# Patient Record
Sex: FEMALE | Race: WHITE | HISPANIC OR LATINO | Employment: OTHER | URBAN - METROPOLITAN AREA
[De-identification: names, ages, dates, MRNs, and addresses within clinical notes are randomized per-mention and may not be internally consistent; named-entity substitution may affect disease eponyms.]

---

## 2017-02-15 ENCOUNTER — OFFICE VISIT (OUTPATIENT)
Dept: INTERNAL MEDICINE | Facility: CLINIC | Age: 82
End: 2017-02-15
Payer: MEDICARE

## 2017-02-15 VITALS
WEIGHT: 122.81 LBS | DIASTOLIC BLOOD PRESSURE: 70 MMHG | RESPIRATION RATE: 18 BRPM | HEIGHT: 60 IN | BODY MASS INDEX: 24.11 KG/M2 | TEMPERATURE: 97 F | SYSTOLIC BLOOD PRESSURE: 134 MMHG | HEART RATE: 76 BPM

## 2017-02-15 DIAGNOSIS — J40 BRONCHITIS: Primary | ICD-10-CM

## 2017-02-15 DIAGNOSIS — E78.5 HYPERLIPIDEMIA, UNSPECIFIED HYPERLIPIDEMIA TYPE: ICD-10-CM

## 2017-02-15 DIAGNOSIS — M81.0 OSTEOPOROSIS: ICD-10-CM

## 2017-02-15 PROCEDURE — 99214 OFFICE O/P EST MOD 30 MIN: CPT | Mod: S$PBB,,, | Performed by: INTERNAL MEDICINE

## 2017-02-15 PROCEDURE — 99999 PR PBB SHADOW E&M-EST. PATIENT-LVL III: CPT | Mod: PBBFAC,,, | Performed by: INTERNAL MEDICINE

## 2017-02-15 PROCEDURE — 99213 OFFICE O/P EST LOW 20 MIN: CPT | Mod: PBBFAC,PO | Performed by: INTERNAL MEDICINE

## 2017-02-15 RX ORDER — IBUPROFEN 600 MG/1
600 TABLET ORAL EVERY 6 HOURS PRN
Qty: 60 TABLET | Refills: 5 | Status: SHIPPED | OUTPATIENT
Start: 2017-02-15 | End: 2017-07-27 | Stop reason: SDUPTHER

## 2017-02-15 RX ORDER — SIMVASTATIN 40 MG/1
40 TABLET, FILM COATED ORAL NIGHTLY
Qty: 90 TABLET | Refills: 3 | Status: SHIPPED | OUTPATIENT
Start: 2017-02-15 | End: 2018-04-11 | Stop reason: SDUPTHER

## 2017-02-15 RX ORDER — ALBUTEROL SULFATE 90 UG/1
1-2 AEROSOL, METERED RESPIRATORY (INHALATION) EVERY 6 HOURS PRN
Qty: 1 EACH | Refills: 0 | Status: ON HOLD | OUTPATIENT
Start: 2017-02-15 | End: 2019-02-19 | Stop reason: HOSPADM

## 2017-02-15 RX ORDER — AZITHROMYCIN 250 MG/1
TABLET, FILM COATED ORAL
Qty: 6 TABLET | Refills: 0 | Status: SHIPPED | OUTPATIENT
Start: 2017-02-15 | End: 2017-02-24

## 2017-02-15 NOTE — PROGRESS NOTES
Subjective:       Patient ID: Vicki Peña is a 81 y.o. female.    Chief Complaint: Cough (had flu 2 weeks ago and is still having a productive cough) and Insomnia    HPI     81-year-old female here for evaluation of a cough and insomnia.    She has a lot of coughing, especially when she is trying to go to sleep.  This started 2 weeks ago.  She thinks she had the flu.  Her nose was running and had a headache.  She felt chest congestion as well.  No fevers or chills.  She had sinus pressure before.  The cough is a little dry.  Before, she was taking robitussin.  She feels like she has congestion in her chest.  No SOB.  Her daughter is sick with similar symptoms.  She has taken ibuprofen for the pain in her head when she had sinus pressure.  The cough is keeping her from sleeping.    Review of Systems    Objective:      Physical Exam   Constitutional: She is oriented to person, place, and time. She appears well-developed and well-nourished.   HENT:   Head: Normocephalic and atraumatic.   Right Ear: Tympanic membrane and ear canal normal.   Left Ear: Tympanic membrane and ear canal normal.   Mouth/Throat: No oropharyngeal exudate.   Eyes: EOM are normal. Pupils are equal, round, and reactive to light. Right eye exhibits no discharge. Left eye exhibits no discharge. No scleral icterus.   Neck: Normal range of motion. Neck supple. No tracheal deviation present. No thyromegaly present.   Cardiovascular: Normal rate, regular rhythm and normal heart sounds.  Exam reveals no gallop and no friction rub.    No murmur heard.  Pulmonary/Chest: Effort normal and breath sounds normal. No respiratory distress. She has no wheezes. She has no rales. She exhibits no tenderness.   Abdominal: Soft. Bowel sounds are normal. She exhibits no distension and no mass. There is no tenderness. There is no rebound and no guarding.   Musculoskeletal: Normal range of motion. She exhibits no edema or tenderness.   Neurological: She is alert and  oriented to person, place, and time.   Skin: Skin is warm and dry. No rash noted. No erythema. No pallor.   Psychiatric: She has a normal mood and affect. Her behavior is normal.   Vitals reviewed.      Assessment:       1. Bronchitis    2. Hyperlipidemia, unspecified hyperlipidemia type    3. Osteoporosis        Plan:       1.  Z-Clement prescribed.  Albuterol prescribed.  2.  Simvastatin 40 mg.  3.  Continue with Boniva.  Check DEXA scan.

## 2017-02-16 ENCOUNTER — TELEPHONE (OUTPATIENT)
Dept: INTERNAL MEDICINE | Facility: CLINIC | Age: 82
End: 2017-02-16

## 2017-02-16 ENCOUNTER — LAB VISIT (OUTPATIENT)
Dept: LAB | Facility: HOSPITAL | Age: 82
End: 2017-02-16
Attending: INTERNAL MEDICINE
Payer: MEDICARE

## 2017-02-16 DIAGNOSIS — E87.5 HYPERKALEMIA: Primary | ICD-10-CM

## 2017-02-16 DIAGNOSIS — E78.5 HYPERLIPIDEMIA, UNSPECIFIED HYPERLIPIDEMIA TYPE: ICD-10-CM

## 2017-02-16 LAB
ALBUMIN SERPL BCP-MCNC: 3.4 G/DL
ALP SERPL-CCNC: 147 U/L
ALT SERPL W/O P-5'-P-CCNC: 24 U/L
ANION GAP SERPL CALC-SCNC: 8 MMOL/L
AST SERPL-CCNC: 26 U/L
BILIRUB SERPL-MCNC: 0.5 MG/DL
BUN SERPL-MCNC: 11 MG/DL
CALCIUM SERPL-MCNC: 9.5 MG/DL
CHLORIDE SERPL-SCNC: 106 MMOL/L
CHOLEST/HDLC SERPL: 2.1 {RATIO}
CO2 SERPL-SCNC: 25 MMOL/L
CREAT SERPL-MCNC: 0.8 MG/DL
EST. GFR  (AFRICAN AMERICAN): >60 ML/MIN/1.73 M^2
EST. GFR  (NON AFRICAN AMERICAN): >60 ML/MIN/1.73 M^2
GLUCOSE SERPL-MCNC: 86 MG/DL
HDL/CHOLESTEROL RATIO: 48.1 %
HDLC SERPL-MCNC: 162 MG/DL
HDLC SERPL-MCNC: 78 MG/DL
LDLC SERPL CALC-MCNC: 71.6 MG/DL
NONHDLC SERPL-MCNC: 84 MG/DL
POTASSIUM SERPL-SCNC: 5.5 MMOL/L
PROT SERPL-MCNC: 6.6 G/DL
SODIUM SERPL-SCNC: 139 MMOL/L
TRIGL SERPL-MCNC: 62 MG/DL

## 2017-02-16 PROCEDURE — 80061 LIPID PANEL: CPT

## 2017-02-16 PROCEDURE — 36415 COLL VENOUS BLD VENIPUNCTURE: CPT | Mod: PO

## 2017-02-16 PROCEDURE — 80053 COMPREHEN METABOLIC PANEL: CPT

## 2017-02-16 NOTE — TELEPHONE ENCOUNTER
Cholesterol is normal.  Kidney/liver function are normal.  Potassium is elevated.  Need to recheck potassium in the next couple days.

## 2017-02-24 ENCOUNTER — LAB VISIT (OUTPATIENT)
Dept: LAB | Facility: HOSPITAL | Age: 82
End: 2017-02-24
Attending: INTERNAL MEDICINE
Payer: MEDICARE

## 2017-02-24 ENCOUNTER — OFFICE VISIT (OUTPATIENT)
Dept: INTERNAL MEDICINE | Facility: CLINIC | Age: 82
End: 2017-02-24
Payer: MEDICARE

## 2017-02-24 VITALS
WEIGHT: 123.88 LBS | TEMPERATURE: 98 F | SYSTOLIC BLOOD PRESSURE: 138 MMHG | DIASTOLIC BLOOD PRESSURE: 72 MMHG | RESPIRATION RATE: 14 BRPM | BODY MASS INDEX: 24.32 KG/M2 | HEIGHT: 60 IN | HEART RATE: 82 BPM

## 2017-02-24 DIAGNOSIS — E87.5 HYPERKALEMIA: ICD-10-CM

## 2017-02-24 DIAGNOSIS — M79.671 RIGHT FOOT PAIN: ICD-10-CM

## 2017-02-24 DIAGNOSIS — M19.90 ARTHRITIS: ICD-10-CM

## 2017-02-24 DIAGNOSIS — M81.0 OSTEOPOROSIS: Primary | ICD-10-CM

## 2017-02-24 LAB
ANION GAP SERPL CALC-SCNC: 6 MMOL/L
BUN SERPL-MCNC: 16 MG/DL
CALCIUM SERPL-MCNC: 9.2 MG/DL
CHLORIDE SERPL-SCNC: 105 MMOL/L
CO2 SERPL-SCNC: 26 MMOL/L
CREAT SERPL-MCNC: 0.8 MG/DL
EST. GFR  (AFRICAN AMERICAN): >60 ML/MIN/1.73 M^2
EST. GFR  (NON AFRICAN AMERICAN): >60 ML/MIN/1.73 M^2
GLUCOSE SERPL-MCNC: 88 MG/DL
POTASSIUM SERPL-SCNC: 4.4 MMOL/L
SODIUM SERPL-SCNC: 137 MMOL/L

## 2017-02-24 PROCEDURE — 99999 PR PBB SHADOW E&M-EST. PATIENT-LVL III: CPT | Mod: PBBFAC,,, | Performed by: INTERNAL MEDICINE

## 2017-02-24 PROCEDURE — 36415 COLL VENOUS BLD VENIPUNCTURE: CPT | Mod: PO

## 2017-02-24 PROCEDURE — 99214 OFFICE O/P EST MOD 30 MIN: CPT | Mod: S$PBB,,, | Performed by: INTERNAL MEDICINE

## 2017-02-24 PROCEDURE — 80048 BASIC METABOLIC PNL TOTAL CA: CPT

## 2017-02-24 RX ORDER — RISEDRONATE SODIUM 150 MG/1
150 TABLET, FILM COATED ORAL
Qty: 1 TABLET | Refills: 11 | Status: SHIPPED | OUTPATIENT
Start: 2017-02-24 | End: 2018-04-11 | Stop reason: SDUPTHER

## 2017-02-24 NOTE — PROGRESS NOTES
Subjective:       Patient ID: Vicki Peña is a 81 y.o. female.    Chief Complaint: Results and Foot Pain    HPI     81-year-old female here for follow-up of results and foot pain.  She had a DEXA scan showing osteoporosis.    She reports that she has trouble with her right foot.   She has a bump.  She has pain in her toes as well.  This has been going on a long time.  She has pressure in her shoes causing pain.    She complains of arthritis in her knees.  The right is worse than the left.   She can exercise.  She was told that she needs surgery.  She is worried about surgery.    Review of Systems    Objective:      Physical Exam   Constitutional: She is oriented to person, place, and time. She appears well-developed and well-nourished.   HENT:   Head: Normocephalic and atraumatic.   Mouth/Throat: No oropharyngeal exudate.   Eyes: EOM are normal. Pupils are equal, round, and reactive to light. Right eye exhibits no discharge. Left eye exhibits no discharge. No scleral icterus.   Neck: Normal range of motion. Neck supple. No tracheal deviation present. No thyromegaly present.   Cardiovascular: Normal rate, regular rhythm and normal heart sounds.  Exam reveals no gallop and no friction rub.    No murmur heard.  Pulmonary/Chest: Effort normal and breath sounds normal. No respiratory distress. She has no wheezes. She has no rales. She exhibits no tenderness.   Abdominal: Soft. Bowel sounds are normal. She exhibits no distension and no mass. There is no tenderness. There is no rebound and no guarding.   Musculoskeletal: Normal range of motion. She exhibits no edema or tenderness.   Neurological: She is alert and oriented to person, place, and time.   Skin: Skin is warm and dry. No rash noted. No erythema. No pallor.   Psychiatric: She has a normal mood and affect. Her behavior is normal.   Vitals reviewed.      Assessment:       1. Osteoporosis    2. Right foot pain    3. Arthritis    4. Hyperkalemia        Plan:        1.  Actonel 150 mg monthly.  2.  Refer to podiatry.  Recommend wider shoes.  3.  Recommend patient continue with exercise and living her life.  When this becomes difficult, would recommend surgery.  4.  Check BMP.

## 2017-02-27 ENCOUNTER — OFFICE VISIT (OUTPATIENT)
Dept: PODIATRY | Facility: CLINIC | Age: 82
End: 2017-02-27
Payer: MEDICARE

## 2017-02-27 ENCOUNTER — HOSPITAL ENCOUNTER (OUTPATIENT)
Dept: RADIOLOGY | Facility: HOSPITAL | Age: 82
Discharge: HOME | End: 2017-02-27
Attending: PODIATRIST
Payer: MEDICARE

## 2017-02-27 VITALS
HEIGHT: 60 IN | HEART RATE: 70 BPM | WEIGHT: 123 LBS | DIASTOLIC BLOOD PRESSURE: 76 MMHG | BODY MASS INDEX: 24.15 KG/M2 | SYSTOLIC BLOOD PRESSURE: 151 MMHG

## 2017-02-27 DIAGNOSIS — M20.41 HAMMER TOE OF RIGHT FOOT: ICD-10-CM

## 2017-02-27 DIAGNOSIS — M21.611 BUNION OF GREAT TOE OF RIGHT FOOT: ICD-10-CM

## 2017-02-27 DIAGNOSIS — M79.671 RIGHT FOOT PAIN: ICD-10-CM

## 2017-02-27 DIAGNOSIS — M79.671 RIGHT FOOT PAIN: Primary | ICD-10-CM

## 2017-02-27 DIAGNOSIS — L84 CORN OR CALLUS: ICD-10-CM

## 2017-02-27 PROCEDURE — 73630 X-RAY EXAM OF FOOT: CPT | Mod: 26,RT,, | Performed by: RADIOLOGY

## 2017-02-27 PROCEDURE — 99203 OFFICE O/P NEW LOW 30 MIN: CPT | Mod: S$PBB,,, | Performed by: PODIATRIST

## 2017-02-27 PROCEDURE — 99999 PR PBB SHADOW E&M-EST. PATIENT-LVL III: CPT | Mod: PBBFAC,,, | Performed by: PODIATRIST

## 2017-02-27 PROCEDURE — 73630 X-RAY EXAM OF FOOT: CPT | Mod: TC,PO,RT

## 2017-02-27 NOTE — PROGRESS NOTES
Subjective:    Patient ID: Vicki Peña is a 81 y.o. female.    Chief Complaint: Foot Pain (right foot /between toes)      HPI:   Vicki is a 81 y.o. female who presents to the podiatry clinic  with complaint of  right foot pain. Onset of the symptoms was several years ago. Precipitating event: none known. Current symptoms include: stiffness, swelling and worsening symptoms after a period of activity. Aggravating factors: any weight bearing. Symptoms have gradually worsened. Patient has had no prior foot problems. Evaluation to date: none. Treatment to date: none. Patients rates pain 5/10 on pain scale.  Of note - pt had dexa scan showing osteoporosis.    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     Social History Main Topics    Smoking status: Never Smoker    Smokeless tobacco: Never Used    Alcohol use No      Comment: moderate    Drug use: No    Sexual activity: Yes     Partners: Male     Other Topics Concern    Not on file     Social History Narrative         Current Outpatient Prescriptions:     albuterol 90 mcg/actuation inhaler, Inhale 1-2 puffs into the lungs every 6 (six) hours as needed for Wheezing., Disp: 1 each, Rfl: 0    ascorbic acid (VITAMIN C) 500 MG tablet, Take 1 tablet (500 mg total) by  mouth 2 (two) times daily., Disp: , Rfl:     cranberry 400 mg Cap, Take by mouth.  , Disp: , Rfl:     GLUCOSAMINE SULFATE (GLUCOSAMINE ORAL), Take 2 tablets by mouth once daily., Disp: , Rfl:     ibuprofen (ADVIL,MOTRIN) 600 MG tablet, Take 1 tablet (600 mg total) by mouth every 6 (six) hours as needed for Pain., Disp: 60 tablet, Rfl: 5    multivitamin capsule, Take 1 capsule by mouth once daily.  , Disp: , Rfl:     naproxen (NAPROSYN) 500 MG tablet, Take 1 tablet (500 mg total) by mouth 2 (two) times daily., Disp: 60 tablet, Rfl: 1    risedronate (ACTONEL) 150 MG Tab, Take 1 tablet (150 mg total) by mouth every 30 days. with water on empty stomach, nothing by mouth or lie down for next 30 minutes., Disp: 1 tablet, Rfl: 11    simvastatin (ZOCOR) 40 MG tablet, Take 1 tablet (40 mg total) by mouth every evening., Disp: 90 tablet, Rfl: 3    tramadol (ULTRAM) 50 mg tablet, Take 1 tablet (50 mg total) by mouth nightly as needed for Pain., Disp: 30 tablet, Rfl: 0    estradiol (ESTRACE) 0.01 % (0.1 mg/gram) vaginal cream, Place 1 g vaginally every other day. Pea size amount 3 x week (M-W-F) at bedtime, Disp: 42.5 g, Rfl: 3    ibandronate (BONIVA) 150 mg tablet, Take 1 tablet (150 mg total) by mouth every 30 days., Disp: 1 tablet, Rfl: 11  Review of patient's allergies indicates:   Allergen Reactions    Tramadol Rash       BP (!) 151/76  Pulse 70  Ht 5' (1.524 m)  Wt 55.8 kg (123 lb)  BMI 24.02 kg/m2    ROS  ROS Constitutional:  General Appearance: well nourished  Vascular: negative for cramps, edema and bruising  Musculoskeletal: positive for joint pain, joint edema  Skin: negative for rashes and lesions  Neurological: negative for burning, tingling and numbness  Gastrointestinal: negative for stomach pain, nausea and vomiting        Objective:        Ortho/SPM Exam  Physical Exam  LE exam cont:  V: DP 2/4, PT 2/4, CRT< 3s to all digits tested.     N: SILT in SP/DP/T/Pepe/Saph distributions.     Derm: Skin  "intact. No erythema, edema or ecchymosis. +hyperkeratotic lesions "kissing lesions" toes 1-4    Ortho:  +Motor EHL/FHL/TA/GA   Lateral deviation of hallux R foot   Hammertoe deformity 2-5 R foot   Compartments soft/compressible. No pain on passive stretch of big toe. No calf  pain.        Assessment:     Imaging / Labs:          1. Right foot pain    2. Hammer toe of right foot    3. Bunion of great toe of right foot    4. Corn or callus          Plan:       Orders Placed This Encounter    X-Ray Foot Complete Right     Vicki was seen today for foot pain.    Diagnoses and all orders for this visit:    Right foot pain  -     X-Ray Foot Complete Right; Future    Hammer toe of right foot    Bunion of great toe of right foot    Corn or callus        Vicki Peña is a 81 y.o. female presenting w/   1. Right foot pain    2. Hammer toe of right foot    3. Bunion of great toe of right foot    4. Corn or callus        -pt seen, evaluated, and managed  -dx discussed in detail. All questions/concerns addressed  -all tx options discussed. All alternatives, risks, benefits of all txs discussed  -The patient was educated regarding the above diagnosis. We discussed conservative care options regarding shoe wear and/or padding.  -rxs dispensed: none  -offloading pads dispensed  -discussed bunion and hammertoe eitiology and txs  -rec'd wider shoegear  -rec'd OTC wart kits for callus  -XR on way out to assess deformity---> will review at nxt visit    Return in about 8 weeks (around 4/24/2017) for xr review.          "

## 2017-02-27 NOTE — LETTER
February 27, 2017      Yaron Waite MD  [address] [facility]  [place] [address]           [place] [facility]  [address] [facility] [address]  Phone: [phone]          Patient: Vicki Peña   MR Number: [id number]   YOB: 1935   Date of Visit: 2/27/2017       Dear Dr. Yaron Waite:    Thank you for referring Vicki Peña to me for evaluation. Attached you will find relevant portions of my assessment and plan of care.    If you have questions, please do not hesitate to call me. I look forward to following Vicki Peña along with you.    Sincerely,    Larry Odell Jr., DPBESS    Enclosure  CC:  No Recipients    If you would like to receive this communication electronically, please contact [email or web address] or [phone] to request more information on [employer] access.    For providers and/or their staff who would like to refer a patient to [facility], please contact us through our one-stop-shop provider referral line, [facility], at [phone].    If you feel you have received this communication in error or would no longer like to receive these types of communications, please e-mail [email or web address]

## 2017-02-27 NOTE — PATIENT INSTRUCTIONS
What Are Corns and Calluses?    Corns and calluses are your bodys response to friction or pressure against the skin. If your foot rubs the inside of your shoe, the affected area of skin thickens. Or, if a bone is not in the normal position, skin caught between bone and shoe or bone and ground builds up. In either case, the outer layer of skin thickens to protect the foot from unusual pressure. In many cases, corns and calluses look bad but are not harmful. However, more severe corns and calluses may become infected, destroy healthy tissue, or affect foot movement.    Corns  Corns usually grow on top of the foot, often at the toe joint. Corns can range from a slight thickening of skin to a painful soft or hard bump. They often form on top of buckled toe joints (hammer toes). If your toes curl under, corns may grow on the tips of the toes. You may also get a corn on the end of a toe if it rubs against your shoe. Corns can also grow between toes, often between the first and second toes.    Calluses  Calluses grow on the bottom of the foot or on the outer edge of a toe or heel. A callus may spread across the ball of your foot. This type of callus is usually due to a problem with a metatarsal (the long bone at the base of a toe, near the ball of the foot). A pinch callus may grow along the outer edge of the heel or the big toe. Some calluses press up into the foot instead of spreading on the outside. A callus may form a central core or plug of tissue where pressure is greatest.  Date Last Reviewed: 9/21/2015 © 2000-2016 [employer]. [address] [place] [address], [place] [address]. All rights reserved. This information is not intended as a substitute for professional medical care. Always follow your healthcare professional's instructions.        Treating Corns and Calluses  If your corns or calluses are mild, reducing friction may help. Different shoes, moleskin patches, or soft pads may be all the treatment  you need. In more severe cases, treating tissue buildup may require your doctors care. Sometimes custom-made shoe inserts (orthotics) or special pads are prescribed to reduce friction and pressure.    Change shoes  If you have corns, your doctor may suggest wearing shoes that have more toe room. This way, buckled joints are less likely to be pinched against the top of the shoe. If you have calluses, wearing a cushioned insole, arch support, or heel counter can help reduce friction.  Visit your doctor  In some cases, your doctor may trim away the outer layers of skin that make up the corn or callus. For a painful corn, medicine may be injected beneath the built-up tissue.  Wear orthotics  Orthotics are specially made to meet the needs of your feet. They cushion calluses or divert pressure away from these problem areas. Worn as directed, orthotics help limit existing problems and prevent new ones from forming.  If you need surgery  If a bone or joint is out of place, certain parts of your foot may be under too much pressure. This can cause severe corns and calluses. In such cases, surgery may be the best way to correct the problem.  Outpatient procedures  In most cases, surgery to improve bone position is an outpatient procedure. Your doctor may cut away excess bone, reposition prominent bones, or even fuse joints. Sometimes tendons or ligaments are cut to reduce tension on a bone or joint. Your doctor will talk with you about the procedure that is best suited to your needs.  Date Last Reviewed: 7/1/2016 © 2000-2016 [employer] [address] [place] [address], [place] [address]. All rights reserved. This information is not intended as a substitute for professional medical care. Always follow your healthcare professional's instructions.        Treating Mallet, Hammer, and Claw Toes  Definitions  A hammer toe has an abnormal bend in the middle joint of your toe (toe is bent upward at joint).  Mallet toe affects  the joint nearest your toenail (toe is bent downward at joint).  Claw toe affects the joint at the ball of your foot (toe is bent upward at joint), as well as both toe joints (toe is bent downward at both joints).  Hammer toe and mallet toe are most likely to occur in the toe next to your big toe.  Causes  The most common cause for all 3 deformities is poorly fitting shoes and tight shoes, especially high heels for women. Trauma and nerve damage from various diseases like diabetes may also cause these deformities.  Treatment  Buying shoes with more room in the toes, filing down corns and calluses, and padding, taping, or strapping the toe most often relieve the pain. Toe stretching and exercises may also be helpful. If these steps dont work, you may need surgery to straighten your toes.  Shoes  Buy low-heeled shoes with plenty of room in the front. This keeps your toes from being jammed against the end of your shoe. It also keeps your shoe from rubbing the tops of your toes.  Corns and calluses    To file down a corn or callus, soak your foot in warm water. This softens the hard skin. Dry your foot. Then gently rub the corn or callus with a pumice stone or nail file.  Pads and splints  If you still have pain, you may need to put a pad or splint on your toe. This helps take pressure off the painful corn or callus.  · For a mallet toe, you can put a gel pad on the toe. This keeps the tip of the toe from rubbing against the bottom of the shoe.  · For a hammer or claw toe, you can put a felt or foam pad over the bent joint. This keeps the toe from rubbing on the top of the shoe.  · For a hammer or claw toe that is still flexible, you can put a splint on the toe. This keeps it straight so it doesn't rub on the top of the shoe.    Date Last Reviewed: 9/29/2015 © 2000-2016 [employer]. [address] [place] [address], [place] [address]. All rights reserved. This information is not intended as a substitute for  professional medical care. Always follow your healthcare professional's instructions.          Bunion    You have a bunion. This is a bony bump at the base of your big toe, along the inside edge of your foot. As the bump gets bigger, it can become red, swollen, and painful with shoe wear.  Bunions may occur if you wear shoes that are too tight and pinch your toes together. High heels may make this worse. In some cases a bunion is due to poor alignment of the foot and ankle. This puts extra weight on the instep of each foot.  Once a bunion forms, it changes the way weight is spread all across your foot. This causes the bunion to get worse over time. The big toe will bend more and more toward the other toes.  A minor bunion can be treated by:  · Wearing properly fitting shoes  · Using bunion pads  · Wearing shoe inserts, called orthotics, to better align the foot and ankle  Physical therapy with ultrasound or whirlpool baths can ease pain, redness, and swelling. Severe cases may require surgery. If you dont treat what is causing the bunion, it may get larger and more painful.  Home care  · Limit high heels. These shoes force your foot forward, crowding the toes together.  · Switch to comfortable shoes with a wide toe area. Or have your existing shoes stretched by a shoe repair shop.  · Avoid shoes that are tight, narrow, or pointed.  · If you are flat-footed, using arch supports may help prevent further deformity. The best shoe inserts are the ones custom made by a foot specialist, called a podiatrist, or other healthcare provider.  · Put a bunion pad over the bunion to ease pressure of your shoe against the bunion. You can buy these pads at most pharmacies without a prescription  · To reduce pain and swelling, apply an ice pack over the injured area for 15 to 20 minutes. Do this every 1 to 2 hours the first day. Keep using ice 3 to 4 times a day until the pain and swelling goes away.  · To make an ice pack, put ice  "cubes in a sealed zip-lock plastic bag. Wrap the bag in a clean, thin towel or cloth. Never put ice or an ice pack directly on the skin.  · You may use over-the-counter pain medicine to control pain, unless another medicine was prescribed. Talk with your provider before using these medicines if you have chronic liver or kidney disease, or ever had a stomach ulcer or GI (gastrointestinal) bleeding.  Follow-up care  Follow up with a podiatrist or foot doctor, or as advised.  If X-rays were taken, you will be notified of any new findings that may affect your care.  When to seek medical care  Contact your healthcare provider if any of the following occur:  · Increasing pain or redness around the base of the big toe  · Painful ingrown toenail, with redness and swelling or pus around the nail  Date Last Reviewed: 11/21/2015  © 5454-4484 [employer]. [address] [place] [address]. All rights reserved. This information is not intended as a substitute for professional medical care. Always follow your healthcare professional's instructions.          Treating Bunions  Although a bunion wont go away, wearing shoes that fit properly will often relieve the pain. Padding and icing the bunion may also help. Bunions that remain painful may need surgery.     Heels: Heel height should be low. The back of the shoe should  your heel firmly so the shoe doesn't flop when you walk.         Toes: There should be 1/2" between your longest toe and the tip of the shoe. The shoe should be wide enough for you to wiggle your toes.    Shoes  To relieve a bunion, you dont have to buy shoes that are ugly or out of fashion. But follow these tips:  · Shop for shoes late in the day. This is when your feet are the largest.  · Have both feet measured often. Fit shoes to your larger foot.  · Look for shoes that have the same shape as your foot but are slightly wider across the toes.  · Choose low-heeled shoes.  · Always try " shoes on. Stand up and walk around. If the shoes arent comfortable, dont buy them.  Ice massage  To help relieve a painful bunion, put an ice cube in a plastic bag. Rub the ice on the bunion for 5 minutes. Repeat 2 to 3 times a day.  Pads  You may want to put a pad over the bunion to cushion it. You can buy bunion pads at [facility].  Surgery  Wearing wider shoes and padding the bunion may not relieve the pain. Your healthcare provider may then suggest surgery. During surgery, the bunion is shaved away and the bones are put back in a straight line.   Date Last Reviewed: 9/27/2015  © 6849-0550 [employer]. [address] [place] [address], [place] [address]. All rights reserved. This information is not intended as a substitute for professional medical care. Always follow your healthcare professional's instructions.            What Is Arthritis in the Foot?  Degenerative arthritis is a condition that slowly wears away joints, the area where bones meet and move. In the beginning, you may notice that the affected joint seems stiff. It may even ache. As the joint lining (cartilage) breaks down, the bones rub against each other, causing pain and swelling. Over time, small pieces of rough or splintered bone (bone spurs) develop, and the joints range of motion becomes limited. But movement doesnt have to cause pain. The effects of arthritis can be reduced.    The big-toe joint  When arthritis affects your big toe, your foot hurts when it pushes off the ground. Arthritis often appears in the big-toe joint along with a bunion (a bony bump at the side of the joint) or a bone spur on top of the joint.    Other joints  When arthritis affects the rear or midfoot joints, you feel pain when you put weight on your foot. Arthritis may affect the joint where the ankle and foot meet. It may also affect other joints nearby.  Date Last Reviewed: 7/1/2016  © 0617-4848 [employer]. [address] [place] [address],  [place] [address]. All rights reserved. This information is not intended as a substitute for professional medical care. Always follow your healthcare professional's instructions.        Treating Arthritis in the Foot  If your symptoms are mild, medications may be enough to reduce pain and swelling. For more severe arthritis, surgery may be needed to improve the condition of the joint.    Medicine  Your doctor may prescribe medicine--pills or injections--to limit pain and swelling. Ice, aspirin, acetaminophen, or ibuprofen may help relieve mild symptoms that occur after activity.  Surgery and bone trimming  To ease movement and reduce pain, your doctor may trim damaged bone. If arthritis is severe, the joint may be fused or removed. If the bone is not damaged too badly, your doctor may simply shave away bone spurs. Any excess bone growth related to a bunion may also be trimmed.  Fusing joints  If damage is more severe, your doctor may fuse the joint to prevent the bones from rubbing. Afterward, staples, plates, or screws may hold the bones in place so they heal properly. In some cases, the joint may be removed and replaced with an implant.  After surgery  During the early stages of recovery, your foot is likely to be bandaged and immobilized for a while. For best results, follow up with your doctor as scheduled. These visits help ensure that your foot heals properly.  As you heal  After surgery, youll be told how to care for your incision and how soon to begin walking on the foot. Until the foot can bear weight, you may need to walk with crutches or a cane.  For surgery on the big toe, your foot may be splinted to limit movement for several weeks. Despite this, you should be able to walk soon after surgery.  For surgery on rear or midfoot joints, you may need to wear a cast or surgical shoe. These joints are fairly large, so full recovery may take a few months. Once the bone has healed, any staples, plates, or  screws may be removed.  Date Last Reviewed: 7/1/2016  © 5819-1248 [employer]. [address] [place] [address], [place] [address]. All rights reserved. This information is not intended as a substitute for professional medical care. Always follow your healthcare professional's instructions.

## 2017-02-27 NOTE — MR AVS SNAPSHOT
[facility]   [facility]  [place] [address]  Phone: [phone]                  Vicki Peña   2017 7:45 AM   Office Visit    Description:  Female : 1935   Provider:  Larry Odell Jr., DPM   Department:  [facility]           Reason for Visit     Foot Pain           Diagnoses this Visit        Comments    Right foot pain    -  Primary            To Do List           Future Appointments        Provider Department Dept Phone    2017 8:00 AM Larry Odell Jr., DPM [facility] [phone]      Goals (5 Years of Data)     None      Follow-Up and Disposition     Return in about 8 weeks (around 2017) for xr review.      [facility]     [facility] On Call Nurse Care Line -  Assistance  Registered nurses in the [facility] Call Center provide clinical advisement, health education, appointment booking, and other advisory services.  Call for this free service at [phone].             Medications           Message regarding Medications     Verify the changes and/or additions to your medication regime listed below are the same as discussed with your clinician today.  If any of these changes or additions are incorrect, please notify your healthcare provider.             Verify that the below list of medications is an accurate representation of the medications you are currently taking.  If none reported, the list may be blank. If incorrect, please contact your healthcare provider. Carry this list with you in case of emergency.           Current Medications     albuterol 90 mcg/actuation inhaler Inhale 1-2 puffs into the lungs every 6 (six) hours as needed for Wheezing.    ascorbic acid (VITAMIN C) 500 MG tablet Take 1 tablet (500 mg total) by mouth 2 (two) times daily.    cranberry 400 mg Cap Take by mouth.      GLUCOSAMINE SULFATE (GLUCOSAMINE ORAL) Take 2 tablets by mouth once daily.    ibuprofen (ADVIL,MOTRIN) 600 MG tablet Take 1  tablet (600 mg total) by mouth every 6 (six) hours as needed for Pain.    multivitamin capsule Take 1 capsule by mouth once daily.      naproxen (NAPROSYN) 500 MG tablet Take 1 tablet (500 mg total) by mouth 2 (two) times daily.    risedronate (ACTONEL) 150 MG Tab Take 1 tablet (150 mg total) by mouth every 30 days. with water on empty stomach, nothing by mouth or lie down for next 30 minutes.    simvastatin (ZOCOR) 40 MG tablet Take 1 tablet (40 mg total) by mouth every evening.    tramadol (ULTRAM) 50 mg tablet Take 1 tablet (50 mg total) by mouth nightly as needed for Pain.    estradiol (ESTRACE) 0.01 % (0.1 mg/gram) vaginal cream Place 1 g vaginally every other day. Pea size amount 3 x week (M-W-F) at bedtime    ibandronate (BONIVA) 150 mg tablet Take 1 tablet (150 mg total) by mouth every 30 days.           Clinical Reference Information           Your Vitals Were     BP                   151/76           Blood Pressure          Most Recent Value    BP  (!)  151/76      Allergies as of 2/27/2017     Tramadol      Immunizations Administered on Date of Encounter - 2/27/2017     None      Orders Placed During Today's Visit     Future Labs/Procedures Expected by Expires    X-Ray Foot Complete Right  2/27/2017 2/27/2018      Instructions      What Is Arthritis in the Foot?  Degenerative arthritis is a condition that slowly wears away joints, the area where bones meet and move. In the beginning, you may notice that the affected joint seems stiff. It may even ache. As the joint lining (cartilage) breaks down, the bones rub against each other, causing pain and swelling. Over time, small pieces of rough or splintered bone (bone spurs) develop, and the joints range of motion becomes limited. But movement doesnt have to cause pain. The effects of arthritis can be reduced.    The big-toe joint  When arthritis affects your big toe, your foot hurts when it pushes off the ground. Arthritis often appears in the big-toe joint  along with a bunion (a bony bump at the side of the joint) or a bone spur on top of the joint.    Other joints  When arthritis affects the rear or midfoot joints, you feel pain when you put weight on your foot. Arthritis may affect the joint where the ankle and foot meet. It may also affect other joints nearby.  Date Last Reviewed: 7/1/2016  © 5739-1050 [employer]. [address] [place] [address], [place] [address]. All rights reserved. This information is not intended as a substitute for professional medical care. Always follow your healthcare professional's instructions.        Treating Arthritis in the Foot  If your symptoms are mild, medications may be enough to reduce pain and swelling. For more severe arthritis, surgery may be needed to improve the condition of the joint.    Medicine  Your doctor may prescribe medicine--pills or injections--to limit pain and swelling. Ice, aspirin, acetaminophen, or ibuprofen may help relieve mild symptoms that occur after activity.  Surgery and bone trimming  To ease movement and reduce pain, your doctor may trim damaged bone. If arthritis is severe, the joint may be fused or removed. If the bone is not damaged too badly, your doctor may simply shave away bone spurs. Any excess bone growth related to a bunion may also be trimmed.  Fusing joints  If damage is more severe, your doctor may fuse the joint to prevent the bones from rubbing. Afterward, staples, plates, or screws may hold the bones in place so they heal properly. In some cases, the joint may be removed and replaced with an implant.  After surgery  During the early stages of recovery, your foot is likely to be bandaged and immobilized for a while. For best results, follow up with your doctor as scheduled. These visits help ensure that your foot heals properly.  As you heal  After surgery, youll be told how to care for your incision and how soon to begin walking on the foot. Until the foot can bear weight, you  may need to walk with crutches or a cane.  For surgery on the big toe, your foot may be splinted to limit movement for several weeks. Despite this, you should be able to walk soon after surgery.  For surgery on rear or midfoot joints, you may need to wear a cast or surgical shoe. These joints are fairly large, so full recovery may take a few months. Once the bone has healed, any staples, plates, or screws may be removed.  Date Last Reviewed: 7/1/2016 © 2000-2016 [facility]. [address] [place] [address]. All rights reserved. This information is not intended as a substitute for professional medical care. Always follow your healthcare professional's instructions.             Language Assistance Services     ATTENTION: Language assistance services are available, free of charge. Please call [phone].      ATENCIÓN: Si rosa moralez, tiene a yang disposición servicios gratuitos de asistencia lingüística. Llame al [phone].     CHÚ Ý: N?u b?n nói Ti?ng Vi?t, có các d?ch v? h? tr? ngôn ng? mi?n phí dành cho b?n. G?i s? [phone].         [facility] complies with applicable Federal civil rights laws and does not discriminate on the basis of race, color, national origin, age, disability, or sex.

## 2017-07-27 ENCOUNTER — HOSPITAL ENCOUNTER (OUTPATIENT)
Dept: RADIOLOGY | Facility: HOSPITAL | Age: 82
Discharge: HOME | End: 2017-07-27
Attending: ORTHOPAEDIC SURGERY
Payer: MEDICARE

## 2017-07-27 ENCOUNTER — OFFICE VISIT (OUTPATIENT)
Dept: ORTHOPEDICS | Facility: CLINIC | Age: 82
End: 2017-07-27
Payer: MEDICARE

## 2017-07-27 VITALS — BODY MASS INDEX: 25.99 KG/M2 | HEIGHT: 60 IN | WEIGHT: 132.38 LBS

## 2017-07-27 DIAGNOSIS — M25.561 PAIN IN BOTH KNEES, UNSPECIFIED CHRONICITY: ICD-10-CM

## 2017-07-27 DIAGNOSIS — M25.562 PAIN IN BOTH KNEES, UNSPECIFIED CHRONICITY: ICD-10-CM

## 2017-07-27 DIAGNOSIS — M17.0 OSTEOARTHRITIS OF BOTH KNEES, UNSPECIFIED OSTEOARTHRITIS TYPE: Primary | ICD-10-CM

## 2017-07-27 PROCEDURE — 20610 DRAIN/INJ JOINT/BURSA W/O US: CPT | Mod: 50,S$PBB,, | Performed by: PHYSICIAN ASSISTANT

## 2017-07-27 PROCEDURE — 99999 PR PBB SHADOW E&M-EST. PATIENT-LVL III: CPT | Mod: PBBFAC,,, | Performed by: PHYSICIAN ASSISTANT

## 2017-07-27 PROCEDURE — 73564 X-RAY EXAM KNEE 4 OR MORE: CPT | Mod: 26,50,, | Performed by: RADIOLOGY

## 2017-07-27 PROCEDURE — 1159F MED LIST DOCD IN RCRD: CPT | Mod: ,,, | Performed by: PHYSICIAN ASSISTANT

## 2017-07-27 PROCEDURE — 1125F AMNT PAIN NOTED PAIN PRSNT: CPT | Mod: ,,, | Performed by: PHYSICIAN ASSISTANT

## 2017-07-27 PROCEDURE — 99213 OFFICE O/P EST LOW 20 MIN: CPT | Mod: 25,S$PBB,, | Performed by: PHYSICIAN ASSISTANT

## 2017-07-27 PROCEDURE — 73564 X-RAY EXAM KNEE 4 OR MORE: CPT | Mod: TC,50

## 2017-07-27 RX ORDER — METHYLPREDNISOLONE ACETATE 80 MG/ML
80 INJECTION, SUSPENSION INTRA-ARTICULAR; INTRALESIONAL; INTRAMUSCULAR; SOFT TISSUE
Status: COMPLETED | OUTPATIENT
Start: 2017-07-27 | End: 2017-07-27

## 2017-07-27 RX ORDER — IBUPROFEN 600 MG/1
600 TABLET ORAL EVERY 6 HOURS PRN
Qty: 60 TABLET | Refills: 2 | Status: SHIPPED | OUTPATIENT
Start: 2017-07-27 | End: 2018-07-02 | Stop reason: SDUPTHER

## 2017-07-27 RX ADMIN — METHYLPREDNISOLONE ACETATE 80 MG: 80 INJECTION, SUSPENSION INTRALESIONAL; INTRAMUSCULAR; INTRASYNOVIAL; SOFT TISSUE at 02:07

## 2017-07-27 NOTE — PROGRESS NOTES
Subjective:      Patient ID: Vicki Peña is a 82 y.o. female.    Chief Complaint: No chief complaint on file.    HPI  82 year old female presents with chief complaint of bilateral knee pain R>L. Right knee pain is chronic and she has h/o right knee surgery for meniscus tear in 2000. Left knee pain started in February. No trauma. Pain occurs with a lot of walking or exercising. Ibuprofen prn helps. She denies swelling. She does not use assistive devices.   Review of Systems   Constitution: Negative for chills, fever and night sweats.   Cardiovascular: Negative for chest pain.   Respiratory: Negative for cough and shortness of breath.    Hematologic/Lymphatic: Does not bruise/bleed easily.   Skin: Negative for color change.   Gastrointestinal: Negative for heartburn.   Genitourinary: Negative for dysuria.   Neurological: Negative for numbness and paresthesias.   Psychiatric/Behavioral: Negative for altered mental status.   Allergic/Immunologic: Negative for persistent infections.         Objective:            General    Vitals reviewed.  Constitutional: She is oriented to person, place, and time. She appears well-developed and well-nourished.   Cardiovascular: Normal rate.    Neurological: She is alert and oriented to person, place, and time.             Right Knee Exam:  ROM 0-100 degrees  +crepitus  No effusion  TTP medial joint line    Left Knee Exam:  ROM 0-110 degrees  +crepitus  No effusion  TTP medial joint line    X-ray: ordered and reviewed by myself. Advanced DJD both knees.      Assessment:       Encounter Diagnosis   Name Primary?    Osteoarthritis of both knees, unspecified osteoarthritis type Yes          Plan:       Discussed treatment options with patient. She is not interested in surgery. She would like to try an injection. Continue ibuprofen as needed. RTC prn.     PROCEDURE:  I have explained the risks, benefits, and alternatives of the procedure in detail.  The patient voices understanding and  all questions have been answered.  The patient agrees to proceed as planned. So after I performed a sterile prep of the skin in the normal fashion the bilateral knee is injected using a 22 gauge needle from the anteromedial approach with a combination of 4cc 1% plain lidocaine and 80 mg of depo medrol.  The patient is cautioned and immediate relief of pain is secondary to the local anesthetic and will be temporary.  After the anesthetic wears off there may be a increase in pain that may last for a few hours or a few days and they should use ice to help alleviate this flair up of pain.

## 2017-08-08 ENCOUNTER — OFFICE VISIT (OUTPATIENT)
Dept: INTERNAL MEDICINE | Facility: CLINIC | Age: 82
End: 2017-08-08
Payer: MEDICARE

## 2017-08-08 VITALS
DIASTOLIC BLOOD PRESSURE: 84 MMHG | HEART RATE: 63 BPM | SYSTOLIC BLOOD PRESSURE: 120 MMHG | TEMPERATURE: 97 F | BODY MASS INDEX: 23.82 KG/M2 | WEIGHT: 129.44 LBS | HEIGHT: 62 IN

## 2017-08-08 DIAGNOSIS — G47.00 INSOMNIA, UNSPECIFIED TYPE: ICD-10-CM

## 2017-08-08 DIAGNOSIS — F41.9 ANXIETY: Primary | ICD-10-CM

## 2017-08-08 PROCEDURE — 99213 OFFICE O/P EST LOW 20 MIN: CPT | Mod: PBBFAC,PO | Performed by: INTERNAL MEDICINE

## 2017-08-08 PROCEDURE — 3008F BODY MASS INDEX DOCD: CPT | Mod: ,,, | Performed by: INTERNAL MEDICINE

## 2017-08-08 PROCEDURE — 99214 OFFICE O/P EST MOD 30 MIN: CPT | Mod: S$PBB,,, | Performed by: INTERNAL MEDICINE

## 2017-08-08 PROCEDURE — 1159F MED LIST DOCD IN RCRD: CPT | Mod: ,,, | Performed by: INTERNAL MEDICINE

## 2017-08-08 PROCEDURE — 99999 PR PBB SHADOW E&M-EST. PATIENT-LVL III: CPT | Mod: PBBFAC,,, | Performed by: INTERNAL MEDICINE

## 2017-08-08 PROCEDURE — 1126F AMNT PAIN NOTED NONE PRSNT: CPT | Mod: ,,, | Performed by: INTERNAL MEDICINE

## 2017-08-08 RX ORDER — TRAZODONE HYDROCHLORIDE 50 MG/1
50 TABLET ORAL NIGHTLY
Qty: 30 TABLET | Refills: 11 | Status: ON HOLD | OUTPATIENT
Start: 2017-08-08 | End: 2019-02-03

## 2017-08-08 NOTE — PROGRESS NOTES
Subjective:       Patient ID: Vicki Peña is a 82 y.o. female.    Chief Complaint: Anxiety    HPI     82-year-old female here for evaluation of anxiety.  She is anxious about her daughter being in the hospital.  Her daughter was hospitalized for her BP was really high.  She is missing a plane trip right now, because of her anxiety surrounding the situation with her hospitalized daughter.  Her daughter has been discharged Sunday.  She is planning to fly out next week after making sure her daughter is ok. She has not tried anything for the anxiety.  Her sleep is poor.  She never takes a pill for her anxiety.  She thinks that when this acute event passes, she will feel better.  She was not sleeping well before her daughter went to the hospital.  Her appetite is decreased as well.  She does not sleep during the day.  She goes to sleep about 2 am and wakes up about 5 am.    Review of Systems    Objective:      Physical Exam   Constitutional: She is oriented to person, place, and time. She appears well-developed and well-nourished.   HENT:   Head: Normocephalic and atraumatic.   Mouth/Throat: No oropharyngeal exudate.   Eyes: EOM are normal. Pupils are equal, round, and reactive to light. Right eye exhibits no discharge. Left eye exhibits no discharge. No scleral icterus.   Neck: Normal range of motion. Neck supple. No tracheal deviation present. No thyromegaly present.   Cardiovascular: Normal rate, regular rhythm and normal heart sounds.  Exam reveals no gallop and no friction rub.    No murmur heard.  Pulmonary/Chest: Effort normal and breath sounds normal. No respiratory distress. She has no wheezes. She has no rales. She exhibits no tenderness.   Abdominal: Soft. Bowel sounds are normal. She exhibits no distension and no mass. There is no tenderness. There is no rebound and no guarding.   Musculoskeletal: Normal range of motion. She exhibits no edema or tenderness.   Neurological: She is alert and oriented to  person, place, and time.   Skin: Skin is warm and dry. No rash noted. No erythema. No pallor.   Psychiatric: She has a normal mood and affect. Her behavior is normal.   Vitals reviewed.      Assessment:       1. Anxiety    2. Insomnia, unspecified type        Plan:       1.  Likely situational related to daughter's health.  Expect this to resolve the starts health improves.  Gave patient note for airline to assist in rescheduling.  2.  Trazodone prescribed for sleep.

## 2017-10-04 ENCOUNTER — OFFICE VISIT (OUTPATIENT)
Dept: INTERNAL MEDICINE | Facility: CLINIC | Age: 82
End: 2017-10-04
Payer: MEDICARE

## 2017-10-04 VITALS
RESPIRATION RATE: 10 BRPM | WEIGHT: 127.19 LBS | DIASTOLIC BLOOD PRESSURE: 90 MMHG | HEART RATE: 70 BPM | BODY MASS INDEX: 23.4 KG/M2 | SYSTOLIC BLOOD PRESSURE: 160 MMHG | HEIGHT: 62 IN

## 2017-10-04 DIAGNOSIS — R03.0 ELEVATED BLOOD PRESSURE READING: ICD-10-CM

## 2017-10-04 DIAGNOSIS — F41.9 ANXIETY: Primary | ICD-10-CM

## 2017-10-04 PROCEDURE — 99213 OFFICE O/P EST LOW 20 MIN: CPT | Mod: S$PBB,,, | Performed by: INTERNAL MEDICINE

## 2017-10-04 PROCEDURE — 99213 OFFICE O/P EST LOW 20 MIN: CPT | Mod: PBBFAC,PO | Performed by: INTERNAL MEDICINE

## 2017-10-04 PROCEDURE — 99999 PR PBB SHADOW E&M-EST. PATIENT-LVL III: CPT | Mod: PBBFAC,,, | Performed by: INTERNAL MEDICINE

## 2017-10-04 NOTE — PROGRESS NOTES
Subjective:       Patient ID: Vicki Peña is a 82 y.o. female.    Chief Complaint: Anxiety    HPI     82-year-old female here to discuss travel plans around her anxiety.  She reports that she can fly after August 16th.  She needs the letter to read that she can fly after August 16th.    She is asking about her cholesterol.    She went to a fair last Saturday and was told her BP was a little high. Her blood pressure was 174 systolic.    HTN -  Patient is currently on no medication. She does not check her BP at home. Side effects of medications note: none. Denies headaches, blurred vision, chest pain, shortness of breath, nausea.    Review of Systems    Objective:      Physical Exam   Constitutional: She is oriented to person, place, and time. She appears well-developed and well-nourished.   HENT:   Head: Normocephalic and atraumatic.   Mouth/Throat: No oropharyngeal exudate.   Eyes: EOM are normal. Pupils are equal, round, and reactive to light. Right eye exhibits no discharge. Left eye exhibits no discharge. No scleral icterus.   Neck: Normal range of motion. Neck supple. No tracheal deviation present. No thyromegaly present.   Cardiovascular: Normal rate, regular rhythm and normal heart sounds.  Exam reveals no gallop and no friction rub.    No murmur heard.  Pulmonary/Chest: Effort normal and breath sounds normal. No respiratory distress. She has no wheezes. She has no rales. She exhibits no tenderness.   Abdominal: Soft. Bowel sounds are normal. She exhibits no distension and no mass. There is no tenderness. There is no rebound and no guarding.   Musculoskeletal: Normal range of motion. She exhibits no edema or tenderness.   Neurological: She is alert and oriented to person, place, and time.   Skin: Skin is warm and dry. No rash noted. No erythema. No pallor.   Psychiatric: She has a normal mood and affect. Her behavior is normal.   Vitals reviewed.      Assessment:       1. Anxiety    2. Elevated blood  pressure reading        Plan:       1.  Letter written for patient to rearrange travel plans.  2.  Patient to reassess blood pressure after she returns from [place].  Do not want to prescribe medication not have patient follow-up for 3 months.

## 2017-10-11 ENCOUNTER — OFFICE VISIT (OUTPATIENT)
Dept: OPHTHALMOLOGY | Facility: CLINIC | Age: 82
End: 2017-10-11
Payer: MEDICARE

## 2017-10-11 DIAGNOSIS — M35.01 KERATOCONJUNCTIVITIS SICCA OF BOTH EYES: ICD-10-CM

## 2017-10-11 PROCEDURE — 99213 OFFICE O/P EST LOW 20 MIN: CPT | Mod: PBBFAC | Performed by: OPHTHALMOLOGY

## 2017-10-11 PROCEDURE — 92012 INTRM OPH EXAM EST PATIENT: CPT | Mod: S$PBB,,, | Performed by: OPHTHALMOLOGY

## 2017-10-11 PROCEDURE — 99999 PR PBB SHADOW E&M-EST. PATIENT-LVL III: CPT | Mod: PBBFAC,,, | Performed by: OPHTHALMOLOGY

## 2017-10-11 NOTE — PATIENT INSTRUCTIONS
Systane Balance four times daily in both eyes.  Lubricating ointment or gel at bedtime.  Return to me as needed.

## 2017-10-11 NOTE — PROGRESS NOTES
HPI     Diego/Bashir pt  S/p phaco IOL OS 7/21/14   S/p phaco IOL OD 8/11/14   Patient states OS eye pain x week that comes and goes.  1 on pain scale.    Eye Drops:AT'S qid OU(PRN)    I have personally interviewed the patient, reviewed the history and   examined the patient and agree with the technician's exam.    Last edited by Jose F Spear MD on 10/11/2017 10:48 AM. (History)            Assessment /Plan     For exam results, see Encounter Report.    Keratoconjunctivitis sicca of both eyes      Ms. Peña is developing staining of the inferior cornea in her left eye indicating k sicca as the cause for her foreign body sensation. Recommend Systane Balance 4 times daily, lubricating ointment or gel at bedtime. Return to me as needed.

## 2018-04-04 ENCOUNTER — OFFICE VISIT (OUTPATIENT)
Dept: ORTHOPEDICS | Facility: CLINIC | Age: 83
End: 2018-04-04
Payer: MEDICARE

## 2018-04-04 VITALS — WEIGHT: 120.13 LBS | HEIGHT: 62 IN | BODY MASS INDEX: 22.11 KG/M2

## 2018-04-04 DIAGNOSIS — M17.0 OSTEOARTHRITIS OF BOTH KNEES, UNSPECIFIED OSTEOARTHRITIS TYPE: Primary | ICD-10-CM

## 2018-04-04 PROCEDURE — 20610 DRAIN/INJ JOINT/BURSA W/O US: CPT | Mod: 50,PBBFAC | Performed by: PHYSICIAN ASSISTANT

## 2018-04-04 PROCEDURE — 99999 PR PBB SHADOW E&M-EST. PATIENT-LVL III: CPT | Mod: PBBFAC,,, | Performed by: PHYSICIAN ASSISTANT

## 2018-04-04 PROCEDURE — 20610 DRAIN/INJ JOINT/BURSA W/O US: CPT | Mod: 50,S$PBB,, | Performed by: PHYSICIAN ASSISTANT

## 2018-04-04 PROCEDURE — 99213 OFFICE O/P EST LOW 20 MIN: CPT | Mod: PBBFAC | Performed by: PHYSICIAN ASSISTANT

## 2018-04-04 PROCEDURE — 99213 OFFICE O/P EST LOW 20 MIN: CPT | Mod: 25,S$PBB,, | Performed by: PHYSICIAN ASSISTANT

## 2018-04-04 RX ORDER — METHYLPREDNISOLONE ACETATE 80 MG/ML
80 INJECTION, SUSPENSION INTRA-ARTICULAR; INTRALESIONAL; INTRAMUSCULAR; SOFT TISSUE
Status: COMPLETED | OUTPATIENT
Start: 2018-04-04 | End: 2018-04-04

## 2018-04-04 RX ADMIN — METHYLPREDNISOLONE ACETATE 80 MG: 80 INJECTION, SUSPENSION INTRALESIONAL; INTRAMUSCULAR; INTRASYNOVIAL; SOFT TISSUE at 04:04

## 2018-04-04 NOTE — PROGRESS NOTES
Subjective:      Patient ID: Vicki Peña is a 82 y.o. female.    Chief Complaint: No chief complaint on file.    HPI  Patient returns to clinic with chief complaint of bilateral knee pain R>L. She has a known h/o OA. She received cortisone injections last July that gave her relief for 3 months. She takes ibuprofen prn and says it helps. She does not use assistive devices.   Review of Systems   Constitution: Negative for chills, fever and night sweats.   Cardiovascular: Negative for chest pain.   Respiratory: Negative for cough and shortness of breath.    Hematologic/Lymphatic: Does not bruise/bleed easily.   Skin: Negative for color change.   Gastrointestinal: Negative for heartburn.   Genitourinary: Negative for dysuria.   Neurological: Negative for numbness and paresthesias.   Psychiatric/Behavioral: Negative for altered mental status.   Allergic/Immunologic: Negative for persistent infections.         Objective:            General    Vitals reviewed.  Constitutional: She is oriented to person, place, and time. She appears well-developed and well-nourished.   Cardiovascular: Normal rate.    Neurological: She is alert and oriented to person, place, and time.             Bilateral Knee Exam:  ROM 0-120 degrees  +crepitus  No effusion  TTP medial joint line      X-ray: reviewed by myself. Advanced DJD both knees.      Assessment:       Encounter Diagnosis   Name Primary?    Osteoarthritis of both knees, unspecified osteoarthritis type Yes          Plan:       Discussed treatment options with patient. She would like to discuss possible surgery but not at this time. She would like a cortisone injection in both knees for now. Will schedule a consult with one of the knee surgeons in about 2 months.     PROCEDURE:  I have explained the risks, benefits, and alternatives of the procedure in detail.  The patient voices understanding and all questions have been answered.  The patient agrees to proceed as planned. So after  I performed a sterile prep of the skin in the normal fashion the bilateral knee is injected using a 22 gauge needle from the anteromedial approach with a combination of 4cc 1% plain lidocaine and 80 mg of depo medrol.  The patient is cautioned and immediate relief of pain is secondary to the local anesthetic and will be temporary.  After the anesthetic wears off there may be a increase in pain that may last for a few hours or a few days and they should use ice to help alleviate this flair up of pain.

## 2018-04-11 ENCOUNTER — OFFICE VISIT (OUTPATIENT)
Dept: INTERNAL MEDICINE | Facility: CLINIC | Age: 83
End: 2018-04-11
Payer: MEDICARE

## 2018-04-11 VITALS
TEMPERATURE: 98 F | RESPIRATION RATE: 16 BRPM | SYSTOLIC BLOOD PRESSURE: 137 MMHG | DIASTOLIC BLOOD PRESSURE: 67 MMHG | WEIGHT: 116.88 LBS | HEIGHT: 62 IN | BODY MASS INDEX: 21.51 KG/M2 | HEART RATE: 79 BPM

## 2018-04-11 DIAGNOSIS — M35.01 KERATOCONJUNCTIVITIS SICCA OF BOTH EYES: Chronic | ICD-10-CM

## 2018-04-11 DIAGNOSIS — M81.0 OSTEOPOROSIS, UNSPECIFIED OSTEOPOROSIS TYPE, UNSPECIFIED PATHOLOGICAL FRACTURE PRESENCE: ICD-10-CM

## 2018-04-11 DIAGNOSIS — I10 ESSENTIAL HYPERTENSION: Primary | ICD-10-CM

## 2018-04-11 DIAGNOSIS — E78.5 HYPERLIPIDEMIA, UNSPECIFIED HYPERLIPIDEMIA TYPE: Chronic | ICD-10-CM

## 2018-04-11 PROCEDURE — 99213 OFFICE O/P EST LOW 20 MIN: CPT | Mod: PBBFAC,PO | Performed by: INTERNAL MEDICINE

## 2018-04-11 PROCEDURE — 99214 OFFICE O/P EST MOD 30 MIN: CPT | Mod: S$PBB,,, | Performed by: INTERNAL MEDICINE

## 2018-04-11 PROCEDURE — 99999 PR PBB SHADOW E&M-EST. PATIENT-LVL III: CPT | Mod: PBBFAC,,, | Performed by: INTERNAL MEDICINE

## 2018-04-11 RX ORDER — RISEDRONATE SODIUM 150 MG/1
150 TABLET, FILM COATED ORAL
Qty: 1 TABLET | Refills: 11 | Status: ON HOLD | OUTPATIENT
Start: 2018-04-11 | End: 2019-02-03

## 2018-04-11 RX ORDER — SIMVASTATIN 40 MG/1
40 TABLET, FILM COATED ORAL NIGHTLY
Qty: 90 TABLET | Refills: 3 | Status: ON HOLD | OUTPATIENT
Start: 2018-04-11 | End: 2019-02-19 | Stop reason: HOSPADM

## 2018-04-11 RX ORDER — LOSARTAN POTASSIUM 50 MG/1
50 TABLET ORAL DAILY
Qty: 30 TABLET | Refills: 6 | Status: SHIPPED | OUTPATIENT
Start: 2018-04-11 | End: 2018-05-03

## 2018-04-12 ENCOUNTER — LAB VISIT (OUTPATIENT)
Dept: LAB | Facility: HOSPITAL | Age: 83
End: 2018-04-12
Attending: INTERNAL MEDICINE
Payer: MEDICARE

## 2018-04-12 DIAGNOSIS — M35.01 KERATOCONJUNCTIVITIS SICCA OF BOTH EYES: Chronic | ICD-10-CM

## 2018-04-12 DIAGNOSIS — E78.5 HYPERLIPIDEMIA, UNSPECIFIED HYPERLIPIDEMIA TYPE: Chronic | ICD-10-CM

## 2018-04-12 DIAGNOSIS — I10 ESSENTIAL HYPERTENSION: ICD-10-CM

## 2018-04-12 DIAGNOSIS — M81.0 OSTEOPOROSIS, UNSPECIFIED OSTEOPOROSIS TYPE, UNSPECIFIED PATHOLOGICAL FRACTURE PRESENCE: ICD-10-CM

## 2018-04-12 LAB
ALBUMIN SERPL BCP-MCNC: 3.7 G/DL
ALP SERPL-CCNC: 113 U/L
ALT SERPL W/O P-5'-P-CCNC: 25 U/L
ANION GAP SERPL CALC-SCNC: 7 MMOL/L
ANISOCYTOSIS BLD QL SMEAR: SLIGHT
AST SERPL-CCNC: 20 U/L
BASOPHILS # BLD AUTO: 0.02 K/UL
BASOPHILS NFR BLD: 0.7 %
BILIRUB SERPL-MCNC: 0.7 MG/DL
BUN SERPL-MCNC: 16 MG/DL
CALCIUM SERPL-MCNC: 9.6 MG/DL
CHLORIDE SERPL-SCNC: 104 MMOL/L
CHOLEST SERPL-MCNC: 184 MG/DL
CHOLEST/HDLC SERPL: 2 {RATIO}
CO2 SERPL-SCNC: 27 MMOL/L
CREAT SERPL-MCNC: 0.8 MG/DL
DIFFERENTIAL METHOD: ABNORMAL
EOSINOPHIL # BLD AUTO: 0.1 K/UL
EOSINOPHIL NFR BLD: 2.1 %
ERYTHROCYTE [DISTWIDTH] IN BLOOD BY AUTOMATED COUNT: 19.4 %
EST. GFR  (AFRICAN AMERICAN): >60 ML/MIN/1.73 M^2
EST. GFR  (NON AFRICAN AMERICAN): >60 ML/MIN/1.73 M^2
GIANT PLATELETS BLD QL SMEAR: PRESENT
GLUCOSE SERPL-MCNC: 91 MG/DL
HCT VFR BLD AUTO: 35.5 %
HDLC SERPL-MCNC: 90 MG/DL
HDLC SERPL: 48.9 %
HGB BLD-MCNC: 11.6 G/DL
LDLC SERPL CALC-MCNC: 82.6 MG/DL
LYMPHOCYTES # BLD AUTO: 1.1 K/UL
LYMPHOCYTES NFR BLD: 37.7 %
MCH RBC QN AUTO: 28.9 PG
MCHC RBC AUTO-ENTMCNC: 32.7 G/DL
MCV RBC AUTO: 89 FL
MONOCYTES # BLD AUTO: 0.5 K/UL
MONOCYTES NFR BLD: 17.6 %
NEUTROPHILS # BLD AUTO: 1.2 K/UL
NEUTROPHILS NFR BLD: 41.9 %
NONHDLC SERPL-MCNC: 94 MG/DL
PLATELET # BLD AUTO: 95 K/UL
PLATELET BLD QL SMEAR: ABNORMAL
PMV BLD AUTO: ABNORMAL FL
POIKILOCYTOSIS BLD QL SMEAR: SLIGHT
POTASSIUM SERPL-SCNC: 4.8 MMOL/L
PROT SERPL-MCNC: 6.8 G/DL
RBC # BLD AUTO: 4.01 M/UL
SODIUM SERPL-SCNC: 138 MMOL/L
TRIGL SERPL-MCNC: 57 MG/DL
TSH SERPL DL<=0.005 MIU/L-ACNC: 1.1 UIU/ML
WBC # BLD AUTO: 2.84 K/UL

## 2018-04-12 PROCEDURE — 84443 ASSAY THYROID STIM HORMONE: CPT

## 2018-04-12 PROCEDURE — 80061 LIPID PANEL: CPT

## 2018-04-12 PROCEDURE — 85025 COMPLETE CBC W/AUTO DIFF WBC: CPT

## 2018-04-12 PROCEDURE — 36415 COLL VENOUS BLD VENIPUNCTURE: CPT

## 2018-04-12 PROCEDURE — 80053 COMPREHEN METABOLIC PANEL: CPT

## 2018-04-20 ENCOUNTER — PATIENT MESSAGE (OUTPATIENT)
Dept: INTERNAL MEDICINE | Facility: CLINIC | Age: 83
End: 2018-04-20

## 2018-05-03 ENCOUNTER — OFFICE VISIT (OUTPATIENT)
Dept: INTERNAL MEDICINE | Facility: CLINIC | Age: 83
End: 2018-05-03
Payer: MEDICARE

## 2018-05-03 ENCOUNTER — HOSPITAL ENCOUNTER (OUTPATIENT)
Dept: RADIOLOGY | Facility: HOSPITAL | Age: 83
Discharge: HOME | End: 2018-05-03
Attending: INTERNAL MEDICINE
Payer: MEDICARE

## 2018-05-03 VITALS
WEIGHT: 114.88 LBS | BODY MASS INDEX: 21.14 KG/M2 | TEMPERATURE: 98 F | DIASTOLIC BLOOD PRESSURE: 71 MMHG | SYSTOLIC BLOOD PRESSURE: 153 MMHG | HEART RATE: 73 BPM | HEIGHT: 62 IN | RESPIRATION RATE: 16 BRPM

## 2018-05-03 DIAGNOSIS — M17.10 ARTHRITIS OF KNEE: ICD-10-CM

## 2018-05-03 DIAGNOSIS — R51.9 NONINTRACTABLE HEADACHE, UNSPECIFIED CHRONICITY PATTERN, UNSPECIFIED HEADACHE TYPE: Primary | ICD-10-CM

## 2018-05-03 DIAGNOSIS — I10 ESSENTIAL HYPERTENSION: Chronic | ICD-10-CM

## 2018-05-03 PROCEDURE — 99214 OFFICE O/P EST MOD 30 MIN: CPT | Mod: PBBFAC,PO,25 | Performed by: INTERNAL MEDICINE

## 2018-05-03 PROCEDURE — 73560 X-RAY EXAM OF KNEE 1 OR 2: CPT | Mod: 50,TC,PO

## 2018-05-03 PROCEDURE — 73560 X-RAY EXAM OF KNEE 1 OR 2: CPT | Mod: 26,50,, | Performed by: RADIOLOGY

## 2018-05-03 PROCEDURE — 99999 PR PBB SHADOW E&M-EST. PATIENT-LVL IV: CPT | Mod: PBBFAC,,, | Performed by: INTERNAL MEDICINE

## 2018-05-03 PROCEDURE — 99214 OFFICE O/P EST MOD 30 MIN: CPT | Mod: S$PBB,,, | Performed by: INTERNAL MEDICINE

## 2018-05-03 RX ORDER — AMLODIPINE BESYLATE 2.5 MG/1
2.5 TABLET ORAL DAILY
Qty: 30 TABLET | Refills: 11 | Status: ON HOLD | OUTPATIENT
Start: 2018-05-03 | End: 2019-02-03

## 2018-05-03 NOTE — PROGRESS NOTES
Subjective:       Patient ID: Vicki Peña is a 82 y.o. female.    Chief Complaint: Headache    HPI     82-year-old female here for evaluation of headache.  She feels it on the left side of her head.  She has had migraines in the past.  This started after her stroke.  She was in [place] for 6 months and had two and has been back and with more headaches.  She has stopped the blood pressure medication.  Her blood pressure has been between 130s-150s.  She reports that the pain on the left side of her head feels like a little pain in her eyes and ears.  When she does take the ibuprofen, the pain is not as bad.  She feels the pain for a bout 5 seconds, then it comes back. This happens when she does not take the ibuprofen in time.  If she does not take anything for this, it goes on for hours.    She has had chest pain on her left side.  This has happened twice.  She gets anxiety a lot.  It happened twice in the last week.  This pain lasts for a couple seconds and is gone.  Her niece passed away recently.  She has not walked as much as she used to.  She has arthritis of both knees.    Review of Systems    Objective:      Physical Exam   Constitutional: She is oriented to person, place, and time. She appears well-developed and well-nourished.   HENT:   Head: Normocephalic and atraumatic.   Mouth/Throat: No oropharyngeal exudate.   Eyes: EOM are normal. Pupils are equal, round, and reactive to light. Right eye exhibits no discharge. Left eye exhibits no discharge. No scleral icterus.   Neck: Normal range of motion. Neck supple. No tracheal deviation present. No thyromegaly present.   Cardiovascular: Normal rate, regular rhythm and normal heart sounds.  Exam reveals no gallop and no friction rub.    No murmur heard.  Pulmonary/Chest: Effort normal and breath sounds normal. No respiratory distress. She has no wheezes. She has no rales. She exhibits no tenderness.   Abdominal: Soft. Bowel sounds are normal. She exhibits no  distension and no mass. There is no tenderness. There is no rebound and no guarding.   Musculoskeletal: Normal range of motion. She exhibits no edema or tenderness.   Neurological: She is alert and oriented to person, place, and time.   Skin: Skin is warm and dry. No rash noted. No erythema. No pallor.   Psychiatric: She has a normal mood and affect. Her behavior is normal.   Vitals reviewed.      Assessment:       1. Nonintractable headache, unspecified chronicity pattern, unspecified headache type    2. Arthritis of knee    3. Essential hypertension        Plan:       1.  Think this is related to blood pressure.  Patient advised to use improve as needed.  2.  Refer to orthopedics for evaluation of possible operative intervention.  3.  Norvasc 2.5 mg daily prescribed.  Return to clinic in one month.  Sent message about blood pressures in 2 weeks.

## 2018-05-17 ENCOUNTER — OFFICE VISIT (OUTPATIENT)
Dept: ORTHOPEDICS | Facility: CLINIC | Age: 83
End: 2018-05-17
Payer: MEDICARE

## 2018-05-17 VITALS
HEIGHT: 62 IN | DIASTOLIC BLOOD PRESSURE: 74 MMHG | WEIGHT: 114 LBS | SYSTOLIC BLOOD PRESSURE: 138 MMHG | BODY MASS INDEX: 20.98 KG/M2

## 2018-05-17 DIAGNOSIS — M17.11 PRIMARY OSTEOARTHRITIS OF RIGHT KNEE: Primary | ICD-10-CM

## 2018-05-17 PROCEDURE — 99213 OFFICE O/P EST LOW 20 MIN: CPT | Mod: PBBFAC,PO,25 | Performed by: ORTHOPAEDIC SURGERY

## 2018-05-17 PROCEDURE — 99999 PR PBB SHADOW E&M-EST. PATIENT-LVL III: CPT | Mod: PBBFAC,,, | Performed by: ORTHOPAEDIC SURGERY

## 2018-05-17 PROCEDURE — 20610 DRAIN/INJ JOINT/BURSA W/O US: CPT | Mod: S$PBB,RT,, | Performed by: ORTHOPAEDIC SURGERY

## 2018-05-17 PROCEDURE — 20610 DRAIN/INJ JOINT/BURSA W/O US: CPT | Mod: PBBFAC,PO | Performed by: ORTHOPAEDIC SURGERY

## 2018-05-17 PROCEDURE — 99204 OFFICE O/P NEW MOD 45 MIN: CPT | Mod: 25,S$PBB,, | Performed by: ORTHOPAEDIC SURGERY

## 2018-05-17 RX ORDER — TRIAMCINOLONE ACETONIDE 40 MG/ML
40 INJECTION, SUSPENSION INTRA-ARTICULAR; INTRAMUSCULAR
Status: COMPLETED | OUTPATIENT
Start: 2018-05-17 | End: 2018-05-17

## 2018-05-17 RX ADMIN — TRIAMCINOLONE ACETONIDE 40 MG: 40 INJECTION, SUSPENSION INTRA-ARTICULAR; INTRAMUSCULAR at 11:05

## 2018-05-17 NOTE — PROCEDURES
Procedures       After explaining the procedure, the patient gave verbal consent for right knee aspiration. The sight was identified and the skin was aseptically prepped. The right knee was aspirated from asuperiorportal.  35 cc's of clear fluid was obtained.  After injection the joint was injected with 40 mg of triamcinolone and 1 cc of 1% plain Xylocaine.  A sterile dressing was applied.  The patient was instructed to call if there were any problems at the aspiration sight.

## 2018-05-17 NOTE — LETTER
May 17, 2018      Yaron Waite MD  [address] [facility]  [place] [address]           [place] [facility]  [address] [facility] [address]  Phone: [phone]          Patient: Vicki Peña   MR Number: [id number]   YOB: 1935   Date of Visit: 5/17/2018       Dear Dr. Yaron Waite:    Thank you for referring Vicki Peña to me for evaluation. Attached you will find relevant portions of my assessment and plan of care.    If you have questions, please do not hesitate to call me. I look forward to following Vicki Peña along with you.    Sincerely,    Dominguez Thompson MD    Enclosure  CC:  No Recipients    If you would like to receive this communication electronically, please contact [email or web address] or [phone] to request more information on [employer] [facility] access.    For providers and/or their staff who would like to refer a patient to [facility], please contact us through our one-stop-shop provider referral line, [facility] [place], at [phone].    If you feel you have received this communication in error or would no longer like to receive these types of communications, please e-mail [email or web address]

## 2018-05-17 NOTE — PROGRESS NOTES
Subjective:      Patient ID: Vicki Peña is a 83 y.o. female.    Chief Complaint: Right knee pain  HPI     Patient complains of many years right knee pain and swelling.  She has giving way.  Walking aggravates symptoms.  NSAIDs help a little.  Past injections were more helpful but recent injection was not  Review of Systems   Constitution: Negative for fever and weight loss.   HENT: Negative for congestion.    Eyes: Negative for visual disturbance.   Cardiovascular: Negative for chest pain.   Respiratory: Negative for shortness of breath.    Hematologic/Lymphatic: Negative for bleeding problem. Does not bruise/bleed easily.   Skin: Negative for poor wound healing.   Musculoskeletal: Positive for joint pain and joint swelling.   Gastrointestinal: Negative for abdominal pain.   Genitourinary: Negative for dysuria.   Neurological: Negative for seizures.   Psychiatric/Behavioral: Negative for altered mental status.   Allergic/Immunologic: Negative for persistent infections.         Objective:            Ortho/SPM Exam    Right Knee    Vitals:    05/17/18 1034   BP: 128/62       The patient is not in acute distress.   Body habitus is normal.   The patient walks with a limp.  Resisted SLR negative.   The skin over the knee is intact.  Knee effusion 3+  Tendernes is located presentmedial  Range of motion- Flexion 135 deg, Extension 0 deg,   Ligament exam:   MCL 2+ laxity   Lachman intact              Post sag intact    LCL intact  Patellar apprehension negative.  Popliteal cyst positive  Patellar crepitation present.  Flexion/pinch negative.  Pulses DP present, PT present.  Motor normal 5/5 strength in all tested muscle groups.   Sensory normal.    I reviewed the relevant radiographic images for the patient's condition: There is advanced medial narrowing and diffuse osteophyte formation      Vitals:    05/17/18 1034   BP: 128/62           Assessment:       No diagnosis found.         I explained my diagnostic impression  and the reasoning behind it in detail, using layman's terms.  Models and/or pictures were used to help in the explanation.  Plan:       There are no diagnoses linked to this encounter.    Aspiration and injection recommended-consent given    Brace    Cane    I explained the potential role of surgery in the treatment of this condition to the patient.  They understand that if nonsurgical measures do not adequately control symptoms, surgery will be considered in the future.    Patient may wish to consider at the end of the summer

## 2018-06-28 ENCOUNTER — OFFICE VISIT (OUTPATIENT)
Dept: ORTHOPEDICS | Facility: CLINIC | Age: 83
End: 2018-06-28
Payer: MEDICARE

## 2018-06-28 VITALS — BODY MASS INDEX: 20.98 KG/M2 | WEIGHT: 114 LBS | HEIGHT: 62 IN

## 2018-06-28 DIAGNOSIS — M17.0 PRIMARY OSTEOARTHRITIS OF BOTH KNEES: Primary | ICD-10-CM

## 2018-06-28 PROCEDURE — 99213 OFFICE O/P EST LOW 20 MIN: CPT | Mod: 25,S$PBB,, | Performed by: ORTHOPAEDIC SURGERY

## 2018-06-28 PROCEDURE — 99999 PR PBB SHADOW E&M-EST. PATIENT-LVL III: CPT | Mod: PBBFAC,,, | Performed by: ORTHOPAEDIC SURGERY

## 2018-06-28 PROCEDURE — 99213 OFFICE O/P EST LOW 20 MIN: CPT | Mod: PBBFAC,PO | Performed by: ORTHOPAEDIC SURGERY

## 2018-06-28 PROCEDURE — 20610 DRAIN/INJ JOINT/BURSA W/O US: CPT | Mod: 50,PBBFAC,PO | Performed by: ORTHOPAEDIC SURGERY

## 2018-06-28 PROCEDURE — 20610 DRAIN/INJ JOINT/BURSA W/O US: CPT | Mod: 50,S$PBB,, | Performed by: ORTHOPAEDIC SURGERY

## 2018-06-28 RX ORDER — TRIAMCINOLONE ACETONIDE 40 MG/ML
40 INJECTION, SUSPENSION INTRA-ARTICULAR; INTRAMUSCULAR
Status: COMPLETED | OUTPATIENT
Start: 2018-06-28 | End: 2018-06-28

## 2018-06-28 RX ADMIN — TRIAMCINOLONE ACETONIDE 40 MG: 40 INJECTION, SUSPENSION INTRA-ARTICULAR; INTRAMUSCULAR at 01:06

## 2018-06-28 NOTE — PROCEDURES
Procedures     After obtaining verbal informed consent the patient's right knee was prepped aseptically and injected through an inferior lateral approach using 40 mg of triamcinolone and 1 cc of 1% plain Xylocaine.  The patient was warned about postinjection flare and how to manage it with ice, rest and over-the-counter analgesics.  They're advised to contact me for any severe, uncontrolled pain.    After obtaining verbal informed consent the patient's left knee was prepped aseptically and injected through an inferior lateral approach using 40 mg of triamcinolone and 1 cc of 1% plain Xylocaine.  The patient was warned about postinjection flare and how to manage it with ice, rest and over-the-counter analgesics.  They're advised to contact me for any severe, uncontrolled pain.

## 2018-06-28 NOTE — PROGRESS NOTES
Subjective:      Patient ID: Vicki Peña is a 83 y.o. female.    Chief Complaint:  Bilateral knee pain  HPI     Follow-up for osteoarthritis.  The patient has received injections with good but transient relief.  She request reinjection.  She has recently had increasing pain with prolonged ambulation he  Review of Systems   Constitution: Negative for fever and weight loss.   HENT: Negative for congestion.    Eyes: Negative for visual disturbance.   Cardiovascular: Negative for chest pain.   Respiratory: Negative for shortness of breath.    Hematologic/Lymphatic: Negative for bleeding problem. Does not bruise/bleed easily.   Skin: Negative for poor wound healing.   Musculoskeletal: Positive for joint pain.   Gastrointestinal: Negative for abdominal pain.   Genitourinary: Negative for dysuria.   Neurological: Negative for seizures.   Psychiatric/Behavioral: Negative for altered mental status.   Allergic/Immunologic: Negative for persistent infections.         Objective:            Ortho/SPM Exam    Right Knee    There were no vitals filed for this visit.    The patient is not in acute distress.   Body habitus is normal.   The patient walks without a limp.  Resisted SLR negative.   The skin over the knee is intact.  Knee effusion trace  Tendernes is located absent  Range of motion- Flexion 125 deg, Extension 3 deg,   Ligament exam:   MCL 1+ laxity   Lachman intact              Post sag intact    LCL intact  Patellar apprehension negative.  Popliteal cyst positive  Patellar crepitation present.  Flexion/pinch negative.  Pulses DP present, PT present.  Motor normal 5/5 strength in all tested muscle groups.   Sensory normal.    Left Knee      There were no vitals filed for this visit.    This side is idententical to the contralateral side.          Assessment:       Encounter Diagnosis   Name Primary?    Primary osteoarthritis of both knees Yes          Plan:       Vicki was seen today for pain.    Diagnoses and all  orders for this visit:    Primary osteoarthritis of both knees      I explained my diagnostic impression and the reasoning behind it in detail, using layman's terms.  Models and/or pictures were used to help in the explanation.    Patient is leaving for [place] and would like palliative injection prior to being way for several months    I explained the potential role of surgery in the treatment of this condition to the patient.  They understand that if nonsurgical measures do not adequately control symptoms, surgery will be considered in the future.    X-ray follow-up

## 2018-06-28 NOTE — LETTER
June 28, 2018      Yaron Waite MD  [address] [facility]  [place] [address]           [place] [facility]  [address] [facility] [address]  Phone: [phone]          Patient: Vicki Peña   MR Number: [id number]   YOB: 1935   Date of Visit: 6/28/2018       Dear Dr. Yaron Waite:    Thank you for referring Vicki Peña to me for evaluation. Attached you will find relevant portions of my assessment and plan of care.    If you have questions, please do not hesitate to call me. I look forward to following Vicki Peña along with you.    Sincerely,    Dominguez Thompson MD    Enclosure  CC:  No Recipients    If you would like to receive this communication electronically, please contact [email or web address] or [phone] to request more information on [employer] Link access.    For providers and/or their staff who would like to refer a patient to [facility], please contact us through our one-stop-shop provider referral line, [facility], at [phone].    If you feel you have received this communication in error or would no longer like to receive these types of communications, please e-mail [email or web address]

## 2018-07-02 DIAGNOSIS — M17.0 OSTEOARTHRITIS OF BOTH KNEES, UNSPECIFIED OSTEOARTHRITIS TYPE: ICD-10-CM

## 2018-07-02 RX ORDER — IBUPROFEN 600 MG/1
600 TABLET ORAL EVERY 6 HOURS PRN
Qty: 60 TABLET | Refills: 2 | Status: ON HOLD | OUTPATIENT
Start: 2018-07-02 | End: 2019-02-19 | Stop reason: HOSPADM

## 2018-10-05 ENCOUNTER — PES CALL (OUTPATIENT)
Dept: ADMINISTRATIVE | Facility: CLINIC | Age: 83
End: 2018-10-05

## 2018-12-20 ENCOUNTER — HOSPITAL ENCOUNTER (OUTPATIENT)
Dept: RADIOLOGY | Facility: HOSPITAL | Age: 83
Discharge: HOME | End: 2018-12-20
Attending: ORTHOPAEDIC SURGERY
Payer: MEDICARE

## 2018-12-20 ENCOUNTER — OFFICE VISIT (OUTPATIENT)
Dept: ORTHOPEDICS | Facility: CLINIC | Age: 83
End: 2018-12-20
Payer: MEDICARE

## 2018-12-20 ENCOUNTER — TELEPHONE (OUTPATIENT)
Dept: ORTHOPEDICS | Facility: CLINIC | Age: 83
End: 2018-12-20

## 2018-12-20 VITALS — BODY MASS INDEX: 20.98 KG/M2 | WEIGHT: 114 LBS | HEIGHT: 62 IN

## 2018-12-20 DIAGNOSIS — M17.9 OSTEOARTHRITIS, KNEE: ICD-10-CM

## 2018-12-20 DIAGNOSIS — M17.11 PRIMARY OSTEOARTHRITIS OF RIGHT KNEE: Primary | ICD-10-CM

## 2018-12-20 DIAGNOSIS — M17.0 PRIMARY OSTEOARTHRITIS OF BOTH KNEES: ICD-10-CM

## 2018-12-20 DIAGNOSIS — M17.0 OSTEOARTHRITIS OF BOTH KNEES, UNSPECIFIED OSTEOARTHRITIS TYPE: Primary | ICD-10-CM

## 2018-12-20 PROCEDURE — 99213 OFFICE O/P EST LOW 20 MIN: CPT | Mod: PBBFAC,25,PN | Performed by: ORTHOPAEDIC SURGERY

## 2018-12-20 PROCEDURE — 73564 X-RAY EXAM KNEE 4 OR MORE: CPT | Mod: TC,50,PN

## 2018-12-20 PROCEDURE — 73564 X-RAY EXAM KNEE 4 OR MORE: CPT | Mod: 26,50,, | Performed by: RADIOLOGY

## 2018-12-20 PROCEDURE — 99999 PR PBB SHADOW E&M-EST. PATIENT-LVL III: CPT | Mod: PBBFAC,,, | Performed by: ORTHOPAEDIC SURGERY

## 2018-12-20 PROCEDURE — 99214 OFFICE O/P EST MOD 30 MIN: CPT | Mod: S$PBB,,, | Performed by: ORTHOPAEDIC SURGERY

## 2018-12-20 RX ORDER — SODIUM CHLORIDE 0.9 % (FLUSH) 0.9 %
3 SYRINGE (ML) INJECTION
Status: CANCELLED | OUTPATIENT
Start: 2018-12-20

## 2018-12-20 RX ORDER — ACETAMINOPHEN 325 MG/1
1000 TABLET ORAL
Status: CANCELLED | OUTPATIENT
Start: 2018-12-20 | End: 2018-12-20

## 2018-12-20 RX ORDER — MUPIROCIN 20 MG/G
OINTMENT TOPICAL
Status: CANCELLED | OUTPATIENT
Start: 2018-12-20

## 2018-12-20 RX ORDER — PREGABALIN 50 MG/1
150 CAPSULE ORAL
Status: CANCELLED | OUTPATIENT
Start: 2018-12-20 | End: 2018-12-20

## 2018-12-20 RX ORDER — NAPROXEN 250 MG/1
500 TABLET ORAL
Status: CANCELLED | OUTPATIENT
Start: 2018-12-20 | End: 2018-12-20

## 2018-12-20 NOTE — PROGRESS NOTES
Subjective:      Patient ID: Vicki Peña is a 83 y.o. female.    Chief Complaint: Pain of the Left Knee and Pain of the Right Knee    HPI the patient complains of ongoing pain in both knees.  Right side is worse.  She has crepitation and pseudo locking bilaterally. Although she continues to walk for exercise the right knee seems to be more limiting over time. Injection was minimally helpful and ibuprofen is also minimally helpful.    Review of Systems   Constitution: Negative for fever and weight loss.   HENT: Negative for congestion.    Eyes: Negative for visual disturbance.   Cardiovascular: Negative for chest pain.   Respiratory: Negative for shortness of breath.    Hematologic/Lymphatic: Negative for bleeding problem. Does not bruise/bleed easily.   Skin: Negative for poor wound healing.   Musculoskeletal: Positive for joint pain.   Gastrointestinal: Negative for abdominal pain.   Genitourinary: Negative for dysuria.   Neurological: Negative for seizures.   Psychiatric/Behavioral: Negative for altered mental status.   Allergic/Immunologic: Negative for persistent infections.         Objective:            Ortho/SPM Exam  Right knee  [unfilled]    The patient is not in acute distress.   Sclerae normal  Body habitus is normal.  Respiratory distress:  none   The patient walks with a limp.  Hip irritability  negative.   The skin over the knee is intact.  Knee effusion 1+  Tendernes is located none  Range of motion- Flexion 130 deg, Extension 0 deg,   Ligament laxity exam:   MCL 2+   Lachman negative   Post sag negative    LCL 0  Patellar apprehension negative.  Popliteal cyst positive  Patellar crepitation present.  Flexion/pinch not applicable  Pulses DP intact, PT intact.  Motor normal 5/5 strength in all tested muscle groups.   Sensory normal    The left knee has slightly less valgus laxity but is otherwise identical.    I reviewed the relevant radiographic images for the patient's condition:  Recent films of both  knees show advanced medial narrowing with no remaining joint space, osteophytes and patellofemoral degeneration.        Assessment:       Encounter Diagnosis   Name Primary?    Osteoarthritis of both knees, unspecified osteoarthritis type Yes          The condition is progressive and not controlled nonsurgically.  The right knee is more symptomatic.  Plan:       Vicki was seen today for pain and pain.    Diagnoses and all orders for this visit:    Osteoarthritis of both knees, unspecified osteoarthritis type      I explained my diagnostic impression and the reasoning behind it in detail, using layman's terms.  Models and/or pictures were used to help in the explanation.    Treatment options were discussed. The surgical process of right knee replacement was discussed in detail with the patient including a detailed discussion of the procedure itself (including visual model, x-ray review, and literature review). The typical perioperative and post-operative course was discussed and perioperative risks were discussed to the patient's satisfaction.  Risks and complications discussed included but were not limited to the risks of anesthetic complications, infection, bleeding, wound healing complications, stiffness, aseptic loosening, instability, limb length inequality, neurologic dysfunction including numbness and weakness, additional surgery,  DVT, pulmonary embolism, perioperative medical risks (cardiac, pulmonary, renal, neurologic), and death and the patient elects to proceed.

## 2018-12-27 DIAGNOSIS — M17.11 PRIMARY OSTEOARTHRITIS OF RIGHT KNEE: Primary | ICD-10-CM

## 2018-12-27 RX ORDER — ACETAMINOPHEN 325 MG/1
1000 TABLET ORAL
Status: CANCELLED | OUTPATIENT
Start: 2018-12-27 | End: 2018-12-27

## 2018-12-27 RX ORDER — MUPIROCIN 20 MG/G
OINTMENT TOPICAL
Status: CANCELLED | OUTPATIENT
Start: 2018-12-27

## 2018-12-27 RX ORDER — NAPROXEN 250 MG/1
500 TABLET ORAL
Status: CANCELLED | OUTPATIENT
Start: 2018-12-27 | End: 2018-12-27

## 2018-12-27 RX ORDER — SODIUM CHLORIDE 0.9 % (FLUSH) 0.9 %
3 SYRINGE (ML) INJECTION
Status: CANCELLED | OUTPATIENT
Start: 2018-12-27

## 2018-12-27 RX ORDER — PREGABALIN 50 MG/1
150 CAPSULE ORAL
Status: CANCELLED | OUTPATIENT
Start: 2018-12-27 | End: 2018-12-27

## 2019-01-04 ENCOUNTER — OFFICE VISIT (OUTPATIENT)
Dept: URGENT CARE | Facility: CLINIC | Age: 84
End: 2019-01-04
Payer: MEDICARE

## 2019-01-04 VITALS
RESPIRATION RATE: 19 BRPM | HEIGHT: 60 IN | OXYGEN SATURATION: 99 % | SYSTOLIC BLOOD PRESSURE: 125 MMHG | BODY MASS INDEX: 22.58 KG/M2 | HEART RATE: 72 BPM | DIASTOLIC BLOOD PRESSURE: 68 MMHG | TEMPERATURE: 98 F | WEIGHT: 115 LBS

## 2019-01-04 DIAGNOSIS — J40 BRONCHITIS: Primary | ICD-10-CM

## 2019-01-04 DIAGNOSIS — R05.9 COUGH: ICD-10-CM

## 2019-01-04 PROCEDURE — 99214 OFFICE O/P EST MOD 30 MIN: CPT | Mod: S$GLB,,, | Performed by: FAMILY MEDICINE

## 2019-01-04 PROCEDURE — 99214 PR OFFICE/OUTPT VISIT, EST, LEVL IV, 30-39 MIN: ICD-10-PCS | Mod: S$GLB,,, | Performed by: FAMILY MEDICINE

## 2019-01-04 RX ORDER — BENZONATATE 200 MG/1
200 CAPSULE ORAL 3 TIMES DAILY PRN
Qty: 30 CAPSULE | Refills: 0 | Status: SHIPPED | OUTPATIENT
Start: 2019-01-04 | End: 2019-01-14

## 2019-01-04 RX ORDER — ALBUTEROL SULFATE 90 UG/1
2 AEROSOL, METERED RESPIRATORY (INHALATION) EVERY 4 HOURS PRN
Qty: 1 INHALER | Refills: 0 | Status: SHIPPED | OUTPATIENT
Start: 2019-01-04 | End: 2019-01-26 | Stop reason: SDUPTHER

## 2019-01-04 RX ORDER — AMOXICILLIN AND CLAVULANATE POTASSIUM 875; 125 MG/1; MG/1
1 TABLET, FILM COATED ORAL 2 TIMES DAILY
Qty: 20 TABLET | Refills: 0 | Status: SHIPPED | OUTPATIENT
Start: 2019-01-04 | End: 2019-01-14

## 2019-01-04 NOTE — PROGRESS NOTES
Subjective:       Patient ID: Vicki Peña is a 83 y.o. female.    Vitals:  height is 5' (1.524 m) and weight is 52.2 kg (115 lb). Her oral temperature is 97.7 °F (36.5 °C). Her blood pressure is 125/68 and her pulse is 72. Her respiration is 19 and oxygen saturation is 99%.     Chief Complaint: URI    URI    This is a new problem. The current episode started 1 to 4 weeks ago (12/24/2018). The problem has been gradually worsening. Maximum temperature: unmeasured. Associated symptoms include congestion, coughing, ear pain, headaches, sinus pain and sneezing. Pertinent negatives include no abdominal pain, chest pain, diarrhea, dysuria, joint pain, joint swelling, nausea, neck pain, plugged ear sensation, rash, rhinorrhea, sore throat, swollen glands, vomiting or wheezing. She has tried antihistamine, decongestant and acetaminophen for the symptoms. The treatment provided mild relief.       Constitution: Negative for chills, sweating, fatigue and fever.   HENT: Positive for ear pain, congestion and sinus pain. Negative for sinus pressure, sore throat and voice change.    Neck: Negative for neck pain and painful lymph nodes.   Cardiovascular: Negative for chest pain.   Eyes: Negative for eye redness.   Respiratory: Positive for cough and sputum production. Negative for chest tightness, bloody sputum, COPD, shortness of breath, stridor, wheezing and asthma.    Gastrointestinal: Negative for abdominal pain, nausea, vomiting and diarrhea.   Genitourinary: Negative for dysuria.   Musculoskeletal: Negative for muscle ache.   Skin: Negative for rash and erythema.   Allergic/Immunologic: Positive for sneezing. Negative for seasonal allergies and asthma.   Neurological: Positive for headaches.   Hematologic/Lymphatic: Negative for swollen lymph nodes.       Objective:      Physical Exam   Constitutional: She is oriented to person, place, and time. She appears well-developed and well-nourished.   HENT:   Head: Normocephalic and  atraumatic.   Right Ear: External ear normal.   Left Ear: External ear normal.   Mouth/Throat: Oropharynx is clear and moist.   Eyes: EOM are normal. Pupils are equal, round, and reactive to light.   Neck: Normal range of motion. Neck supple. No thyromegaly present.   Cardiovascular: Normal rate, regular rhythm and normal heart sounds. Exam reveals no gallop and no friction rub.   No murmur heard.  Pulmonary/Chest: Breath sounds normal. No respiratory distress. She has no wheezes. She has no rales.   Abdominal: Soft. Bowel sounds are normal. She exhibits no distension. There is no tenderness. There is no rebound and no guarding.   Musculoskeletal: Normal range of motion. She exhibits no edema, tenderness or deformity.   Lymphadenopathy:     She has no cervical adenopathy.   Neurological: She is alert and oriented to person, place, and time. She displays normal reflexes. No cranial nerve deficit. She exhibits normal muscle tone. Coordination normal.   Skin: Skin is warm. Capillary refill takes less than 2 seconds. No rash noted. No erythema. No pallor.   Psychiatric: She has a normal mood and affect. Her behavior is normal. Judgment and thought content normal.   Vitals reviewed.      Assessment:       1. Bronchitis    2. Cough        Plan:         Bronchitis  -     amoxicillin-clavulanate 875-125mg (AUGMENTIN) 875-125 mg per tablet; Take 1 tablet by mouth 2 (two) times daily. for 10 days  Dispense: 20 tablet; Refill: 0  -     albuterol (PROVENTIL/VENTOLIN HFA) 90 mcg/actuation inhaler; Inhale 2 puffs into the lungs every 4 (four) hours as needed for Wheezing. Rescue  Dispense: 1 Inhaler; Refill: 0    Cough  -     benzonatate (TESSALON) 200 MG capsule; Take 1 capsule (200 mg total) by mouth 3 (three) times daily as needed for Cough.  Dispense: 30 capsule; Refill: 0          Patient Instructions       What Is Acute Bronchitis?  Acute bronchitis is when the airways in your lungs (bronchial tubes) become red and swollen  (inflamed). It is usually caused by a viral infection. But it can also occur because of a bacteria or allergen. Symptoms include a cough that produces yellow or greenish mucus and can last for days or sometimes weeks.  Inside healthy lungs    Air travels in and out of the lungs through the airways. The linings of these airways produce sticky mucus. This mucus traps particles that enter the lungs. Tiny structures called cilia then sweep the particles out of the airways.     Healthy airway: Airways are normally open. Air moves in and out easily.      Healthy cilia: Tiny, hairlike cilia sweep mucus and particles up and out of the airways.   Lungs with bronchitis  Bronchitis often occurs with a cold or the flu virus. The airways become inflamed (red and swollen). There is a deep hacking cough from the extra mucus. Other symptoms may include:  · Wheezing  · Chest discomfort  · Shortness of breath  · Mild fever  A second infection, this time due to bacteria, may then occur. And airways irritated by allergens or smoke are more likely to get infected.        Inflamed airway: Inflammation and extra mucus narrow the airway, causing shortness of breath.      Impaired cilia: Extra mucus impairs cilia, causing congestion and wheezing. Smoking makes the problem worse.   Making a diagnosis  A physical exam, health history, and certain tests help your healthcare provider make the diagnosis.  Health history  Your healthcare provider will ask you about your symptoms.  The exam  Your provider listens to your chest for signs of congestion. He or she may also check your ears, nose, and throat.  Possible tests  · A sputum test for bacteria. This requires a sample of mucus from your lungs.  · A nasal or throat swab. This tests to see if you have a bacterial infection.  · A chest X-ray. This is done if your healthcare provider thinks you have pneumonia.  · Tests to check for an underlying condition. Other tests may be done to check for  things such as allergies, asthma, or COPD (chronic obstructive pulmonary disease). You may need to see a specialist for more lung function testing.  Treating a cough  The main treatment for bronchitis is easing symptoms. Avoiding smoke, allergens, and other things that trigger coughing can often help. If the infection is bacterial, you may be given antibiotics. During the illness, it's important to get plenty of sleep. To ease symptoms:  · Dont smoke. Also avoid secondhand smoke.  · Use a humidifier. Or try breathing in steam from a hot shower. This may help loosen mucus.  · Drink a lot of water and juice. They can soothe the throat and may help thin mucus.  · Sit up or use extra pillows when in bed. This helps to lessen coughing and congestion.  · Ask your provider about using medicine. Ask about using cough medicine, pain and fever medicine, or a decongestant.  Antibiotics  Most cases of bronchitis are caused by cold or flu viruses. They dont need antibiotics to treat them, even if your mucus is thick and green or yellow. Antibiotics dont treat viral illness and antibiotics have not been shown to have any benefit in cases of acute bronchitis. Taking antibiotics when they are not needed increases your risk of getting an infection later that is antibiotic-resistant. Antibiotics can also cause severe cases of diarrhea that require other antibiotics to treat.  It is important that you accept your healthcare provider's opinion to not use antibiotics. Your provider will prescribe antibiotics if the infection is caused by bacteria. If they are prescribed:  · Take all of the medicine. Take the medicine until it is used up, even if symptoms have improved. If you dont, the bronchitis may come back.  · Take the medicines as directed. For instance, some medicines should be taken with food.  · Ask about side effects. Ask your provider or pharmacist what side effects are common, and what to do about them.  Follow-up  care  You should see your provider again in 2 to 3 weeks. By this time, symptoms should have improved. An infection that lasts longer may mean you have a more serious problem.  Prevention  · Avoid tobacco smoke. If you smoke, quit. Stay away from smoky places. Ask friends and family not to smoke around you, or in your home or car.  · Get checked for allergies.  · Ask your provider about getting a yearly flu shot. Also ask about pneumococcal or pneumonia shots.  · Wash your hands often. This helps reduce the chance of picking up viruses that cause colds and flu.  Call your healthcare provider if:  · Symptoms worsen, or you have new symptoms  · Breathing problems worsen or  become severe  · Symptoms dont get better within a week, or within 3 days of taking antibiotics   Date Last Reviewed: 2/1/2017  © 1988-2971 [employer]. [address] [place] [id number]. All rights reserved. This information is not intended as a substitute for professional medical care. Always follow your healthcare professional's instructions.    Follow up with your doctor in a few days as needed.  Return to the urgent care or go to the ER if symptoms get worse.    Jim Lyons MD

## 2019-01-04 NOTE — PATIENT INSTRUCTIONS
What Is Acute Bronchitis?  Acute bronchitis is when the airways in your lungs (bronchial tubes) become red and swollen (inflamed). It is usually caused by a viral infection. But it can also occur because of a bacteria or allergen. Symptoms include a cough that produces yellow or greenish mucus and can last for days or sometimes weeks.  Inside healthy lungs    Air travels in and out of the lungs through the airways. The linings of these airways produce sticky mucus. This mucus traps particles that enter the lungs. Tiny structures called cilia then sweep the particles out of the airways.     Healthy airway: Airways are normally open. Air moves in and out easily.      Healthy cilia: Tiny, hairlike cilia sweep mucus and particles up and out of the airways.   Lungs with bronchitis  Bronchitis often occurs with a cold or the flu virus. The airways become inflamed (red and swollen). There is a deep hacking cough from the extra mucus. Other symptoms may include:  · Wheezing  · Chest discomfort  · Shortness of breath  · Mild fever  A second infection, this time due to bacteria, may then occur. And airways irritated by allergens or smoke are more likely to get infected.        Inflamed airway: Inflammation and extra mucus narrow the airway, causing shortness of breath.      Impaired cilia: Extra mucus impairs cilia, causing congestion and wheezing. Smoking makes the problem worse.   Making a diagnosis  A physical exam, health history, and certain tests help your healthcare provider make the diagnosis.  Health history  Your healthcare provider will ask you about your symptoms.  The exam  Your provider listens to your chest for signs of congestion. He or she may also check your ears, nose, and throat.  Possible tests  · A sputum test for bacteria. This requires a sample of mucus from your lungs.  · A nasal or throat swab. This tests to see if you have a bacterial infection.  · A chest X-ray. This is done if your healthcare  provider thinks you have pneumonia.  · Tests to check for an underlying condition. Other tests may be done to check for things such as allergies, asthma, or COPD (chronic obstructive pulmonary disease). You may need to see a specialist for more lung function testing.  Treating a cough  The main treatment for bronchitis is easing symptoms. Avoiding smoke, allergens, and other things that trigger coughing can often help. If the infection is bacterial, you may be given antibiotics. During the illness, it's important to get plenty of sleep. To ease symptoms:  · Dont smoke. Also avoid secondhand smoke.  · Use a humidifier. Or try breathing in steam from a hot shower. This may help loosen mucus.  · Drink a lot of water and juice. They can soothe the throat and may help thin mucus.  · Sit up or use extra pillows when in bed. This helps to lessen coughing and congestion.  · Ask your provider about using medicine. Ask about using cough medicine, pain and fever medicine, or a decongestant.  Antibiotics  Most cases of bronchitis are caused by cold or flu viruses. They dont need antibiotics to treat them, even if your mucus is thick and green or yellow. Antibiotics dont treat viral illness and antibiotics have not been shown to have any benefit in cases of acute bronchitis. Taking antibiotics when they are not needed increases your risk of getting an infection later that is antibiotic-resistant. Antibiotics can also cause severe cases of diarrhea that require other antibiotics to treat.  It is important that you accept your healthcare provider's opinion to not use antibiotics. Your provider will prescribe antibiotics if the infection is caused by bacteria. If they are prescribed:  · Take all of the medicine. Take the medicine until it is used up, even if symptoms have improved. If you dont, the bronchitis may come back.  · Take the medicines as directed. For instance, some medicines should be taken with food.  · Ask about  side effects. Ask your provider or pharmacist what side effects are common, and what to do about them.  Follow-up care  You should see your provider again in 2 to 3 weeks. By this time, symptoms should have improved. An infection that lasts longer may mean you have a more serious problem.  Prevention  · Avoid tobacco smoke. If you smoke, quit. Stay away from smoky places. Ask friends and family not to smoke around you, or in your home or car.  · Get checked for allergies.  · Ask your provider about getting a yearly flu shot. Also ask about pneumococcal or pneumonia shots.  · Wash your hands often. This helps reduce the chance of picking up viruses that cause colds and flu.  Call your healthcare provider if:  · Symptoms worsen, or you have new symptoms  · Breathing problems worsen or  become severe  · Symptoms dont get better within a week, or within 3 days of taking antibiotics   Date Last Reviewed: 2/1/2017  © 9276-1128 [employer]. [address] [place] [address]. All rights reserved. This information is not intended as a substitute for professional medical care. Always follow your healthcare professional's instructions.    Follow up with your doctor in a few days as needed.  Return to the urgent care or go to the ER if symptoms get worse.    Jim Lyons MD

## 2019-01-10 DIAGNOSIS — I10 ESSENTIAL HYPERTENSION: Primary | Chronic | ICD-10-CM

## 2019-01-10 DIAGNOSIS — D72.819 LEUKOPENIA, UNSPECIFIED TYPE: ICD-10-CM

## 2019-01-10 DIAGNOSIS — R73.01 IMPAIRED FASTING GLUCOSE: ICD-10-CM

## 2019-01-26 DIAGNOSIS — J40 BRONCHITIS: ICD-10-CM

## 2019-01-28 RX ORDER — ALBUTEROL SULFATE 90 UG/1
AEROSOL, METERED RESPIRATORY (INHALATION)
Qty: 18 INHALER | Refills: 0 | Status: ON HOLD | OUTPATIENT
Start: 2019-01-28 | End: 2019-02-03 | Stop reason: SDUPTHER

## 2019-02-02 ENCOUNTER — HOSPITAL ENCOUNTER (OUTPATIENT)
Dept: RADIOLOGY | Facility: HOSPITAL | Age: 84
Discharge: HOME | DRG: 811 | End: 2019-02-02
Attending: INTERNAL MEDICINE
Payer: MEDICARE

## 2019-02-02 ENCOUNTER — TELEPHONE (OUTPATIENT)
Dept: INTERNAL MEDICINE | Facility: CLINIC | Age: 84
End: 2019-02-02

## 2019-02-02 ENCOUNTER — HOSPITAL ENCOUNTER (INPATIENT)
Facility: HOSPITAL | Age: 84
LOS: 17 days | Discharge: HOSPICE | DRG: 811 | End: 2019-02-19
Attending: EMERGENCY MEDICINE | Admitting: EMERGENCY MEDICINE
Payer: MEDICARE

## 2019-02-02 ENCOUNTER — OFFICE VISIT (OUTPATIENT)
Dept: INTERNAL MEDICINE | Facility: CLINIC | Age: 84
DRG: 811 | End: 2019-02-02
Payer: MEDICARE

## 2019-02-02 VITALS
HEART RATE: 82 BPM | TEMPERATURE: 99 F | SYSTOLIC BLOOD PRESSURE: 131 MMHG | WEIGHT: 115.31 LBS | BODY MASS INDEX: 22.64 KG/M2 | HEIGHT: 60 IN | RESPIRATION RATE: 16 BRPM | DIASTOLIC BLOOD PRESSURE: 57 MMHG

## 2019-02-02 DIAGNOSIS — M35.01 KERATOCONJUNCTIVITIS SICCA OF BOTH EYES: Chronic | ICD-10-CM

## 2019-02-02 DIAGNOSIS — R06.02 SOB (SHORTNESS OF BREATH): ICD-10-CM

## 2019-02-02 DIAGNOSIS — R11.2 NAUSEA AND VOMITING, INTRACTABILITY OF VOMITING NOT SPECIFIED, UNSPECIFIED VOMITING TYPE: ICD-10-CM

## 2019-02-02 DIAGNOSIS — K12.1 MOUTH ULCERS: ICD-10-CM

## 2019-02-02 DIAGNOSIS — N39.0 SEPSIS DUE TO URINARY TRACT INFECTION: ICD-10-CM

## 2019-02-02 DIAGNOSIS — D61.818 PANCYTOPENIA: ICD-10-CM

## 2019-02-02 DIAGNOSIS — R53.83 FATIGUE, UNSPECIFIED TYPE: ICD-10-CM

## 2019-02-02 DIAGNOSIS — Z86.73 HISTORY OF CVA (CEREBROVASCULAR ACCIDENT): ICD-10-CM

## 2019-02-02 DIAGNOSIS — R53.83 FATIGUE, UNSPECIFIED TYPE: Primary | ICD-10-CM

## 2019-02-02 DIAGNOSIS — D69.6 THROMBOCYTOPENIA: ICD-10-CM

## 2019-02-02 DIAGNOSIS — D64.9 SYMPTOMATIC ANEMIA: ICD-10-CM

## 2019-02-02 DIAGNOSIS — R53.1 WEAKNESS: ICD-10-CM

## 2019-02-02 DIAGNOSIS — D64.9 SEVERE ANEMIA: Primary | ICD-10-CM

## 2019-02-02 DIAGNOSIS — A41.9 SEPSIS DUE TO URINARY TRACT INFECTION: ICD-10-CM

## 2019-02-02 DIAGNOSIS — D64.9 ANEMIA: ICD-10-CM

## 2019-02-02 DIAGNOSIS — D46.9 MDS (MYELODYSPLASTIC SYNDROME): ICD-10-CM

## 2019-02-02 LAB
ABO + RH BLD: NORMAL
ACANTHOCYTES BLD QL SMEAR: PRESENT
ALBUMIN SERPL BCP-MCNC: 2.5 G/DL
ALP SERPL-CCNC: 277 U/L
ALT SERPL W/O P-5'-P-CCNC: 33 U/L
ANION GAP SERPL CALC-SCNC: 10 MMOL/L
ANISOCYTOSIS BLD QL SMEAR: SLIGHT
APTT BLDCRRT: 25.9 SEC
AST SERPL-CCNC: 32 U/L
BASOPHILS NFR BLD: 6 %
BILIRUB SERPL-MCNC: 0.6 MG/DL
BLD GP AB SCN CELLS X3 SERPL QL: NORMAL
BLD PROD TYP BPU: NORMAL
BLOOD UNIT EXPIRATION DATE: NORMAL
BLOOD UNIT TYPE CODE: 7300
BLOOD UNIT TYPE: NORMAL
BUN SERPL-MCNC: 12 MG/DL
BURR CELLS BLD QL SMEAR: ABNORMAL
CALCIUM SERPL-MCNC: 8.7 MG/DL
CHLORIDE SERPL-SCNC: 100 MMOL/L
CO2 SERPL-SCNC: 20 MMOL/L
CODING SYSTEM: NORMAL
CREAT SERPL-MCNC: 0.7 MG/DL
DIFFERENTIAL METHOD: ABNORMAL
DISPENSE STATUS: NORMAL
EOSINOPHIL NFR BLD: 1 %
ERYTHROCYTE [DISTWIDTH] IN BLOOD BY AUTOMATED COUNT: 23.7 %
EST. GFR  (AFRICAN AMERICAN): >60 ML/MIN/1.73 M^2
EST. GFR  (NON AFRICAN AMERICAN): >60 ML/MIN/1.73 M^2
GLUCOSE SERPL-MCNC: 119 MG/DL
HCT VFR BLD AUTO: 21.1 %
HGB BLD-MCNC: 6.8 G/DL
HYPOCHROMIA BLD QL SMEAR: ABNORMAL
IMM GRANULOCYTES # BLD AUTO: ABNORMAL K/UL
IMM GRANULOCYTES NFR BLD AUTO: ABNORMAL %
INR PPP: 1.1
LYMPHOCYTES NFR BLD: 23 %
MCH RBC QN AUTO: 27.3 PG
MCHC RBC AUTO-ENTMCNC: 32.2 G/DL
MCV RBC AUTO: 85 FL
MONOCYTES NFR BLD: 10 %
NEUTROPHILS NFR BLD: 32 %
NEUTS BAND NFR BLD MANUAL: 3 %
NRBC BLD-RTO: 1 /100 WBC
OVALOCYTES BLD QL SMEAR: ABNORMAL
PLATELET # BLD AUTO: 36 K/UL
PLATELET BLD QL SMEAR: ABNORMAL
PMV BLD AUTO: ABNORMAL FL
POIKILOCYTOSIS BLD QL SMEAR: SLIGHT
POLYCHROMASIA BLD QL SMEAR: ABNORMAL
POTASSIUM SERPL-SCNC: 3.6 MMOL/L
PROT SERPL-MCNC: 6.5 G/DL
PROTHROMBIN TIME: 11.7 SEC
RBC # BLD AUTO: 2.49 M/UL
SCHISTOCYTES BLD QL SMEAR: ABNORMAL
SCHISTOCYTES BLD QL SMEAR: PRESENT
SODIUM SERPL-SCNC: 130 MMOL/L
TOXIC GRANULES BLD QL SMEAR: PRESENT
TRANS PLATPHERESIS VOL PATIENT: NORMAL ML
WBC # BLD AUTO: 6.15 K/UL
WBC OTHER NFR BLD MANUAL: 25 %
WBC TOXIC VACUOLES BLD QL SMEAR: PRESENT

## 2019-02-02 PROCEDURE — 80053 COMPREHEN METABOLIC PANEL: CPT

## 2019-02-02 PROCEDURE — 86920 COMPATIBILITY TEST SPIN: CPT

## 2019-02-02 PROCEDURE — 83540 ASSAY OF IRON: CPT

## 2019-02-02 PROCEDURE — 85384 FIBRINOGEN ACTIVITY: CPT

## 2019-02-02 PROCEDURE — 71046 X-RAY EXAM CHEST 2 VIEWS: CPT | Mod: 26,,, | Performed by: RADIOLOGY

## 2019-02-02 PROCEDURE — 82607 VITAMIN B-12: CPT

## 2019-02-02 PROCEDURE — 85027 COMPLETE CBC AUTOMATED: CPT

## 2019-02-02 PROCEDURE — 96375 TX/PRO/DX INJ NEW DRUG ADDON: CPT

## 2019-02-02 PROCEDURE — 99284 PR EMERGENCY DEPT VISIT,LEVEL IV: ICD-10-PCS | Mod: ,,, | Performed by: EMERGENCY MEDICINE

## 2019-02-02 PROCEDURE — P9035 PLATELET PHERES LEUKOREDUCED: HCPCS

## 2019-02-02 PROCEDURE — 12000002 HC ACUTE/MED SURGE SEMI-PRIVATE ROOM

## 2019-02-02 PROCEDURE — 71046 X-RAY EXAM CHEST 2 VIEWS: CPT | Mod: TC,PO

## 2019-02-02 PROCEDURE — P9021 RED BLOOD CELLS UNIT: HCPCS

## 2019-02-02 PROCEDURE — 71046 XR CHEST PA AND LATERAL: ICD-10-PCS | Mod: 26,,, | Performed by: RADIOLOGY

## 2019-02-02 PROCEDURE — 36430 TRANSFUSION BLD/BLD COMPNT: CPT

## 2019-02-02 PROCEDURE — 82746 ASSAY OF FOLIC ACID SERUM: CPT

## 2019-02-02 PROCEDURE — 63600175 PHARM REV CODE 636 W HCPCS: Performed by: EMERGENCY MEDICINE

## 2019-02-02 PROCEDURE — 99999 PR PBB SHADOW E&M-EST. PATIENT-LVL IV: ICD-10-PCS | Mod: PBBFAC,,, | Performed by: INTERNAL MEDICINE

## 2019-02-02 PROCEDURE — 85730 THROMBOPLASTIN TIME PARTIAL: CPT

## 2019-02-02 PROCEDURE — 99999 PR PBB SHADOW E&M-EST. PATIENT-LVL IV: CPT | Mod: PBBFAC,,, | Performed by: INTERNAL MEDICINE

## 2019-02-02 PROCEDURE — 99214 OFFICE O/P EST MOD 30 MIN: CPT | Mod: PBBFAC,PO,25 | Performed by: INTERNAL MEDICINE

## 2019-02-02 PROCEDURE — 85610 PROTHROMBIN TIME: CPT

## 2019-02-02 PROCEDURE — 99214 PR OFFICE/OUTPT VISIT, EST, LEVL IV, 30-39 MIN: ICD-10-PCS | Mod: S$PBB,,, | Performed by: INTERNAL MEDICINE

## 2019-02-02 PROCEDURE — 99285 EMERGENCY DEPT VISIT HI MDM: CPT | Mod: 27

## 2019-02-02 PROCEDURE — 96372 THER/PROPH/DIAG INJ SC/IM: CPT | Mod: 59

## 2019-02-02 PROCEDURE — 86901 BLOOD TYPING SEROLOGIC RH(D): CPT

## 2019-02-02 PROCEDURE — 82977 ASSAY OF GGT: CPT

## 2019-02-02 PROCEDURE — 96374 THER/PROPH/DIAG INJ IV PUSH: CPT

## 2019-02-02 PROCEDURE — 25000003 PHARM REV CODE 250: Performed by: EMERGENCY MEDICINE

## 2019-02-02 PROCEDURE — 99214 OFFICE O/P EST MOD 30 MIN: CPT | Mod: S$PBB,,, | Performed by: INTERNAL MEDICINE

## 2019-02-02 PROCEDURE — 85379 FIBRIN DEGRADATION QUANT: CPT

## 2019-02-02 PROCEDURE — 82728 ASSAY OF FERRITIN: CPT

## 2019-02-02 PROCEDURE — 85045 AUTOMATED RETICULOCYTE COUNT: CPT

## 2019-02-02 PROCEDURE — 99284 EMERGENCY DEPT VISIT MOD MDM: CPT | Mod: ,,, | Performed by: EMERGENCY MEDICINE

## 2019-02-02 PROCEDURE — 85007 BL SMEAR W/DIFF WBC COUNT: CPT

## 2019-02-02 PROCEDURE — 83010 ASSAY OF HAPTOGLOBIN QUANT: CPT

## 2019-02-02 PROCEDURE — 83615 LACTATE (LD) (LDH) ENZYME: CPT

## 2019-02-02 RX ORDER — DIPHENHYDRAMINE HYDROCHLORIDE 50 MG/ML
25 INJECTION INTRAMUSCULAR; INTRAVENOUS
Status: COMPLETED | OUTPATIENT
Start: 2019-02-02 | End: 2019-02-02

## 2019-02-02 RX ORDER — METHYLPREDNISOLONE SOD SUCC 125 MG
125 VIAL (EA) INJECTION
Status: COMPLETED | OUTPATIENT
Start: 2019-02-02 | End: 2019-02-02

## 2019-02-02 RX ORDER — PANTOPRAZOLE SODIUM 40 MG/10ML
80 INJECTION, POWDER, LYOPHILIZED, FOR SOLUTION INTRAVENOUS ONCE
Status: DISCONTINUED | OUTPATIENT
Start: 2019-02-03 | End: 2019-02-03

## 2019-02-02 RX ORDER — ONDANSETRON 8 MG/1
8 TABLET, ORALLY DISINTEGRATING ORAL EVERY 12 HOURS PRN
Qty: 30 TABLET | Refills: 2 | Status: SHIPPED | OUTPATIENT
Start: 2019-02-02

## 2019-02-02 RX ORDER — SIMVASTATIN 40 MG/1
40 TABLET, FILM COATED ORAL NIGHTLY
Status: DISCONTINUED | OUTPATIENT
Start: 2019-02-03 | End: 2019-02-12

## 2019-02-02 RX ORDER — POLYETHYLENE GLYCOL 3350 17 G/17G
17 POWDER, FOR SOLUTION ORAL DAILY
Status: DISCONTINUED | OUTPATIENT
Start: 2019-02-03 | End: 2019-02-03

## 2019-02-02 RX ORDER — SODIUM CHLORIDE 0.9 % (FLUSH) 0.9 %
5 SYRINGE (ML) INJECTION
Status: DISCONTINUED | OUTPATIENT
Start: 2019-02-02 | End: 2019-02-19 | Stop reason: HOSPADM

## 2019-02-02 RX ORDER — HYDROCODONE BITARTRATE AND ACETAMINOPHEN 500; 5 MG/1; MG/1
TABLET ORAL
Status: DISCONTINUED | OUTPATIENT
Start: 2019-02-02 | End: 2019-02-02

## 2019-02-02 RX ORDER — EPINEPHRINE 0.3 MG/.3ML
0.6 INJECTION SUBCUTANEOUS
Status: COMPLETED | OUTPATIENT
Start: 2019-02-02 | End: 2019-02-02

## 2019-02-02 RX ORDER — SODIUM CHLORIDE 0.9 % (FLUSH) 0.9 %
5 SYRINGE (ML) INJECTION
Status: DISCONTINUED | OUTPATIENT
Start: 2019-02-02 | End: 2019-02-13

## 2019-02-02 RX ORDER — ACETAMINOPHEN 325 MG/1
650 TABLET ORAL EVERY 8 HOURS PRN
Status: DISCONTINUED | OUTPATIENT
Start: 2019-02-03 | End: 2019-02-12

## 2019-02-02 RX ORDER — ONDANSETRON 8 MG/1
8 TABLET, ORALLY DISINTEGRATING ORAL EVERY 8 HOURS PRN
Status: DISCONTINUED | OUTPATIENT
Start: 2019-02-03 | End: 2019-02-19 | Stop reason: HOSPADM

## 2019-02-02 RX ORDER — PANTOPRAZOLE SODIUM 40 MG/1
40 TABLET, DELAYED RELEASE ORAL
Status: DISCONTINUED | OUTPATIENT
Start: 2019-02-03 | End: 2019-02-03

## 2019-02-02 RX ORDER — ACETAMINOPHEN 325 MG/1
650 TABLET ORAL EVERY 8 HOURS PRN
Status: DISCONTINUED | OUTPATIENT
Start: 2019-02-03 | End: 2019-02-04

## 2019-02-02 RX ADMIN — METHYLPREDNISOLONE SODIUM SUCCINATE 125 MG: 125 INJECTION, POWDER, FOR SOLUTION INTRAMUSCULAR; INTRAVENOUS at 11:02

## 2019-02-02 RX ADMIN — DIPHENHYDRAMINE HYDROCHLORIDE 25 MG: 50 INJECTION, SOLUTION INTRAMUSCULAR; INTRAVENOUS at 11:02

## 2019-02-02 RX ADMIN — EPINEPHRINE 0.6 MG: 0.3 INJECTION INTRAMUSCULAR at 11:02

## 2019-02-02 NOTE — PROGRESS NOTES
Subjective:       Patient ID: Vicki Peña is a 83 y.o. female.    Chief Complaint: Generalized Body Aches (back of neck, back, headaches); Emesis; and Nausea (no appetite, stomach discomfort)    HPI     83-year-old female here for evaluation of generalized body aches, nausea and vomiting.  This has been going on almost two weeks.  She reports that she has had a lot of body aches, bones hurt.  She has a poor appetite.  She has been very nauseous.  She has had a lot of headaches and the back of her neck hurts.  She has had a lot of chills.  No respiratory symptoms.  She had chikungunya a few years back and was treated.  This is acting like it did the last time she had chikungunya.  She got back from [place] in December.      Review of Systems    Objective:      Physical Exam   Constitutional: She is oriented to person, place, and time. She appears well-developed and well-nourished.   HENT:   Head: Normocephalic and atraumatic.   Mouth/Throat: No oropharyngeal exudate.   Eyes: EOM are normal. Pupils are equal, round, and reactive to light. Right eye exhibits no discharge. Left eye exhibits no discharge. No scleral icterus.   Neck: Normal range of motion. Neck supple. No tracheal deviation present. No thyromegaly present.   Cardiovascular: Normal rate, regular rhythm and normal heart sounds. Exam reveals no gallop and no friction rub.   No murmur heard.  Pulmonary/Chest: Effort normal and breath sounds normal. No respiratory distress. She has no wheezes. She has no rales. She exhibits no tenderness.   Abdominal: Soft. Bowel sounds are normal. She exhibits no distension and no mass. There is no tenderness. There is no rebound and no guarding.   Musculoskeletal: Normal range of motion. She exhibits no edema or tenderness.   Neurological: She is alert and oriented to person, place, and time.   Skin: Skin is warm and dry. No rash noted. No erythema. No pallor.   Psychiatric: She has a normal mood and affect. Her behavior  is normal.   Vitals reviewed.      Assessment:       1. Fatigue, unspecified type    2. Nausea and vomiting, intractability of vomiting not specified, unspecified vomiting type    3. Keratoconjunctivitis sicca of both eyes        Plan:      1/2.  Check CBC, ESR, CRP, procalcitonin, urinalysis, urine culture, chest x-ray, refer to Infectious Disease.  Recommend Tylenol and anti-inflammatories around the clock.  3.  Refer to Ophthalmology.

## 2019-02-03 PROBLEM — E87.1 HYPONATREMIA: Status: ACTIVE | Noted: 2019-02-03

## 2019-02-03 PROBLEM — N30.00 ACUTE CYSTITIS WITHOUT HEMATURIA: Status: ACTIVE | Noted: 2019-02-03

## 2019-02-03 PROBLEM — R82.90 ABNORMAL FINDING ON URINALYSIS: Status: ACTIVE | Noted: 2019-02-03

## 2019-02-03 PROBLEM — R82.71 ASYMPTOMATIC BACTERIURIA: Status: ACTIVE | Noted: 2019-02-03

## 2019-02-03 PROBLEM — R53.1 WEAKNESS: Status: ACTIVE | Noted: 2019-02-03

## 2019-02-03 PROBLEM — Z86.73 HISTORY OF CVA (CEREBROVASCULAR ACCIDENT): Status: ACTIVE | Noted: 2019-02-03

## 2019-02-03 PROBLEM — D69.6 THROMBOCYTOPENIA: Status: ACTIVE | Noted: 2019-02-03

## 2019-02-03 PROBLEM — N39.0 SEPSIS DUE TO URINARY TRACT INFECTION: Status: ACTIVE | Noted: 2019-02-03

## 2019-02-03 PROBLEM — A41.9 SEPSIS DUE TO URINARY TRACT INFECTION: Status: ACTIVE | Noted: 2019-02-03

## 2019-02-03 LAB
ACANTHOCYTES BLD QL SMEAR: PRESENT
ALBUMIN SERPL BCP-MCNC: 2.3 G/DL
ALP SERPL-CCNC: 246 U/L
ALT SERPL W/O P-5'-P-CCNC: 29 U/L
ANION GAP SERPL CALC-SCNC: 8 MMOL/L
ANISOCYTOSIS BLD QL SMEAR: SLIGHT
AST SERPL-CCNC: 34 U/L
BASOPHILS # BLD AUTO: 0.11 K/UL
BASOPHILS # BLD AUTO: 0.12 K/UL
BASOPHILS # BLD AUTO: 0.12 K/UL
BASOPHILS NFR BLD: 1.9 %
BASOPHILS NFR BLD: 2 %
BASOPHILS NFR BLD: 2.1 %
BASOPHILS NFR BLD: 4 %
BILIRUB SERPL-MCNC: 0.9 MG/DL
BLD PROD TYP BPU: NORMAL
BLD PROD TYP BPU: NORMAL
BLOOD UNIT EXPIRATION DATE: NORMAL
BLOOD UNIT EXPIRATION DATE: NORMAL
BLOOD UNIT TYPE CODE: 5100
BLOOD UNIT TYPE CODE: 5100
BLOOD UNIT TYPE: NORMAL
BLOOD UNIT TYPE: NORMAL
BUN SERPL-MCNC: 12 MG/DL
BURR CELLS BLD QL SMEAR: ABNORMAL
CALCIUM SERPL-MCNC: 8.7 MG/DL
CHLORIDE SERPL-SCNC: 102 MMOL/L
CO2 SERPL-SCNC: 21 MMOL/L
CODING SYSTEM: NORMAL
CODING SYSTEM: NORMAL
CREAT SERPL-MCNC: 0.7 MG/DL
CREAT UR-MCNC: 157 MG/DL
D DIMER PPP IA.FEU-MCNC: 2.38 MG/L FEU
DACRYOCYTES BLD QL SMEAR: ABNORMAL
DACRYOCYTES BLD QL SMEAR: ABNORMAL
DAT IGG-SP REAG RBC-IMP: NORMAL
DIFFERENTIAL METHOD: ABNORMAL
DISPENSE STATUS: NORMAL
DISPENSE STATUS: NORMAL
EOSINOPHIL # BLD AUTO: 0 K/UL
EOSINOPHIL NFR BLD: 0 %
EOSINOPHIL NFR BLD: 0.3 %
EOSINOPHIL NFR BLD: 0.3 %
EOSINOPHIL NFR BLD: 0.4 %
ERYTHROCYTE [DISTWIDTH] IN BLOOD BY AUTOMATED COUNT: 19.7 %
ERYTHROCYTE [DISTWIDTH] IN BLOOD BY AUTOMATED COUNT: 19.8 %
ERYTHROCYTE [DISTWIDTH] IN BLOOD BY AUTOMATED COUNT: 19.8 %
ERYTHROCYTE [DISTWIDTH] IN BLOOD BY AUTOMATED COUNT: 19.9 %
EST. GFR  (AFRICAN AMERICAN): >60 ML/MIN/1.73 M^2
EST. GFR  (NON AFRICAN AMERICAN): >60 ML/MIN/1.73 M^2
FERRITIN SERPL-MCNC: 1850 NG/ML
FIBRINOGEN PPP-MCNC: 593 MG/DL
FOLATE SERPL-MCNC: 18.3 NG/ML
GGT SERPL-CCNC: 119 U/L
GLUCOSE SERPL-MCNC: 116 MG/DL
HAPTOGLOB SERPL-MCNC: 231 MG/DL
HCT VFR BLD AUTO: 25.3 %
HCT VFR BLD AUTO: 28.2 %
HCT VFR BLD AUTO: 29.2 %
HCT VFR BLD AUTO: 30.4 %
HGB BLD-MCNC: 8.4 G/DL
HGB BLD-MCNC: 9.5 G/DL
HGB BLD-MCNC: 9.6 G/DL
HGB BLD-MCNC: 9.9 G/DL
HYPOCHROMIA BLD QL SMEAR: ABNORMAL
IMM GRANULOCYTES # BLD AUTO: 0 K/UL
IMM GRANULOCYTES # BLD AUTO: 0.05 K/UL
IMM GRANULOCYTES # BLD AUTO: 1.13 K/UL
IMM GRANULOCYTES # BLD AUTO: ABNORMAL K/UL
IMM GRANULOCYTES NFR BLD AUTO: 0 %
IMM GRANULOCYTES NFR BLD AUTO: 0.8 %
IMM GRANULOCYTES NFR BLD AUTO: 19.9 %
IMM GRANULOCYTES NFR BLD AUTO: ABNORMAL %
IRON SERPL-MCNC: 36 UG/DL
LACTATE SERPL-SCNC: 1 MMOL/L
LACTATE SERPL-SCNC: 1 MMOL/L
LACTATE SERPL-SCNC: 1.7 MMOL/L
LDH SERPL L TO P-CCNC: 454 U/L
LYMPHOCYTES # BLD AUTO: 0.8 K/UL
LYMPHOCYTES # BLD AUTO: 1.1 K/UL
LYMPHOCYTES # BLD AUTO: 1.3 K/UL
LYMPHOCYTES NFR BLD: 13.4 %
LYMPHOCYTES NFR BLD: 18.7 %
LYMPHOCYTES NFR BLD: 22.5 %
LYMPHOCYTES NFR BLD: 35 %
MCH RBC QN AUTO: 26.8 PG
MCH RBC QN AUTO: 27 PG
MCH RBC QN AUTO: 27.2 PG
MCH RBC QN AUTO: 27.2 PG
MCHC RBC AUTO-ENTMCNC: 32.6 G/DL
MCHC RBC AUTO-ENTMCNC: 32.9 G/DL
MCHC RBC AUTO-ENTMCNC: 33.2 G/DL
MCHC RBC AUTO-ENTMCNC: 33.7 G/DL
MCV RBC AUTO: 81 FL
MCV RBC AUTO: 82 FL
METAMYELOCYTES NFR BLD MANUAL: 2 %
MONOCYTES # BLD AUTO: 0.7 K/UL
MONOCYTES # BLD AUTO: 0.8 K/UL
MONOCYTES # BLD AUTO: 1.2 K/UL
MONOCYTES NFR BLD: 11.9 %
MONOCYTES NFR BLD: 14.8 %
MONOCYTES NFR BLD: 20.5 %
MONOCYTES NFR BLD: 7 %
NEUTROPHILS # BLD AUTO: 2.8 K/UL
NEUTROPHILS # BLD AUTO: 3.4 K/UL
NEUTROPHILS # BLD AUTO: 3.6 K/UL
NEUTROPHILS NFR BLD: 48 %
NEUTROPHILS NFR BLD: 49.6 %
NEUTROPHILS NFR BLD: 57.7 %
NEUTROPHILS NFR BLD: 63.2 %
NRBC BLD-RTO: 1 /100 WBC
OSMOLALITY SERPL: 278 MOSM/KG
OSMOLALITY UR: 495 MOSM/KG
OVALOCYTES BLD QL SMEAR: ABNORMAL
PATH REV BLD -IMP: NORMAL
PLATELET # BLD AUTO: 52 K/UL
PLATELET # BLD AUTO: 73 K/UL
PLATELET # BLD AUTO: 73 K/UL
PLATELET # BLD AUTO: 80 K/UL
PLATELET BLD QL SMEAR: ABNORMAL
PMV BLD AUTO: 10 FL
PMV BLD AUTO: 10.5 FL
PMV BLD AUTO: ABNORMAL FL
PMV BLD AUTO: ABNORMAL FL
POIKILOCYTOSIS BLD QL SMEAR: SLIGHT
POLYCHROMASIA BLD QL SMEAR: ABNORMAL
POTASSIUM SERPL-SCNC: 4 MMOL/L
PREALB SERPL-MCNC: 7 MG/DL
PROT SERPL-MCNC: 5.9 G/DL
RBC # BLD AUTO: 3.09 M/UL
RBC # BLD AUTO: 3.49 M/UL
RBC # BLD AUTO: 3.56 M/UL
RBC # BLD AUTO: 3.69 M/UL
RETICS/RBC NFR AUTO: 0.9 %
SATURATED IRON: 17 %
SCHISTOCYTES BLD QL SMEAR: ABNORMAL
SCHISTOCYTES BLD QL SMEAR: PRESENT
SCHISTOCYTES BLD QL SMEAR: PRESENT
SODIUM SERPL-SCNC: 131 MMOL/L
SODIUM UR-SCNC: <20 MMOL/L
TOTAL IRON BINDING CAPACITY: 210 UG/DL
TOXIC GRANULES BLD QL SMEAR: PRESENT
TRANS ERYTHROCYTES VOL PATIENT: NORMAL ML
TRANS ERYTHROCYTES VOL PATIENT: NORMAL ML
TRANSFERRIN SERPL-MCNC: 142 MG/DL
TSH SERPL DL<=0.005 MIU/L-ACNC: 0.88 UIU/ML
VIT B12 SERPL-MCNC: >2000 PG/ML
WBC # BLD AUTO: 4.36 K/UL
WBC # BLD AUTO: 5.69 K/UL
WBC # BLD AUTO: 5.73 K/UL
WBC # BLD AUTO: 5.94 K/UL
WBC OTHER NFR BLD MANUAL: 4 %

## 2019-02-03 PROCEDURE — 25000003 PHARM REV CODE 250: Performed by: HOSPITALIST

## 2019-02-03 PROCEDURE — 84443 ASSAY THYROID STIM HORMONE: CPT

## 2019-02-03 PROCEDURE — 83935 ASSAY OF URINE OSMOLALITY: CPT

## 2019-02-03 PROCEDURE — 86880 COOMBS TEST DIRECT: CPT

## 2019-02-03 PROCEDURE — 99223 1ST HOSP IP/OBS HIGH 75: CPT | Mod: ,,, | Performed by: INTERNAL MEDICINE

## 2019-02-03 PROCEDURE — 93005 ELECTROCARDIOGRAM TRACING: CPT

## 2019-02-03 PROCEDURE — C9113 INJ PANTOPRAZOLE SODIUM, VIA: HCPCS | Performed by: STUDENT IN AN ORGANIZED HEALTH CARE EDUCATION/TRAINING PROGRAM

## 2019-02-03 PROCEDURE — 85027 COMPLETE CBC AUTOMATED: CPT

## 2019-02-03 PROCEDURE — 85007 BL SMEAR W/DIFF WBC COUNT: CPT | Mod: 91

## 2019-02-03 PROCEDURE — P9021 RED BLOOD CELLS UNIT: HCPCS

## 2019-02-03 PROCEDURE — 83605 ASSAY OF LACTIC ACID: CPT

## 2019-02-03 PROCEDURE — 93010 ELECTROCARDIOGRAM REPORT: CPT | Mod: ,,, | Performed by: INTERNAL MEDICINE

## 2019-02-03 PROCEDURE — 99223 PR INITIAL HOSPITAL CARE,LEVL III: ICD-10-PCS | Mod: AI,GC,, | Performed by: HOSPITALIST

## 2019-02-03 PROCEDURE — 63600175 PHARM REV CODE 636 W HCPCS: Performed by: STUDENT IN AN ORGANIZED HEALTH CARE EDUCATION/TRAINING PROGRAM

## 2019-02-03 PROCEDURE — 86747 PARVOVIRUS ANTIBODY: CPT

## 2019-02-03 PROCEDURE — 99223 PR INITIAL HOSPITAL CARE,LEVL III: ICD-10-PCS | Mod: ,,, | Performed by: INTERNAL MEDICINE

## 2019-02-03 PROCEDURE — 93010 ELECTROCARDIOGRAM REPORT: CPT | Mod: 76,,, | Performed by: INTERNAL MEDICINE

## 2019-02-03 PROCEDURE — 93010 EKG 12-LEAD: ICD-10-PCS | Mod: 76,,, | Performed by: INTERNAL MEDICINE

## 2019-02-03 PROCEDURE — 83605 ASSAY OF LACTIC ACID: CPT | Mod: 91

## 2019-02-03 PROCEDURE — 80053 COMPREHEN METABOLIC PANEL: CPT

## 2019-02-03 PROCEDURE — 87040 BLOOD CULTURE FOR BACTERIA: CPT

## 2019-02-03 PROCEDURE — 99223 1ST HOSP IP/OBS HIGH 75: CPT | Mod: AI,GC,, | Performed by: HOSPITALIST

## 2019-02-03 PROCEDURE — 25000003 PHARM REV CODE 250: Performed by: STUDENT IN AN ORGANIZED HEALTH CARE EDUCATION/TRAINING PROGRAM

## 2019-02-03 PROCEDURE — 83930 ASSAY OF BLOOD OSMOLALITY: CPT

## 2019-02-03 PROCEDURE — 11000001 HC ACUTE MED/SURG PRIVATE ROOM

## 2019-02-03 PROCEDURE — 85025 COMPLETE CBC W/AUTO DIFF WBC: CPT

## 2019-02-03 PROCEDURE — 84300 ASSAY OF URINE SODIUM: CPT

## 2019-02-03 PROCEDURE — 84134 ASSAY OF PREALBUMIN: CPT

## 2019-02-03 PROCEDURE — 82570 ASSAY OF URINE CREATININE: CPT

## 2019-02-03 PROCEDURE — 36415 COLL VENOUS BLD VENIPUNCTURE: CPT

## 2019-02-03 PROCEDURE — 97165 OT EVAL LOW COMPLEX 30 MIN: CPT

## 2019-02-03 RX ORDER — PANTOPRAZOLE SODIUM 40 MG/10ML
80 INJECTION, POWDER, LYOPHILIZED, FOR SOLUTION INTRAVENOUS ONCE
Status: COMPLETED | OUTPATIENT
Start: 2019-02-03 | End: 2019-02-03

## 2019-02-03 RX ORDER — VANCOMYCIN HCL IN 5 % DEXTROSE 1.25 G/25
1250 PLASTIC BAG, INJECTION (ML) INTRAVENOUS ONCE
Status: COMPLETED | OUTPATIENT
Start: 2019-02-03 | End: 2019-02-03

## 2019-02-03 RX ORDER — PANTOPRAZOLE SODIUM 40 MG/1
40 TABLET, DELAYED RELEASE ORAL
Status: DISCONTINUED | OUTPATIENT
Start: 2019-02-03 | End: 2019-02-05

## 2019-02-03 RX ORDER — CEFTRIAXONE 1 G/1
1 INJECTION, POWDER, FOR SOLUTION INTRAMUSCULAR; INTRAVENOUS
Status: DISCONTINUED | OUTPATIENT
Start: 2019-02-03 | End: 2019-02-03

## 2019-02-03 RX ADMIN — SIMVASTATIN 40 MG: 40 TABLET, FILM COATED ORAL at 09:02

## 2019-02-03 RX ADMIN — ACETAMINOPHEN 650 MG: 325 TABLET, FILM COATED ORAL at 09:02

## 2019-02-03 RX ADMIN — CEFTRIAXONE SODIUM 1 G: 1 INJECTION, POWDER, FOR SOLUTION INTRAMUSCULAR; INTRAVENOUS at 10:02

## 2019-02-03 RX ADMIN — Medication 1250 MG: at 10:02

## 2019-02-03 RX ADMIN — SODIUM CHLORIDE 1572 ML: 0.9 INJECTION, SOLUTION INTRAVENOUS at 07:02

## 2019-02-03 RX ADMIN — PANTOPRAZOLE SODIUM 80 MG: 40 INJECTION, POWDER, FOR SOLUTION INTRAVENOUS at 02:02

## 2019-02-03 RX ADMIN — ACETAMINOPHEN 650 MG: 325 TABLET, FILM COATED ORAL at 04:02

## 2019-02-03 RX ADMIN — PANTOPRAZOLE SODIUM 40 MG: 40 TABLET, DELAYED RELEASE ORAL at 04:02

## 2019-02-03 NOTE — ED TRIAGE NOTES
Pt presents to the ED for abnormal H/H of 6.6 21.2. Pt denies any blood in stool or any recent trauma. Denies any pmhx of anemia or previous blood transfusions. Denies being on any blood thinners, c/p, SOB. Pt is AAOx4. Name and  checked and verified.

## 2019-02-03 NOTE — PT/OT/SLP EVAL
Occupational Therapy   Evaluation    Name: Vicki Peña  MRN: [id number]  Admitting Diagnosis:  Symptomatic anemia      Recommendations:     Discharge Recommendations: (home with HH)  Discharge Equipment Recommendations:  none  Barriers to discharge:  None    Assessment:     Vicki Peña is a 83 y.o. female with a medical diagnosis of Symptomatic anemia.  She presents supine with daughter present.  Pt with complaints of headache with impending migraine per pt and daughter.  Pt was independent in self care, home and community ().  Pt with good potential for return to [facility].    Performance deficits affecting function: weakness, impaired endurance, impaired self care skills, impaired functional mobilty, gait instability, impaired balance.      Rehab Prognosis: Fair; patient would benefit from acute skilled OT services to address these deficits and reach maximum level of function.       Plan:     Patient to be seen 3 x/week to address the above listed problems via self-care/home management, therapeutic activities, therapeutic exercises  · Plan of Care Expires: 03/03/19  · Plan of Care Reviewed with: patient, daughter    Subjective     Chief Complaint: weakness, poor activity tolerance   Patient/Family Comments/goals: return to home     Occupational Profile:  Living Environment: Pt lives with daughter and granddaughter in 1 story house with no steps to enter   Previous level of function: Pt was independent with all aspects of performance   Equipment Used at Home:  none(has shower chair and rollator from spouse)  Assistance upon Discharge: Pt has family to provide assist as needed    Pain/Comfort:  · Pain Rating 1: 0/10    Patients cultural, spiritual, Orthodoxy conflicts given the current situation: no    Objective:     Communicated with: RN prior to session.  Patient found supine with (lines intact ) upon OT entry to room.    General Precautions: Standard, fall   Orthopedic Precautions:N/A   Braces: N/A      Occupational Performance:    Bed Mobility:    · Patient completed Rolling/Turning to Left with  stand by assistance  · Patient completed Rolling/Turning to Right with stand by assistance  · Patient completed Scooting/Bridging with contact guard assistance  · Patient completed Supine to Sit with contact guard assistance  · Patient completed Sit to Supine with stand by assistance    Functional Mobility/Transfers:  · Patient completed Sit <> Stand Transfer with minimum assistance  with  no assistive device   · Patient completed Toilet Transfer Step Transfer technique with minimum assistance with  no AD  · Functional Mobility: Pt with min A for bed to toilet transfer     Activities of Daily Living:  · Grooming: minimum assistance    · Bathing: moderate assistance    · Upper Body Dressing: contact guard assistance    · Lower Body Dressing: minimum assistance    · Toileting: minimum assistance      Cognitive/Visual Perceptual:  Cognitive/Psychosocial Skills:     -       Oriented to: Person, Place, Time and Situation   -       Follows Commands/attention:Follows two-step commands  -       Communication: clear/fluent  -       Memory: No Deficits noted  -       Safety awareness/insight to disability: intact   -       Mood/Affect/Coping skills/emotional control: Appropriate to situation    Physical Exam:  Upper Extremity Range of Motion:     -       Right Upper Extremity: WFL  -       Left Upper Extremity: WFL  Upper Extremity Strength:    -       Right Upper Extremity: WFL  -       Left Upper Extremity: WFL    AMPAC 6 Click ADL:  AMPAC Total Score: 19    Treatment & Education:  Evaluation complete and goals set. Pt educated on safety, role of OT, importance of increased participation in self care for gains , expectations for participation, expectations for gains, POC, energy conservation, caregiver strain. White board updated.     Education:    Patient left supine with all lines intact and daughter  present    GOALS:  "  Multidisciplinary Problems     Occupational Therapy Goals        Problem: Occupational Therapy Goal    Goal Priority Disciplines Outcome Interventions   Occupational Therapy Goal     OT, PT/OT Ongoing (interventions implemented as appropriate)    Description:  Goals to be met by: 2/15    Patient will increase functional independence with ADLs by performing:    UE Dressing with [place].  LE Dressing with [place].  Grooming while standing with Stand-by Assistance.  Toileting from toilet with Stand-by Assistance for hygiene and clothing management.   Bathing from  shower chair/bench with Contact Guard Assistance.                      History:     Past Medical History:   Diagnosis Date    Bilateral cataracts     Chronic kidney disease     CVA (cerebrovascular accident)     Hypercholesterolemia     Hyperlipidemia     Kidney stone     OP (osteoporosis)     Right knee DJD 2014    UTI (urinary tract infection)        Past Surgical History:   Procedure Laterality Date    CATARACT EXTRACTION W/  INTRAOCULAR LENS IMPLANT Left 14    CATARACT EXTRACTION W/  INTRAOCULAR LENS IMPLANT Right 14    CYSTOSCOPY      INSERTION-INTRAOCULAR LENS (IOL) Right 2014    Performed by Rich Camejo MD at [facility] [place], operated by [facility]    INSERTION-INTRAOCULAR LENS (IOL) Left 2014    Performed by Rich Camejo MD at [facility] [place] [place]    PHACOEMULSIFICATION-ASPIRATION-CATARACT Right 2014    Performed by Rich Camejo MD at [facility] [place] [place]    PHACOEMULSIFICATION-ASPIRATION-CATARACT Left 2014    Performed by Rich Camejo MD at [facility] [place] [place]    screening N/A 2014    Performed by Mt Drake MD at [facility] ([facility]TH FLR)       Clinical Decision Makin.  OT Low:  "Pt evaluation falls under low complexity for evaluation coding due to performance deficits noted in 1-3 areas as stated above and 0 co-morbities affecting current functional status. Data obtained from problem focused assessments. No modifications or assistance " "was required for completion of evaluation. Only brief occupational profile and history review completed."     Time Tracking:     OT Date of Treatment: 02/03/19  OT Start Time: 0825  OT Stop Time: 0840  OT Total Time (min): 15 min    Billable Minutes:Evaluation 15    Rachelle Antonio, OT  2/3/2019    "

## 2019-02-03 NOTE — ASSESSMENT & PLAN NOTE
-- 1-2 week history of weakness/fatigue (spending most of time in bed)  -- Baseline very active with good functional status (performs all ADL's)  -- Lives at home with her daughter  -- Recent weight loss    Plan  -- Will order PT/OT for further evaluation and treatment

## 2019-02-03 NOTE — PLAN OF CARE
Problem: Occupational Therapy Goal  Goal: Occupational Therapy Goal  Goals to be met by: 2/15    Patient will increase functional independence with ADLs by performing:    UE Dressing with [place].  LE Dressing with [place].  Grooming while standing with Stand-by Assistance.  Toileting from toilet with Stand-by Assistance for hygiene and clothing management.   Bathing from  shower chair/bench with Contact Guard Assistance.    Outcome: Ongoing (interventions implemented as appropriate)  Evaluation complete and goals set.  Cont with POC  Rachelle Antonio, OT  2/3/2019

## 2019-02-03 NOTE — HPI
Vicki Peña is an 83 year old female with a PMHx of CVA, osteoporosis, and HLD who presents to the ED with complaints of generalized weakness and fatigue for the past 1-2 weeks. Prior to [facility] [place] [place] arrival she had her blood drawn and was found to have a Hgb of 6.6. At that time her PCP instructed her to go to the emergency department for a blood transfusion. Associated symptoms include nausea with 2 prior episodes of vomiting over the past week. She also endorses body aches/bone pains as well as poor appetite and unspecified weight loss. She is also endorsing some headaches with regular use of NSAIDs for relief. ROS is negative for SOB, PENG, CP, syncope, fever, bloody or black tarry stool, abdominal pain, or dysuria.     Of note, She is very functional at baseline, and reportedly takes care of all her ADL's (cooks, cleans, and even drives), although over the past few weeks she has spent most of her time lying in bed. Approximately 1 month ago she was treated for URI with antibiotics and symptoms are improving. Pt denies any respiratory symptoms other than a residual cough which is reportedly improving.

## 2019-02-03 NOTE — INTERVAL H&P NOTE
The patient has been examined and the H&P has been reviewed:    I concur with the findings and changes have been noted since the H&P was written: .     Patient presenting with symptomatic anemia, progressive thrombocytopenia, hyponatremia, poor appetite and fatigue.  Patient is Khmer speaking primarily, understands english but daughter providing translation regarding detailed medical questions.   Patient is [place], and lives in [place]/ with her children, she recently returned from 5 mo stay in [place], family notes sshe had bout of bronchitis around New Years and since this time has not returned to baseline from activity/subjective standpoint.  She has had poor recovery.     She has a prior history in  of chikungunya.     She reports receiving age appropriate mammogram screening with no prior abnormal findings, Colonoscopy 3yrs ago with adenoma to be f/u in 5yr time frame.  She uses no tobacco or ETOH products.  She uses NSAID PRN for arthritis pain, recently prescribed 600mg BID Ibuprofen for myalgias.  No Hematemesis reported.      Family hx, brother  due to a leukemia @ age 63,  Sister  due to breast cancer @ age 72    She is s/p 1unit Platelet transfusion - developed transfusion reaction during this - received IM epi, Benadryl/ 125 methylpred for concerns of throat swelling per Dr. Gonzalez.     Today Hgb and Plt are stable, however retic % is 0.9 and retic index is <2 indicative of hypoproliferation     Plan  -test HIV/HBV/HCV/EBV/Parvovirus  -hematology consult recs Bone marrow biopsy   -Patient febrile this AM and UA c/w with UTI start Ceftriaxone,  This afternoon repeat fever to >102, blood cultures obtained and on abx for UTI, start sepsis bolus 30cc/kg and monitoring of lactic acid levels   -Patient likely requires endoscopic surveillance - inpt vs outpt.   -hold nsaid         Active Hospital Problems    Diagnosis  POA    *Symptomatic anemia [D64.9]  Yes     Priority: 1 -  High    Thrombocytopenia [D69.6]  Yes     Priority: 2     Sepsis due to urinary tract infection [A41.9, N39.0]  Yes     Priority: 3     Hyponatremia [E87.1]  Yes     Priority: 4     Weakness [R53.1]  Yes     Priority: 5     History of CVA (cerebrovascular accident) [Z86.73]  Not Applicable     Priority: 6       Resolved Hospital Problems   No resolved problems to display.

## 2019-02-03 NOTE — TELEPHONE ENCOUNTER
Spoke with patient's daughter regarding critical lab results of Hgb 6.6 and Platelets 35.  Patient having weakness, abdominal discomfort, nausea, and vomiting x 1 week.  Advised to go to the ED tonight.  Daughter verbalized understanding.

## 2019-02-03 NOTE — ASSESSMENT & PLAN NOTE
-- UA is nitrite positive with 2+ leukocytes  -- Denies dysuria or frequency   -- Hx of recurrent UTI's (always symptomatic in the past)  -- Pt is afebrile without leukocytosis  -- HDS    Plan  -- Low suspicion for infectious etiology  -- Will hold off on antibiotics in pt with asymptomatic bacteriuria   -- Urine cx pending

## 2019-02-03 NOTE — CONSULTS
Consult Note    Inpatient consult to Hematology/Oncology  Consult performed by: Ronda Beebe MD  Consult ordered by: Eligio Gonzalez MD        SUBJECTIVE:     History of Present Illness:  Vicki Peña is a pleasant 83 year old  female with a PMHx of CVA, osteoporosis, and HLD who presented to the ED 2/2/19 with complaints of generalized weakness and fatigue for the past 1-2 weeks. Prior to [facility] arrival she had her blood drawn and was found to have a Hgb of 6.6. At that time her PCP instructed her to go to the emergency department for a blood transfusion.    Patient has had poor oral intake for past 3 weeks per daughter. In addition to some nausea and vomiting. She had Chikungunya infection in December. She lives in [place] half the year. Has been using NSAIDs for arthritis. Denies melena, hematochezia, easy bleeding/bruising, night sweats, fever/chills. Patient denies having a BM biopsy previously. She had a colonoscopy in 2014 with adenoma found, recommend repeat colonoscopy in 5 years. Patient is very functional at baseline.    Has received 1U plt and 2U PRBC since admission.    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    UTI (urinary tract infection)      Past Surgical History:   Procedure Laterality Date    CATARACT EXTRACTION W/  INTRAOCULAR LENS IMPLANT Left 7/21/14    CATARACT EXTRACTION W/  INTRAOCULAR LENS IMPLANT Right 8/11/14    CYSTOSCOPY      INSERTION-INTRAOCULAR LENS (IOL) Right 8/11/2014    Per[facility] OR    INSERTION-INTRAOCULAR LENS (IOL) Left 7/21/2014    [facility] at [facility] OR    PHACOEMULSIFICATION-ASPIRATION-CATARACT Right 8/11/2014     [facility] [place] [facility]    PHACOEMULSIFICATION-ASPIRATION-CATARACT Left 7/21/2014    [facility] at [facility] [place] [facility]    screening N/A 9/5/2014    Performed by Mt Drake MD at [facility] ([facility]LR)     Family History   Problem Relation Age of Onset    Cancer Brother     Cancer Brother     Cancer Sister      Social History     Tobacco Use    Smoking status: Never Smoker    Smokeless tobacco: Never Used   Substance Use Topics    Alcohol use: No     Alcohol/week: 0.0 oz     Comment: moderate    Drug use: No     Review of Systems   Constitutional: Positive for malaise/fatigue and weight loss. Negative for chills and fever.   HENT: Negative for nosebleeds and sore throat.    Eyes: Negative for blurred vision and double vision.   Respiratory: Negative for cough, hemoptysis and shortness of breath.    Cardiovascular: Negative for chest pain and leg swelling.   Gastrointestinal: Negative for abdominal pain, blood in stool, melena, nausea and vomiting.   Genitourinary: Negative for hematuria.   Musculoskeletal: Positive for joint pain. Negative for myalgias.   Skin: Negative for rash.   Neurological: Negative for dizziness, sensory change and headaches.   Endo/Heme/Allergies: Does not bruise/bleed easily.     OBJECTIVE:     Vital Signs:  Temp:  [98.4 °F (36.9 °C)-102.3 °F (39.1 °C)]   Pulse:  [77-94]   Resp:  [16-19]   BP: (100-132)/(53-62)   SpO2:  [95 %-97 %]     Physical Exam   Constitutional: She is oriented to person, place, and time. She appears well-developed and well-nourished.   Eyes: EOM are normal.   Neck: Normal range of motion.   Cardiovascular: Normal rate and regular rhythm.   Pulmonary/Chest: Effort normal. No respiratory distress.   Abdominal: Soft. She exhibits no distension. There is no tenderness.   Musculoskeletal: She exhibits no edema.   Neurological: She is alert and oriented to person, place, and time.   Skin: Skin is warm and dry.   Psychiatric: She has a  normal mood and affect. Her behavior is normal.     Laboratory:  CBC:   Recent Labs   Lab 02/03/19  0931   WBC 5.69   RBC 3.56*   HGB 9.6*   HCT 29.2*   PLT 73*   MCV 82   MCH 27.0   MCHC 32.9     CMP:   Recent Labs   Lab 02/03/19  0443   *   CALCIUM 8.7   ALBUMIN 2.3*   PROT 5.9*   *   K 4.0   CO2 21*      BUN 12   CREATININE 0.7   ALKPHOS 246*   ALT 29   AST 34   BILITOT 0.9       Diagnostic Results:  EXAMINATION:  US ABDOMEN COMPLETE    CLINICAL HISTORY:  r/o splenomegaly    TECHNIQUE:  Complete abdominal ultrasound (including pancreas, aorta, liver, gallbladder, common bile duct, IVC, kidneys, and spleen) was performed.    COMPARISON:  None    FINDINGS:  Pancreas: The visualized portions of pancreas appear normal.    Aorta: No aneurysm.    Liver: 15.3 cm, normal in size. Homogeneous parenchymal echotexture. No focal lesions.    Gallbladder: No calculi, wall thickening, or pericholecystic fluid.  Negative sonographic Benítez's sign.    Biliary system: 3 mm common bile duct.  No intrahepatic ductal dilatation.    Inferior vena cava: Normal in appearance.    Right kidney: 9.6 cm. No hydronephrosis.    Left kidney: 11.5 cm. No hydronephrosis.    Spleen: 9.1 x 4.5 cm.  Normal in size with homogeneous echotexture.    Miscellaneous: No ascites.      Impression       No significant sonographic abnormality.    Electronically signed by resident: Uday Joseph  Date: 02/03/2019  Time: 07:02     ASSESSMENT/PLAN:   IMPRESSION  1) Anemia, thrombocytopenia. Ddx includes primary BM disorder such as MDS, vs secondary BM suppression from infection (recent Chikungunya infection, known to cause thrombocytopenia). Reviewed medication list, no known culprits. Possible also slow GIB from adenoma.  -No evidence of iron deficiency, B12/folate nml  -No evidence of hemolysis  -No evidence of splenomegaly, no evidence of cirrhosis  -No evidence of coagulation defect    RECOMMENDATIONS  -Will try to perform BM biopsy early  this week while patient is here. If patient needs to be discharged, can be scheduled to be done as outpatient. Results will take about a 3-7 days to results.  -Hold NSAIDs  -Transfuse Hb<7 or plt<10 unless active bleeding  -Consider repeat colonoscopy given recommended to be done 5 years from adenoma visualized in 2014    The following was staffed and discussed with supervising physician Dr. Bonner. Please call [employer] number [phone] M-F with additional questions, otherwise page the Consult fellow on call.     Ronda Beebe MD  Hematology/Oncology Fellow      I have reviewed the notes, assessments, and/or procedures performed by the housestaff, as above.  I have personally interviewed and examined the patient at the beside, and rounded with the housestaff. I concur with her/his assessment and plan and the documentation of Vicki Peña.  I, Dr. Neil Bonner, personally spent more than  70 mins during this encounter, greater than 50% was spent in direct counseling and/or coordination of care.     Neil Bonner M.D., M.S., F.A.C.P.  Hematology/Oncology Attending  [facility]

## 2019-02-03 NOTE — SUBJECTIVE & OBJECTIVE
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    UTI (urinary tract infection)        Past Surgical History:   Procedure Laterality Date    CATARACT EXTRACTION W/  INTRAOCULAR LENS IMPLANT Left 7/21/14    CATARACT EXTRACTION W/  INTRAOCULAR LENS IMPLANT Right 8/11/14    CYSTOSCOPY      INSERTION-INTRAOCULAR LENS (IOL) Right 8/11/2014    Performed by [facility] at [facility] [place] OR    INSERTION-INTRAOCULAR LENS (IOL) Left 7/21/2014    Performed by [facility] at [facility] [place]    PHACOEMULSIFICATION-ASPIRATION-CATARACT Right 8/11/2014    Performed by [facility] at [facility] [place]    PHACOEMULSIFICATION-ASPIRATION-CATARACT Left 7/21/2014    Performed by [facility]jo [facility] at [facility] [place] OR    screening N/A 9/5/2014    Performed by Mt Drake [facility] at [facility] (4[facility] F[facility])       Review of patient's allergies indicates:   Allergen Reactions    Tramadol Rash       No current facility-administered medications on file prior to encounter.      Current Outpatient Medications on File Prior to Encounter   Medication Sig    ibuprofen (ADVIL,MOTRIN) 600 MG tablet Take 1 tablet (600 mg total) by mouth every 6 (six) hours as needed for Pain.    ondansetron (ZOFRAN-ODT) 8 MG TbDL Take 1 tablet (8 mg total) by mouth every 12 (twelve) hours as needed.    simvastatin (ZOCOR) 40 MG tablet Take 1 tablet (40 mg total) by mouth every evening.    albuterol 90 mcg/actuation inhaler Inhale 1-2 puffs into the lungs every 6 (six) hours as needed for Wheezing.    amLODIPine (NORVASC) 2.5 MG tablet Take 1 tablet (2.5 mg total) by mouth once daily.    ascorbic acid (VITAMIN C) 500 MG tablet Take 1 tablet (500 mg total) by mouth 2 (two) times daily.    cranberry 400 mg Cap Take by mouth.      estradiol (ESTRACE) 0.01 % (0.1 mg/gram) vaginal cream  Place 1 g vaginally every other day. Pea size amount 3 x week (M-W-F) at bedtime    GLUCOSAMINE SULFATE (GLUCOSAMINE ORAL) Take 2 tablets by mouth once daily.    ibandronate (BONIVA) 150 mg tablet Take 1 tablet (150 mg total) by mouth every 30 days.    multivitamin capsule Take 1 capsule by mouth once daily.      naproxen (NAPROSYN) 500 MG tablet Take 1 tablet (500 mg total) by mouth 2 (two) times daily.    ranitidine (ZANTAC) 150 MG tablet Take 1 tablet (150 mg total) by mouth 2 (two) times daily. PRN heartburn    risedronate (ACTONEL) 150 MG Tab Take 1 tablet (150 mg total) by mouth every 30 days. with water on empty stomach, nothing by mouth or lie down for next 30 minutes.    trazodone (DESYREL) 50 MG tablet Take 1 tablet (50 mg total) by mouth every evening.    VENTOLIN HFA 90 mcg/actuation inhaler INHALE 2 PUFFS INTO THE LUNGS EVERY 4 (FOUR) HOURS AS NEEDED FOR WHEEZING. RESCUE     Family History     Problem Relation (Age of Onset)    Cancer Brother, Brother, Sister        Tobacco Use    Smoking status: Never Smoker    Smokeless tobacco: Never Used   Substance and Sexual Activity    Alcohol use: No     Alcohol/week: 0.0 oz     Comment: moderate    Drug use: No    Sexual activity: Yes     Partners: Male     Review of Systems   Constitutional: Positive for activity change and appetite change. Negative for chills and fever.   HENT: Negative for congestion, rhinorrhea and sore throat.    Respiratory: Positive for cough (recent URI/improving). Negative for shortness of breath, wheezing and stridor.    Cardiovascular: Negative for chest pain, palpitations and leg swelling.   Gastrointestinal: Positive for nausea. Negative for abdominal pain, blood in stool, constipation, diarrhea and vomiting.   Genitourinary: Negative for decreased urine volume, difficulty urinating, dysuria, flank pain, frequency and urgency.   Musculoskeletal: Negative for arthralgias and myalgias.   Neurological: Positive for  weakness and light-headedness. Negative for syncope and headaches.   Psychiatric/Behavioral: Negative for agitation and confusion.     Objective:     Vital Signs (Most Recent):  Temp: 98.7 °F (37.1 °C) (02/02/19 2343)  Pulse: 94 (02/02/19 2343)  Resp: 20 (02/02/19 2343)  BP: (!) 118/56 (02/02/19 2343)  SpO2: 98 % (02/02/19 2343) Vital Signs (24h Range):  Temp:  [98 °F (36.7 °C)-99.1 °F (37.3 °C)] 98.7 °F (37.1 °C)  Pulse:  [74-95] 94  Resp:  [16-22] 20  SpO2:  [95 %-99 %] 98 %  BP: (111-131)/(56-80) 118/56        There is no height or weight on file to calculate BMI.    Physical Exam   Constitutional: She is oriented to person, place, and time. No distress.   HENT:   Head: Normocephalic.   -- conjunctival pallor   Eyes: Pupils are equal, round, and reactive to light.   Neck: Normal range of motion.   Cardiovascular: Normal rate, regular rhythm and normal heart sounds.   No murmur heard.  Pulmonary/Chest: Effort normal and breath sounds normal. No stridor. No respiratory distress. She has no wheezes.   Abdominal: Soft. Bowel sounds are normal. She exhibits no distension. There is no tenderness. There is no guarding.   Musculoskeletal: Normal range of motion. She exhibits no edema or deformity.   Neurological: She is alert and oriented to person, place, and time.   Skin: Skin is warm and dry. Capillary refill takes less than 2 seconds. She is not diaphoretic.   Psychiatric: She has a normal mood and affect.   Vitals reviewed.        CRANIAL NERVES     CN III, IV, VI   Pupils are equal, round, and reactive to light.       Significant Labs: All pertinent labs within the past 24 hours have been reviewed.    Significant Imaging: I have reviewed all pertinent imaging results/findings within the past 24 hours.

## 2019-02-03 NOTE — ED NOTES
LOC: Patient name and date of birth verified.  The patient is awake, alert and aware of environment with an appropriate affect, the patient is oriented x 3 and speaking appropriately.  Pt in NAD.    APPEARANCE: Patient resting comfortably and in no acute distress, patient is clean and well groomed, patient's clothing is properly fastened.  SKIN: The skin is warm and dry, pt appears pale her ethnic background, patient has normal skin turgor and moist mucus membranes, skin intact, no breakdown or brusing noted.  MUSCULOSKELETAL: Patient moving all extremities well, no obvious swelling or deformities noted.  RESPIRATORY: Airway is open and patent, respirations are spontaneous, patient has a normal effort and rate, no accessory muscle use noted.  CARDIAC: Patient has a normal rate and rhythm, no periphreal edema noted, capillary refill < 3 seconds.  ABDOMEN: Soft and non tender to palpation, no distention noted. Bowel sounds present in all four quadrants.  NEUROLOGIC: Eyes open spontaneously, behavior appropriate to situation, follows commands, facial expression symmetrical, bilateral hand grasp equal and even, purposeful motor response noted, normal sensation in all extremities when touched with a finger.

## 2019-02-03 NOTE — ED NOTES
Platelets finished. After platelet infusion pt started to feel her airway close and started to have moderate itching to her back and upper extremities. Pt states no trouble breathing but felt like she could not swallow appropriately. All v/s WDL. ED attending notified.

## 2019-02-03 NOTE — ASSESSMENT & PLAN NOTE
-- Pt presents with 1-2 week hx of weakness and fatigue associated with poor po intake, nausea, arthralgias, myalgias, and diffuse bone pain.  -- Hgb 6.8 and PLT 36 on arrival to [facility]  -- Received 2units PRBC's and 1unit PLT in ED. (diffuse rash and sulma-oral swelling s/p PLT transfusion; methylprednisolone, epinephrine, and bendaryl administered in ED with improvement).   -- MORRIS with brown stool in ED, no melena or BRBPR; pt denies any recent or current changes in stool, abdominal pain, or GERD symptoms.   -- No hx of GIB  -- Last colonoscopy in 2014 with 1 resected polyp and diverticulosis.   -- Peripheral Blood Smear with Schistocytes   -- CRP elevated at 100.5  -- Alk Phos elevated at 277  -- Differentials include GIB, ITP, Neoplastic/malignant etiology.     Plan  -- Abdominal U/S to assess for hepatic involvement/splenomegaly (to suggest sequestration)  -- GGT to r/o biliary cause of elevated Alk Phos  -- Will obtain hemolysis labs (mery - direct/indirect, reticulocyte, LDH, Fibrinogen, D-dimer, haptoglobin)  -- Anemia labs (iron, tibc, ferritin, B12, Folate)  -- Loading dose Protonix IV 80mg, then 40mg po BID  -- Consult Heme/Onc to evaluate and further workup for potential malignancy and/or blood disorder. (will need to call in the AM)  -- Consider GI consult for endoscopic evaluation for GIB as pt regularly uses NSAID's.  -- CBC q12

## 2019-02-03 NOTE — H&P
[facility] Hospital Medicine  History & Physical    Patient Name: Vicki Peña  MRN: [id number]  Admission Date: 2/2/2019  Attending Physician: Markus Mcclain MD   Primary Care Provider: Yaron Waite MD    [place]ospital Medicine [place] [facility] [place] 3 Eligio Gonzalez MD     Patient information was obtained from patient and ER records.     Subjective:     Principal Problem:Severe anemia    Chief Complaint:   Chief Complaint   Patient presents with    Abnormal Labs     Pt arrives after being called by MD to come to ER for low H&H of 6.6 and 21.2.        HPI: Vicki Peña is an 83 year old female with a PMHx of CVA, osteoporosis, and HLD who presents to the ED with complaints of generalized weakness and fatigue for the past 1-2 weeks. [place] [facility] [place] she had her blood drawn and was found to have a Hgb of 6.6. At that time her PCP instructed her to go to the emergency department for a blood transfusion. Associated symptoms include nausea with 2 prior episodes of vomiting over the past week. She also endorses body aches/bone pains as well as poor appetite and unspecified weight loss. She is also endorsing some headaches with regular use of NSAIDs for relief. ROS is negative for SOB, PENG, CP, syncope, fever, bloody or black tarry stool, abdominal pain, or dysuria.     Of note, She is very functional at baseline, and reportedly takes care of all her ADL's (cooks, cleans, and even drives), although over the past few weeks she has spent most of her time lying in bed. Approximately 1 month ago she was treated for URI with antibiotics and symptoms are improving. Pt denies any respiratory symptoms other than a residual cough which is reportedly improving.         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     UTI (urinary tract infection)        Past Surgical History:   Procedure Laterality Date    CATARACT EXTRACTION W/  INTRAOCULAR LENS IMPLANT Left 7/21/14    CATARACT EXTRACTION W/  INTRAOCULAR LENS IMPLANT Right 8/11/14    CYSTOSCOPY      INSERTION-INTRAOCULAR LENS (IOL) Right 8/11/2014    [facility]    INSERTION-INTRAOCULAR LENS (IOL) Left 7/21/2014    [facility]    PHACOEMULSIFICATION-ASPIRATION-CATARACT Right 8/11/2014    [facility]    PHACOEMULSIFICATION-ASPIRATION-CATARACT Left 7/21/2014    [facility]    screening N/A 9/5/2014    [facility]       Review of patient's allergies indicates:   Allergen Reactions    Tramadol Rash       No current facility-administered medications on file prior to encounter.      Current Outpatient Medications on File Prior to Encounter   Medication Sig    ibuprofen (ADVIL,MOTRIN) 600 MG tablet Take 1 tablet (600 mg total) by mouth every 6 (six) hours as needed for Pain.    ondansetron (ZOFRAN-ODT) 8 MG TbDL Take 1 tablet (8 mg total) by mouth every 12 (twelve) hours as needed.    simvastatin (ZOCOR) 40 MG tablet Take 1 tablet (40 mg total) by mouth every evening.    albuterol 90 mcg/actuation inhaler Inhale 1-2 puffs into the lungs every 6 (six) hours as needed for Wheezing.    amLODIPine (NORVASC) 2.5 MG tablet Take 1 tablet (2.5 mg total) by mouth once daily.    ascorbic acid (VITAMIN C) 500 MG tablet Take 1 tablet (500 mg total) by mouth 2 (two) times daily.    cranberry 400 mg Cap Take by mouth.      estradiol (ESTRACE) 0.01 % (0.1 mg/gram) vaginal cream Place 1 g vaginally every other day. Pea size amount 3 x week (M-W-F) at bedtime    GLUCOSAMINE SULFATE (GLUCOSAMINE ORAL) Take 2 tablets by mouth once daily.    ibandronate (BONIVA) 150 mg tablet Take 1 tablet (150 mg total) by mouth every 30  days.    multivitamin capsule Take 1 capsule by mouth once daily.      naproxen (NAPROSYN) 500 MG tablet Take 1 tablet (500 mg total) by mouth 2 (two) times daily.    ranitidine (ZANTAC) 150 MG tablet Take 1 tablet (150 mg total) by mouth 2 (two) times daily. PRN heartburn    risedronate (ACTONEL) 150 MG Tab Take 1 tablet (150 mg total) by mouth every 30 days. with water on empty stomach, nothing by mouth or lie down for next 30 minutes.    trazodone (DESYREL) 50 MG tablet Take 1 tablet (50 mg total) by mouth every evening.    VENTOLIN HFA 90 mcg/actuation inhaler INHALE 2 PUFFS INTO THE LUNGS EVERY 4 (FOUR) HOURS AS NEEDED FOR WHEEZING. RESCUE     Family History     Problem Relation (Age of Onset)    Cancer Brother, Brother, Sister        Tobacco Use    Smoking status: Never Smoker    Smokeless tobacco: Never Used   Substance and Sexual Activity    Alcohol use: No     Alcohol/week: 0.0 oz     Comment: moderate    Drug use: No    Sexual activity: Yes     Partners: Male     Review of Systems   Constitutional: Positive for activity change and appetite change. Negative for chills and fever.   HENT: Negative for congestion, rhinorrhea and sore throat.    Respiratory: Positive for cough (recent URI/improving). Negative for shortness of breath, wheezing and stridor.    Cardiovascular: Negative for chest pain, palpitations and leg swelling.   Gastrointestinal: Positive for nausea. Negative for abdominal pain, blood in stool, constipation, diarrhea and vomiting.   Genitourinary: Negative for decreased urine volume, difficulty urinating, dysuria, flank pain, frequency and urgency.   Musculoskeletal: Positive for arthralgias and myalgias.   Neurological: Positive for weakness and light-headedness. Negative for syncope and headaches.   Psychiatric/Behavioral: Negative for agitation and confusion.     Objective:     Vital Signs (Most Recent):  Temp: 98.7 °F (37.1 °C) (02/02/19 2343)  Pulse: 94 (02/02/19 2343)  Resp:  20 (02/02/19 2343)  BP: (!) 118/56 (02/02/19 2343)  SpO2: 98 % (02/02/19 2343) Vital Signs (24h Range):  Temp:  [98 °F (36.7 °C)-99.1 °F (37.3 °C)] 98.7 °F (37.1 °C)  Pulse:  [74-95] 94  Resp:  [16-22] 20  SpO2:  [95 %-99 %] 98 %  BP: (111-131)/(56-80) 118/56        There is no height or weight on file to calculate BMI.    Physical Exam   Constitutional: She is oriented to person, place, and time. No distress.   HENT:   Head: Normocephalic.   -- conjunctival pallor   Eyes: Pupils are equal, round, and reactive to light.   Neck: Normal range of motion.   Cardiovascular: Normal rate, regular rhythm and normal heart sounds.   No murmur heard.  Pulmonary/Chest: Effort normal and breath sounds normal. No stridor. No respiratory distress. She has no wheezes.   Abdominal: Soft. Bowel sounds are normal. She exhibits no distension. There is no tenderness. There is no guarding.   Musculoskeletal: Normal range of motion. She exhibits no edema or deformity.   Neurological: She is alert and oriented to person, place, and time.   Skin: Skin is warm and dry. Capillary refill takes less than 2 seconds. She is not diaphoretic.   Psychiatric: She has a normal mood and affect.   Vitals reviewed.        CRANIAL NERVES     CN III, IV, VI   Pupils are equal, round, and reactive to light.       Significant Labs: All pertinent labs within the past 24 hours have been reviewed.    Significant Imaging: I have reviewed all pertinent imaging results/findings within the past 24 hours.    Assessment/Plan:     * Severe anemia    -- Pt presents with 1-2 week hx of weakness and fatigue associated with poor po intake, nausea, arthralgias, myalgias, and diffuse bone pain.  -- Hgb 6.8 and PLT 36 [place] [facility]  -- Received 2units PRBC's and 1unit PLT in ED. (diffuse rash and sulma-oral swelling s/p PLT transfusion; methylprednisolone, epinephrine, and bendaryl administered in ED with improvement).   -- MORRIS with brown stool in ED, no melena or BRBPR;  pt denies any recent or current changes in stool, abdominal pain, or GERD symptoms.   -- No hx of GIB  -- Last colonoscopy in 2014 with 1 resected polyp and diverticulosis.   -- Peripheral Blood Smear with Schistocytes   -- CRP elevated at 100.5  -- Alk Phos elevated at 277  -- Differentials include GIB, ITP, Neoplastic/malignant etiology.     Plan  -- Abdominal U/S to assess for hepatic involvement/splenomegaly (to suggest sequestration)  -- GGT to r/o biliary cause of elevated Alk Phos  -- Will obtain hemolysis labs (mery - direct/indirect, reticulocyte, LDH, Fibrinogen, D-dimer, haptoglobin)  -- Anemia labs (iron, tibc, ferritin, B12, Folate)  -- Loading dose Protonix IV 80mg, then 40mg po BID  -- Consult Heme/Onc to evaluate and further workup for potential malignancy and/or blood disorder. (will need to call in the AM)  -- Consider GI consult for endoscopic evaluation for GIB as pt regularly uses NSAID's.  -- CBC q12       History of CVA (cerebrovascular accident)    -- 10+ years ago  -- No focal neurologic deficits       Asymptomatic bacteriuria    -- UA is nitrite positive with 2+ leukocytes  -- Denies dysuria or frequency   -- Hx of recurrent UTI's (always symptomatic in the past)  -- Pt is afebrile without leukocytosis  -- HDS    Plan  -- Low suspicion for infectious etiology  -- Will hold off on antibiotics in pt with asymptomatic bacteriuria   -- Urine cx pending     Weakness    -- 1-2 week history of weakness/fatigue (spending most of time in bed)  -- Baseline very active with good functional status (performs all ADL's)  -- Lives at home with her daughter  -- Recent weight loss    Plan  -- Will order PT/OT for further evaluation and treatment     Hyperlipidemia    -- Continue statin         VTE Risk Mitigation (From admission, onward)        Ordered     Place sequential compression device  Until discontinued      02/02/19 2303     IP VTE HIGH RISK PATIENT  Once      02/02/19 4131     Place sequential  compression device  Until discontinued      02/02/19 0939     Place MING hose  Until discontinued      02/02/19 1244             Eligio Gonzalez MD  Department of Hospital Medicine   [facility]

## 2019-02-03 NOTE — ASSESSMENT & PLAN NOTE
-- UA is nitrite positive with 2+ leukocytes  -- Denies dysuria or frequency   -- Hx of recurrent UTI's (always symptomatic in the past)  -- Pt is afebrile without leukocytosis  -- HDS    Plan  -- Low suspicion for infectious etiology  -- Will hold off on antibiotics in pt with asymptomatic bacteriuria

## 2019-02-03 NOTE — PHARMACY MED REC
"Admission Medication Reconciliation - Pharmacy Consult Note    The home medication history was taken by Virgen Robledo, Pharmacy Technician. Based on information gathered and subsequent review by the clinical pharmacist, the items below may need attention.    You may go to "Admission" then "Reconcile Home Medications" tabs to review and/or act upon these items.    No issues noted with the medication reconciliation.    Potential issues to be addressed PRIOR TO DISCHARGE  o If patient to be discharged on PPI, discontinue ranitidine - duplicate therapy   o Would encourage patient to discontinue NSAIDs at home in setting of possible bleed / anemia and opt for acetaminophen instead     Please address this information as you see fit.  Feel free to contact us if you have any questions or require assistance.    Letty Napier, PharmD  PGY-2 Internal Medicine Pharmacy Resident  [phone]                  .    .          "

## 2019-02-03 NOTE — ED PROVIDER NOTES
Encounter Date: 2/2/2019       History     Chief Complaint   Patient presents with    Abnormal Labs     Pt arrives after being called by MD to come to ER for low H&H of 6.6 and 21.2.     83-year-old female with history CVA presents with complaints of moderate fatigue for 1-2 weeks.  She had blood drawn this morning and was called by her primary care doctor and told she had a hemoglobin of 6.6 and told to come to the emergency department immediately for transfusion.  Her fatigue is mildly sedated with activity.  Timing is constant.  She has no associated pain, fever and has noted no blood in her stool.          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    UTI (urinary tract infection)      Past Surgical History:   Procedure Laterality Date    CATARACT EXTRACTION W/  INTRAOCULAR LENS IMPLANT Left 7/21/14    CATARACT EXTRACTION W/  INTRAOCULAR LENS IMPLANT Right 8/11/14    CYSTOSCOPY      INSERTION-INTRAOCULAR LENS (IOL) Right 8/11/2014    [facility] OR    INSERTION-INTRAOCULAR LENS (IOL) Left 7/21/2014    [facility] OR    PHACOEMULSIFICATION-ASPIRATION-CATARACT Right 8/11/2014    [facility] OR    PHACOEMULSIFICATION-ASPIRATION-CATARACT Left 7/21/2014    [facility] OR    screening N/A 9/5/2014    Per[facility] by [facility] Drake [facility]     Family History   Problem Relation Age of Onset    Cancer Brother     Cancer Brother     Cancer Sister      Social History     Tobacco Use    Smoking status: Never Smoker    Smokeless tobacco: Never Used   Substance Use Topics    Alcohol use: No     Alcohol/week:  0.0 oz     Comment: moderate    Drug use: No     Review of Systems   Constitutional: Positive for appetite change and fatigue. Negative for chills and fever.   Respiratory: Negative for cough and shortness of breath.    Cardiovascular: Negative for chest pain and palpitations.   Gastrointestinal: Negative for abdominal pain, blood in stool, diarrhea, nausea and vomiting.   Skin: Negative for wound.   Neurological: Negative for syncope and headaches.   All other systems reviewed and are negative.      Physical Exam     Initial Vitals [02/02/19 1948]   BP Pulse Resp Temp SpO2   (!) 131/57 91 18 98 °F (36.7 °C) 98 %      MAP       --         Physical Exam    Nursing note and vitals reviewed.  Constitutional: She appears well-developed and well-nourished. No distress.   HENT:   Head: Normocephalic and atraumatic.   Nose: Nose normal.   Eyes: Conjunctivae are normal. No scleral icterus.   Neck: Neck supple. No JVD present.   Cardiovascular: Normal rate and regular rhythm.   Pulmonary/Chest: Breath sounds normal. No stridor. No respiratory distress. She has no wheezes. She has no rhonchi. She has no rales.   Abdominal: Soft. There is no tenderness. There is no rebound and no guarding.   Genitourinary: Rectal exam shows guaiac negative stool. Guaiac negative stool.   Genitourinary Comments: Brown stool heme negative   Musculoskeletal: She exhibits no tenderness.   Neurological: She is alert. She has normal strength.   Normal speech   Skin: Skin is warm and dry. Capillary refill takes less than 2 seconds. No pallor.   Psychiatric: She has a normal mood and affect. Her behavior is normal. Thought content normal.         ED Course   Procedures  Labs Reviewed   APTT   PROTIME-INR   CBC W/ AUTO DIFFERENTIAL   COMPREHENSIVE METABOLIC PANEL   TYPE & SCREEN          Imaging Results    None          Medical Decision Making:   History:   Old Medical Records: I decided to obtain old medical records.  Initial Assessment:   Presents  with report of anemia and fatigue.  Currently hemodynamically stable and not tachycardic.  Hemoglobin and type and screen pending  Differential Diagnosis:   Anemia  Thyroid disease  Infection    ED Management:  Patient endorsed to Dr. Hernandez awaiting labs                      Clinical Impression:   The encounter diagnosis was Severe anemia.      Disposition:   Disposition: Admitted  Condition: Stable  Reason for referral: Care transferred to Dr. Hernandez         Patient was signed out to me by Dr. Lutz with labs pending.  Patient sent in for anemia.  Reported is heme negative by Dr. Hsieh.  Patient with a hemoglobin of 6.8 and platelets of 36680.  Etiology unclear.  I spoke with the hospitalist who told me to admit to IM 3 the hospital floor to .  Transfuse 2 units PRBC and 1 unit plts.   .       I was called to the bedside to see the patient at 11:05 p.m. because she was complaining of feeling like her throat was tightening, redness and itching of her anterior neck.  This was as the platelet transfusion was finishing.  Patient without stridor or wheezing good air movement and normal pulse ox however she does have significant uvular and soft palatal edema.  Because of the potential for airway obstruction and we are giving 0.6 mg of epinephrine IM, 125 mg Solu-Medrol IV and 25 mg of Benadryl IV.  We will re-evaluate.           Anjelica Rowe MD  02/02/19 2111       Qasim Hernandez MD  02/02/19 2212       Qasim Hernandez MD  02/02/19 4408       Qasim Hernandez MD  02/03/19 0130

## 2019-02-04 ENCOUNTER — TELEPHONE (OUTPATIENT)
Dept: INTERNAL MEDICINE | Facility: CLINIC | Age: 84
End: 2019-02-04

## 2019-02-04 DIAGNOSIS — D64.9 ANEMIA, UNSPECIFIED TYPE: Primary | ICD-10-CM

## 2019-02-04 DIAGNOSIS — R79.9 ABNORMAL BLOOD SMEAR: ICD-10-CM

## 2019-02-04 PROBLEM — J18.9 PNEUMONIA: Status: ACTIVE | Noted: 2019-02-04

## 2019-02-04 LAB
ACANTHOCYTES BLD QL SMEAR: PRESENT
ALBUMIN SERPL BCP-MCNC: 1.8 G/DL
ALLENS TEST: ABNORMAL
ALP SERPL-CCNC: 188 U/L
ALT SERPL W/O P-5'-P-CCNC: 29 U/L
ANION GAP SERPL CALC-SCNC: 9 MMOL/L
ANISOCYTOSIS BLD QL SMEAR: SLIGHT
ANISOCYTOSIS BLD QL SMEAR: SLIGHT
ASCENDING AORTA: 3.07 CM
AST SERPL-CCNC: 31 U/L
AV INDEX (PROSTH): 0.67
AV MEAN GRADIENT: 4.4 MMHG
AV PEAK GRADIENT: 7.08 MMHG
AV VALVE AREA: 2.07 CM2
AV VELOCITY RATIO: 0.74
BASOPHILS # BLD AUTO: 0.06 K/UL
BASOPHILS # BLD AUTO: ABNORMAL K/UL
BASOPHILS NFR BLD: 1.7 %
BASOPHILS NFR BLD: 4 %
BILIRUB DIRECT SERPL-MCNC: 1.9 MG/DL
BILIRUB SERPL-MCNC: 2.1 MG/DL
BNP SERPL-MCNC: 1216 PG/ML
BSA FOR ECHO PROCEDURE: 1.49 M2
BUN SERPL-MCNC: 17 MG/DL
BURR CELLS BLD QL SMEAR: ABNORMAL
BURR CELLS BLD QL SMEAR: ABNORMAL
C DIFF GDH STL QL: NEGATIVE
C DIFF TOX A+B STL QL IA: NEGATIVE
CALCIUM SERPL-MCNC: 7.6 MG/DL
CHLORIDE SERPL-SCNC: 102 MMOL/L
CHOLEST SERPL-MCNC: 46 MG/DL
CHOLEST/HDLC SERPL: 4.6 {RATIO}
CO2 SERPL-SCNC: 17 MMOL/L
CORTIS SERPL-MCNC: 37.6 UG/DL
CREAT SERPL-MCNC: 0.9 MG/DL
CV ECHO LV RWT: 0.32 CM
DAT IGG-SP REAG RBC-IMP: NORMAL
DELSYS: ABNORMAL
DIFFERENTIAL METHOD: ABNORMAL
DIFFERENTIAL METHOD: ABNORMAL
DOP CALC AO PEAK VEL: 1.33 M/S
DOP CALC AO VTI: 25.75 CM
DOP CALC LVOT AREA: 3.08 CM2
DOP CALC LVOT DIAMETER: 1.98 CM
DOP CALC LVOT PEAK VEL: 0.98 M/S
DOP CALC LVOT STROKE VOLUME: 53.27 CM3
DOP CALCLVOT PEAK VEL VTI: 17.31 CM
E WAVE DECELERATION TIME: 119.4 MSEC
E/A RATIO: 0.71
E/E' RATIO: 4.18
ECHO LV POSTERIOR WALL: 0.73 CM (ref 0.6–1.1)
EOSINOPHIL # BLD AUTO: 0.1 K/UL
EOSINOPHIL # BLD AUTO: ABNORMAL K/UL
EOSINOPHIL NFR BLD: 2 %
EOSINOPHIL NFR BLD: 3.2 %
ERYTHROCYTE [DISTWIDTH] IN BLOOD BY AUTOMATED COUNT: 20.2 %
ERYTHROCYTE [DISTWIDTH] IN BLOOD BY AUTOMATED COUNT: 20.4 %
EST. GFR  (AFRICAN AMERICAN): >60 ML/MIN/1.73 M^2
EST. GFR  (NON AFRICAN AMERICAN): >60 ML/MIN/1.73 M^2
FRACTIONAL SHORTENING: 27 % (ref 28–44)
GIANT PLATELETS BLD QL SMEAR: PRESENT
GLUCOSE SERPL-MCNC: 83 MG/DL
HAV IGM SERPL QL IA: NEGATIVE
HBV CORE IGM SERPL QL IA: NEGATIVE
HBV SURFACE AG SERPL QL IA: NEGATIVE
HCO3 UR-SCNC: 14.3 MMOL/L (ref 24–28)
HCT VFR BLD AUTO: 27.3 %
HCT VFR BLD AUTO: 28 %
HCV AB SERPL QL IA: NEGATIVE
HDLC SERPL-MCNC: 10 MG/DL
HDLC SERPL: 21.7 %
HGB BLD-MCNC: 9.1 G/DL
HGB BLD-MCNC: 9.3 G/DL
HIV 1+2 AB+HIV1 P24 AG SERPL QL IA: NEGATIVE
HYPOCHROMIA BLD QL SMEAR: ABNORMAL
IMM GRANULOCYTES # BLD AUTO: 0.03 K/UL
IMM GRANULOCYTES # BLD AUTO: ABNORMAL K/UL
IMM GRANULOCYTES NFR BLD AUTO: 0.9 %
IMM GRANULOCYTES NFR BLD AUTO: ABNORMAL %
INTERVENTRICULAR SEPTUM: 0.9 CM (ref 0.6–1.1)
LA MAJOR: 5.2 CM
LA MINOR: 5.52 CM
LA WIDTH: 3.63 CM
LACTATE SERPL-SCNC: 2.3 MMOL/L
LACTATE SERPL-SCNC: 2.4 MMOL/L
LACTATE SERPL-SCNC: 2.5 MMOL/L
LACTATE SERPL-SCNC: 3.5 MMOL/L
LDLC SERPL CALC-MCNC: 19.8 MG/DL
LEFT ATRIUM SIZE: 2.74 CM
LEFT ATRIUM VOLUME INDEX: 30.7 ML/M2
LEFT ATRIUM VOLUME: 45.27 CM3
LEFT INTERNAL DIMENSION IN SYSTOLE: 3.28 CM (ref 2.1–4)
LEFT VENTRICLE DIASTOLIC VOLUME INDEX: 62.64 ML/M2
LEFT VENTRICLE DIASTOLIC VOLUME: 92.42 ML
LEFT VENTRICLE MASS INDEX: 78.9 G/M2
LEFT VENTRICLE SYSTOLIC VOLUME INDEX: 29.5 ML/M2
LEFT VENTRICLE SYSTOLIC VOLUME: 43.54 ML
LEFT VENTRICULAR INTERNAL DIMENSION IN DIASTOLE: 4.5 CM (ref 3.5–6)
LEFT VENTRICULAR MASS: 116.43 G
LV LATERAL E/E' RATIO: 3.29
LV SEPTAL E/E' RATIO: 5.75
LYMPHOCYTES # BLD AUTO: 0.5 K/UL
LYMPHOCYTES # BLD AUTO: ABNORMAL K/UL
LYMPHOCYTES NFR BLD: 13.3 %
LYMPHOCYTES NFR BLD: 42 %
MCH RBC QN AUTO: 27.4 PG
MCH RBC QN AUTO: 27.5 PG
MCHC RBC AUTO-ENTMCNC: 33.2 G/DL
MCHC RBC AUTO-ENTMCNC: 33.3 G/DL
MCV RBC AUTO: 83 FL
MCV RBC AUTO: 83 FL
METAMYELOCYTES NFR BLD MANUAL: 1 %
MONOCYTES # BLD AUTO: 0.5 K/UL
MONOCYTES # BLD AUTO: ABNORMAL K/UL
MONOCYTES NFR BLD: 14 %
MONOCYTES NFR BLD: 14.2 %
MV PEAK A VEL: 0.65 M/S
MV PEAK E VEL: 0.46 M/S
NEUTROPHILS # BLD AUTO: 2.3 K/UL
NEUTROPHILS NFR BLD: 36 %
NEUTROPHILS NFR BLD: 66.7 %
NEUTS BAND NFR BLD MANUAL: 1 %
NONHDLC SERPL-MCNC: 36 MG/DL
NRBC BLD-RTO: 1 /100 WBC
NRBC BLD-RTO: 2 /100 WBC
OVALOCYTES BLD QL SMEAR: ABNORMAL
OVALOCYTES BLD QL SMEAR: ABNORMAL
PCO2 BLDA: 28.1 MMHG (ref 35–45)
PH SMN: 7.32 [PH] (ref 7.35–7.45)
PISA TR MAX VEL: 3 M/S
PLATELET # BLD AUTO: 36 K/UL
PLATELET # BLD AUTO: 37 K/UL
PLATELET BLD QL SMEAR: ABNORMAL
PLATELET BLD QL SMEAR: ABNORMAL
PMV BLD AUTO: ABNORMAL FL
PMV BLD AUTO: ABNORMAL FL
PO2 BLDA: 83 MMHG (ref 80–100)
POC BE: -12 MMOL/L
POC SATURATED O2: 95 % (ref 95–100)
POC TCO2: 15 MMOL/L (ref 23–27)
POCT GLUCOSE: 99 MG/DL (ref 70–110)
POIKILOCYTOSIS BLD QL SMEAR: SLIGHT
POIKILOCYTOSIS BLD QL SMEAR: SLIGHT
POLYCHROMASIA BLD QL SMEAR: ABNORMAL
POTASSIUM SERPL-SCNC: 3.8 MMOL/L
PROT SERPL-MCNC: 4.8 G/DL
RA MAJOR: 4.79 CM
RA PRESSURE: 3 MMHG
RA WIDTH: 4.15 CM
RBC # BLD AUTO: 3.31 M/UL
RBC # BLD AUTO: 3.39 M/UL
RIGHT VENTRICULAR END-DIASTOLIC DIMENSION: 3.15 CM
SAMPLE: ABNORMAL
SCHISTOCYTES BLD QL SMEAR: ABNORMAL
SINUS: 3.75 CM
SITE: ABNORMAL
SODIUM SERPL-SCNC: 128 MMOL/L
STJ: 2.87 CM
T4 FREE SERPL-MCNC: 1.1 NG/DL
TDI LATERAL: 0.14
TDI SEPTAL: 0.08
TDI: 0.11
TIME NOTIFIED: 1059
TR MAX PG: 36 MMHG
TRICUSPID ANNULAR PLANE SYSTOLIC EXCURSION: 2.03 CM
TRIGL SERPL-MCNC: 81 MG/DL
TROPONIN I SERPL DL<=0.01 NG/ML-MCNC: 0.01 NG/ML
TV REST PULMONARY ARTERY PRESSURE: 39 MMHG
WBC # BLD AUTO: 3.46 K/UL
WBC # BLD AUTO: 6.17 K/UL

## 2019-02-04 PROCEDURE — 85025 COMPLETE CBC W/AUTO DIFF WBC: CPT

## 2019-02-04 PROCEDURE — 87449 NOS EACH ORGANISM AG IA: CPT

## 2019-02-04 PROCEDURE — 25000003 PHARM REV CODE 250: Performed by: INTERNAL MEDICINE

## 2019-02-04 PROCEDURE — 87449 NOS EACH ORGANISM AG IA: CPT | Mod: 59

## 2019-02-04 PROCEDURE — 99233 PR SUBSEQUENT HOSPITAL CARE,LEVL III: ICD-10-PCS | Mod: GC,,, | Performed by: INTERNAL MEDICINE

## 2019-02-04 PROCEDURE — 99233 SBSQ HOSP IP/OBS HIGH 50: CPT | Mod: GC,,, | Performed by: INTERNAL MEDICINE

## 2019-02-04 PROCEDURE — 25000242 PHARM REV CODE 250 ALT 637 W/ HCPCS: Performed by: STUDENT IN AN ORGANIZED HEALTH CARE EDUCATION/TRAINING PROGRAM

## 2019-02-04 PROCEDURE — 94640 AIRWAY INHALATION TREATMENT: CPT

## 2019-02-04 PROCEDURE — 83605 ASSAY OF LACTIC ACID: CPT | Mod: 91

## 2019-02-04 PROCEDURE — 86703 HIV-1/HIV-2 1 RESULT ANTBDY: CPT

## 2019-02-04 PROCEDURE — 80061 LIPID PANEL: CPT

## 2019-02-04 PROCEDURE — 99223 1ST HOSP IP/OBS HIGH 75: CPT | Mod: GC,,, | Performed by: INTERNAL MEDICINE

## 2019-02-04 PROCEDURE — 82533 TOTAL CORTISOL: CPT

## 2019-02-04 PROCEDURE — 82248 BILIRUBIN DIRECT: CPT

## 2019-02-04 PROCEDURE — 25000003 PHARM REV CODE 250: Performed by: STUDENT IN AN ORGANIZED HEALTH CARE EDUCATION/TRAINING PROGRAM

## 2019-02-04 PROCEDURE — 99223 PR INITIAL HOSPITAL CARE,LEVL III: ICD-10-PCS | Mod: GC,,, | Performed by: INTERNAL MEDICINE

## 2019-02-04 PROCEDURE — 84439 ASSAY OF FREE THYROXINE: CPT

## 2019-02-04 PROCEDURE — 87632 RESP VIRUS 6-11 TARGETS: CPT | Mod: 59

## 2019-02-04 PROCEDURE — 87632 RESP VIRUS 6-11 TARGETS: CPT

## 2019-02-04 PROCEDURE — 97161 PT EVAL LOW COMPLEX 20 MIN: CPT

## 2019-02-04 PROCEDURE — 36600 WITHDRAWAL OF ARTERIAL BLOOD: CPT

## 2019-02-04 PROCEDURE — 84484 ASSAY OF TROPONIN QUANT: CPT

## 2019-02-04 PROCEDURE — 85007 BL SMEAR W/DIFF WBC COUNT: CPT

## 2019-02-04 PROCEDURE — 83605 ASSAY OF LACTIC ACID: CPT

## 2019-02-04 PROCEDURE — 86665 EPSTEIN-BARR CAPSID VCA: CPT | Mod: 59

## 2019-02-04 PROCEDURE — 20000000 HC ICU ROOM

## 2019-02-04 PROCEDURE — 80074 ACUTE HEPATITIS PANEL: CPT

## 2019-02-04 PROCEDURE — 87070 CULTURE OTHR SPECIMN AEROBIC: CPT

## 2019-02-04 PROCEDURE — 82803 BLOOD GASES ANY COMBINATION: CPT

## 2019-02-04 PROCEDURE — 63600175 PHARM REV CODE 636 W HCPCS

## 2019-02-04 PROCEDURE — 36415 COLL VENOUS BLD VENIPUNCTURE: CPT

## 2019-02-04 PROCEDURE — 51702 INSERT TEMP BLADDER CATH: CPT

## 2019-02-04 PROCEDURE — 51701 INSERT BLADDER CATHETER: CPT

## 2019-02-04 PROCEDURE — 99900035 HC TECH TIME PER 15 MIN (STAT)

## 2019-02-04 PROCEDURE — 99222 1ST HOSP IP/OBS MODERATE 55: CPT | Mod: ,,, | Performed by: INTERNAL MEDICINE

## 2019-02-04 PROCEDURE — 63600175 PHARM REV CODE 636 W HCPCS: Performed by: HOSPITALIST

## 2019-02-04 PROCEDURE — 80053 COMPREHEN METABOLIC PANEL: CPT

## 2019-02-04 PROCEDURE — 99222 PR INITIAL HOSPITAL CARE,LEVL II: ICD-10-PCS | Mod: ,,, | Performed by: INTERNAL MEDICINE

## 2019-02-04 PROCEDURE — 27000221 HC OXYGEN, UP TO 24 HOURS

## 2019-02-04 PROCEDURE — 94761 N-INVAS EAR/PLS OXIMETRY MLT: CPT

## 2019-02-04 PROCEDURE — 86880 COOMBS TEST DIRECT: CPT

## 2019-02-04 PROCEDURE — 63600175 PHARM REV CODE 636 W HCPCS: Performed by: STUDENT IN AN ORGANIZED HEALTH CARE EDUCATION/TRAINING PROGRAM

## 2019-02-04 PROCEDURE — 83880 ASSAY OF NATRIURETIC PEPTIDE: CPT

## 2019-02-04 PROCEDURE — 85027 COMPLETE CBC AUTOMATED: CPT

## 2019-02-04 PROCEDURE — 87205 SMEAR GRAM STAIN: CPT

## 2019-02-04 RX ORDER — PROMETHAZINE HYDROCHLORIDE 25 MG/1
25 TABLET ORAL EVERY 6 HOURS PRN
Status: DISCONTINUED | OUTPATIENT
Start: 2019-02-04 | End: 2019-02-19 | Stop reason: HOSPADM

## 2019-02-04 RX ORDER — IPRATROPIUM BROMIDE AND ALBUTEROL SULFATE 2.5; .5 MG/3ML; MG/3ML
3 SOLUTION RESPIRATORY (INHALATION) EVERY 6 HOURS PRN
Status: DISCONTINUED | OUTPATIENT
Start: 2019-02-04 | End: 2019-02-04

## 2019-02-04 RX ORDER — ASPIRIN 81 MG/1
81 TABLET ORAL DAILY
Status: DISCONTINUED | OUTPATIENT
Start: 2019-02-05 | End: 2019-02-04

## 2019-02-04 RX ORDER — SODIUM CHLORIDE 0.9 % (FLUSH) 0.9 %
3 SYRINGE (ML) INJECTION
Status: DISCONTINUED | OUTPATIENT
Start: 2019-02-04 | End: 2019-02-19 | Stop reason: HOSPADM

## 2019-02-04 RX ORDER — IPRATROPIUM BROMIDE AND ALBUTEROL SULFATE 2.5; .5 MG/3ML; MG/3ML
3 SOLUTION RESPIRATORY (INHALATION)
Status: DISCONTINUED | OUTPATIENT
Start: 2019-02-04 | End: 2019-02-19 | Stop reason: HOSPADM

## 2019-02-04 RX ORDER — FUROSEMIDE 10 MG/ML
40 INJECTION INTRAMUSCULAR; INTRAVENOUS ONCE
Status: COMPLETED | OUTPATIENT
Start: 2019-02-04 | End: 2019-02-04

## 2019-02-04 RX ADMIN — PIPERACILLIN AND TAZOBACTAM 4.5 G: 4; .5 INJECTION, POWDER, LYOPHILIZED, FOR SOLUTION INTRAVENOUS; PARENTERAL at 03:02

## 2019-02-04 RX ADMIN — PIPERACILLIN AND TAZOBACTAM 4.5 G: 4; .5 INJECTION, POWDER, LYOPHILIZED, FOR SOLUTION INTRAVENOUS; PARENTERAL at 06:02

## 2019-02-04 RX ADMIN — SIMVASTATIN 40 MG: 40 TABLET, FILM COATED ORAL at 08:02

## 2019-02-04 RX ADMIN — PANTOPRAZOLE SODIUM 40 MG: 40 TABLET, DELAYED RELEASE ORAL at 06:02

## 2019-02-04 RX ADMIN — IPRATROPIUM BROMIDE AND ALBUTEROL SULFATE 3 ML: .5; 3 SOLUTION RESPIRATORY (INHALATION) at 10:02

## 2019-02-04 RX ADMIN — SODIUM CHLORIDE 500 ML: 0.9 INJECTION, SOLUTION INTRAVENOUS at 04:02

## 2019-02-04 RX ADMIN — PANTOPRAZOLE SODIUM 40 MG: 40 TABLET, DELAYED RELEASE ORAL at 03:02

## 2019-02-04 RX ADMIN — PIPERACILLIN AND TAZOBACTAM 4.5 G: 4; .5 INJECTION, POWDER, LYOPHILIZED, FOR SOLUTION INTRAVENOUS; PARENTERAL at 12:02

## 2019-02-04 RX ADMIN — AZITHROMYCIN MONOHYDRATE 500 MG: 500 INJECTION, POWDER, LYOPHILIZED, FOR SOLUTION INTRAVENOUS at 01:02

## 2019-02-04 RX ADMIN — SODIUM CHLORIDE 500 ML: 0.9 INJECTION, SOLUTION INTRAVENOUS at 11:02

## 2019-02-04 RX ADMIN — IPRATROPIUM BROMIDE AND ALBUTEROL SULFATE 3 ML: .5; 3 SOLUTION RESPIRATORY (INHALATION) at 03:02

## 2019-02-04 RX ADMIN — IPRATROPIUM BROMIDE AND ALBUTEROL SULFATE 3 ML: .5; 3 SOLUTION RESPIRATORY (INHALATION) at 08:02

## 2019-02-04 RX ADMIN — ACETAMINOPHEN 650 MG: 325 TABLET, FILM COATED ORAL at 08:02

## 2019-02-04 RX ADMIN — SODIUM CHLORIDE 500 ML: 0.9 INJECTION, SOLUTION INTRAVENOUS at 05:02

## 2019-02-04 RX ADMIN — VANCOMYCIN HYDROCHLORIDE 750 MG: 750 INJECTION, POWDER, LYOPHILIZED, FOR SOLUTION INTRAVENOUS at 10:02

## 2019-02-04 RX ADMIN — ACETAMINOPHEN 650 MG: 325 TABLET, FILM COATED ORAL at 04:02

## 2019-02-04 RX ADMIN — FUROSEMIDE 40 MG: 10 INJECTION, SOLUTION INTRAVENOUS at 12:02

## 2019-02-04 NOTE — CARE UPDATE
Yesterday evening patient with persistent fever  Given 30cc/kg bolus 1.5L  As initial source of infection was  - based on UA, continued on Ceftriaxone, with repeated fevers overnight       Patient did have complaints of left sided lateral chest wall pleuritic pain, on exam yesterday was CTA bilaterally, did not repeat CXR. Pt was on room air and CXR from 2/2 w/o acute consolidation.     Throughout the night patient remained febrile, O2 requirement increasing this Am after 4am patient given 2 500ml saline boluses.     This am patient is tachypneic, accessory muscle use, febrile, MAP >65, but pressures 90s/50s    CXR with significant new left sided lower& upper airspace disease, absent breath sounds in left mid to lower fields, dullness to percussion, right basilar absent breath sounds.     On Exam patient also with distended JVP to angle of jaw     Critical Care consulted & have accepted patient    Concern patient now with severe sepsis potential with multiple sources or an acute decomepnsated HF state - Patient was noted to have a transfusion reaction to platelelts requiring - benadyl/Epi/IV steroid treatment due to complaint of upper airway swelling.  Has patient developed a TACO or TRALI picture plus potential sepsis due to UTI?          Markus Mcclain M.D.  Attending Physician  [facility] Medicine Dept.  Pager: [phone]  Spectralink -x [phone]

## 2019-02-04 NOTE — ASSESSMENT & PLAN NOTE
Patient states that she had a stroke 20 years back. No residual neurological manifestion.  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 No shunting of agitated saline baseline and with valsalva the RAP were not increased as evidence by the intra-atrial septum bulging to the right. With valsalva there are a few fine bubbles seen 4-5 beat after opacification of the RA.    - Plan:  Continue on statin  Echo with bubble study  Send for lipid panel

## 2019-02-04 NOTE — HPI
83 year old female with PMH of CVA, osteoporosis, and HLD who presented to the ED (02/02/2019) with complaints of generalized weakness, whole body aches and fatigue for 6 weeks.  She was evaluated by her PCP for this and was found to be anemic and thrombocytopenic and subsequently referred to the ER.  She has been hospitalized since 2/2/19.  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  She has essentially been on some form of antibiotics since her admission.  She was initially febrile and then had several days were she didn't have a fever.  She has since developed fevers again.  Her work up has included blood cultures that have been negative.  A chest CT shows an area of consolidation that is concerning.  She has ulcers in her mouth.  CT of maxillofacial area was unremarkable.  We are re-consulted to provide input into her ongoing fevers.

## 2019-02-04 NOTE — PLAN OF CARE
Problem: Adult Inpatient Plan of Care  Goal: Absence of Hospital-Acquired Illness or Injury  Outcome: Ongoing (interventions implemented as appropriate)  Aseptic technique maintained  Intervention: Prevent Skin Injury  Pt ambulates independently

## 2019-02-04 NOTE — SUBJECTIVE & OBJECTIVE
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    UTI (urinary tract infection)        Past Surgical History:   Procedure Laterality Date    CATARACT EXTRACTION W/  INTRAOCULAR LENS IMPLANT Left 7/21/14    CATARACT EXTRACTION W/  INTRAOCULAR LENS IMPLANT Right 8/11/14    CYSTOSCOPY      INSERTION-INTRAOCULAR LENS (IOL) Right 8/11/2014    Performed by [facility] C[facility]jo [facility] at [facility] OR    INSERTION-INTRAOCULAR LENS (IOL) Left 7/21/2014    Performed by [facility] at [facility] OR    PHACOEMULSIFICATION-ASPIRATION-CATARACT Right 8/11/2014    Performed by [facility] at [facility] OR    PHACOEMULSIFICATION-ASPIRATION-CATARACT Left 7/21/2014    Performed by [facility]jo [facility] at [facility] OR    screening N/A 9/5/2014    Performed by Mt Drake MD at [facility] (4TH FLR)       Review of patient's allergies indicates:   Allergen Reactions    Tramadol Rash       Medications:  Medications Prior to Admission   Medication Sig    albuterol 90 mcg/actuation inhaler Inhale 1-2 puffs into the lungs every 6 (six) hours as needed for Wheezing.    ascorbic acid (VITAMIN C) 500 MG tablet Take 1 tablet (500 mg total) by mouth 2 (two) times daily.    cranberry 400 mg Cap Take 1 capsule by mouth once daily.     GLUCOSAMINE SULFATE (GLUCOSAMINE ORAL) Take 2 tablets by mouth once daily.    ibuprofen (ADVIL,MOTRIN) 600 MG tablet Take 1 tablet (600 mg total) by mouth every 6 (six) hours as needed for Pain.    multivitamin capsule Take 1 capsule by mouth once daily.      naproxen (NAPROSYN) 500 MG tablet Take 1 tablet (500 mg total) by mouth 2 (two) times daily. (Patient taking differently: Take 500 mg by mouth 2 (two) times daily as needed. )    simvastatin (ZOCOR) 40 MG tablet Take 1 tablet (40 mg total) by mouth every evening.     ondansetron (ZOFRAN-ODT) 8 MG TbDL Take 1 tablet (8 mg total) by mouth every 12 (twelve) hours as needed.    ranitidine (ZANTAC) 150 MG tablet Take 1 tablet (150 mg total) by mouth 2 (two) times daily. PRN heartburn     Antibiotics (From admission, onward)    Start     Stop Route Frequency Ordered    02/04/19 2100  vancomycin 750 mg in dextrose 5 % 250 mL IVPB (ready to mix system)  (Vancomycin IVPB with levels panel)      -- IV Every 24 hours (non-standard times) 02/03/19 2110    02/04/19 1145  azithromycin 500 mg in dextrose 5 % 250 mL IVPB (ready to mix system)      02/07 1144 IV Every 24 hours (non-standard times) 02/04/19 1043    02/03/19 2245  piperacillin-tazobactam 4.5 g in sodium chloride 0.9% 100 mL IVPB (ready to mix system)      -- IV Every 8 hours (non-standard times) 02/03/19 2109        Antifungals (From admission, onward)    None        Antivirals (From admission, onward)    None           Immunization History   Administered Date(s) Administered    Influenza - High Dose 11/17/2015    Pneumococcal Conjugate - 13 Valent 11/17/2015       Family History     Problem Relation (Age of Onset)    Cancer Brother, Brother, Sister        Social History     Socioeconomic History    Marital status:      Spouse name: None    Number of children: None    Years of education: None    Highest education level: None   Social Needs    Financial resource strain: None    Food insecurity - worry: None    Food insecurity - inability: None    Transportation needs - medical: None    Transportation needs - non-medical: None   Occupational History    None   Tobacco Use    Smoking status: Never Smoker    Smokeless tobacco: Never Used   Substance and Sexual Activity    Alcohol use: No     Alcohol/week: 0.0 oz     Comment: moderate    Drug use: No    Sexual activity: Yes     Partners: Male   Other Topics Concern    None   Social History Narrative    None     Review of Systems   Constitutional: Positive for  activity change, appetite change, fatigue and fever. Negative for chills.   HENT: Negative for congestion, rhinorrhea and sore throat.    Respiratory: Positive for cough. Negative for shortness of breath, wheezing and stridor.    Cardiovascular: Negative for chest pain, palpitations and leg swelling.   Gastrointestinal: Positive for nausea. Negative for abdominal pain, blood in stool, constipation, diarrhea and vomiting.   Genitourinary: Negative for decreased urine volume, difficulty urinating, dysuria, flank pain, frequency and urgency.   Musculoskeletal: Negative for arthralgias and myalgias.   Neurological: Positive for weakness and light-headedness. Negative for syncope and headaches.   Psychiatric/Behavioral: Negative for agitation and confusion.     Objective:     Vital Signs (Most Recent):  Temp: 98.4 °F (36.9 °C) (02/04/19 1126)  Pulse: 90 (02/04/19 1126)  Resp: 17 (02/04/19 1126)  BP: (!) 99/56 (02/04/19 1126)  SpO2: 95 % (02/04/19 1126) Vital Signs (24h Range):  Temp:  [96.9 °F (36.1 °C)-102.4 °F (39.1 °C)] 98.4 °F (36.9 °C)  Pulse:  [] 90  Resp:  [17-20] 17  SpO2:  [92 %-98 %] 95 %  BP: ()/(40-56) 99/56     Weight: 52.4 kg (115 lb 8.3 oz)  Body mass index is 22.56 kg/m².    Estimated Creatinine Clearance: 35.2 mL/min (based on SCr of 0.9 mg/dL).    Physical Exam   Constitutional: She is oriented to person, place, and time. No distress.   HENT:   Head: Normocephalic.   conjunctival pallor   Eyes: Pupils are equal, round, and reactive to light.   Neck: Normal range of motion. JVD present.   Cardiovascular: Normal rate, regular rhythm and normal heart sounds.   No murmur heard.  Pulmonary/Chest: Effort normal and breath sounds normal. No stridor. No respiratory distress. She has no wheezes.   Abdominal: Soft. Bowel sounds are normal. She exhibits no distension. There is no tenderness. There is no guarding.   Musculoskeletal: Normal range of motion. She exhibits no edema or deformity.    Neurological: She is alert and oriented to person, place, and time.   Skin: Skin is warm and dry. Capillary refill takes less than 2 seconds. She is not diaphoretic.   Psychiatric: She has a normal mood and affect.   Vitals reviewed.      Significant Labs: All pertinent labs within the past 24 hours have been reviewed.    Significant Imaging: I have reviewed all pertinent imaging results/findings within the past 24 hours.

## 2019-02-04 NOTE — HPI
Vicki Peña is a 83 y.o. female with hx CVA, HLD and newly diagnosed MDS who presented to ED on Feb 2nd c/o myalgias and generalized fatigue since the new year.  Since that time she has undergone bone marrow biopsy for pancytopenia, revealing above diagnosis of MDS with cytogenetics result and NGS result pending.  She has remained hospitalized without improvement and has continued to fever despite broad spectrum antimicrobials. All cultures and work up have been unrevealing.  CT of her chest has bilateral infiltrates with small bilateral pleural effusions. Due to persistent fevers, worsening overall condition critical care was re-consulted.

## 2019-02-04 NOTE — NURSING
Patient identified by 2 identifiers.   Allergies reviewed.  IV in place to RA.  Bubble study explained to patient, patient verbalized understanding.  Bubble performed X 2, with & without Valsalva.  Pt tolerated procedure well.

## 2019-02-04 NOTE — NURSING TRANSFER
Nursing Transfer Note      2/4/2019     Transfer To: 6065    Transfer via stretcher    Transfer with telemetry and 3L NC O2    Transported by [facility] RN's    Medicines sent: Azithromycin    Chart send with patient: Yes    Notified: daughter    Patient reassessed at: LEONOR Miller, present at bedside for assessment and handoff. NABEEL, STEPHANIE.     Upon arrival to floor: cardiac monitor applied, patient oriented to room, call bell in reach and bed in lowest position

## 2019-02-04 NOTE — SUBJECTIVE & OBJECTIVE
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    UTI (urinary tract infection)        Past Surgical History:   Procedure Laterality Date    CATARACT EXTRACTION W/  INTRAOCULAR LENS IMPLANT Left 7/21/14    CATARACT EXTRACTION W/  INTRAOCULAR LENS IMPLANT Right 8/11/14    CYSTOSCOPY      INSERTION-INTRAOCULAR LENS (IOL) Right 8/11/2014    Performed by [facility] at [facility] [place]    INSERTION-INTRAOCULAR LENS (IOL) Left 7/21/2014    Performed by Rich Camejo MD at [facility] [place]    PHACOEMULSIFICATION-ASPIRATION-CATARACT Right 8/11/2014    Performed by Rich Camejo MD at [facility] [place]    PHACOEMULSIFICATION-ASPIRATION-CATARACT Left 7/21/2014    Performed by Rich Camejo MD at [facility] [place]    screening N/A 9/5/2014    Performed by Mt Drake [facility] at [facility] (4TH FLR)       Review of patient's allergies indicates:   Allergen Reactions    Tramadol Rash       No current facility-administered medications on file prior to encounter.      Current Outpatient Medications on File Prior to Encounter   Medication Sig    albuterol 90 mcg/actuation inhaler Inhale 1-2 puffs into the lungs every 6 (six) hours as needed for Wheezing.    ascorbic acid (VITAMIN C) 500 MG tablet Take 1 tablet (500 mg total) by mouth 2 (two) times daily.    cranberry 400 mg Cap Take 1 capsule by mouth once daily.     GLUCOSAMINE SULFATE (GLUCOSAMINE ORAL) Take 2 tablets by mouth once daily.    ibuprofen (ADVIL,MOTRIN) 600 MG tablet Take 1 tablet (600 mg total) by mouth every 6 (six) hours as needed for Pain.    multivitamin capsule Take 1 capsule by mouth once daily.      naproxen (NAPROSYN) 500 MG tablet Take 1 tablet (500 mg total) by mouth 2 (two) times daily. (Patient taking differently: Take 500 mg by mouth 2 (two) times daily as needed. )     simvastatin (ZOCOR) 40 MG tablet Take 1 tablet (40 mg total) by mouth every evening.    ondansetron (ZOFRAN-ODT) 8 MG TbDL Take 1 tablet (8 mg total) by mouth every 12 (twelve) hours as needed.    ranitidine (ZANTAC) 150 MG tablet Take 1 tablet (150 mg total) by mouth 2 (two) times daily. PRN heartburn     Family History     Problem Relation (Age of Onset)    Cancer Brother, Brother, Sister        Tobacco Use    Smoking status: Never Smoker    Smokeless tobacco: Never Used   Substance and Sexual Activity    Alcohol use: No     Alcohol/week: 0.0 oz     Comment: moderate    Drug use: No    Sexual activity: Yes     Partners: Male     Review of Systems   Constitutional: Positive for activity change, appetite change, fatigue and fever. Negative for chills.   HENT: Negative for congestion, rhinorrhea and sore throat.    Respiratory: Positive for cough. Negative for shortness of breath, wheezing and stridor.    Cardiovascular: Negative for chest pain, palpitations and leg swelling.   Gastrointestinal: Positive for nausea. Negative for abdominal pain, blood in stool, constipation, diarrhea and vomiting.   Genitourinary: Negative for decreased urine volume, difficulty urinating, dysuria, flank pain, frequency and urgency.   Musculoskeletal: Negative for arthralgias and myalgias.   Neurological: Positive for weakness and light-headedness. Negative for syncope and headaches.   Psychiatric/Behavioral: Negative for agitation and confusion.     Objective:     Vital Signs (Most Recent):  Temp: 98.4 °F (36.9 °C) (02/04/19 1126)  Pulse: 90 (02/04/19 1126)  Resp: 17 (02/04/19 1126)  BP: (!) 99/56 (02/04/19 1126)  SpO2: 95 % (02/04/19 1126) Vital Signs (24h Range):  Temp:  [96.9 °F (36.1 °C)-102.4 °F (39.1 °C)] 98.4 °F (36.9 °C)  Pulse:  [] 90  Resp:  [17-20] 17  SpO2:  [92 %-98 %] 95 %  BP: ()/(40-56) 99/56     Weight: 52.4 kg (115 lb 9.6 oz)  Body mass index is 22.58 kg/m².    Physical Exam   Constitutional: She  is oriented to person, place, and time. No distress.   HENT:   Head: Normocephalic.   -- conjunctival pallor   Eyes: Pupils are equal, round, and reactive to light.   Neck: Normal range of motion. JVD present.   Cardiovascular: Normal rate, regular rhythm and normal heart sounds.   No murmur heard.  Pulmonary/Chest: Effort normal and breath sounds normal. No stridor. No respiratory distress. She has no wheezes.   Bilateral decrease air entry   Abdominal: Soft. Bowel sounds are normal. She exhibits no distension. There is no tenderness. There is no guarding.   Musculoskeletal: Normal range of motion. She exhibits no edema or deformity.   Neurological: She is alert and oriented to person, place, and time.   Skin: Skin is warm and dry. Capillary refill takes less than 2 seconds. She is not diaphoretic.   Psychiatric: She has a normal mood and affect.   Vitals reviewed.        CRANIAL NERVES     CN III, IV, VI   Pupils are equal, round, and reactive to light.       Significant Labs: All pertinent labs within the past 24 hours have been reviewed.    Significant Imaging: I have reviewed all pertinent imaging results/findings within the past 24 hours.

## 2019-02-04 NOTE — PROGRESS NOTES
Dr. Javed called back regarding BP. Orders placed for 500 cc bolus now and recheck BP after. States to call back if BP low after bolus for another 500 cc bolus. Plan reviewed with primary RN, Robson. No further orders given at this time. Will continue to monitor.

## 2019-02-04 NOTE — PROGRESS NOTES
[facility]  Adult Nutrition  Consult Note    SUMMARY     Recommendations    Recommendation/Intervention:   1. Recommend Boost Plus TID   2. Continue regular diet as tolerated (with texture per SLP)   3. RD to follow    Goals:   1. Meet >75% EEN/EPN   2. Prevent >2% dry weight loss over one week    Nutrition Goal Status: new  Communication of RD Recs: other (comment)(POC)    Reason for Assessment    Reason For Assessment: consult  Diagnosis: other (see comments)(severe anemia)  Relevant Medical History: CVA, osteoporosis, HLD  Interdisciplinary Rounds: did not attend  General Information Comments: Spoke with Pt daughter and sister in the Pt room. Information provided by family during visit. Pt does not have appetite, PO intake 0-25%. Family noted more fluid consumption than food. PTA Pt followed general diet, and is very picky eater. PTA PO intake 0-25% for 2-3 weeks. PTA Pt had no appetite. UBW of 118-120. Weight loss of unkown amount over no specified time. NFPE performed 2/4/19. Mild muscle depletion of clavicle. Moderate muscle depletion of temporals, and lower extremeties. Moderate fat depletion of orbitals and triceps. Family agreed Pt could most likely tolerate Boost Plus TID. Pt moved to CICU after time of visit. Pt at risk for malnutrition in the context of acute illness.    Nutrition Discharge Planning: d/c on regular diet    Nutrition Risk Screen    Nutrition Risk Screen: no indicators present    Nutrition/Diet History    Patient Reported Diet/Restrictions/Preferences: general  Spiritual, Cultural Beliefs, Adventism Practices, Values that Affect Care: no    Anthropometrics    Temp: 98.4 °F (36.9 °C)  Height Method: Estimated  Height: 5' (152.4 cm)  Height (inches): 60 in  Weight Method: Bed Scale  Weight: 52.4 kg (115 lb 8.3 oz)  Weight (lb): 115.52 lb  Ideal Body Weight (IBW), Female: 100 lb  % Ideal Body Weight, Female (lb): 115.52 lb  BMI (Calculated): 22.6  Usual Body Weight  (UBW), k.43 kg  % Usual Body Weight: 96.47  % Weight Change From Usual Weight: -3.73 %     Lab/Procedures/Meds    Pertinent Labs Reviewed: reviewed  Pertinent Labs Comments: Na 128, Ca 7.6, , Albumin 1.8, Bilirubin 2.1, Hgb 8.4, Hct 25.3  Pertinent Medications Reviewed: reviewed  Pertinent Medications Comments:   Scheduled Meds:   albuterol-ipratropium  3 mL Nebulization Q4H WAKE    azithromycin  500 mg Intravenous Q24H    pantoprazole  40 mg Oral BID AC    piperacillin-tazobactam (ZOSYN) IVPB  4.5 g Intravenous Q8H    simvastatin  40 mg Oral QHS    vancomycin (VANCOCIN) IVPB  750 mg Intravenous Q24H     Estimated/Assessed Needs    Weight Used For Calorie Calculations: 52.4 kg (115 lb 8.3 oz)  Energy Calorie Requirements (kcal): 1310-1575kcal/day(25-30kcal/kg)  Energy Need Method: Kcal/kg  Protein Requirements: 53-63g/day(1.0-1.2g/kg)  Weight Used For Protein Calculations: 52.4 kg (115 lb 8.3 oz)  Fluid Requirements (mL): 1mL/kcal or per MD  RDA Method (mL): 1310     Nutrition Prescription Ordered    Current Diet Order: regular    Evaluation of Received Nutrient/Fluid Intake    Energy Calories Required: not meeting needs  Protein Required: not meeting needs  Fluid Required: meeting needs  % Intake of Estimated Energy Needs: 0 - 25 %  % Meal Intake: 0 - 25 %    Nutrition Risk    Level of Risk/Frequency of Follow-up: low     Assessment and Plan    Nutrition Problem  Inadequate oral intake    Related to (etiology):   Poor appetite    Signs and Symptoms (as evidenced by):   PO intake 0-25% PTA for >2 weeks; PO intake 0-25% while admitted; mild weight loss    Nutrition Diagnosis Status:   New    Monitor and Evaluation    Food and Nutrient Intake: energy intake, food and beverage intake  Food and Nutrient Adminstration: diet order  Knowledge/Beliefs/Attitudes: food and nutrition knowledge/skill, beliefs and attitudes  Physical Activity and Function: nutrition-related ADLs and IADLs  Anthropometric  Measurements: weight, weight change, body mass index  Biochemical Data, Medical Tests and Procedures: lipid profile, inflammatory profile, glucose/endocrine profile, gastrointestinal profile, electrolyte and renal panel  Nutrition-Focused Physical Findings: overall appearance, extremities, muscles and bones, head and eyes, skin     Malnutrition Assessment     Orbital Region (Subcutaneous Fat Loss): moderate depletion  Upper Arm Region (Subcutaneous Fat Loss): moderate depletion   [place] Region (Muscle Loss): moderate depletion  Clavicle and Acromion Bone Region (Muscle Loss): mild depletion  Patellar Region (Muscle Loss): moderate depletion  Posterior Calf Region (Muscle Loss): moderate depletion       Nutrition Follow-Up    RD Follow-up?: Yes        Samantha Gillies, Dietetic Intern

## 2019-02-04 NOTE — TELEPHONE ENCOUNTER
You have abnormalities of your blood cells that look like we need to get a Hematology referral to evaluate further.  I am entering the order, but I know you are in the hospital.  If hematology does not see you in the hospital, will set this up when you are discharged.  Please call my office for assistance.  Released to patient portal.

## 2019-02-04 NOTE — PLAN OF CARE
Problem: Adult Inpatient Plan of Care  Goal: Plan of Care Review  Outcome: Ongoing (interventions implemented as appropriate)  No gtts. BP stable throughout afternoon. Antibiotics administered. Plan is to continue to trend labs and vitals.  VS and assessment per flow sheet, patient progressing towards goals as tolerated, plan of care reviewed with Vicki Peña and family, all concerns addressed, will continue to monitor.

## 2019-02-04 NOTE — PROGRESS NOTES
Pt transferred to ICU to room 6065 on her bed with 2 RN nurses , family at bedside . Pt on O2 N/C and cardiac monitor . Pt is A,a,O x 4 . Stable V/s. All belonging sent with the pt . Report given to the nurse in ICU .

## 2019-02-04 NOTE — PROGRESS NOTES
Progress Note      SUBJECTIVE:     Patient seen and examined at the bedside.  Patient complained of more shortness of breath at rest since last night.  She was hypotensive with a systolic blood pressure in the 80s and received multiple boluses of fluids today and was transferred to ICU for close monitoring.  Patient has been spiking fevers with a T-max of 102.4°  Currently denied any chest pain, abdominal pain, nausea, vomiting, edema her leg pain.  Patient is initially scheduled for a bone marrow biopsy today.    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    UTI (urinary tract infection)      Past Surgical History:   Procedure Laterality Date    CATARACT EXTRACTION W/  INTRAOCULAR LENS IMPLANT Left 7/21/14    CATARACT EXTRACTION W/  INTRAOCULAR LENS IMPLANT Right 8/11/14    CYSTOSCOPY      INSERTION-INTRAOCULAR LENS (IOL) Right 8/11/2014    Performed by [facility] at [facility]    INSERTION-INTRAOCULAR LENS (IOL) Left 7/21/2014    Performed by [facility] at [facility]    PHACOEMULSIFICATION-ASPIRATION-CATARACT Right 8/11/2014    Performed by [facility] at [facility]    PHACOEMULSIFICATION-ASPIRATION-CATARACT Left 7/21/2014    Performed by [facility] at [facility]    screening N/A 9/5/2014    Performed by Mt Drake MD at [facility] (4[facility] FLR)     Family History   Problem Relation Age of Onset    Cancer Brother     Cancer Brother     Cancer Sister      Social History     Tobacco Use    Smoking status: Never Smoker    Smokeless tobacco: Never Used   Substance Use Topics    Alcohol use: No     Alcohol/week: 0.0 oz     Comment: moderate    Drug use: No     Review of Systems   Constitutional: Positive for  chills, fever and malaise/fatigue.   HENT: Negative for nosebleeds.    Respiratory: Positive for shortness of breath. Negative for cough.    Cardiovascular: Negative for chest pain.   Gastrointestinal: Negative for abdominal pain, blood in stool and nausea.   Genitourinary: Negative for frequency and urgency.   Musculoskeletal: Positive for back pain and myalgias. Negative for falls.   Neurological: Positive for weakness. Negative for seizures and headaches.     OBJECTIVE:     Vital Signs:  Temp:  [97.3 °F (36.3 °C)-100.3 °F (37.9 °C)]   Pulse:  []   Resp:  [17-24]   BP: ()/(46-66)   SpO2:  [94 %-98 %]     Physical Exam   Constitutional: She is oriented to person, place, and time.   She appears ill   HENT:   Head: Normocephalic and atraumatic.   Eyes: EOM are normal. Pupils are equal, round, and reactive to light.   Neck: Normal range of motion. Neck supple.   Cardiovascular: Normal rate and regular rhythm.   Murmur heard.  Pulmonary/Chest: She is in respiratory distress. She has wheezes. She has rales.   Abdominal: Soft. Bowel sounds are normal. She exhibits no distension.   Musculoskeletal: Normal range of motion. She exhibits no edema or deformity.   Neurological: She is alert and oriented to person, place, and time. No cranial nerve deficit.   Skin: Skin is warm and dry. Capillary refill takes less than 2 seconds.     Laboratory:  CBC:   Recent Labs   Lab 02/04/19  0902   WBC 3.46*   RBC 3.31*   HGB 9.1*   HCT 27.3*   PLT 37*   MCV 83   MCH 27.5   MCHC 33.3     CMP:   Recent Labs   Lab 02/04/19  0446   GLU 83   CALCIUM 7.6*   ALBUMIN 1.8*   PROT 4.8*   *   K 3.8   CO2 17*      BUN 17   CREATININE 0.9   ALKPHOS 188*   ALT 29   AST 31   BILITOT 2.1*     Coagulation:   Recent Labs   Lab 02/02/19  2036   LABPROT 11.7   INR 1.1   APTT 25.9     ABGs:   Recent Labs   Lab 02/04/19  1059   PH 7.316*   PCO2 28.1*   PO2 83   HCO3 14.3*   POCSATURATED 95   BE -12     Specimen (12h ago, onward)     None        Recent Labs   Lab 02/02/19  0852   COLORU Yellow   SPECGRAV 1.010   PHUR 6.0   PROTEINUA Negative   BACTERIA Many*   NITRITE Positive*   LEUKOCYTESUR 2+*       Diagnostic Results:    CXR  Left-sided pneumonia.    Positive urinalysis.  Cultures positive for gram-negative rods    ASSESSMENT/PLAN:     1.  Anemia and thrombocytopenia:  Hemoglobin improved and stable.  Platelet are 37, no active bleeding seen.  Patient has sepsis which is likely contributing to her low platelet count  2.  Left-sided pneumonia as evidence on chest x-ray  3.  Urinary tract infection with positive cultures for gram-negative rods  4.  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  Seen by Dr. José Manuel baker in 2013  5.  Fluid overload:  She received multiple transfusions and IV fluid boluses    Plan:     1.  Transfuse for hemoglobin of less than 7 and platelets less than 10  2.  Treat sepsis with antibiotics, as per primary  3.  Will hold off on bone marrow biopsy as patient is more unstable with low blood pressure and hypoxemic, currently in ICU for close monitoring.  May consider bone marrow biopsy when more stable.    Plan of care discussed with Dr. Rainer Ordaz  PGY 4, hematology/oncology   Number Of Tubes Drawn: 1 Venipuncture Paragraph: An alcohol pad was applied to the venipuncture site. Venipuncture was performed using a butterfly needle. Pressure and a bandaid was applied to the site. No complications were noted. Detail Level: None

## 2019-02-04 NOTE — PROGRESS NOTES
We contacted vascular neurology Dr. Horn on #[id number] to ask about the role of anti-platelets vs anticoagulation because the patient has PFO with history of stroke. Patient denies having Afib. EKG: Normal sinus rhythm. He recommends to start ASA 81 mg PO OD.    Halie Padron  PGY-1 Internal Medicine

## 2019-02-04 NOTE — H&P
[facility]  Critical Care Medicine  History & Physical    Patient Name: Vicki Peña  MRN: [id number]  Admission Date: 2/2/2019  Hospital Length of Stay: 2 days  Code Status: Full Code  Attending Physician: Markus Mcclain MD   Primary Care Provider: Yaron Waite MD   Principal Problem: Symptomatic anemia    Subjective:     HPI:   Vicki Peña is an 83 year old female with medical issues of CVA, osteoporosis, and HLD who presents to the ED (02/02/2019) with complaints of generalized weakness and fatigue for the past 1-2 weeks. Prior to [facility] arrival she had her blood drawn and was found to have a Hgb of 6.6. At that time her PCP instructed her to go to the emergency department for a blood transfusion. Associated symptoms include nausea with 2 prior episodes of vomiting over the past week. She also endorses body aches/bone pains as well as poor appetite and unspecified weight loss. She is also endorsing some headaches with regular use of NSAIDs for relief.    Patient has had poor oral intake for past 3 weeks per daughter. In addition to some nausea and vomiting. She had Chikungunya infection in December. . Approximately 1 month ago she was treated for URI with antibiotics and symptoms are improving. Pt denies any respiratory symptoms other than a residual cough which is reportedly improving  She lives in [place] half the year. Has been using NSAIDs for arthritis. ROS is negative for SOB, PENG, CP, syncope, fever, bloody or black tarry stool, abdominal pain, or dysuria. Denies melena, hematochezia, easy bleeding/bruising, night sweats, fever/chills. Patient denies having a BM biopsy previously. She had a colonoscopy in 2014 with adenoma found, recommend repeat colonoscopy in 5 years.      Of note, She is very functional at baseline, and reportedly takes care of all her ADL's (cooks, cleans, and even drives), although over the past few weeks she has spent most of her time lying in bed.      Hospital/ICU  Course:  She is s/p 1unit Platelet transfusion - developed transfusion reaction during this - received IM epi, Benadryl/ 125 methylpred for concerns of throat swelling per Dr. Gonzalez    02/03/2019: Patient febrile this AM and UA c/w with UTI start Ceftriaxone,   At afternoon repeat fever to >102, blood cultures obtained and on abx for UTI, started on sepsis bolus 30cc/kg and monitoring of lactic acid levels. Antibiotics were changed to Piperacillin/Tazobactam and Vancomycin.     02/04/2019: Patient have complaints of left sided lateral chest wall pleuritic pain. Throughout the night patient remained febrile, O2 requirement increasing this Am after 4am patient given 2 500ml saline boluses. This am patient is tachypneic, accessory muscle use, febrile, MAP >65, but pressures 90s/50s. CXR with significant new left sided lower& upper airspace disease, absent breath sounds in left mid to lower fields, dullness to percussion, right basilar absent breath sounds. On Exam patient also with distended JVP to angle of jaw. 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    UTI (urinary tract infection)        Past Surgical History:   Procedure Laterality Date    CATARACT EXTRACTION W/  INTRAOCULAR LENS IMPLANT Left 7/21/14    CATARACT EXTRACTION W/  INTRAOCULAR LENS IMPLANT Right 8/11/14    CYSTOSCOPY      INSERTION-INTRAOCULAR LENS (IOL) Right 8/11/2014    Performed by Rich Camejo [facility] at [facility] [place] OR    INSERTION-INTRAOCULAR LENS (IOL) Left 7/21/2014    Performed by Rich Camejo MD at [facility] [place]    PHACOEMULSIFICATION-ASPIRATION-CATARACT Right 8/11/2014    Performed by Rich Camejo MD at [facility] [place]    PHACOEMULSIFICATION-ASPIRATION-CATARACT Left 7/21/2014    Performed by  [facility] C[facility]jo MD at [facility] OR    screening N/A 9/5/2014    Performed by Mt Drake MD at [facility] (4TH FLR)       Review of patient's allergies indicates:   Allergen Reactions    Tramadol Rash       No current facility-administered medications on file prior to encounter.      Current Outpatient Medications on File Prior to Encounter   Medication Sig    albuterol 90 mcg/actuation inhaler Inhale 1-2 puffs into the lungs every 6 (six) hours as needed for Wheezing.    ascorbic acid (VITAMIN C) 500 MG tablet Take 1 tablet (500 mg total) by mouth 2 (two) times daily.    cranberry 400 mg Cap Take 1 capsule by mouth once daily.     GLUCOSAMINE SULFATE (GLUCOSAMINE ORAL) Take 2 tablets by mouth once daily.    ibuprofen (ADVIL,MOTRIN) 600 MG tablet Take 1 tablet (600 mg total) by mouth every 6 (six) hours as needed for Pain.    multivitamin capsule Take 1 capsule by mouth once daily.      naproxen (NAPROSYN) 500 MG tablet Take 1 tablet (500 mg total) by mouth 2 (two) times daily. (Patient taking differently: Take 500 mg by mouth 2 (two) times daily as needed. )    simvastatin (ZOCOR) 40 MG tablet Take 1 tablet (40 mg total) by mouth every evening.    ondansetron (ZOFRAN-ODT) 8 MG TbDL Take 1 tablet (8 mg total) by mouth every 12 (twelve) hours as needed.    ranitidine (ZANTAC) 150 MG tablet Take 1 tablet (150 mg total) by mouth 2 (two) times daily. PRN heartburn     Family History     Problem Relation (Age of Onset)    Cancer Brother, Brother, Sister        Tobacco Use    Smoking status: Never Smoker    Smokeless tobacco: Never Used   Substance and Sexual Activity    Alcohol use: No     Alcohol/week: 0.0 oz     Comment: moderate    Drug use: No    Sexual activity: Yes     Partners: Male     Review of Systems   Constitutional: Positive for activity change, appetite change, fatigue and fever. Negative for chills.   HENT: Negative for congestion, rhinorrhea and sore throat.    Respiratory: Positive for  cough. Negative for shortness of breath, wheezing and stridor.    Cardiovascular: Negative for chest pain, palpitations and leg swelling.   Gastrointestinal: Positive for nausea. Negative for abdominal pain, blood in stool, constipation, diarrhea and vomiting.   Genitourinary: Negative for decreased urine volume, difficulty urinating, dysuria, flank pain, frequency and urgency.   Musculoskeletal: Negative for arthralgias and myalgias.   Neurological: Positive for weakness and light-headedness. Negative for syncope and headaches.   Psychiatric/Behavioral: Negative for agitation and confusion.     Objective:     Vital Signs (Most Recent):  Temp: 98.4 °F (36.9 °C) (02/04/19 1126)  Pulse: 90 (02/04/19 1126)  Resp: 17 (02/04/19 1126)  BP: (!) 99/56 (02/04/19 1126)  SpO2: 95 % (02/04/19 1126) Vital Signs (24h Range):  Temp:  [96.9 °F (36.1 °C)-102.4 °F (39.1 °C)] 98.4 °F (36.9 °C)  Pulse:  [] 90  Resp:  [17-20] 17  SpO2:  [92 %-98 %] 95 %  BP: ()/(40-56) 99/56     Weight: 52.4 kg (115 lb 9.6 oz)  Body mass index is 22.58 kg/m².    Physical Exam   Constitutional: She is oriented to person, place, and time. No distress.   HENT:   Head: Normocephalic.   -- conjunctival pallor   Eyes: Pupils are equal, round, and reactive to light.   Neck: Normal range of motion. JVD present.   Cardiovascular: Normal rate, regular rhythm and normal heart sounds.   No murmur heard.  Pulmonary/Chest: Effort normal and breath sounds normal. No stridor. No respiratory distress. She has no wheezes.   Bilateral decrease air entry   Abdominal: Soft. Bowel sounds are normal. She exhibits no distension. There is no tenderness. There is no guarding.   Musculoskeletal: Normal range of motion. She exhibits no edema or deformity.   Neurological: She is alert and oriented to person, place, and time.   Skin: Skin is warm and dry. Capillary refill takes less than 2 seconds. She is not diaphoretic.   Psychiatric: She has a normal mood and affect.    Vitals reviewed.        CRANIAL NERVES     CN III, IV, VI   Pupils are equal, round, and reactive to light.       Significant Labs: All pertinent labs within the past 24 hours have been reviewed.    Significant Imaging: I have reviewed all pertinent imaging results/findings within the past 24 hours.    Assessment/Plan:     Neuro   History of CVA (cerebrovascular accident)    Patient states that she had a stroke 20 years back. No residual neurological manifestion.  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 No shunting of agitated saline baseline and with valsalva the RAP were not increased as evidence by the intra-atrial septum bulging to the right. With valsalva there are a few fine bubbles seen 4-5 beat after opacification of the RA.    - Plan:  Continue on statin  Echo with bubble study  Send for lipid panel     Pulmonary   Pneumonia involving left lung    On 02/03/2019: Patient  febrile this AM and UA c/w with UTI start Ceftriaxone,  At afternoon repeat fever to >102, blood cultures obtained and on abx for UTI, started on sepsis bolus 30cc/kg and monitoring of lactic acid levels. Antibiotics were changed to Piperacillin/Tazobactam and Vancomycin.     02/04/2019: Patient have complaints of left sided lateral chest wall pleuritic pain. Throughout the night patient remained febrile, O2 requirement increasing this Am after 4am patient given 2 500ml saline boluses. This am patient is tachypneic, accessory muscle use, febrile, MAP >65, but pressures 90s/50s. CXR with significant new left sided lower& upper airspace disease, absent breath sounds in left mid to lower fields, dullness to percussion, right basilar absent breath sounds. On Exam patient also with distended JVP to angle of jaw. Critical Care consulted & have accepted patient. Azithromycin started    - Will hold IVF in the settings of high JVP and BNP > 1200. Will get echocardiography.  - Strict Is/Os  - Chest x ray  "tomorrow  - If clinical status has not improved or chest x ray is getting worse will do CT chest to rule out abscess.          Cardiac/Vascular   Hyperlipidemia    - Resume home medication: Statin     ID   Sepsis due to urinary tract infection    - Pneumonia involving left lung     Hematology   Thrombocytopenia    - Pancytopenia     Oncology   * Symptomatic anemia    - Pancytopenia     Severe anemia    - Pancytopenia     Pancytopenia    She has chronic leukopenia without neutropenia dating back to the year 2005.  Prior to [facility] arrival she had her blood drawn and was found to have a Hgb of 6.6. At that time her PCP instructed her to go to the emergency department for a blood transfusion.  She is s/p 1unit Platelet transfusion - developed transfusion reaction during this - received IM epi, Benadryl/ 125 methylpred for concerns of throat swelling per Dr. Gonzalez.    Heme/oncology consulted (02/03/2019) their recs and plan: " Anemia, thrombocytopenia. Ddx includes primary BM disorder such as MDS, vs secondary BM suppression from infection (recent Chikungunya infection, known to cause thrombocytopenia). Reviewed medication list, no known culprits. Possible also slow GIB from adenoma.  -No evidence of iron deficiency, B12/folate nml  -No evidence of hemolysis  -No evidence of splenomegaly, no evidence of cirrhosis  -No evidence of coagulation     Will try to perform BM biopsy early this week while patient is here. If patient needs to be discharged, can be scheduled to be done as outpatient. Results will take about a 3-7 days to results.  -Hold NSAIDs  -Transfuse Hb<7 or plt<10 unless active bleeding  -Consider repeat colonoscopy given recommended to be done 5 years from adenoma visualized in 2014"               Critical Care Daily Checklist:    A: Awake: RASS Goal/Actual Goal:    Actual:     B: Spontaneous Breathing Trial Performed?     C: SAT & SBT Coordinated?  On nasal cannula                      D: Delirium: CAM-ICU "     E: Early Mobility Performed? No   F: Feeding Goal:    Status:     Current Diet Order   Procedures    Diet Adult Regular (IDDSI Level 7)      AS: Analgesia/Sedation PRN   T: Thromboembolic Prophylaxis SCD   H: HOB > 300 Yes   U: Stress Ulcer Prophylaxis (if needed) Pantoprazole   G: Glucose Control Monitor   B: Bowel Function Stool Occurrence: 1   I: Indwelling Catheter (Lines & Ojeda) Necessity Ojeda's catheter   D: De-escalation of Antimicrobials/Pharmacotherapies Piperacillin/Tazobactam, Azithromycin and Vancomycin    Plan for the day/ETD Antibiotics, echocardiography    Code Status:  Family/Goals of Care: Full Code         Critical secondary to Patient has a condition that poses threat to life and bodily function: Severe Respiratory Distress     Critical care was time spent personally by me on the following activities: development of treatment plan with patient or surrogate and bedside caregivers, discussions with consultants, evaluation of patient's response to treatment, examination of patient, ordering and performing treatments and interventions, ordering and review of laboratory studies, ordering and review of radiographic studies, pulse oximetry, re-evaluation of patient's condition. This critical care time did not overlap with that of any other provider or involve time for any procedures.     Halie Padron MD  Critical Care Medicine  [facility]

## 2019-02-04 NOTE — PLAN OF CARE
Problem: Physical Therapy Goal  Goal: Physical Therapy Goal  Goals to be met by: 2019      Patient will increase functional independence with mobility by performin. Supine to sit with Stand-by Assistance  2. Sit to supine with Stand-by Assistance  3. Sit to stand transfer with Stand-by Assistance  4. Gait  x 50 feet with Stand-by Assistance using Rolling Walker.   5. Sitting at edge of bed x10 minutes with Supervision  6. Lower extremity exercise program x15 reps per handout, with independence    Eval completed and POC established     Yaron Orellana, PT, DPT  2019

## 2019-02-04 NOTE — PLAN OF CARE
02/04/19 1010   Discharge Assessment   Assessment Type Discharge Planning Assessment   Confirmed/corrected address and phone number on facesheet? Yes   Assessment information obtained from? Caregiver;Medical Record;Patient   Prior to hospitilization cognitive status: Alert/Oriented   Prior to hospitalization functional status: Independent   Current cognitive status: Alert/Oriented   Current Functional Status: Independent   Lives With child(della), adult  (Lives with daughter and drives back and forth visiting other children)   Able to Return to Prior Arrangements yes   Is patient able to care for self after discharge? Yes   Who are your caregiver(s) and their phone number(s)? Bettina Avina [phone]   Patient's perception of discharge disposition home or selfcare   Patient currently being followed by outpatient case management? No   Patient currently receives any other outside agency services? No   Equipment Currently Used at Home walker, rolling;shower chair   Do you have any problems affording any of your prescribed medications? No   Is the patient taking medications as prescribed? yes   Does the patient have transportation home? Yes   Transportation Anticipated family or friend will provide   Does the patient receive services at the [facility]? No   Discharge Plan A Home with family   Discharge Plan B Home   DME Needed Upon Discharge  none   Patient/Family in Agreement with Plan yes

## 2019-02-04 NOTE — ASSESSMENT & PLAN NOTE
82 yo female with fever and sepsis who is being admitted to the ICU.     -possible sources of infection are urine and lung  -GNR growing in urine culture  -CXR with possible PNA  -continue vanco and zosyn  -also complaining of diarrhea, if continues would get c diff testing  -await pending cultures

## 2019-02-04 NOTE — CONSULTS
Please see H&P to follow    Quoc Moody MD  [phone]  Critical Care Medicine  [facility]

## 2019-02-04 NOTE — CONSULTS
[facility]  Infectious Disease  Consult Note    Patient Name: Vicki Peña  MRN: [id number]  Admission Date: 2/2/2019  Hospital Length of Stay: 2 days  Attending Physician: Markus Mcclain MD  Primary Care Provider: Yaron Waite MD     Isolation Status: No active isolations    Patient information was obtained from patient, relative(s) and past medical records.      Inpatient consult to Infectious Diseases  Consult performed by: Kenn Emanuel MD  Consult ordered by: Binu Marie MD  Reason for consult: sepsis        Assessment/Plan:     * Symptomatic anemia    82 yo female with fever and sepsis who is being admitted to the ICU.     -possible sources of infection are urine and lung  -GNR growing in urine culture  -CXR with possible PNA  -continue vanco and zosyn  -also complaining of diarrhea, if continues would get c diff testing  -await pending cultures          Thank you for your consult. I will follow-up with patient. Please contact us if you have any additional questions.    Kenn Emanuel MD  Infectious Disease  [facility]    Subjective:     Principal Problem: Symptomatic anemia    HPI: 83 year old female with PMH of CVA, osteoporosis, and HLD who presented to the ED (02/02/2019) with complaints of generalized weakness and fatigue for the past 1-2 weeks. Prior to [facility] arrival she had her blood drawn and was found to have a Hgb of 6.6. At that time her PCP instructed her to go to the emergency department for a blood transfusion. Associated symptoms include nausea with 2 prior episodes of vomiting over the past week. She also endorses body aches/bone pains as well as poor appetite and unspecified weight loss. She is also endorsing some headaches with regular use of NSAIDs for relief. Patient has had poor oral intake for past 3 weeks per daughter. In addition to some nausea and vomiting. She had Chikungunya infection in December. . Approximately 1 month ago she was treated for  URI with antibiotics and symptoms are improving. Pt denies any respiratory symptoms other than a residual cough which is reportedly improving. She lives in [place] half the year. Has been using NSAIDs for arthritis. ROS is negative for SOB, PENG, CP, syncope, fever, bloody or black tarry stool, abdominal pain, or dysuria. Denies melena, hematochezia, easy bleeding/bruising, night sweats, fever/chills. Patient denies having a BM biopsy previously. She had a colonoscopy in 2014 with adenoma found, recommend repeat colonoscopy in 5 years.      She became febrile during her admission and ID was consulted for this. 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    UTI (urinary tract infection)        Past Surgical History:   Procedure Laterality Date    CATARACT EXTRACTION W/  INTRAOCULAR LENS IMPLANT Left 7/21/14    CATARACT EXTRACTION W/  INTRAOCULAR LENS IMPLANT Right 8/11/14    CYSTOSCOPY      INSERTION-INTRAOCULAR LENS (IOL) Right 8/11/2014    [facility]formed by [facility]    INSERTION-INTRAOCULAR LENS (IOL) Left 7/21/2014    [facility] at [facility]    PHACOEMULSIFICATION-ASPIRATION-CATARACT Right 8/11/2014    [facility]formed by [facility]    PHACOEMULSIFICATION-ASPIRATION-CATARACT Left 7/21/2014    [facility]formed by [facility] at [facility]    screening N/A 9/5/2014    Performed by Mt Drake [facility] at [facility] ([address] [place] [address])       Review of patient's allergies indicates:   Allergen Reactions    Tramadol Rash       Medications:  Medications Prior to Admission   Medication Sig    albuterol 90 mcg/actuation inhaler Inhale 1-2 puffs into the lungs every 6 (six) hours as needed for Wheezing.    ascorbic acid  (VITAMIN C) 500 MG tablet Take 1 tablet (500 mg total) by mouth 2 (two) times daily.    cranberry 400 mg Cap Take 1 capsule by mouth once daily.     GLUCOSAMINE SULFATE (GLUCOSAMINE ORAL) Take 2 tablets by mouth once daily.    ibuprofen (ADVIL,MOTRIN) 600 MG tablet Take 1 tablet (600 mg total) by mouth every 6 (six) hours as needed for Pain.    multivitamin capsule Take 1 capsule by mouth once daily.      naproxen (NAPROSYN) 500 MG tablet Take 1 tablet (500 mg total) by mouth 2 (two) times daily. (Patient taking differently: Take 500 mg by mouth 2 (two) times daily as needed. )    simvastatin (ZOCOR) 40 MG tablet Take 1 tablet (40 mg total) by mouth every evening.    ondansetron (ZOFRAN-ODT) 8 MG TbDL Take 1 tablet (8 mg total) by mouth every 12 (twelve) hours as needed.    ranitidine (ZANTAC) 150 MG tablet Take 1 tablet (150 mg total) by mouth 2 (two) times daily. PRN heartburn     Antibiotics (From admission, onward)    Start     Stop Route Frequency Ordered    02/04/19 2100  vancomycin 750 mg in dextrose 5 % 250 mL IVPB (ready to mix system)  (Vancomycin IVPB with levels panel)      -- IV Every 24 hours (non-standard times) 02/03/19 2110    02/04/19 1145  azithromycin 500 mg in dextrose 5 % 250 mL IVPB (ready to mix system)      02/07 1144 IV Every 24 hours (non-standard times) 02/04/19 1043    02/03/19 2245  piperacillin-tazobactam 4.5 g in sodium chloride 0.9% 100 mL IVPB (ready to mix system)      -- IV Every 8 hours (non-standard times) 02/03/19 2109        Antifungals (From admission, onward)    None        Antivirals (From admission, onward)    None           Immunization History   Administered Date(s) Administered    Influenza - High Dose 11/17/2015    Pneumococcal Conjugate - 13 Valent 11/17/2015       Family History     Problem Relation (Age of Onset)    Cancer Brother, Brother, Sister        Social History     Socioeconomic History    Marital status:      Spouse name: None    Number  of children: None    Years of education: None    Highest education level: None   Social Needs    Financial resource strain: None    Food insecurity - worry: None    Food insecurity - inability: None    Transportation needs - medical: None    Transportation needs - non-medical: None   Occupational History    None   Tobacco Use    Smoking status: Never Smoker    Smokeless tobacco: Never Used   Substance and Sexual Activity    Alcohol use: No     Alcohol/week: 0.0 oz     Comment: moderate    Drug use: No    Sexual activity: Yes     Partners: Male   Other Topics Concern    None   Social History Narrative    None     Review of Systems   Constitutional: Positive for activity change, appetite change, fatigue and fever. Negative for chills.   HENT: Negative for congestion, rhinorrhea and sore throat.    Respiratory: Positive for cough. Negative for shortness of breath, wheezing and stridor.    Cardiovascular: Negative for chest pain, palpitations and leg swelling.   Gastrointestinal: Positive for nausea. Negative for abdominal pain, blood in stool, constipation, diarrhea and vomiting.   Genitourinary: Negative for decreased urine volume, difficulty urinating, dysuria, flank pain, frequency and urgency.   Musculoskeletal: Negative for arthralgias and myalgias.   Neurological: Positive for weakness and light-headedness. Negative for syncope and headaches.   Psychiatric/Behavioral: Negative for agitation and confusion.     Objective:     Vital Signs (Most Recent):  Temp: 98.4 °F (36.9 °C) (02/04/19 1126)  Pulse: 90 (02/04/19 1126)  Resp: 17 (02/04/19 1126)  BP: (!) 99/56 (02/04/19 1126)  SpO2: 95 % (02/04/19 1126) Vital Signs (24h Range):  Temp:  [96.9 °F (36.1 °C)-102.4 °F (39.1 °C)] 98.4 °F (36.9 °C)  Pulse:  [] 90  Resp:  [17-20] 17  SpO2:  [92 %-98 %] 95 %  BP: ()/(40-56) 99/56     Weight: 52.4 kg (115 lb 8.3 oz)  Body mass index is 22.56 kg/m².    Estimated Creatinine Clearance: 35.2 mL/min  (based on SCr of 0.9 mg/dL).    Physical Exam   Constitutional: She is oriented to person, place, and time. No distress.   HENT:   Head: Normocephalic.   conjunctival pallor   Eyes: Pupils are equal, round, and reactive to light.   Neck: Normal range of motion. JVD present.   Cardiovascular: Normal rate, regular rhythm and normal heart sounds.   No murmur heard.  Pulmonary/Chest: Effort normal and breath sounds normal. No stridor. No respiratory distress. She has no wheezes.   Abdominal: Soft. Bowel sounds are normal. She exhibits no distension. There is no tenderness. There is no guarding.   Musculoskeletal: Normal range of motion. She exhibits no edema or deformity.   Neurological: She is alert and oriented to person, place, and time.   Skin: Skin is warm and dry. Capillary refill takes less than 2 seconds. She is not diaphoretic.   Psychiatric: She has a normal mood and affect.   Vitals reviewed.      Significant Labs: All pertinent labs within the past 24 hours have been reviewed.    Significant Imaging: I have reviewed all pertinent imaging results/findings within the past 24 hours.

## 2019-02-04 NOTE — PROGRESS NOTES
SRN called to bedside by RN for low BP. Manual recheck 85/40, MAP 55. O2 sat 85% on RA. Patient placed on 3L O2 per NC at this time, sat up to 92%. Dr. Javed notified; states he will review chart and call back.

## 2019-02-04 NOTE — ASSESSMENT & PLAN NOTE
On 02/03/2019: Patient  febrile this AM and UA c/w with UTI start Ceftriaxone,  At afternoon repeat fever to >102, blood cultures obtained and on abx for UTI, started on sepsis bolus 30cc/kg and monitoring of lactic acid levels. Antibiotics were changed to Piperacillin/Tazobactam and Vancomycin.     02/04/2019: Patient have complaints of left sided lateral chest wall pleuritic pain. Throughout the night patient remained febrile, O2 requirement increasing this Am after 4am patient given 2 500ml saline boluses. This am patient is tachypneic, accessory muscle use, febrile, MAP >65, but pressures 90s/50s. CXR with significant new left sided lower& upper airspace disease, absent breath sounds in left mid to lower fields, dullness to percussion, right basilar absent breath sounds. On Exam patient also with distended JVP to angle of jaw. Critical Care consulted & have accepted patient. Azithromycin started    - Will hold IVF in the settings of high JVP and BNP > 1200. Will get echocardiography.  - Strict Is/Os  - Chest x ray tomorrow  - If clinical status has not improved or chest x ray is getting worse will do CT chest to rule out abscess.

## 2019-02-04 NOTE — PT/OT/SLP EVAL
Physical Therapy Evaluation    Patient Name:  Vicki Peña   MRN:  [id number]    Recommendations:     Discharge Recommendations:  (HHPT)   Discharge Equipment Recommendations: none   Barriers to discharge: Decreased activity tolerance    Assessment:     Vicki Peña is a 83 y.o. female admitted with a medical diagnosis of Symptomatic anemia.  She presents with the following impairments/functional limitations:  weakness, impaired functional mobilty, impaired cardiopulmonary response to activity, impaired endurance, gait instability, pain. Therapy unable to assess OOB activity at this time secondary to arrival of lab and imaging to perform procedures, additionally pt stated she was in 9/10 pain and would like to wait for OOB activity following labs and imaging. Pt demonstrated decreased activity tolerance compared to reported baseline secondary to increased pain in L flank region. Pt would benefit from continued skilled acute PT 3x/wk to improve functional mobility.  Recommending pt receive PT services in HHPT setting following discharge from hospital once medically cleared. Therapy will cont. To follow.       Rehab Prognosis: Fair; patient would benefit from acute skilled PT services to address these deficits and reach maximum level of function.    Recent Surgery: * No surgery found *      Plan:     During this hospitalization, patient to be seen   to address the identified rehab impairments via gait training, therapeutic activities, therapeutic exercises, neuromuscular re-education and progress toward the following goals:    · Plan of Care Expires:  03/04/19    Subjective     Chief Complaint: 9/10 L Flank pain  Patient/Family Comments/goals: none stated  Pain/Comfort:  · Pain Rating 1: 9/10  · Location - Side 1: Left  · Location 1: flank  · Pain Addressed 1: Distraction, Cessation of Activity    Patients cultural, spiritual, Sikh conflicts given the current situation: no     Pt unable to communicate in english,  family present to translate and provide PLOF and living environment information. Pt stated she currently has 9/10 L Flank pain and difficulty breathing. Pt and family state pain and breathing has improved following administering 3L O2. Pt unable to perform OOB activity secondary to receiving lab work, imaging, and pain therapy will attempt to re-assess OOB activity later.     Living Environment:    Pt lives with daughter and granddaughter in single level home with no stairs to enter.     Prior to admission, patients level of function was Ind. With amb, driving, cooking, and cleaning.  Equipment used at home: walker, rolling, shower chair.  DME owned (not currently used): none.  Upon discharge, patient will have assistance from daughter and granddaughter.    Objective:     Communicated with RN prior to session.  Patient found all lines intact, call button in reach and family present oxygen, peripheral IV  upon PT entry to room.    General Precautions: Standard, fall   Orthopedic Precautions:N/A   Braces: N/A     Exams:  · Cognitive Exam:  Patient is oriented to Person, Place, Time and Situation  · RLE ROM: WFL  · RLE Strength: WFL  · LLE ROM: WFL  · LLE Strength: WFL    Functional Mobility:  · Unable to assess at this time      Therapeutic Activities and Exercises:   -LE AROM in all planes while supine  - Pt educated on:   -PT roles, expectations, and POC    -Safety with mobility   -Benefits of OOB activities to increase strength and functional mobility    -Performing ther ex for increasing LE ROM and strength   -Discharge recommendations    AM-PAC 6 CLICK MOBILITY  Total Score:19     Patient left supine with call button in reach , imaging tech, and family present.    GOALS:   Multidisciplinary Problems     Physical Therapy Goals        Problem: Physical Therapy Goal    Goal Priority Disciplines Outcome Goal Variances Interventions   Physical Therapy Goal     PT, PT/OT      Description:  Goals to be met by:  2019      Patient will increase functional independence with mobility by performin. Supine to sit with Stand-by Assistance  2. Sit to supine with Stand-by Assistance  3. Sit to stand transfer with Stand-by Assistance  4. Gait  x 50 feet with Stand-by Assistance using Rolling Walker.   5. Sitting at edge of bed x10 minutes with Supervision  6. Lower extremity exercise program x15 reps per handout, with independence                      History:     Past Medical History:   Diagnosis Date    Bilateral cataracts     Chronic kidney disease     CVA (cerebrovascular accident)     Hypercholesterolemia     Hyperlipidemia     Kidney stone     OP (osteoporosis)     Right knee DJD 2014    UTI (urinary tract infection)        Past Surgical History:   Procedure Laterality Date    CATARACT EXTRACTION W/  INTRAOCULAR LENS IMPLANT Left 14    CATARACT EXTRACTION W/  INTRAOCULAR LENS IMPLANT Right 14    CYSTOSCOPY      INSERTION-INTRAOCULAR LENS (IOL) Right 2014    [facility] at [facility] OR    INSERTION-INTRAOCULAR LENS (IOL) Left 2014    [facility] by [facility] at [facility] OR    PHACOEMULSIFICATION-ASPIRATION-CATARACT Right 2014    [facility] by [facility] at [facility] OR    PHACOEMULSIFICATION-ASPIRATION-CATARACT Left 2014    [facility]formed by [facility] at [facility] OR    screening N/A 2014    Performed by Mt Drake MD at [facility] ([address] [place] [address])       Clinical Decision Making:     History  Co-morbidities and personal factors that may impact the plan of care Examination  Body Structures and Functions, activity limitations and participation restrictions that may impact the plan of care Clinical Presentation   Decision Making/ Complexity Score   Co-morbidities:   [] Time since onset of injury / illness / exacerbation  [] Status of current condition  []Patient's cognitive status and safety concerns    [] Multiple Medical Problems (see med  hx)  Personal Factors:   [] Patient's age  [] Prior Level of function   [] Patient's home situation (environment and family support)  [] Patient's level of motivation  [] Expected progression of patient      HISTORY:(criteria)    [] 50426 - no personal factors/history    [] 51265 - has 1-2 personal factor/comorbidity     [] 04013 - has >3 personal factor/comorbidity     Body Regions:  [] Objective examination findings  [] Head     []  Neck  [] Trunk   [] Upper Extremity  [] Lower Extremity    Body Systems:  [] For communication ability, affect, cognition, language, and learning style: the assessment of the ability to make needs known, consciousness, orientation (person, place, and time), expected emotional /behavioral responses, and learning preferences (eg, learning barriers, education  needs)  [] For the neuromuscular system: a general assessment of gross coordinated movement (eg, balance, gait, locomotion, transfers, and transitions) and motor function  (motor control and motor learning)  [] For the musculoskeletal system: the assessment of gross symmetry, gross range of motion, gross strength, height, and weight  [] For the integumentary system: the assessment of pliability(texture), presence of scar formation, skin color, and skin integrity  [] For cardiovascular/pulmonary system: the assessment of heart rate, respiratory rate, blood pressure, and edema     Activity limitations:    [] Patient's cognitive status and saf ety concerns          [] Status of current condition      [] Weight bearing restriction  [] Cardiopulmunary Restriction    Participation Restrictions:   [] Goals and goal agreement with the patient     [] Rehab potential (prognosis) and probable outcome      Examination of Body System: (criteria)    [] 30793 - addressing 1-2 elements    [] 04910 - addressing a total of 3 or more elements     [] 43154 -  Addressing a total of 4 or more elements         Clinical Presentation: (criteria)  Choose  one     On examination of body system using standardized tests and measures patient presents with (CHOOSE ONE) elements from any of the following: body structures and functions, activity limitations, and/or participation restrictions.  Leading to a clinical presentation that is considered (CHOOSE ONE)                              Clinical Decision Making  (Eval Complexity):  Choose One     Time Tracking:     PT Received On: 02/04/19  PT Start Time: 0852     PT Stop Time: 0901  PT Total Time (min): 9 min     Billable Minutes: Evaluation 9      Chirag Orellana, PT  02/04/2019

## 2019-02-04 NOTE — CARE UPDATE
RN Proactive Rounding Note  Time of Visit: 1120    Admit Date: 2019  LOS: 2  Code Status: Full Code   Date of Visit: 2019  : 1935  Age: 83 y.o.  Sex: female  Race: White  Bed: 609/609 A:   MRN: [id number]  Was the patient discharged from an ICU this admission? no   Was the patient discharged from a PACU within last 24 hours?  no  Did the patient receive conscious sedation/general anesthesia in last 24 hours?  no  Was the patient in the ED within the past 24 hours?  no  Was the patient started on NIPPV within the past 24 hours?  no  Attending Physician: Markus Mcclain MD  Primary Service: [facility]      ASSESSMENT:     Abnormal Vital Signs: Increasing O2 requirements and hypotension  Clinical Issues: Respiratory     INTERVENTIONS/ RECOMMENDATIONS:     Discussed pt with bedside and charge RN prior to entering room. Pt noted to be AAOx4 per family on 3L NC, lung sounds coarse and diminished bilaterally. Vitals: HR 90 BP 99/56 Sats 95% T 98.4. Pt reports some SOB. 250 mL bolus given overnight for hypotension, durbin noted to have 75 mL dark sonali urine. [facility] has already accepted pt and plan to move pt to unit for closer monitoring.     Discussed plan of care with RN, Norma [id number].    PHYSICIAN ESCALATION:     Yes/No  no    Orders received and case discussed with N/A .    Disposition: Tx in ICU bed 6086.    FOLLOW-UP/CONTINGENCY:     Call back the Rapid Response Nurse at x [phone] for additional questions or concerns.

## 2019-02-04 NOTE — ASSESSMENT & PLAN NOTE
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  Prior to [facility] arrival she had her blood drawn and was found to have a Hgb of 6.6. At that time her PCP instructed her to go to the emergency department for a blood transfusion.  She is s/p 1unit Platelet transfusion - developed transfusion reaction during this - received IM epi, Benadryl/ 125 methylpred for concerns of throat swelling per Dr. Gonzalez.    Heme/oncology consulted (02/03/2019) their recs and plan: " Anemia, thrombocytopenia. Ddx includes primary BM disorder such as MDS, vs secondary BM suppression from infection (recent Chikungunya infection, known to cause thrombocytopenia). Reviewed medication list, no known culprits. Possible also slow GIB from adenoma.  -No evidence of iron deficiency, B12/folate nml  -No evidence of hemolysis  -No evidence of splenomegaly, no evidence of cirrhosis  -No evidence of coagulation     Will try to perform BM biopsy early this week while patient is here. If patient needs to be discharged, can be scheduled to be done as outpatient. Results will take about a 3-7 days to results.  -Hold NSAIDs  -Transfuse Hb<7 or plt<10 unless active bleeding  -Consider repeat colonoscopy given recommended to be done 5 years from adenoma visualized in 2014"        "

## 2019-02-04 NOTE — HOSPITAL COURSE
Presented to PCP on 2/2 with generalized body aches, nausea and vomiting of two week duration with severe bone pain. Patient with poor appetite. She has had a lot of headaches and the back of her neck hurts.  She has had a lot of chills.  No respiratory symptoms. Patient's last travel out of country to [facility] was Dec 2018. Admitted 2/2 due to anemia. 2/4 given 30cc/kg due to persistent fever, found to have UA - complaining of pleuritic chest pain - with CXR displaying left sided lower and upper airspace disease. 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 Stepped up to ICU 2/4 and stepped down 2/6. 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 Underwent and completed course of CAP therapy. 2/13 with repeat CXR showing decreased infiltrates, patient with tachypnea, increased WOB, with leukocytosis and left shift, hypotension. Added broad spectrum abx. Patient continued to deteriorate clinically, remained on broad spectrum antimicrobials with fluconazole, zosyn, acyclovir, vancomycin with multiple serologies for fungal organisms and molds. Patient has undergone extensive testing with laboratory and microbiological workup. All cultures outside of a culture from mouth (which grew oral fernando) have been negative. Respiratory panels have been unrevealing. Parvo b19 IgG positive, IgM negative, prealbumin 7 on admit, HIV 1/2 negative ag/ab, acute hepatitis negative, EBV IgG positive, Fungitell negative, HSV1/HSV2 negative, AML negative. Histo urine pending, blastomyces antigen pending, aspergillus negative, IgA levels WNL. Transferred back to ICU due to lethargy, tachypnea and continued fevers. Goals of care addressed with all children today and have transitioned patient to comfort care measures only with  discontinuation of antimicrobials. Home hospice arranged.

## 2019-02-05 PROBLEM — E83.42 HYPOMAGNESEMIA: Status: ACTIVE | Noted: 2019-02-05

## 2019-02-05 PROBLEM — E87.6 HYPOKALEMIA: Status: ACTIVE | Noted: 2019-02-05

## 2019-02-05 LAB
ALBUMIN SERPL BCP-MCNC: 1.7 G/DL
ALBUMIN SERPL BCP-MCNC: 1.7 G/DL
ALP SERPL-CCNC: 126 U/L
ALP SERPL-CCNC: 128 U/L
ALT SERPL W/O P-5'-P-CCNC: 25 U/L
ALT SERPL W/O P-5'-P-CCNC: 25 U/L
ANION GAP SERPL CALC-SCNC: 10 MMOL/L
ANION GAP SERPL CALC-SCNC: 9 MMOL/L
ANISOCYTOSIS BLD QL SMEAR: SLIGHT
AST SERPL-CCNC: 23 U/L
AST SERPL-CCNC: 24 U/L
BASOPHILS # BLD AUTO: 0.15 K/UL
BASOPHILS NFR BLD: 2.3 %
BILIRUB SERPL-MCNC: 1.8 MG/DL
BILIRUB SERPL-MCNC: 1.8 MG/DL
BUN SERPL-MCNC: 19 MG/DL
BUN SERPL-MCNC: 19 MG/DL
BURR CELLS BLD QL SMEAR: ABNORMAL
CALCIUM SERPL-MCNC: 7.5 MG/DL
CALCIUM SERPL-MCNC: 7.6 MG/DL
CHLORIDE SERPL-SCNC: 98 MMOL/L
CHLORIDE SERPL-SCNC: 98 MMOL/L
CO2 SERPL-SCNC: 18 MMOL/L
CO2 SERPL-SCNC: 18 MMOL/L
CREAT SERPL-MCNC: 1 MG/DL
CREAT SERPL-MCNC: 1 MG/DL
DIFFERENTIAL METHOD: ABNORMAL
EBV EA IGG SER-ACNC: <5 U/ML
EBV NA IGG SER-ACNC: 199 U/ML
EBV VCA IGG SER-ACNC: 25.8 U/ML
EBV VCA IGM SER-ACNC: <10 U/ML
EOSINOPHIL # BLD AUTO: 0.4 K/UL
EOSINOPHIL NFR BLD: 6.8 %
ERYTHROCYTE [DISTWIDTH] IN BLOOD BY AUTOMATED COUNT: 20.3 %
EST. GFR  (AFRICAN AMERICAN): >60 ML/MIN/1.73 M^2
EST. GFR  (AFRICAN AMERICAN): >60 ML/MIN/1.73 M^2
EST. GFR  (NON AFRICAN AMERICAN): 53 ML/MIN/1.73 M^2
EST. GFR  (NON AFRICAN AMERICAN): 53 ML/MIN/1.73 M^2
GLUCOSE SERPL-MCNC: 71 MG/DL
GLUCOSE SERPL-MCNC: 74 MG/DL
HCT VFR BLD AUTO: 28.3 %
HGB BLD-MCNC: 9.4 G/DL
HYPOCHROMIA BLD QL SMEAR: ABNORMAL
IMM GRANULOCYTES # BLD AUTO: 0.07 K/UL
IMM GRANULOCYTES NFR BLD AUTO: 1.1 %
LACTATE SERPL-SCNC: 1.6 MMOL/L
LYMPHOCYTES # BLD AUTO: 1.2 K/UL
LYMPHOCYTES NFR BLD: 18 %
MAGNESIUM SERPL-MCNC: 1 MG/DL
MAGNESIUM SERPL-MCNC: 1.1 MG/DL
MCH RBC QN AUTO: 27.4 PG
MCHC RBC AUTO-ENTMCNC: 33.2 G/DL
MCV RBC AUTO: 83 FL
MONOCYTES # BLD AUTO: 1.3 K/UL
MONOCYTES NFR BLD: 20.8 %
NEUTROPHILS # BLD AUTO: 3.3 K/UL
NEUTROPHILS NFR BLD: 51 %
NRBC BLD-RTO: 1 /100 WBC
OVALOCYTES BLD QL SMEAR: ABNORMAL
PHOSPHATE SERPL-MCNC: 3.5 MG/DL
PLATELET # BLD AUTO: 29 K/UL
PLATELET BLD QL SMEAR: ABNORMAL
PMV BLD AUTO: ABNORMAL FL
POIKILOCYTOSIS BLD QL SMEAR: SLIGHT
POLYCHROMASIA BLD QL SMEAR: ABNORMAL
POTASSIUM SERPL-SCNC: 3 MMOL/L
POTASSIUM SERPL-SCNC: 3.4 MMOL/L
PROT SERPL-MCNC: 4.9 G/DL
PROT SERPL-MCNC: 4.9 G/DL
RBC # BLD AUTO: 3.43 M/UL
SCHISTOCYTES BLD QL SMEAR: ABNORMAL
SCHISTOCYTES BLD QL SMEAR: PRESENT
SODIUM SERPL-SCNC: 125 MMOL/L
SODIUM SERPL-SCNC: 126 MMOL/L
WBC # BLD AUTO: 6.43 K/UL

## 2019-02-05 PROCEDURE — 83605 ASSAY OF LACTIC ACID: CPT

## 2019-02-05 PROCEDURE — 80053 COMPREHEN METABOLIC PANEL: CPT | Mod: 91

## 2019-02-05 PROCEDURE — 99232 SBSQ HOSP IP/OBS MODERATE 35: CPT | Mod: GC,,, | Performed by: INTERNAL MEDICINE

## 2019-02-05 PROCEDURE — 92610 EVALUATE SWALLOWING FUNCTION: CPT

## 2019-02-05 PROCEDURE — 84100 ASSAY OF PHOSPHORUS: CPT

## 2019-02-05 PROCEDURE — 80053 COMPREHEN METABOLIC PANEL: CPT

## 2019-02-05 PROCEDURE — 83735 ASSAY OF MAGNESIUM: CPT

## 2019-02-05 PROCEDURE — 99233 PR SUBSEQUENT HOSPITAL CARE,LEVL III: ICD-10-PCS | Mod: GC,,, | Performed by: INTERNAL MEDICINE

## 2019-02-05 PROCEDURE — 99232 PR SUBSEQUENT HOSPITAL CARE,LEVL II: ICD-10-PCS | Mod: GC,,, | Performed by: INTERNAL MEDICINE

## 2019-02-05 PROCEDURE — 25000003 PHARM REV CODE 250: Performed by: STUDENT IN AN ORGANIZED HEALTH CARE EDUCATION/TRAINING PROGRAM

## 2019-02-05 PROCEDURE — 25000003 PHARM REV CODE 250: Performed by: INTERNAL MEDICINE

## 2019-02-05 PROCEDURE — 63600175 PHARM REV CODE 636 W HCPCS: Performed by: HOSPITALIST

## 2019-02-05 PROCEDURE — 94761 N-INVAS EAR/PLS OXIMETRY MLT: CPT

## 2019-02-05 PROCEDURE — 63600175 PHARM REV CODE 636 W HCPCS: Performed by: STUDENT IN AN ORGANIZED HEALTH CARE EDUCATION/TRAINING PROGRAM

## 2019-02-05 PROCEDURE — 99233 SBSQ HOSP IP/OBS HIGH 50: CPT | Mod: GC,,, | Performed by: INTERNAL MEDICINE

## 2019-02-05 PROCEDURE — 83735 ASSAY OF MAGNESIUM: CPT | Mod: 91

## 2019-02-05 PROCEDURE — 20000000 HC ICU ROOM

## 2019-02-05 PROCEDURE — 94640 AIRWAY INHALATION TREATMENT: CPT

## 2019-02-05 PROCEDURE — 27000221 HC OXYGEN, UP TO 24 HOURS

## 2019-02-05 PROCEDURE — 36415 COLL VENOUS BLD VENIPUNCTURE: CPT

## 2019-02-05 PROCEDURE — 63600175 PHARM REV CODE 636 W HCPCS: Performed by: INTERNAL MEDICINE

## 2019-02-05 PROCEDURE — 85025 COMPLETE CBC W/AUTO DIFF WBC: CPT

## 2019-02-05 PROCEDURE — 25000242 PHARM REV CODE 250 ALT 637 W/ HCPCS: Performed by: STUDENT IN AN ORGANIZED HEALTH CARE EDUCATION/TRAINING PROGRAM

## 2019-02-05 RX ORDER — PANTOPRAZOLE SODIUM 40 MG/1
40 TABLET, DELAYED RELEASE ORAL DAILY
Status: DISCONTINUED | OUTPATIENT
Start: 2019-02-06 | End: 2019-02-18

## 2019-02-05 RX ORDER — CEFTRIAXONE 1 G/1
1 INJECTION, POWDER, FOR SOLUTION INTRAMUSCULAR; INTRAVENOUS
Status: DISCONTINUED | OUTPATIENT
Start: 2019-02-05 | End: 2019-02-12

## 2019-02-05 RX ORDER — MAGNESIUM SULFATE HEPTAHYDRATE 40 MG/ML
2 INJECTION, SOLUTION INTRAVENOUS ONCE
Status: COMPLETED | OUTPATIENT
Start: 2019-02-05 | End: 2019-02-05

## 2019-02-05 RX ORDER — RAMELTEON 8 MG/1
8 TABLET ORAL NIGHTLY PRN
Status: DISCONTINUED | OUTPATIENT
Start: 2019-02-05 | End: 2019-02-19 | Stop reason: HOSPADM

## 2019-02-05 RX ORDER — POTASSIUM CHLORIDE 20 MEQ/1
40 TABLET, EXTENDED RELEASE ORAL ONCE
Status: COMPLETED | OUTPATIENT
Start: 2019-02-05 | End: 2019-02-05

## 2019-02-05 RX ADMIN — ACETAMINOPHEN 650 MG: 325 TABLET, FILM COATED ORAL at 07:02

## 2019-02-05 RX ADMIN — PANTOPRAZOLE SODIUM 40 MG: 40 TABLET, DELAYED RELEASE ORAL at 06:02

## 2019-02-05 RX ADMIN — CEFTRIAXONE SODIUM 1 G: 1 INJECTION, POWDER, FOR SOLUTION INTRAMUSCULAR; INTRAVENOUS at 03:02

## 2019-02-05 RX ADMIN — PIPERACILLIN AND TAZOBACTAM 4.5 G: 4; .5 INJECTION, POWDER, LYOPHILIZED, FOR SOLUTION INTRAVENOUS; PARENTERAL at 06:02

## 2019-02-05 RX ADMIN — PIPERACILLIN AND TAZOBACTAM 4.5 G: 4; .5 INJECTION, POWDER, LYOPHILIZED, FOR SOLUTION INTRAVENOUS; PARENTERAL at 12:02

## 2019-02-05 RX ADMIN — RAMELTEON 8 MG: 8 TABLET, FILM COATED ORAL at 09:02

## 2019-02-05 RX ADMIN — IPRATROPIUM BROMIDE AND ALBUTEROL SULFATE 3 ML: .5; 3 SOLUTION RESPIRATORY (INHALATION) at 03:02

## 2019-02-05 RX ADMIN — SODIUM CHLORIDE 500 ML: 0.9 INJECTION, SOLUTION INTRAVENOUS at 12:02

## 2019-02-05 RX ADMIN — SIMVASTATIN 40 MG: 40 TABLET, FILM COATED ORAL at 09:02

## 2019-02-05 RX ADMIN — IPRATROPIUM BROMIDE AND ALBUTEROL SULFATE 3 ML: .5; 3 SOLUTION RESPIRATORY (INHALATION) at 11:02

## 2019-02-05 RX ADMIN — POTASSIUM CHLORIDE 40 MEQ: 1500 TABLET, EXTENDED RELEASE ORAL at 12:02

## 2019-02-05 RX ADMIN — AZITHROMYCIN MONOHYDRATE 500 MG: 500 INJECTION, POWDER, LYOPHILIZED, FOR SOLUTION INTRAVENOUS at 12:02

## 2019-02-05 RX ADMIN — IPRATROPIUM BROMIDE AND ALBUTEROL SULFATE 3 ML: .5; 3 SOLUTION RESPIRATORY (INHALATION) at 08:02

## 2019-02-05 RX ADMIN — MAGNESIUM SULFATE IN WATER 2 G: 40 INJECTION, SOLUTION INTRAVENOUS at 12:02

## 2019-02-05 RX ADMIN — ACETAMINOPHEN 650 MG: 325 TABLET, FILM COATED ORAL at 04:02

## 2019-02-05 RX ADMIN — IPRATROPIUM BROMIDE AND ALBUTEROL SULFATE 3 ML: .5; 3 SOLUTION RESPIRATORY (INHALATION) at 07:02

## 2019-02-05 NOTE — PLAN OF CARE
Problem: SLP Goal  Goal: SLP Goal  Speech Language Pathology Goals  Goals expected to be met by 2/11/2019  1. Pt will participate in ongoing swallow assessment   2. Educate Pt and family on S/S aspiration and aspiration precautions     Outcome: Ongoing (interventions implemented as appropriate)  Pt seen for bedside swallow study. Pt with minimal PO acceptance across multiple attempts. Pt presents with c/o odynophagia and minimal appetite. Overt S/S aspiration with presentations of thin at the bedside. REC: Further, objective assessment of swallow function via MBSS when feasible to determine safest, least restrictive means of nutrition/hydration/medication.  Occasional pleasure feeds of puree and nectar-thickened liquids deemed ok provided small bites/sips, no straws, strict aspiration precautions, only when awake/alert and upright at 90 degree angle. Please thicken all liquids to at least NECTAR-thickened consistency using 6 oz of any liquid to 1 packet of thickener. Note: No jello, ice cream or ice.  Continue to monitor for signs and symptoms of aspiration and discontinue oral feeding should you notice any of the following: watery eyes, reddened facial area, wet vocal quality, increased work of breathing, change in respiratory status, increased congestion, coughing, or fever.   Findings reviewed with Mrs. Peña, Daughters, Nurse and MD team.     FRANDY Murdock., [facility]-SLP  Speech-Language Pathology  Pager: [phone]  2/5/2019

## 2019-02-05 NOTE — ASSESSMENT & PLAN NOTE
82 yo female with fever and sepsis who is being admitted to the ICU.     - Possible sources of infection are urine and lung  - Urine culture growing E.Coli   - CXR with possible PNA  - Discontinue Vanc  - Deescalate Zosyn to Ceftriaxone   - Can continue planned 3 doses of Azithromycin as already started   - C.Diff negative  - Will continue to follow pending cultures

## 2019-02-05 NOTE — SUBJECTIVE & OBJECTIVE
Interval History: NAEON. Afebrile. Urine culture growing e.coli, Resp gram stain shows gram positive cocci and gram negative rods, culture growing normal resp fernando. Will discontinue Vancomycin and deescalate Zosyn to Ceftriaxone. Continue Azithromycin for planned 3 doses.    Review of Systems   Constitutional: Positive for activity change, appetite change, fatigue and fever. Negative for chills.   HENT: Negative for congestion, rhinorrhea and sore throat.    Respiratory: Positive for cough. Negative for shortness of breath, wheezing and stridor.    Cardiovascular: Negative for chest pain, palpitations and leg swelling.   Gastrointestinal: Positive for nausea. Negative for abdominal pain, blood in stool, constipation, diarrhea and vomiting.   Genitourinary: Negative for decreased urine volume, difficulty urinating, dysuria, flank pain, frequency and urgency.   Musculoskeletal: Negative for arthralgias and myalgias.   Neurological: Positive for weakness and light-headedness. Negative for syncope and headaches.   Psychiatric/Behavioral: Negative for agitation and confusion.     Objective:     Vital Signs (Most Recent):  Temp: 97.9 °F (36.6 °C) (02/05/19 0700)  Pulse: 90 (02/05/19 1300)  Resp: 16 (02/05/19 1300)  BP: (!) 111/56 (02/05/19 1300)  SpO2: 100 % (02/05/19 1300) Vital Signs (24h Range):  Temp:  [97.9 °F (36.6 °C)-98.7 °F (37.1 °C)] 97.9 °F (36.6 °C)  Pulse:  [85-96] 90  Resp:  [4-28] 16  SpO2:  [94 %-100 %] 100 %  BP: ()/(45-66) 111/56     Weight: 52.2 kg (115 lb)  Body mass index is 22.46 kg/m².    Estimated Creatinine Clearance: 31.7 mL/min (based on SCr of 1 mg/dL).    Physical Exam   Constitutional: She is oriented to person, place, and time. No distress.   HENT:   Head: Normocephalic.   conjunctival pallor   Eyes: Pupils are equal, round, and reactive to light.   Neck: Normal range of motion. JVD present.   Cardiovascular: Normal rate, regular rhythm and normal heart sounds.   No murmur  heard.  Pulmonary/Chest: Effort normal and breath sounds normal. No stridor. No respiratory distress. She has no wheezes.   Abdominal: Soft. Bowel sounds are normal. She exhibits no distension. There is no tenderness. There is no guarding.   Musculoskeletal: Normal range of motion. She exhibits no edema or deformity.   Neurological: She is alert and oriented to person, place, and time.   Skin: Skin is warm and dry. Capillary refill takes less than 2 seconds. She is not diaphoretic.   Psychiatric: She has a normal mood and affect.   Vitals reviewed.      Significant Labs: All pertinent labs within the past 24 hours have been reviewed.    Significant Imaging: I have reviewed all pertinent imaging results/findings within the past 24 hours.

## 2019-02-05 NOTE — ASSESSMENT & PLAN NOTE
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  Prior to [facility] arrival she had her blood drawn and was found to have a Hgb of 6.6. At that time her PCP instructed her to go to the emergency department for a blood transfusion.  She is s/p 1unit Platelet transfusion - developed transfusion reaction during this - received IM epi, Benadryl/ 125 methylpred for concerns of throat swelling per Dr. Gonzalez.    Heme/oncology consulted (02/03/2019) their recs and plan: " Anemia, thrombocytopenia. Ddx includes primary BM disorder such as MDS, vs secondary BM suppression from infection (recent Chikungunya infection, known to cause thrombocytopenia). Reviewed medication list, no known culprits. Possible also slow GIB from adenoma.  -No evidence of iron deficiency, B12/folate nml  -No evidence of hemolysis  -No evidence of splenomegaly, no evidence of cirrhosis  -No evidence of coagulation     Will try to perform BM biopsy early this week while patient is here. If patient needs to be discharged, can be scheduled to be done as outpatient. Results will take about a 3-7 days to results.  -Hold NSAIDs  -Transfuse Hb<7 or plt<10 unless active bleeding  -Consider repeat colonoscopy given recommended to be done 5 years from adenoma visualized in 2014"        "

## 2019-02-05 NOTE — NURSING
No acute events throughout day. See vital signs and assessments in flowsheets. See below for updates on today's progress.     Pulmonary: sats WNL 3L NC    Cardiovascular: SR; BP marginal --> 1L NS bolus --> pressures now stable; afebrile     Neurological: intact     Gastrointestinal: small smear BM, reg diet     Genitourinary: durbin w/  cc u/o     Endocrine: WNL    Integumentary/Other: WNL    Infusions: KVO    Patient progressing towards goals as tolerated, plan of care communicated and reviewed with Vicki Peña and family. All concerns addressed. Will continue to monitor.

## 2019-02-05 NOTE — PT/OT/SLP EVAL
Speech Language Pathology Evaluation  Bedside Swallow    Patient Name:  Vicki Peña   MRN:  [id number]  Admitting Diagnosis: Symptomatic anemia The primary encounter diagnosis was Severe anemia. Diagnoses of Anemia, Symptomatic anemia, History of CVA (cerebrovascular accident), Sepsis due to urinary tract infection, Thrombocytopenia, Weakness, Pancytopenia, and SOB (shortness of breath) were also pertinent to this visit.    Recommendations:                 General Recommendations:  Modified barium swallow study  Diet recommendations:  Medications crushed in puree or via alterative means, Occasional Pleasure Feeding, Nectar Thick Liquids or Puree ok for comfort provided strict aspiration precautions as follow  Aspiration Precautions:  Strict, frequent oral care, ,occasional sips of NECTAR-thickened liquids via tsp or tsp bites of Puree for ok comfort, provided strict 1:1 assistance with PO, Pt is awake/alert, upright at 90 degree angle, single bites/sips, no straws, and Pt remains upright 30 minutes post meal and Strict aspiration precautions.  Continue to monitor for signs and symptoms of aspiration and discontinue oral feeding should you notice any of the following: watery eyes, reddened facial area, wet vocal quality, increased work of breathing, change in respiratory status, increased congestion, coughing, fever, etc.  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    INSERTION-INTRAOCULAR LENS (IOL) Left 7/21/2014    [facility] at [facility] OR    PHACOEMULSIFICATION-ASPIRATION-CATARACT Right 8/11/2014    Per[facility] at [facility] OR    PHACOEMULSIFICATION-ASPIRATION-CATARACT Left 7/21/2014    Per[facility] by [facility] at [facility] OR    screening N/A 9/5/2014    Performed by Mt Drake MD at [facility] ENDO (4TH FLR)       Social History: Patient lives with Daughters in [place], [place] part time and in [place] part time.     Prior Intubation HX:  None this visit    Modified Barium Swallow: none on file    Chest X-Rays: 2/5/2019:   Worsening severe pulmonary edema.  There is a possible left lung mass.    Prior diet: regular, thin. Daughter reported minimal appetite ~ 2-3 weeks and Pt preferring smooth textures (I.e yogurts, soups) of recent.      Subjective     SLP reviewed Pt with nurse, nurse reported PT ok for therapy, no difficulty with medications, some multiple swallows with thins  Pt presents lethargic    She grimacing and explains, "it hurts still a little" as she swallows PO trials   Daughter explains, "She hasn't touched her [regular] trays last night or this morning, she just ate a few bites of this apple pie."  Other Daughter explained, "She had to swallow a few times to get her pills down"   Pain/Comfort:  · Pain Rating 1: 0/10    Objective:   Pt found awake in bed with nasal canula upon first attempt. SLP offered use of  during session, Pt noted to speak in both Hebrew and English across assessment. Pt politely declined ; however, Daughter was noted to occasionally interpret t/o assessment.  Pt politely declined PO trials 2/2 lethargy upon first attempt. Upon second attempt, Pt found with venti mask and declined PO trials. Upon third attempt, RT in room to switch Pt to " "nasal canula. HOB elevated.     Oral Musculature Evaluation  · Oral Musculature: other (see comments)  · Structural Abnormalities: Pt with c/o of "tightness" following reported reaction to platelets received 2/2/2019   · Dentition: upper and lower dentures  · Secretion Management: adequate  · Mucosal Quality: adequate  · Oral Labial Strength and Mobility: WNL  · Lingual Strength and Mobility: WNL  · Volitional Cough: present   · Volitional Swallow: elicited  · Voice Prior to PO Intake: clear, occasionally breathy     Bedside Swallow Eval:   Consistencies Assessed:  · Thin liquids ice chip, cup edge sipsx2  · Nectar thick liquids tsp sip x1, cup edge sips x3  · Puree tsp bites x4     Oral Phase:   · WNL    Pharyngeal Phase:   · delayed swallow initation  · multiple spontaneous swallows  · throat clearing    Compensatory Strategies  · Effortful swallow    Treatment: Pt with c/o odynophagia and grimacing with cup edge sips thin liquids. Pt with delayed throat clears following trials of thin liquids. Pt reported "tight" feeling and globus sensation when she tried to swallow pills earlier service day.  Pt noted to use multiple swallows with PO trials this service day.  Pt with minimal PO acceptance across SLP attempts 2/2 lethargy and decreased appetite. Pt with worsening CXR concerning for PNA. Pt and family educated on swallow anatomy, SLP role, aspiration precautions, and recommendations for further, instrumental assessment via Modified Barium Swallow Study when feasible, and occasional pleasure feeds of nectar-thickened liquids or puree for comfort when feasible. Pt and Daughter verbalized understanding of MBSS procedure and pleasure feeds.  Thickener packets, measuring cups and spoons left at bedside. Whiteboard updated.      Assessment:     Vicki Peña is a 83 y.o. female with an SLP diagnosis of risk of aspiration.  She presents with odynophagia and minimal appetite.  When feasible, she would benefit from " further instrumental assessment via MBSS To determine safest, least restrictive means of nutrition/hydration.  Please order if in agreement.     Goals:   Multidisciplinary Problems     SLP Goals        Problem: SLP Goal    Goal Priority Disciplines Outcome   SLP Goal     SLP Ongoing (interventions implemented as appropriate)   Description:  Speech Language Pathology Goals  Goals expected to be met by 2/11/2019  1. Pt will participate in ongoing swallow assessment   2. Educate Pt and family on S/S aspiration and aspiration precautions                       Plan:     · Patient to be seen:  4 x/week   · Plan of Care expires:  03/07/19  · Plan of Care reviewed with:  patient, daughter   · SLP Follow-Up:  Yes       Discharge recommendations:  (pending progress and PT/OT recs)     Time Tracking:     SLP Treatment Date:   02/05/19  Speech Start Time:  1048 14:03  Speech Stop Time:  1058   14:23  Speech Total Time (min):  10 min /20 min     Billable Minutes: Eval Swallow and Oral Function: 30 min     FRANDY Murdock., CCC-SLP  Speech-Language Pathology  Pager: [phone]      02/05/2019

## 2019-02-05 NOTE — ASSESSMENT & PLAN NOTE
"- CXR concerning for PNA  - Per family, pt had URI, did not finish abx course due to diarrhea and "feeling better"  - See Sepsis 2/2 UTI  - Current Abx regimen covers most resp pathogens that this patient is at risk for  - Continue planned 3 doses of azithromycin  "

## 2019-02-05 NOTE — PROGRESS NOTES
[facility]  Critical Care Medicine  Progress Note    Patient Name: Vicki Peña  MRN: [id number]  Admission Date: 2/2/2019  Hospital Length of Stay: 3 days  Code Status: Full Code  Attending Provider: Quoc Moody*  Primary Care Provider: Yaron Waite MD   Principal Problem: Symptomatic anemia    Subjective:     HPI:   Vicki Peña is an 83 year old female with medical issues of CVA, osteoporosis, and HLD who presents to the ED (02/02/2019) with complaints of generalized weakness and fatigue for the past 1-2 weeks. Prior to [facility] arrival she had her blood drawn and was found to have a Hgb of 6.6. At that time her PCP instructed her to go to the emergency department for a blood transfusion. Associated symptoms include nausea with 2 prior episodes of vomiting over the past week. She also endorses body aches/bone pains as well as poor appetite and unspecified weight loss. She is also endorsing some headaches with regular use of NSAIDs for relief.    Patient has had poor oral intake for past 3 weeks per daughter. In addition to some nausea and vomiting. She had Chikungunya infection in December. . Approximately 1 month ago she was treated for URI with antibiotics and symptoms are improving. Pt denies any respiratory symptoms other than a residual cough which is reportedly improving  She lives in [place] half the year. Has been using NSAIDs for arthritis. ROS is negative for SOB, PENG, CP, syncope, fever, bloody or black tarry stool, abdominal pain, or dysuria. Denies melena, hematochezia, easy bleeding/bruising, night sweats, fever/chills. Patient denies having a BM biopsy previously. She had a colonoscopy in 2014 with adenoma found, recommend repeat colonoscopy in 5 years.      Of note, She is very functional at baseline, and reportedly takes care of all her ADL's (cooks, cleans, and even drives), although over the past few weeks she has spent most of her time lying in bed.      Hospital/ICU  Course:  She is s/p 1unit Platelet transfusion - developed transfusion reaction during this - received IM epi, Benadryl/ 125 methylpred for concerns of throat swelling per Dr. Gonzalez    02/03/2019: Patient febrile this AM and UA c/w with UTI start Ceftriaxone,   At afternoon repeat fever to >102, blood cultures obtained and on abx for UTI, started on sepsis bolus 30cc/kg and monitoring of lactic acid levels. Antibiotics were changed to Piperacillin/Tazobactam and Vancomycin.     02/04/2019: Patient have complaints of left sided lateral chest wall pleuritic pain. Throughout the night patient remained febrile, O2 requirement increasing this Am after 4am patient given 2 500ml saline boluses. This am patient is tachypneic, accessory muscle use, febrile, MAP >65, but pressures 90s/50s. CXR with significant new left sided lower& upper airspace disease, absent breath sounds in left mid to lower fields, dullness to percussion, right basilar absent breath sounds. On Exam patient also with distended JVP to angle of jaw. Critical Care consulted & have accepted patient    02/05/2019: During night patient received 1 L NS bolus for marginal BP. Patient reports her symptoms have improved today. BP now is stable. Afebrile last spike of fever: 39.1 (02/03/3019). Np leucocytosis. CXR today: Worsening severe pulmonary edema.  There is a possible left lung mass.Will proceed with CT chest without contrast.    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    UTI (urinary tract infection)        Past Surgical History:   Procedure Laterality Date    CATARACT EXTRACTION W/  INTRAOCULAR LENS IMPLANT Left 7/21/14    CATARACT EXTRACTION W/  INTRAOCULAR LENS IMPLANT Right 8/11/14    CYSTOSCOPY      INSERTION-INTRAOCULAR LENS (IOL) Right  8/11/2014    [facility]    INSERTION-INTRAOCULAR LENS (IOL) Left 7/21/2014    [facility]    PHACOEMULSIFICATION-ASPIRATION-CATARACT Right 8/11/2014    [facility]    PHACOEMULSIFICATION-ASPIRATION-CATARACT Left 7/21/2014    [facility] at [facility] OR    screening N/A 9/5/2014    Performed by Mt Drake MD at [facility] (4TH FLR)       Review of patient's allergies indicates:   Allergen Reactions    Tramadol Rash       Medications:  Medications Prior to Admission   Medication Sig    albuterol 90 mcg/actuation inhaler Inhale 1-2 puffs into the lungs every 6 (six) hours as needed for Wheezing.    ascorbic acid (VITAMIN C) 500 MG tablet Take 1 tablet (500 mg total) by mouth 2 (two) times daily.    cranberry 400 mg Cap Take 1 capsule by mouth once daily.     GLUCOSAMINE SULFATE (GLUCOSAMINE ORAL) Take 2 tablets by mouth once daily.    ibuprofen (ADVIL,MOTRIN) 600 MG tablet Take 1 tablet (600 mg total) by mouth every 6 (six) hours as needed for Pain.    multivitamin capsule Take 1 capsule by mouth once daily.      naproxen (NAPROSYN) 500 MG tablet Take 1 tablet (500 mg total) by mouth 2 (two) times daily. (Patient taking differently: Take 500 mg by mouth 2 (two) times daily as needed. )    simvastatin (ZOCOR) 40 MG tablet Take 1 tablet (40 mg total) by mouth every evening.    ondansetron (ZOFRAN-ODT) 8 MG TbDL Take 1 tablet (8 mg total) by mouth every 12 (twelve) hours as needed.    ranitidine (ZANTAC) 150 MG tablet Take 1 tablet (150 mg total) by mouth 2 (two) times daily. PRN heartburn     Antibiotics (From admission, onward)    Start     Stop Route Frequency Ordered    02/04/19 2100  vancomycin 750 mg in dextrose 5 % 250 mL IVPB (ready to mix system)  (Vancomycin IVPB with levels panel)      -- IV Every 24 hours (non-standard times) 02/03/19 2110    02/04/19 1145  azithromycin 500 mg in dextrose  5 % 250 mL IVPB (ready to mix system)      02/07 1144 IV Every 24 hours (non-standard times) 02/04/19 1043    02/03/19 2245  piperacillin-tazobactam 4.5 g in sodium chloride 0.9% 100 mL IVPB (ready to mix system)      -- IV Every 8 hours (non-standard times) 02/03/19 2109        Antifungals (From admission, onward)    None        Antivirals (From admission, onward)    None           Immunization History   Administered Date(s) Administered    Influenza - High Dose 11/17/2015    Pneumococcal Conjugate - 13 Valent 11/17/2015       Family History     Problem Relation (Age of Onset)    Cancer Brother, Brother, Sister        Social History     Socioeconomic History    Marital status:      Spouse name: None    Number of children: None    Years of education: None    Highest education level: None   Social Needs    Financial resource strain: None    Food insecurity - worry: None    Food insecurity - inability: None    Transportation needs - medical: None    Transportation needs - non-medical: None   Occupational History    None   Tobacco Use    Smoking status: Never Smoker    Smokeless tobacco: Never Used   Substance and Sexual Activity    Alcohol use: No     Alcohol/week: 0.0 oz     Comment: moderate    Drug use: No    Sexual activity: Yes     Partners: Male   Other Topics Concern    None   Social History Narrative    None     Review of Systems   Constitutional: Positive for activity change, appetite change, fatigue and fever. Negative for chills.   HENT: Negative for congestion, rhinorrhea and sore throat.    Respiratory: Positive for cough. Negative for shortness of breath, wheezing and stridor.    Cardiovascular: Negative for chest pain, palpitations and leg swelling.   Gastrointestinal: Positive for nausea. Negative for abdominal pain, blood in stool, constipation, diarrhea and vomiting.   Genitourinary: Negative for decreased urine volume, difficulty urinating, dysuria, flank pain, frequency  and urgency.   Musculoskeletal: Negative for arthralgias and myalgias.   Neurological: Positive for weakness and light-headedness. Negative for syncope and headaches.   Psychiatric/Behavioral: Negative for agitation and confusion.     Objective:     Vital Signs (Most Recent):  Temp: 97.9 °F (36.6 °C) (02/05/19 0700)  Pulse: 89 (02/05/19 1101)  Resp: (!) 25 (02/05/19 1101)  BP: (!) 101/50 (02/05/19 0737)  SpO2: 96 % (02/05/19 1101) Vital Signs (24h Range):  Temp:  [97.9 °F (36.6 °C)-98.7 °F (37.1 °C)] 97.9 °F (36.6 °C)  Pulse:  [85-96] 89  Resp:  [14-25] 25  SpO2:  [94 %-100 %] 96 %  BP: ()/(45-66) 101/50     Weight: 52.2 kg (115 lb)  Body mass index is 22.46 kg/m².    Estimated Creatinine Clearance: 31.7 mL/min (based on SCr of 1 mg/dL).    Physical Exam   Constitutional: She is oriented to person, place, and time. No distress.   HENT:   Head: Normocephalic.   conjunctival pallor   Eyes: Pupils are equal, round, and reactive to light.   Neck: Normal range of motion. JVD present.   Cardiovascular: Normal rate, regular rhythm and normal heart sounds.   No murmur heard.  Pulmonary/Chest: Effort normal and breath sounds normal. No stridor. No respiratory distress. She has no wheezes.   Abdominal: Soft. Bowel sounds are normal. She exhibits no distension. There is no tenderness. There is no guarding.   Musculoskeletal: Normal range of motion. She exhibits no edema or deformity.   Neurological: She is alert and oriented to person, place, and time.   Skin: Skin is warm and dry. Capillary refill takes less than 2 seconds. She is not diaphoretic.   Psychiatric: She has a normal mood and affect.   Vitals reviewed.      Significant Labs: All pertinent labs within the past 24 hours have been reviewed.    Significant Imaging: I have reviewed all pertinent imaging results/findings within the past 24 hours.      [facility]  Recent Labs   Lab 02/04/19  1059   PH 7.316*   PO2 83   PCO2 28.1*   HCO3 14.3*   BE -12     Assessment/Plan:      Neuro   History of CVA (cerebrovascular accident)    Patient states that she had a stroke 20 years back. No residual neurological manifestion.  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 No shunting of agitated saline baseline and with valsalva the RAP were not increased as evidence by the intra-atrial septum bulging to the right. With valsalva there are a few fine bubbles seen 4-5 beat after opacification of the RA.    - 02/05/2019: We contacted vascular neurology Dr. Horn on #[id number] to ask about the role of anti-platelets vs anticoagulation because the patient has PFO with history of stroke. Patient denies having Afib. EKG: Normal sinus rhythm. He recommends to start ASA 81 mg PO OD         Pulmonary   Pneumonia involving left lung    On 02/03/2019: Patient  febrile this AM and UA c/w with UTI start Ceftriaxone,  At afternoon repeat fever to >102, blood cultures obtained and on abx for UTI, started on sepsis bolus 30cc/kg and monitoring of lactic acid levels. Antibiotics were changed to Piperacillin/Tazobactam and Vancomycin.     02/04/2019: Patient have complaints of left sided lateral chest wall pleuritic pain. Throughout the night patient remained febrile, O2 requirement increasing this Am after 4am patient given 2 500ml saline boluses. This am patient is tachypneic, accessory muscle use, febrile, MAP >65, but pressures 90s/50s. CXR with significant new left sided lower& upper airspace disease, absent breath sounds in left mid to lower fields, dullness to percussion, right basilar absent breath sounds. On Exam patient also with distended JVP to angle of jaw. Critical Care consulted & have accepted patient. Azithromycin started    02/05/2019: During night patient received 1 L NS bolus for marginal BP. Patient reports her symptoms have improved today. BP now is stable. Afebrile last spike of fever: 39.1 (02/03/3019). Np leucocytosis. CXR today: Worsening severe pulmonary  "edema.  There is a possible left lung mass.Will proceed with CT chest without contrast.      - Strict Is/Os  -will do CT chest without contrast today         Cardiac/Vascular   Hyperlipidemia    - Resume home medication: Statin.  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  Prior to [facility] arrival she had her blood drawn and was found to have a Hgb of 6.6. At that time her PCP instructed her to go to the emergency department for a blood transfusion.  She is s/p 1unit Platelet transfusion - developed transfusion reaction during this - received IM epi, Benadryl/ 125 methylpred for concerns of throat swelling per Dr. Gonzalez.    Heme/oncology consulted (02/03/2019) their recs and plan: " Anemia, thrombocytopenia. Ddx includes primary BM disorder such as MDS, vs secondary BM suppression from infection (recent Chikungunya infection, known to cause thrombocytopenia). Reviewed medication list, no known culprits. Possible also slow GIB from adenoma.  -No evidence of iron deficiency, B12/folate nml  -No evidence of hemolysis  -No evidence of splenomegaly, no evidence of cirrhosis  -No evidence of coagulation     Will try to perform BM biopsy early this week while patient is here. If patient needs to be discharged, can be scheduled to be done as outpatient. Results will take about a 3-7 days to results.  -Hold NSAIDs  -Transfuse Hb<7 or plt<10 unless active bleeding  -Consider repeat colonoscopy given recommended to be done 5 years from adenoma visualized in 2014"              Critical Care Daily " Checklist:    A: Awake: RASS Goal/Actual Goal: RASS Goal: 0-->alert and calm  Actual: Ambrose Agitation Sedation Scale (RASS): Alert and calm   B: Spontaneous Breathing Trial Performed?     C: SAT & SBT Coordinated?  On nasal cannula                      D: Delirium: CAM-ICU Overall CAM-ICU: Negative   E: Early Mobility Performed? No   F: Feeding Goal: Goals: 1. Meet >75% EEN/EPN 2. Prevent >2% dry weight loss over one week  Status: Nutrition Goal Status: new   Current Diet Order   Procedures    Diet Adult Regular (IDDSI Level 7)      AS: Analgesia/Sedation PRN   T: Thromboembolic Prophylaxis SCD   H: HOB > 300 Yes   U: Stress Ulcer Prophylaxis (if needed) Pantoprazole   G: Glucose Control Monitor   B: Bowel Function Stool Occurrence: 1   I: Indwelling Catheter (Lines & Ojeda) Necessity Ojeda catheter   D: De-escalation of Antimicrobials/Pharmacotherapies On Piperacillin/Tazo, Vancomycin and Azithromycin    Plan for the day/ETD CT chest without contrast. Follow up with ID. Await for cultures results.    Code Status:  Family/Goals of Care: Full Code         Critical secondary to Patient has a condition that poses threat to life and bodily function: Severe Respiratory Distress      Critical care was time spent personally by me on the following activities: development of treatment plan with patient or surrogate and bedside caregivers, discussions with consultants, evaluation of patient's response to treatment, examination of patient, ordering and performing treatments and interventions, ordering and review of laboratory studies, ordering and review of radiographic studies, pulse oximetry, re-evaluation of patient's condition. This critical care time did not overlap with that of any other provider or involve time for any procedures.     Halie Padron MD  Critical Care Medicine  [facility]

## 2019-02-05 NOTE — SUBJECTIVE & OBJECTIVE
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    UTI (urinary tract infection)        Past Surgical History:   Procedure Laterality Date    CATARACT EXTRACTION W/  INTRAOCULAR LENS IMPLANT Left 7/21/14    CATARACT EXTRACTION W/  INTRAOCULAR LENS IMPLANT Right 8/11/14    CYSTOSCOPY      INSERTION-INTRAOCULAR LENS (IOL) Right 8/11/2014    [facility]formed by [facility] OR    INSERTION-INTRAOCULAR LENS (IOL) Left 7/21/2014    [facility] at [facility] OR    PHACOEMULSIFICATION-ASPIRATION-CATARACT Right 8/11/2014    [facility] at [facility] OR    PHACOEMULSIFICATION-ASPIRATION-CATARACT Left 7/21/2014    [facility]formed by [facility] at [facility] OR    screening N/A 9/5/2014    Performed by Mt Drake MD at [facility] (4TH FLR)       Review of patient's allergies indicates:   Allergen Reactions    Tramadol Rash       Medications:  Medications Prior to Admission   Medication Sig    albuterol 90 mcg/actuation inhaler Inhale 1-2 puffs into the lungs every 6 (six) hours as needed for Wheezing.    ascorbic acid (VITAMIN C) 500 MG tablet Take 1 tablet (500 mg total) by mouth 2 (two) times daily.    cranberry 400 mg Cap Take 1 capsule by mouth once daily.     GLUCOSAMINE SULFATE (GLUCOSAMINE ORAL) Take 2 tablets by mouth once daily.    ibuprofen (ADVIL,MOTRIN) 600 MG tablet Take 1 tablet (600 mg total) by mouth every 6 (six) hours as needed for Pain.    multivitamin capsule Take 1 capsule by mouth once daily.      naproxen (NAPROSYN) 500 MG tablet Take 1 tablet (500 mg total) by mouth 2 (two) times daily. (Patient taking differently: Take 500 mg by mouth 2 (two) times daily as needed. )    simvastatin (ZOCOR) 40 MG tablet Take 1 tablet (40 mg total) by mouth every evening.     ondansetron (ZOFRAN-ODT) 8 MG TbDL Take 1 tablet (8 mg total) by mouth every 12 (twelve) hours as needed.    ranitidine (ZANTAC) 150 MG tablet Take 1 tablet (150 mg total) by mouth 2 (two) times daily. PRN heartburn     Antibiotics (From admission, onward)    Start     Stop Route Frequency Ordered    02/04/19 2100  vancomycin 750 mg in dextrose 5 % 250 mL IVPB (ready to mix system)  (Vancomycin IVPB with levels panel)      -- IV Every 24 hours (non-standard times) 02/03/19 2110    02/04/19 1145  azithromycin 500 mg in dextrose 5 % 250 mL IVPB (ready to mix system)      02/07 1144 IV Every 24 hours (non-standard times) 02/04/19 1043    02/03/19 2245  piperacillin-tazobactam 4.5 g in sodium chloride 0.9% 100 mL IVPB (ready to mix system)      -- IV Every 8 hours (non-standard times) 02/03/19 2109        Antifungals (From admission, onward)    None        Antivirals (From admission, onward)    None           Immunization History   Administered Date(s) Administered    Influenza - High Dose 11/17/2015    Pneumococcal Conjugate - 13 Valent 11/17/2015       Family History     Problem Relation (Age of Onset)    Cancer Brother, Brother, Sister        Social History     Socioeconomic History    Marital status:      Spouse name: None    Number of children: None    Years of education: None    Highest education level: None   Social Needs    Financial resource strain: None    Food insecurity - worry: None    Food insecurity - inability: None    Transportation needs - medical: None    Transportation needs - non-medical: None   Occupational History    None   Tobacco Use    Smoking status: Never Smoker    Smokeless tobacco: Never Used   Substance and Sexual Activity    Alcohol use: No     Alcohol/week: 0.0 oz     Comment: moderate    Drug use: No    Sexual activity: Yes     Partners: Male   Other Topics Concern    None   Social History Narrative    None     Review of Systems   Constitutional: Positive for  activity change, appetite change, fatigue and fever. Negative for chills.   HENT: Negative for congestion, rhinorrhea and sore throat.    Respiratory: Positive for cough. Negative for shortness of breath, wheezing and stridor.    Cardiovascular: Negative for chest pain, palpitations and leg swelling.   Gastrointestinal: Positive for nausea. Negative for abdominal pain, blood in stool, constipation, diarrhea and vomiting.   Genitourinary: Negative for decreased urine volume, difficulty urinating, dysuria, flank pain, frequency and urgency.   Musculoskeletal: Negative for arthralgias and myalgias.   Neurological: Positive for weakness and light-headedness. Negative for syncope and headaches.   Psychiatric/Behavioral: Negative for agitation and confusion.     Objective:     Vital Signs (Most Recent):  Temp: 97.9 °F (36.6 °C) (02/05/19 0700)  Pulse: 89 (02/05/19 1101)  Resp: (!) 25 (02/05/19 1101)  BP: (!) 101/50 (02/05/19 0737)  SpO2: 96 % (02/05/19 1101) Vital Signs (24h Range):  Temp:  [97.9 °F (36.6 °C)-98.7 °F (37.1 °C)] 97.9 °F (36.6 °C)  Pulse:  [85-96] 89  Resp:  [14-25] 25  SpO2:  [94 %-100 %] 96 %  BP: ()/(45-66) 101/50     Weight: 52.2 kg (115 lb)  Body mass index is 22.46 kg/m².    Estimated Creatinine Clearance: 31.7 mL/min (based on SCr of 1 mg/dL).    Physical Exam   Constitutional: She is oriented to person, place, and time. No distress.   HENT:   Head: Normocephalic.   conjunctival pallor   Eyes: Pupils are equal, round, and reactive to light.   Neck: Normal range of motion. JVD present.   Cardiovascular: Normal rate, regular rhythm and normal heart sounds.   No murmur heard.  Pulmonary/Chest: Effort normal and breath sounds normal. No stridor. No respiratory distress. She has no wheezes.   Abdominal: Soft. Bowel sounds are normal. She exhibits no distension. There is no tenderness. There is no guarding.   Musculoskeletal: Normal range of motion. She exhibits no edema or deformity.   Neurological:  She is alert and oriented to person, place, and time.   Skin: Skin is warm and dry. Capillary refill takes less than 2 seconds. She is not diaphoretic.   Psychiatric: She has a normal mood and affect.   Vitals reviewed.      Significant Labs: All pertinent labs within the past 24 hours have been reviewed.    Significant Imaging: I have reviewed all pertinent imaging results/findings within the past 24 hours.

## 2019-02-05 NOTE — PT/OT/SLP DISCHARGE
Occupational Therapy Discharge Summary    Vicki Peña  MRN: [id number]   Principal Problem: Symptomatic anemia      Patient Discharged from acute Occupational Therapy on 2/5/2019  .  Please refer to prior OT note dated 2/3/19 for functional status.    Assessment:      Patient appropriate for care in another setting.    Objective:     GOALS:   Multidisciplinary Problems     Occupational Therapy Goals        Problem: Occupational Therapy Goal    Goal Priority Disciplines Outcome Interventions   Occupational Therapy Goal     OT, PT/OT Ongoing (interventions implemented as appropriate)    Description:  Goals to be met by: 2/15    Patient will increase functional independence with ADLs by performing:    UE Dressing with [place].  LE Dressing with [place].  Grooming while standing with Stand-by Assistance.  Toileting from toilet with Stand-by Assistance for hygiene and clothing management.   Bathing from  shower chair/bench with Contact Guard Assistance.                      Reasons for Discontinuation of Therapy Services  Transfer to alternate level of care.      Plan:     Patient Discharged to: Pt t/f to ICU 2/4/19 with hypotension and SOB. OT to await new orders prior to continued therapy.     JOAQUIN Pedro  2/5/2019

## 2019-02-05 NOTE — ASSESSMENT & PLAN NOTE
On 02/03/2019: Patient  febrile this AM and UA c/w with UTI start Ceftriaxone,  At afternoon repeat fever to >102, blood cultures obtained and on abx for UTI, started on sepsis bolus 30cc/kg and monitoring of lactic acid levels. Antibiotics were changed to Piperacillin/Tazobactam and Vancomycin.     02/04/2019: Patient have complaints of left sided lateral chest wall pleuritic pain. Throughout the night patient remained febrile, O2 requirement increasing this Am after 4am patient given 2 500ml saline boluses. This am patient is tachypneic, accessory muscle use, febrile, MAP >65, but pressures 90s/50s. CXR with significant new left sided lower& upper airspace disease, absent breath sounds in left mid to lower fields, dullness to percussion, right basilar absent breath sounds. On Exam patient also with distended JVP to angle of jaw. Critical Care consulted & have accepted patient. Azithromycin started    02/05/2019: During night patient received 1 L NS bolus for marginal BP. Patient reports her symptoms have improved today. BP now is stable. Afebrile last spike of fever: 39.1 (02/03/3019). Np leucocytosis. CXR today: Worsening severe pulmonary edema.  There is a possible left lung mass.Will proceed with CT chest without contrast.      - Strict Is/Os  -will do CT chest without contrast today

## 2019-02-05 NOTE — PROGRESS NOTES
"[facility]  Infectious Disease  Progress Note    Patient Name: Vicki Peña  MRN: [id number]  Admission Date: 2/2/2019  Length of Stay: 3 days  Attending Physician: Quoc Moody*  Primary Care Provider: Yaron Waite MD    Isolation Status: No active isolations  Assessment/Plan:      Sepsis due to urinary tract infection    82 yo female with fever and sepsis who is being admitted to the ICU.     - Possible sources of infection are urine and lung  - Urine culture growing E.Coli   - CXR with possible PNA  - Discontinue Vanc  - Deescalate Zosyn to Ceftriaxone   - Can continue planned 3 doses of Azithromycin as already started   - C.Diff negative  - Will continue to follow pending cultures      Pneumonia involving left lung    - CXR concerning for PNA  - Per family, pt had URI, did not finish abx course due to diarrhea and "feeling better"  - See Sepsis 2/2 UTI  - Current Abx regimen covers most resp pathogens that this patient is at risk for  - Continue planned 3 doses of azithromycin       Thank you for your consult. I will follow-up with patient. Please contact us if you have any additional questions.    Guy Sanders MD  Infectious Disease  [facility]    Subjective:     Principal Problem:Symptomatic anemia    HPI: 83 year old female with PMH of CVA, osteoporosis, and HLD who presented to the ED (02/02/2019) with complaints of generalized weakness and fatigue for the past 1-2 weeks. Prior to C arrival she had her blood drawn and was found to have a Hgb of 6.6. At that time her PCP instructed her to go to the emergency department for a blood transfusion. Associated symptoms include nausea with 2 prior episodes of vomiting over the past week. She also endorses body aches/bone pains as well as poor appetite and unspecified weight loss. She is also endorsing some headaches with regular use of NSAIDs for relief. Patient has had poor oral intake for past 3 weeks per " daughter. In addition to some nausea and vomiting. She had Chikungunya infection in December. . Approximately 1 month ago she was treated for URI with antibiotics and symptoms are improving. Pt denies any respiratory symptoms other than a residual cough which is reportedly improving. She lives in [place] half the year. Has been using NSAIDs for arthritis. ROS is negative for SOB, PENG, CP, syncope, fever, bloody or black tarry stool, abdominal pain, or dysuria. Denies melena, hematochezia, easy bleeding/bruising, night sweats, fever/chills. Patient denies having a BM biopsy previously. She had a colonoscopy in 2014 with adenoma found, recommend repeat colonoscopy in 5 years.      She became febrile during her admission and ID was consulted for this. She is currently on vanco, azithro, and zosyn                Interval History: NAEON. Afebrile. Urine culture growing e.coli, Resp gram stain shows gram positive cocci and gram negative rods, culture growing normal resp fernando. Will discontinue Vancomycin and deescalate Zosyn to Ceftriaxone. Continue Azithromycin for planned 3 doses.    Review of Systems   Constitutional: Positive for activity change, appetite change, fatigue and fever. Negative for chills.   HENT: Negative for congestion, rhinorrhea and sore throat.    Respiratory: Positive for cough. Negative for shortness of breath, wheezing and stridor.    Cardiovascular: Negative for chest pain, palpitations and leg swelling.   Gastrointestinal: Positive for nausea. Negative for abdominal pain, blood in stool, constipation, diarrhea and vomiting.   Genitourinary: Negative for decreased urine volume, difficulty urinating, dysuria, flank pain, frequency and urgency.   Musculoskeletal: Negative for arthralgias and myalgias.   Neurological: Positive for weakness and light-headedness. Negative for syncope and headaches.   Psychiatric/Behavioral: Negative for agitation and confusion.     Objective:     Vital Signs (Most  Recent):  Temp: 97.9 °F (36.6 °C) (02/05/19 0700)  Pulse: 90 (02/05/19 1300)  Resp: 16 (02/05/19 1300)  BP: (!) 111/56 (02/05/19 1300)  SpO2: 100 % (02/05/19 1300) Vital Signs (24h Range):  Temp:  [97.9 °F (36.6 °C)-98.7 °F (37.1 °C)] 97.9 °F (36.6 °C)  Pulse:  [85-96] 90  Resp:  [4-28] 16  SpO2:  [94 %-100 %] 100 %  BP: ()/(45-66) 111/56     Weight: 52.2 kg (115 lb)  Body mass index is 22.46 kg/m².    Estimated Creatinine Clearance: 31.7 mL/min (based on SCr of 1 mg/dL).    Physical Exam   Constitutional: She is oriented to person, place, and time. No distress.   HENT:   Head: Normocephalic.   conjunctival pallor   Eyes: Pupils are equal, round, and reactive to light.   Neck: Normal range of motion. JVD present.   Cardiovascular: Normal rate, regular rhythm and normal heart sounds.   No murmur heard.  Pulmonary/Chest: Effort normal and breath sounds normal. No stridor. No respiratory distress. She has no wheezes.   Abdominal: Soft. Bowel sounds are normal. She exhibits no distension. There is no tenderness. There is no guarding.   Musculoskeletal: Normal range of motion. She exhibits no edema or deformity.   Neurological: She is alert and oriented to person, place, and time.   Skin: Skin is warm and dry. Capillary refill takes less than 2 seconds. She is not diaphoretic.   Psychiatric: She has a normal mood and affect.   Vitals reviewed.      Significant Labs: All pertinent labs within the past 24 hours have been reviewed.    Significant Imaging: I have reviewed all pertinent imaging results/findings within the past 24 hours.

## 2019-02-05 NOTE — ASSESSMENT & PLAN NOTE
Patient states that she had a stroke 20 years back. No residual neurological manifestion.  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 No shunting of agitated saline baseline and with valsalva the RAP were not increased as evidence by the intra-atrial septum bulging to the right. With valsalva there are a few fine bubbles seen 4-5 beat after opacification of the RA.    - 02/05/2019: We contacted vascular neurology Dr. Horn on #[id number] to ask about the role of anti-platelets vs anticoagulation because the patient has PFO with history of stroke. Patient denies having Afib. EKG: Normal sinus rhythm. He recommends to start ASA 81 mg PO OD

## 2019-02-06 LAB
1,3 BETA GLUCAN SPEC-MCNC: <31 PG/ML
ACANTHOCYTES BLD QL SMEAR: PRESENT
ACANTHOCYTES BLD QL SMEAR: PRESENT
ALBUMIN SERPL BCP-MCNC: 1.5 G/DL
ALP SERPL-CCNC: 146 U/L
ALT SERPL W/O P-5'-P-CCNC: 20 U/L
ANION GAP SERPL CALC-SCNC: 10 MMOL/L
ANION GAP SERPL CALC-SCNC: 10 MMOL/L
ANION GAP SERPL CALC-SCNC: 7 MMOL/L
ANISOCYTOSIS BLD QL SMEAR: ABNORMAL
ANISOCYTOSIS BLD QL SMEAR: ABNORMAL
ANISOCYTOSIS BLD QL SMEAR: SLIGHT
AST SERPL-CCNC: 16 U/L
BACTERIA SPEC AEROBE CULT: NORMAL
BACTERIA SPEC AEROBE CULT: NORMAL
BASOPHILS # BLD AUTO: 0.13 K/UL
BASOPHILS # BLD AUTO: ABNORMAL K/UL
BASOPHILS NFR BLD: 1.7 %
BASOPHILS NFR BLD: 14 %
BASOPHILS NFR BLD: 5 %
BILIRUB SERPL-MCNC: 1.2 MG/DL
BUN SERPL-MCNC: 15 MG/DL
BUN SERPL-MCNC: 16 MG/DL
BUN SERPL-MCNC: 16 MG/DL
BURR CELLS BLD QL SMEAR: ABNORMAL
CALCIUM SERPL-MCNC: 7.8 MG/DL
CALCIUM SERPL-MCNC: 7.8 MG/DL
CALCIUM SERPL-MCNC: 8.2 MG/DL
CHLORIDE SERPL-SCNC: 96 MMOL/L
CHLORIDE SERPL-SCNC: 97 MMOL/L
CHLORIDE SERPL-SCNC: 98 MMOL/L
CO2 SERPL-SCNC: 18 MMOL/L
CO2 SERPL-SCNC: 19 MMOL/L
CO2 SERPL-SCNC: 20 MMOL/L
CREAT SERPL-MCNC: 0.8 MG/DL
DIFFERENTIAL METHOD: ABNORMAL
ENTEROVIRUS: NOT DETECTED
EOSINOPHIL # BLD AUTO: 0.6 K/UL
EOSINOPHIL # BLD AUTO: ABNORMAL K/UL
EOSINOPHIL NFR BLD: 3 %
EOSINOPHIL NFR BLD: 7.8 %
EOSINOPHIL NFR BLD: 8 %
ERYTHROCYTE [DISTWIDTH] IN BLOOD BY AUTOMATED COUNT: 20 %
ERYTHROCYTE [DISTWIDTH] IN BLOOD BY AUTOMATED COUNT: 20.3 %
ERYTHROCYTE [DISTWIDTH] IN BLOOD BY AUTOMATED COUNT: 20.4 %
EST. GFR  (AFRICAN AMERICAN): >60 ML/MIN/1.73 M^2
EST. GFR  (NON AFRICAN AMERICAN): >60 ML/MIN/1.73 M^2
GIANT PLATELETS BLD QL SMEAR: PRESENT
GLUCOSE SERPL-MCNC: 106 MG/DL
GLUCOSE SERPL-MCNC: 84 MG/DL
GLUCOSE SERPL-MCNC: 85 MG/DL
GRAM STN SPEC: NORMAL
HCT VFR BLD AUTO: 26 %
HCT VFR BLD AUTO: 26.3 %
HCT VFR BLD AUTO: 28.3 %
HGB BLD-MCNC: 8.6 G/DL
HGB BLD-MCNC: 8.9 G/DL
HGB BLD-MCNC: 9.5 G/DL
HUMAN BOCAVIRUS: NOT DETECTED
HUMAN CORONAVIRUS, COMMON COLD VIRUS: NOT DETECTED
HYPOCHROMIA BLD QL SMEAR: ABNORMAL
IMM GRANULOCYTES # BLD AUTO: 0.21 K/UL
IMM GRANULOCYTES # BLD AUTO: ABNORMAL K/UL
IMM GRANULOCYTES # BLD AUTO: ABNORMAL K/UL
IMM GRANULOCYTES NFR BLD AUTO: 2.7 %
IMM GRANULOCYTES NFR BLD AUTO: ABNORMAL %
IMM GRANULOCYTES NFR BLD AUTO: ABNORMAL %
INFLUENZA A - H1N1-09: NOT DETECTED
LYMPHOCYTES # BLD AUTO: 1.4 K/UL
LYMPHOCYTES # BLD AUTO: ABNORMAL K/UL
LYMPHOCYTES NFR BLD: 16 %
LYMPHOCYTES NFR BLD: 18.4 %
LYMPHOCYTES NFR BLD: 23 %
MAGNESIUM SERPL-MCNC: 1.7 MG/DL
MAGNESIUM SERPL-MCNC: 1.8 MG/DL
MCH RBC QN AUTO: 26.5 PG
MCH RBC QN AUTO: 26.8 PG
MCH RBC QN AUTO: 28 PG
MCHC RBC AUTO-ENTMCNC: 32.7 G/DL
MCHC RBC AUTO-ENTMCNC: 33.6 G/DL
MCHC RBC AUTO-ENTMCNC: 34.2 G/DL
MCV RBC AUTO: 80 FL
MCV RBC AUTO: 81 FL
MCV RBC AUTO: 82 FL
METAMYELOCYTES NFR BLD MANUAL: 1 %
METAMYELOCYTES NFR BLD MANUAL: 3 %
MONOCYTES # BLD AUTO: 1.8 K/UL
MONOCYTES # BLD AUTO: ABNORMAL K/UL
MONOCYTES NFR BLD: 22.7 %
MONOCYTES NFR BLD: 23 %
MONOCYTES NFR BLD: 25 %
MYELOCYTES NFR BLD MANUAL: 1 %
NEUTROPHILS # BLD AUTO: 3.6 K/UL
NEUTROPHILS NFR BLD: 31 %
NEUTROPHILS NFR BLD: 34 %
NEUTROPHILS NFR BLD: 46.7 %
NEUTS BAND NFR BLD MANUAL: 1 %
NEUTS BAND NFR BLD MANUAL: 2 %
NRBC BLD-RTO: 1 /100 WBC
OSMOLALITY SERPL: 263 MOSM/KG
OSMOLALITY UR: 366 MOSM/KG
PARAINFLUENZA: NOT DETECTED
PHOSPHATE SERPL-MCNC: 2.3 MG/DL
PLATELET # BLD AUTO: 25 K/UL
PLATELET # BLD AUTO: 25 K/UL
PLATELET # BLD AUTO: 28 K/UL
PLATELET BLD QL SMEAR: ABNORMAL
PMV BLD AUTO: ABNORMAL FL
POIKILOCYTOSIS BLD QL SMEAR: SLIGHT
POTASSIUM SERPL-SCNC: 3 MMOL/L
POTASSIUM SERPL-SCNC: 3.1 MMOL/L
POTASSIUM SERPL-SCNC: 4.5 MMOL/L
PROT SERPL-MCNC: 4.7 G/DL
RBC # BLD AUTO: 3.18 M/UL
RBC # BLD AUTO: 3.24 M/UL
RBC # BLD AUTO: 3.55 M/UL
RVP - ADENOVIRUS: NOT DETECTED
RVP - HUMAN METAPNEUMOVIRUS (HMPV): NOT DETECTED
RVP - INFLUENZA A: NOT DETECTED
RVP - INFLUENZA B: NOT DETECTED
RVP - RESPIRATORY SYNCTIAL VIRUS (RSV) A: NOT DETECTED
RVP - RESPIRATORY VIRAL PANEL, SOURCE: NORMAL
RVP - RHINOVIRUS: NOT DETECTED
SCHISTOCYTES BLD QL SMEAR: ABNORMAL
SCHISTOCYTES BLD QL SMEAR: PRESENT
SMUDGE CELLS BLD QL SMEAR: PRESENT
SODIUM SERPL-SCNC: 123 MMOL/L
SODIUM SERPL-SCNC: 126 MMOL/L
SODIUM SERPL-SCNC: 126 MMOL/L
SODIUM UR-SCNC: <20 MMOL/L
WBC # BLD AUTO: 6.97 K/UL
WBC # BLD AUTO: 7.05 K/UL
WBC # BLD AUTO: 7.71 K/UL
WBC OTHER NFR BLD MANUAL: 10 %

## 2019-02-06 PROCEDURE — 80048 BASIC METABOLIC PNL TOTAL CA: CPT

## 2019-02-06 PROCEDURE — 25000003 PHARM REV CODE 250: Performed by: STUDENT IN AN ORGANIZED HEALTH CARE EDUCATION/TRAINING PROGRAM

## 2019-02-06 PROCEDURE — 84300 ASSAY OF URINE SODIUM: CPT

## 2019-02-06 PROCEDURE — 25000242 PHARM REV CODE 250 ALT 637 W/ HCPCS: Performed by: STUDENT IN AN ORGANIZED HEALTH CARE EDUCATION/TRAINING PROGRAM

## 2019-02-06 PROCEDURE — 99233 SBSQ HOSP IP/OBS HIGH 50: CPT | Mod: GC,,, | Performed by: INTERNAL MEDICINE

## 2019-02-06 PROCEDURE — 85027 COMPLETE CBC AUTOMATED: CPT

## 2019-02-06 PROCEDURE — 94640 AIRWAY INHALATION TREATMENT: CPT

## 2019-02-06 PROCEDURE — 99232 PR SUBSEQUENT HOSPITAL CARE,LEVL II: ICD-10-PCS | Mod: GC,,, | Performed by: INTERNAL MEDICINE

## 2019-02-06 PROCEDURE — 63600175 PHARM REV CODE 636 W HCPCS: Performed by: INTERNAL MEDICINE

## 2019-02-06 PROCEDURE — 84100 ASSAY OF PHOSPHORUS: CPT

## 2019-02-06 PROCEDURE — 92611 MOTION FLUOROSCOPY/SWALLOW: CPT

## 2019-02-06 PROCEDURE — 94761 N-INVAS EAR/PLS OXIMETRY MLT: CPT

## 2019-02-06 PROCEDURE — 63600175 PHARM REV CODE 636 W HCPCS: Performed by: STUDENT IN AN ORGANIZED HEALTH CARE EDUCATION/TRAINING PROGRAM

## 2019-02-06 PROCEDURE — 83735 ASSAY OF MAGNESIUM: CPT

## 2019-02-06 PROCEDURE — 99233 PR SUBSEQUENT HOSPITAL CARE,LEVL III: ICD-10-PCS | Mod: GC,,, | Performed by: INTERNAL MEDICINE

## 2019-02-06 PROCEDURE — 85007 BL SMEAR W/DIFF WBC COUNT: CPT

## 2019-02-06 PROCEDURE — 85025 COMPLETE CBC W/AUTO DIFF WBC: CPT

## 2019-02-06 PROCEDURE — 25000003 PHARM REV CODE 250

## 2019-02-06 PROCEDURE — 20600001 HC STEP DOWN PRIVATE ROOM

## 2019-02-06 PROCEDURE — 80053 COMPREHEN METABOLIC PANEL: CPT

## 2019-02-06 PROCEDURE — 36415 COLL VENOUS BLD VENIPUNCTURE: CPT

## 2019-02-06 PROCEDURE — 99232 SBSQ HOSP IP/OBS MODERATE 35: CPT | Mod: GC,,, | Performed by: INTERNAL MEDICINE

## 2019-02-06 PROCEDURE — 80048 BASIC METABOLIC PNL TOTAL CA: CPT | Mod: 91

## 2019-02-06 PROCEDURE — 83735 ASSAY OF MAGNESIUM: CPT | Mod: 91

## 2019-02-06 PROCEDURE — 83935 ASSAY OF URINE OSMOLALITY: CPT

## 2019-02-06 PROCEDURE — 27000221 HC OXYGEN, UP TO 24 HOURS

## 2019-02-06 PROCEDURE — 83930 ASSAY OF BLOOD OSMOLALITY: CPT

## 2019-02-06 RX ORDER — POTASSIUM CHLORIDE 20 MEQ/1
40 TABLET, EXTENDED RELEASE ORAL ONCE
Status: COMPLETED | OUTPATIENT
Start: 2019-02-06 | End: 2019-02-06

## 2019-02-06 RX ORDER — AZITHROMYCIN 250 MG/1
500 TABLET, FILM COATED ORAL ONCE
Status: COMPLETED | OUTPATIENT
Start: 2019-02-06 | End: 2019-02-06

## 2019-02-06 RX ORDER — LOPERAMIDE HYDROCHLORIDE 2 MG/1
2 CAPSULE ORAL 4 TIMES DAILY PRN
Status: DISCONTINUED | OUTPATIENT
Start: 2019-02-06 | End: 2019-02-19 | Stop reason: HOSPADM

## 2019-02-06 RX ORDER — POTASSIUM CHLORIDE 7.45 MG/ML
10 INJECTION INTRAVENOUS
Status: COMPLETED | OUTPATIENT
Start: 2019-02-06 | End: 2019-02-06

## 2019-02-06 RX ORDER — FUROSEMIDE 10 MG/ML
40 INJECTION INTRAMUSCULAR; INTRAVENOUS ONCE
Status: COMPLETED | OUTPATIENT
Start: 2019-02-06 | End: 2019-02-06

## 2019-02-06 RX ORDER — POTASSIUM CHLORIDE 20 MEQ/15ML
60 SOLUTION ORAL ONCE
Status: COMPLETED | OUTPATIENT
Start: 2019-02-06 | End: 2019-02-06

## 2019-02-06 RX ADMIN — IPRATROPIUM BROMIDE AND ALBUTEROL SULFATE 3 ML: .5; 3 SOLUTION RESPIRATORY (INHALATION) at 12:02

## 2019-02-06 RX ADMIN — POTASSIUM CHLORIDE 60 MEQ: 20 SOLUTION ORAL at 06:02

## 2019-02-06 RX ADMIN — SIMVASTATIN 40 MG: 40 TABLET, FILM COATED ORAL at 09:02

## 2019-02-06 RX ADMIN — FUROSEMIDE 40 MG: 10 INJECTION, SOLUTION INTRAMUSCULAR; INTRAVENOUS at 12:02

## 2019-02-06 RX ADMIN — ACETAMINOPHEN 650 MG: 325 TABLET, FILM COATED ORAL at 09:02

## 2019-02-06 RX ADMIN — PANTOPRAZOLE SODIUM 40 MG: 40 TABLET, DELAYED RELEASE ORAL at 09:02

## 2019-02-06 RX ADMIN — CEFTRIAXONE SODIUM 1 G: 1 INJECTION, POWDER, FOR SOLUTION INTRAMUSCULAR; INTRAVENOUS at 02:02

## 2019-02-06 RX ADMIN — POTASSIUM CHLORIDE 10 MEQ: 7.46 INJECTION, SOLUTION INTRAVENOUS at 06:02

## 2019-02-06 RX ADMIN — FUROSEMIDE 40 MG: 10 INJECTION, SOLUTION INTRAMUSCULAR; INTRAVENOUS at 06:02

## 2019-02-06 RX ADMIN — ONDANSETRON 8 MG: 8 TABLET, ORALLY DISINTEGRATING ORAL at 09:02

## 2019-02-06 RX ADMIN — IPRATROPIUM BROMIDE AND ALBUTEROL SULFATE 3 ML: .5; 3 SOLUTION RESPIRATORY (INHALATION) at 03:02

## 2019-02-06 RX ADMIN — LOPERAMIDE HYDROCHLORIDE 2 MG: 2 CAPSULE ORAL at 09:02

## 2019-02-06 RX ADMIN — IPRATROPIUM BROMIDE AND ALBUTEROL SULFATE 3 ML: .5; 3 SOLUTION RESPIRATORY (INHALATION) at 07:02

## 2019-02-06 RX ADMIN — POTASSIUM CHLORIDE 40 MEQ: 1500 TABLET, EXTENDED RELEASE ORAL at 09:02

## 2019-02-06 RX ADMIN — AZITHROMYCIN 500 MG: 250 TABLET, FILM COATED ORAL at 09:02

## 2019-02-06 RX ADMIN — POTASSIUM CHLORIDE 10 MEQ: 7.46 INJECTION, SOLUTION INTRAVENOUS at 09:02

## 2019-02-06 RX ADMIN — ACETAMINOPHEN 650 MG: 325 TABLET, FILM COATED ORAL at 05:02

## 2019-02-06 NOTE — PROGRESS NOTES
Progress Note      SUBJECTIVE:     Patient seen and examined at the bedside.  Patient complained of poor appetite but no acute overnight events.  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    UTI (urinary tract infection)      Past Surgical History:   Procedure Laterality Date    CATARACT EXTRACTION W/  INTRAOCULAR LENS IMPLANT Left 7/21/14    CATARACT EXTRACTION W/  INTRAOCULAR LENS IMPLANT Right 8/11/14    CYSTOSCOPY      INSERTION-INTRAOCULAR LENS (IOL) Right 8/11/2014    [facility]    INSERTION-INTRAOCULAR LENS (IOL) Left 7/21/2014    [facility] at [facility] OR    PHACOEMULSIFICATION-ASPIRATION-CATARACT Right 8/11/2014    [facility]    PHACOEMULSIFICATION-ASPIRATION-CATARACT Left 7/21/2014    [facility] OR    screening N/A 9/5/2014    Performed by [facility] Drake [facility] at [facility] ([facility])     Family History   Problem Relation Age of Onset    Cancer Brother     Cancer Brother     Cancer Sister      Social History     Tobacco Use    Smoking status: Never Smoker    Smokeless tobacco: Never Used   Substance Use Topics    Alcohol use: No     Alcohol/week: 0.0 oz     Comment: moderate    Drug use: No     Review of Systems   Constitutional: Positive for malaise/fatigue.   HENT: Negative for nosebleeds.    Respiratory: Positive for cough and shortness of breath.    Cardiovascular: Negative for chest pain and leg swelling.   Gastrointestinal: Positive for abdominal pain and diarrhea. Negative for blood in stool, heartburn and vomiting.    Genitourinary: Negative for hematuria and urgency.   Musculoskeletal: Positive for back pain and joint pain.   Neurological: Positive for weakness. Negative for sensory change, seizures and headaches.     OBJECTIVE:     Vital Signs:  Temp:  [97.8 °F (36.6 °C)-98 °F (36.7 °C)]   Pulse:  [86-96]   Resp:  [16-28]   BP: ()/(52-78)   SpO2:  [94 %-100 %]     Physical Exam   Constitutional: She is oriented to person, place, and time.   Appears ill   HENT:   Head: Normocephalic and atraumatic.   Eyes: EOM are normal. Pupils are equal, round, and reactive to light.   Neck: Normal range of motion. Neck supple.   Cardiovascular:   Murmur heard.  Pulmonary/Chest: Effort normal. She has wheezes.   Abdominal: Soft. Bowel sounds are normal. There is no tenderness.   Musculoskeletal: Normal range of motion. She exhibits no tenderness or deformity.   Neurological: She is alert and oriented to person, place, and time.   Skin: Skin is warm and dry. No pallor.     Laboratory:  CBC:   Recent Labs   Lab 02/04/19 2027   WBC 6.17   RBC 3.39*   HGB 9.3*   HCT 28.0*   PLT 36*   MCV 83   MCH 27.4   MCHC 33.2     CMP:   Recent Labs   Lab 02/05/19  0608   GLU 71   CALCIUM 7.5*   ALBUMIN 1.7*   PROT 4.9*   *   K 3.0*   CO2 18*   CL 98   BUN 19   CREATININE 1.0   ALKPHOS 126   ALT 25   AST 23   BILITOT 1.8*     Coagulation:   Recent Labs   Lab 02/02/19 2036   LABPROT 11.7   INR 1.1   APTT 25.9     Microbiology Results (last 7 days)     Procedure Component Value Units Date/Time    Blood Culture #1 **CANNOT BE ORDERED STAT** [[id number]] Collected:  02/03/19 0925    Order Status:  Completed Specimen:  Blood Updated:  02/05/19 1212     Blood Culture, Routine No Growth to date     Blood Culture, Routine No Growth to date     Blood Culture, Routine No Growth to date    Blood Culture #1 **CANNOT BE ORDERED STAT** [[id number]] Collected:  02/03/19 0925    Order Status:  Completed Specimen:  Blood Updated:  02/05/19 1212     Blood Culture,  Routine No Growth to date     Blood Culture, Routine No Growth to date     Blood Culture, Routine No Growth to date    Culture, Respiratory with Gram Stain [[id number]] Collected:  02/04/19 1115    Order Status:  Completed Specimen:  Respiratory from Sputum Updated:  02/05/19 0928     Respiratory Culture Normal respiratory fernando     Gram Stain (Respiratory) <10 epithelial cells per low power field.     Gram Stain (Respiratory) Few WBC's     Gram Stain (Respiratory) Few Gram positive cocci     Gram Stain (Respiratory) Few Gram negative rods    Clostridium difficile EIA [[id number]] Collected:  02/04/19 1445    Order Status:  Completed Specimen:  Stool Updated:  02/04/19 2256     C. diff Antigen Negative     C difficile Toxins A+B, EIA Negative     Comment: Testing not recommended for children <24 months old.       Respiratory Viral Panel by PCR [facility]; Nasal Swab [[id number]] Collected:  02/04/19 1154    Order Status:  Sent Specimen:  Respiratory Updated:  02/04/19 1414    Influenza A & B by Molecular [[id number]] Collected:  02/04/19 1153    Order Status:  Canceled Specimen:  Nasopharyngeal Swab Updated:  02/04/19 1414    Respiratory Viral Panel by PCR [facility]; Sputum [[id number]] Collected:  02/04/19 1115    Order Status:  Sent Specimen:  Respiratory Updated:  02/04/19 1217    Gram stain [[id number]]     Order Status:  Completed Specimen:  Urine         Specimen (12h ago, onward)    None        Recent Labs   Lab 02/02/19  0852   COLORU Yellow   SPECGRAV 1.010   PHUR 6.0   PROTEINUA Negative   BACTERIA Many*   NITRITE Positive*   LEUKOCYTESUR 2+*         Diagnostic Results:  CT chest  1.  Large areas of consolidation most notably in the left upper lobe and right lower lobe, as well as small consolidations in the posterior right upper lobe and central right middle lobe.  There are areas of volume loss associated with these consolidations and relative sparing of the periphery.  These consolidations have rapidly formal  compared to chest radiograph 02/02/2019, and likely represent acute airspace disease favoring pulmonary edema or ARDS, with pneumonia less likely.    2.  Small bilateral layering pleural effusions.    3.  Mild cardiomegaly.    ASSESSMENT/PLAN:        1.  Anemia and thrombocytopenia:  Hemoglobin improved and stable.  Platelet are 36, no active bleeding seen.  Patient has sepsis which is likely contributing to her low platelet count  2.  Left-sided pneumonia as evidence on chest x-ray, CT  3.  Urinary tract infection with positive cultures for gram-negative rods  4.  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  Seen by Dr. José Manuel baker in 2013  5.  Fluid overload:  She received multiple transfusions and IV fluid boluses     Plan:      1.  Transfuse for hemoglobin of less than 7 and platelets less than 10  2.  Treat sepsis with antibiotics, as per primary  3.  Will hold off on bone marrow biopsy as patient is more unstable with low blood pressure and hypoxemic, currently in ICU for close monitoring.  May consider bone marrow biopsy when more stable.  4. No aspirin or heparin until the platelet count improves to >50,000.  Use SCDs for DVT prophylaxis     Plan of care discussed with Dr. Rainer Ordaz  PGY 4, hematology/oncology

## 2019-02-06 NOTE — ASSESSMENT & PLAN NOTE
On 02/03/2019: Patient  febrile this AM and UA c/w with UTI start Ceftriaxone,  At afternoon repeat fever to >102, blood cultures obtained and on abx for UTI, started on sepsis bolus 30cc/kg and monitoring of lactic acid levels. Antibiotics were changed to Piperacillin/Tazobactam and Vancomycin.     02/04/2019: Patient have complaints of left sided lateral chest wall pleuritic pain. Throughout the night patient remained febrile, O2 requirement increasing this Am after 4am patient given 2 500ml saline boluses. This am patient is tachypneic, accessory muscle use, febrile, MAP >65, but pressures 90s/50s. CXR with significant new left sided lower& upper airspace disease, absent breath sounds in left mid to lower fields, dullness to percussion, right basilar absent breath sounds. On Exam patient also with distended JVP to angle of jaw. Critical Care consulted & have accepted patient. Azithromycin started    02/05/2019: During night patient received 1 L NS bolus for marginal BP. Patient reports her symptoms have improved today. BP now is stable. Afebrile last spike of fever: 39.1 (02/03/3019). Np leucocytosis. CXR today: Worsening severe pulmonary edema.      CT Chest consistent with pulmonary edema vs ARDS; however, patient requiring minimal oxygen (3L currently) and has received copious IVF due to sepsis. PNA possible but less likely given how rapid radiographic changes developed.   Will start gentle diuresis  Will complete course of CAP therapy with rocephin and azithromycin      - Strict Is/Os  -will do CT chest without contrast today

## 2019-02-06 NOTE — NURSING
No acute events throughout day. See vital signs and assessments in flowsheets. See below for updates on today's progress.     Pulmonary: sats WNL 3L NC    Cardiovascular: SR; BP stable; afebrile     Neurological: intact     Gastrointestinal: small smear BM; use thicken up w/ liquids     Genitourinary: durbin w/  cc u/o     Endocrine: WNL    Integumentary/Other: WNL    Infusions: KVO    Patient progressing towards goals as tolerated, plan of care communicated and reviewed with Vicki Peña and family. All concerns addressed. Will continue to monitor.

## 2019-02-06 NOTE — ASSESSMENT & PLAN NOTE
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  Prior to [facility] arrival she had her blood drawn and was found to have a Hgb of 6.6. At that time her PCP instructed her to go to the emergency department for a blood transfusion.  She is s/p 1unit Platelet transfusion - developed transfusion reaction during this - received IM epi, Benadryl/ 125 methylpred for concerns of throat swelling per Dr. Gonzalez.    Heme/oncology consulted (02/03/2019) their recs and plan: " Anemia, thrombocytopenia. Ddx includes primary BM disorder such as MDS, vs secondary BM suppression from infection (recent Chikungunya infection, known to cause thrombocytopenia). Reviewed medication list, no known culprits. Possible also slow GIB from adenoma.  -No evidence of iron deficiency, B12/folate nml  -No evidence of hemolysis  -No evidence of splenomegaly, no evidence of cirrhosis  -No evidence of coagulation     Will try to perform BM biopsy early this week while patient is here. If patient needs to be discharged, can be scheduled to be done as outpatient. Results will take about a 3-7 days to results.  -Hold NSAIDs  -Transfuse Hb<7 or plt<10 unless active bleeding  -Consider repeat colonoscopy given recommended to be done 5 years from adenoma visualized in 2014"        "

## 2019-02-06 NOTE — PROCEDURES
"Modified Barium Swallow    Patient Name:  Vicki Peña   MRN:  [id number]      Recommendations:     Recommendations:                General Recommendations:  Dysphagia therapy  Diet recommendations:  Regular, Thin   Aspiration Precautions: 1 bite/sip at a time, Alternating bites/sips, Feed only when awake/alert, HOB to 90 degrees, Meds whole 1 at a time, Monitor for s/s of aspiration, Small bites/sips and Standard aspiration precautions   General Precautions: Standard, aspiration, fall, nectar thick, pureed diet  Communication strategies:  Pt is primary language Syriac speaker,  needed    Referral     Reason for Referral  Patient was referred for a Modified Barium Swallow Study to assess the efficiency of his/her swallow function, rule out aspiration and make recommendations regarding safe dietary consistencies, effective compensatory strategies, and safe eating environment.     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    UTI (urinary tract infection)        Objective:     Current Respiratory Status: 02/06/19    Alert: yes    Cooperative: yes    Follows Directions: yes    Visualization  · Patient was seen in the lateral view  · Supplement O2 in place via nasal cannula    Oral Peripheral Examination  · Oral Musculature: WFL  · Structural Abnormalities: Pt with c/o of "tightness" following reported reaction to platelets received 2/2/2019   · Dentition: upper and lower dentures  · Secretion Management: adequate  · Mucosal Quality: adequate  · Mandibular Strength and Mobility: WFL  · Oral Labial Strength and Mobility: WFL  · Lingual Strength and Mobility: WFL  · Volitional Cough: adequate  · Volitional Swallow: present  · Voice Prior to PO " Intake: clear    Consistencies Assessed  · Thin tsp sips x2, cup sips x2, straw sips in isolation x2   · Nectar thick tsp and cup sip  · Thin-Honey Thick tsp sip  · Honey thicktsp sip  · Puree tsp bite  · Solids clinician fed bite    Oral Preparation/Oral Phase  · Oral residue present post swallow:  Mild coating tongue body post all trials cleared w/ cued and spontaneous double swallows.  · No anterior oral spillage  · Adequate mastication, bolus control and transit    Pharyngeal Phase   Pt demonstrated mild pharyngeal dysphagia characterized by delayed swallow reflex initiation and reduced pharyngeal constriction which resulted in flash penetration during the swallow w/ thin liquids w/ initial presentation of thin liquids by tsp.  Penetration did not reach the level of the vocal cords and was ejected from the laryngeal vestibule during the swallow.  No penetration was seen w/ any other trials.  No aspiration was seen w/ any consistency.  Mild scattered pharyngeal stasis was seen w/ all trials cleared w/ spontaneous double swallows.    Cervical Esophageal Phase  · UES appeared to accommodate all bolus types without stasis or retrograde movement observed     Assessment:     Impressions  ·   Patient demonstrates mild Oropharyngeal  dysphagia characterized by mildly reduced oral transit strength, delayed pharyngeal swallow reflex initiation and reduced pharyngeal contraction which resulted in mild lingual stasis coating the tongue body, flash penetration w/ thin liquids and scattered pharyngeal stasis.  No aspiration was seen w/ any trial and pt was able to clear both oral cavity and pharynx using spontaneous double swallows.    Prognosis: Good    Barriers:  · Fatigue    Plan  Cont Skilled Speech services for ongoing monitoring of diet tolerance, education re: aspiration precautions and normal swallow mechanics.    Education  Results were discussed with patient, in pt's primary language, Tunisian, by Bilingual  SLP.    Goals:   Multidisciplinary Problems     SLP Goals        Problem: SLP Goal    Goal Priority Disciplines Outcome   SLP Goal     SLP Ongoing (interventions implemented as appropriate)   Description:  Speech Language Pathology Goals  Goals expected to be met by 2/11/2019  1. Pt will participate in ongoing swallow assessment   2. Educate Pt and family on S/S aspiration and aspiration precautions                       Plan:   · Patient to be seen:  Therapy Frequency: 4 x/week   · Plan of Care expires:  03/07/19  · Plan of Care reviewed with:  patient        Discharge recommendations:  other (see comments)(pending pt progress and pt/ot recs)   Barriers to Discharge:  Inaccessible Home Environment    Time Tracking:   SLP Treatment Date:   02/06/19  Speech Start Time:  1135  Speech Stop Time:  1153     Speech Total Time (min):  18 min    Rahel Walker CCC-SLP  02/06/2019

## 2019-02-06 NOTE — ASSESSMENT & PLAN NOTE
Patient states that she had a stroke 20 years back. No residual neurological manifestion.  ECHO this admission consistent with PFO and shunt physiology.   - 02/05/2019: Critical Care contacted Vascular Neurology, Dr. Horn, to ask about the role of anti-platelets vs anticoagulation because the patient has PFO with history of stroke. No history of A fib. He recommends to start ASA 81 mg PO qD

## 2019-02-06 NOTE — PROGRESS NOTES
[facility]  Critical Care Medicine  Progress Note    Patient Name: Vicki Peña  MRN: [id number]  Admission Date: 2/2/2019  Hospital Length of Stay: 4 days  Code Status: Full Code  Attending Provider: Adin Webb MD  Primary Care Provider: Yaron Waite MD   Principal Problem: Sepsis due to urinary tract infection    Subjective:     HPI:   Vicki Peña is an 83 year old female with medical issues of CVA, osteoporosis, and HLD who presents to the ED (02/02/2019) with complaints of generalized weakness and fatigue for the past 1-2 weeks. Prior to [facility] arrival she had her blood drawn and was found to have a Hgb of 6.6. At that time her PCP instructed her to go to the emergency department for a blood transfusion. Associated symptoms include nausea with 2 prior episodes of vomiting over the past week. She also endorses body aches/bone pains as well as poor appetite and unspecified weight loss. She is also endorsing some headaches with regular use of NSAIDs for relief.    Patient has had poor oral intake for past 3 weeks per daughter. In addition to some nausea and vomiting. She had Chikungunya infection in December. . Approximately 1 month ago she was treated for URI with antibiotics and symptoms are improving. Pt denies any respiratory symptoms other than a residual cough which is reportedly improving  She lives in [place] half the year. Has been using NSAIDs for arthritis. ROS is negative for SOB, PENG, CP, syncope, fever, bloody or black tarry stool, abdominal pain, or dysuria. Denies melena, hematochezia, easy bleeding/bruising, night sweats, fever/chills. Patient denies having a BM biopsy previously. She had a colonoscopy in 2014 with adenoma found, recommend repeat colonoscopy in 5 years.      Of note, She is very functional at baseline, and reportedly takes care of all her ADL's (cooks, cleans, and even drives), although over the past few weeks she has spent most of her time lying in  bed.    Hospital/ICU Course:  She is s/p 1unit Platelet transfusion - developed transfusion reaction during this - received IM epi, Benadryl/ 125 methylpred for concerns of throat swelling per Dr. Gonzalez    02/03/2019: Patient febrile this AM and UA c/w with UTI start Ceftriaxone,   At afternoon repeat fever to >102, blood cultures obtained and on abx for UTI, started on sepsis bolus 30cc/kg and monitoring of lactic acid levels. Antibiotics were changed to Piperacillin/Tazobactam and Vancomycin.     02/04/2019: Patient have complaints of left sided lateral chest wall pleuritic pain. Throughout the night patient remained febrile, O2 requirement increasing this Am after 4am patient given 2 500ml saline boluses. This am patient is tachypneic, accessory muscle use, febrile, MAP >65, but pressures 90s/50s. CXR with significant new left sided lower& upper airspace disease, absent breath sounds in left mid to lower fields, dullness to percussion, right basilar absent breath sounds. On Exam patient also with distended JVP to angle of jaw. Critical Care consulted & have accepted patient    02/05/2019: During night patient received 1 L NS bolus for marginal BP. Patient reports her symptoms have improved today. BP now is stable. Afebrile last spike of fever: 39.1 (02/03/3019). No leukocytosis. CXR today: Worsening severe pulmonary edema.  There is a possible left lung mass.Will proceed with CT chest without contrast.    02/06/2019: CT chest performed yesterday which was consistent with pulmonary edema vs ARDS. Pneumonia is possible, but unlikely given how quickly radiographic changes developed. Will complete course for CAP and UTI, rocephin x 5 days and azithromycin x 3 days. Hyponatremia worsened slightly; however, patient remains asymptomatic. Likely hypervolemic hyponatremia. Given lasix 40 IV for 1 dose and has repeat BMP at noon. Patient is without ICU needs at this time and was stepped down to Hospital Medicine.      Interval History: No issues overnight, no complaints this morning.     Review of Systems   Constitutional: Positive for activity change, appetite change and fatigue. Negative for chills and fever.   HENT: Negative for congestion, rhinorrhea and sore throat.    Respiratory: Positive for cough. Negative for shortness of breath, wheezing and stridor.    Cardiovascular: Negative for chest pain, palpitations and leg swelling.   Gastrointestinal: Positive for nausea. Negative for abdominal pain, blood in stool, constipation, diarrhea and vomiting.   Genitourinary: Negative for decreased urine volume, difficulty urinating, dysuria, flank pain, frequency and urgency.   Musculoskeletal: Negative for arthralgias and myalgias.   Neurological: Negative for syncope, weakness, light-headedness and headaches.   Psychiatric/Behavioral: Negative for agitation and confusion.     Objective:     Vital Signs (Most Recent):  Temp: 98.6 °F (37 °C) (02/06/19 1017)  Pulse: 89 (02/06/19 1017)  Resp: 20 (02/06/19 1017)  BP: 117/65 (02/06/19 1017)  SpO2: (!) 90 % (02/06/19 1017) Vital Signs (24h Range):  Temp:  [97.8 °F (36.6 °C)-99.1 °F (37.3 °C)] 98.6 °F (37 °C)  Pulse:  [] 89  Resp:  [14-35] 20  SpO2:  [90 %-100 %] 90 %  BP: ()/(46-78) 117/65     Weight: 52.2 kg (115 lb)  Body mass index is 22.46 kg/m².    Estimated Creatinine Clearance: 39.6 mL/min (based on SCr of 0.8 mg/dL).    Physical Exam   Constitutional: She is oriented to person, place, and time. No distress.   HENT:   Head: Normocephalic.   conjunctival pallor   Eyes: Pupils are equal, round, and reactive to light.   Neck: Normal range of motion. JVD present.   Cardiovascular: Normal rate, regular rhythm and normal heart sounds.   No murmur heard.  Pulmonary/Chest: Effort normal. No stridor. No respiratory distress. She has wheezes. She has rales (Throughout lung fields, worse on left).   Abdominal: Soft. Bowel sounds are normal. She exhibits no distension. There  is no tenderness. There is no guarding.   Musculoskeletal: Normal range of motion. She exhibits no edema or deformity.   Neurological: She is alert and oriented to person, place, and time.   Skin: Skin is warm and dry. Capillary refill takes less than 2 seconds. She is not diaphoretic.   Psychiatric: She has a normal mood and affect.   Nursing note and vitals reviewed.      Significant Labs: All pertinent labs within the past 24 hours have been reviewed.    Significant Imaging: I have reviewed all pertinent imaging results/findings within the past 24 hours.      ABG  Recent Labs   Lab 02/04/19  1059   PH 7.316*   PO2 83   PCO2 28.1*   HCO3 14.3*   BE -12     Assessment/Plan:     Neuro   History of CVA (cerebrovascular accident)    Patient states that she had a stroke 20 years back. No residual neurological manifestion.  ECHO this admission consistent with PFO and shunt physiology.   - 02/05/2019: Critical Care contacted Vascular Neurology, Dr. Horn, to ask about the role of anti-platelets vs anticoagulation because the patient has PFO with history of stroke. No history of A fib. He recommends to start ASA 81 mg PO qD         Pulmonary   Pneumonia involving left lung    On 02/03/2019: Patient  febrile this AM and UA c/w with UTI start Ceftriaxone,  At afternoon repeat fever to >102, blood cultures obtained and on abx for UTI, started on sepsis bolus 30cc/kg and monitoring of lactic acid levels. Antibiotics were changed to Piperacillin/Tazobactam and Vancomycin.     02/04/2019: Patient have complaints of left sided lateral chest wall pleuritic pain. Throughout the night patient remained febrile, O2 requirement increasing this Am after 4am patient given 2 500ml saline boluses. This am patient is tachypneic, accessory muscle use, febrile, MAP >65, but pressures 90s/50s. CXR with significant new left sided lower& upper airspace disease, absent breath sounds in left mid to lower fields, dullness to percussion, right  basilar absent breath sounds. On Exam patient also with distended JVP to angle of jaw. Critical Care consulted & have accepted patient. Azithromycin started    02/05/2019: During night patient received 1 L NS bolus for marginal BP. Patient reports her symptoms have improved today. BP now is stable. Afebrile last spike of fever: 39.1 (02/03/3019). Np leucocytosis. CXR today: Worsening severe pulmonary edema.      CT Chest consistent with pulmonary edema vs ARDS; however, patient requiring minimal oxygen (3L currently) and has received copious IVF due to sepsis. PNA possible but less likely given how rapid radiographic changes developed.   Will start gentle diuresis  Will complete course of CAP therapy with rocephin and azithromycin      - Strict Is/Os  -will do CT chest without contrast today         Cardiac/Vascular   Hyperlipidemia    - Resume home medication: Statin.  - Lipid panel- sent     Renal/   Hypokalemia    Monitor potassium level and replace as needed     Hypomagnesemia    Monitor Mg level and replace as needed     Hyponatremia    Likely hypervolemic hyponatremia, given pulmonary edema and fluid resuscitation for sepsis  Will trial with Lasix 40 mg IV, repeat BMP at noon  Should hyponatremia not improve with diuresis, will order serum and urine osm, urine sodium     ID   * Sepsis due to urinary tract infection    - Urine cultures growing E. 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  Prior to [facility] arrival she had her blood drawn and was found to have a Hgb of 6.6. At that time her PCP instructed her to go to the emergency department for a blood transfusion.  She is s/p 1unit Platelet transfusion - developed transfusion reaction during this - received IM epi,  "Benadryl/ 125 methylpred for concerns of throat swelling per Dr. Gonzalez.    Heme/oncology consulted (02/03/2019) their recs and plan: " Anemia, thrombocytopenia. Ddx includes primary BM disorder such as MDS, vs secondary BM suppression from infection (recent Chikungunya infection, known to cause thrombocytopenia). Reviewed medication list, no known culprits. Possible also slow GIB from adenoma.  -No evidence of iron deficiency, B12/folate nml  -No evidence of hemolysis  -No evidence of splenomegaly, no evidence of cirrhosis  -No evidence of coagulation     Will try to perform BM biopsy early this week while patient is here. If patient needs to be discharged, can be scheduled to be done as outpatient. Results will take about a 3-7 days to results.  -Hold NSAIDs  -Transfuse Hb<7 or plt<10 unless active bleeding  -Consider repeat colonoscopy given recommended to be done 5 years from adenoma visualized in 2014"           Other   SOB (shortness of breath)    - Likely due to volume overload and pulmonary edema  - Will diurese and assess for improvement     Weakness    - Generalized, likely multifactorial (age, acute illness, deconditioning)  - PT/OT consulted       Critical secondary to Patient has a condition that poses threat to life and bodily function: Sepsis      Critical care was time spent personally by me on the following activities: development of treatment plan with patient or surrogate and bedside caregivers, discussions with consultants, evaluation of patient's response to treatment, examination of patient, ordering and performing treatments and interventions, ordering and review of laboratory studies, ordering and review of radiographic studies, pulse oximetry, re-evaluation of patient's condition. This critical care time did not overlap with that of any other provider or involve time for any procedures.     Conor Hirsch MD  Critical Care Medicine  [facility]  "

## 2019-02-06 NOTE — PLAN OF CARE
Problem: Adult Inpatient Plan of Care  Goal: Plan of Care Review  Outcome: Ongoing (interventions implemented as appropriate)  Pt remains free from falls and injury. Pt on strict I&O with the exception of a few unmeasured urine occurences d/t diarrhea and bed accidents. Pt placed on pure wick to prevent pt from jumping out of bed and risk falling. Pt and family states they understand POC. Pt diet currently pureed diet. Pt currently on 3L NC. VSS. No complaints at this time. Will continue to monitor.

## 2019-02-06 NOTE — PROGRESS NOTES
Progress Note      SUBJECTIVE:     Patient seen and examined at the bedside.  Patient stated that her breathing is better today, on 3 L of oxygen via nasal cannula.  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    UTI (urinary tract infection)      Past Surgical History:   Procedure Laterality Date    CATARACT EXTRACTION W/  INTRAOCULAR LENS IMPLANT Left 7/21/14    CATARACT EXTRACTION W/  INTRAOCULAR LENS IMPLANT Right 8/11/14    CYSTOSCOPY      INSERTION-INTRAOCULAR LENS (IOL) Right 8/11/2014    [facility] at [facility]    INSERTION-INTRAOCULAR LENS (IOL) Left 7/21/2014    [facility] at [facility] OR    PHACOEMULSIFICATION-ASPIRATION-CATARACT Right 8/11/2014    [facility] at [facility] OR    PHACOEMULSIFICATION-ASPIRATION-CATARACT Left 7/21/2014    [facility] at [facility] OR    screening N/A 9/5/2014    [facility]     Family History   Problem Relation Age of Onset    Cancer Brother     Cancer Brother     Cancer Sister      Social History     Tobacco Use    Smoking status: Never Smoker    Smokeless tobacco: Never Used   Substance Use Topics    Alcohol use: No     Alcohol/week: 0.0 oz     Comment: moderate    Drug use: No     ROS   Constitutional: Positive for malaise/fatigue.   HENT: Negative for nosebleeds.    Respiratory: Positive for cough and shortness of breath.     Cardiovascular: Negative for chest pain and leg swelling.   Gastrointestinal: Positive for abdominal pain. Negative for blood in stool, heartburn and vomiting.   Genitourinary: Negative for hematuria and urgency.   Musculoskeletal: Positive for back pain and joint pain.   Neurological: Negative for sensory change, seizures and headaches.       OBJECTIVE:     Vital Signs:  Temp:  [98.4 °F (36.9 °C)-98.6 °F (37 °C)]   Pulse:  [84-96]   Resp:  [19-35]   BP: (103-128)/(56-71)   SpO2:  [90 %-99 %]     Physical Exam   Constitutional: She is oriented to person, place, and time.   HENT:   Head: Normocephalic and atraumatic.   Eyes: EOM are normal. Pupils are equal, round, and reactive to light.   Neck: Normal range of motion. Neck supple.   Cardiovascular:   Murmur heard.  Pulmonary/Chest: Effort normal. She has wheezes.   Abdominal: Soft. Bowel sounds are normal. There is no tenderness.   Musculoskeletal: Normal range of motion. She exhibits no tenderness or deformity.   Neurological: She is alert and oriented to person, place, and time.   Skin: Skin is warm and dry. No pallor.       Laboratory:  CBC:   Recent Labs   Lab 02/06/19  0902   WBC 7.05   RBC 3.18*   HGB 8.9*   HCT 26.0*   PLT 25*   MCV 82   MCH 28.0   MCHC 34.2     CMP:   Recent Labs   Lab 02/06/19  0401  02/06/19  1325   GLU 84   < > 106   CALCIUM 7.8*   < > 8.2*   ALBUMIN 1.5*  --   --    PROT 4.7*  --   --    *   < > 126*   K 3.0*   < > 4.5   CO2 20*   < > 18*   CL 96   < > 98   BUN 16   < > 15   CREATININE 0.8   < > 0.8   ALKPHOS 146*  --   --    ALT 20  --   --    AST 16  --   --    BILITOT 1.2*  --   --     < > = values in this interval not displayed.     Coagulation:   Recent Labs   Lab 02/02/19  2036   LABPROT 11.7   INR 1.1   APTT 25.9     ABGs:   Recent Labs   Lab 02/04/19  1059   PH 7.316*   PCO2 28.1*   PO2 83   HCO3 14.3*   POCSATURATED 95   BE -12     Microbiology Results (last 7 days)     Procedure Component Value Units Date/Time    Blood  Culture #1 **CANNOT BE ORDERED STAT** [[id number]] Collected:  02/03/19 0925    Order Status:  Completed Specimen:  Blood Updated:  02/06/19 1212     Blood Culture, Routine No Growth to date     Blood Culture, Routine No Growth to date     Blood Culture, Routine No Growth to date     Blood Culture, Routine No Growth to date    Blood Culture #1 **CANNOT BE ORDERED STAT** [[id number]] Collected:  02/03/19 0925    Order Status:  Completed Specimen:  Blood Updated:  02/06/19 1212     Blood Culture, Routine No Growth to date     Blood Culture, Routine No Growth to date     Blood Culture, Routine No Growth to date     Blood Culture, Routine No Growth to date    Culture, Respiratory with Gram Stain [[id number]] Collected:  02/04/19 1115    Order Status:  Completed Specimen:  Respiratory from Sputum Updated:  02/06/19 1110     Respiratory Culture Normal respiratory fernando     Respiratory Culture No S aureus or Pseudomonas isolated.     Gram Stain (Respiratory) <10 epithelial cells per low power field.     Gram Stain (Respiratory) Few WBC's     Gram Stain (Respiratory) Few Gram positive cocci     Gram Stain (Respiratory) Few Gram negative rods    Respiratory Viral Panel by PCR [facility]; Sputum [[id number]] Collected:  02/04/19 1115    Order Status:  Completed Specimen:  Respiratory Updated:  02/06/19 0707     Respiratory Virus Panel, source Sputum     RVP - Adenovirus Not Detected     Comment: Detects Serotypes B and E. Detection of Serotype C may   be limited. If Adenovirus infection is suspected and a   Not Detected result is returned the sample should be   re-tested for Adenovirus using an independent method  (e.g. [employer] Adenovirus Quantitative Real-Time  PCR test.          Enterovirus Not Detected     Comment: Cross-reactivity has been observed between certain Rhinovirus  strains and the Enterovirus assay.          Human Bocavirus Not Detected     Human Coronavirus Not Detected     Comment: The Human Coronavirus  assay detects Human coronavirus types  229E, OC43,NL63 and HKU1.          RVP - Human Metapneumovirus (hMPV) Not Detected     RVP - Influenza A Not Detected     Influenza A - K4V3-82 Not Detected     RVP - Influenza B Not Detected     Parainfluenza Not Detected     Respiratory Syncytial VirusVirus (RSV) A Not Detected     Comment: The Respiratory Syncytial Viral assay detects types A and B,  however it does not distinguish between the two.          RVP - Rhinovirus Not Detected     Comment: Cross-Reactivity has been observed between certain   Rhinovirus strains and the Enterovirus assay.  Target Enriched Mulitplex Polymerase Chain Reaction (TEM-PCR)  allows for the detection of multiple pathogens out of a single  reaction.  This test was developed and its performance   characteristics determined by [employer].  It has not   been cleared or approved by the U.S.Food and Drug Administration.  Results should be used in conjunction with clinical findings,   and should not form the sole basis for a diagnosis or treatment  decision.  TEM-PCR is a licensed technology of [employer].         Narrative:       Receiving Lab:->[facility]    Clostridium difficile EIA [[id number]] Collected:  02/04/19 1445    Order Status:  Completed Specimen:  Stool Updated:  02/04/19 2256     C. diff Antigen Negative     C difficile Toxins A+B, EIA Negative     Comment: Testing not recommended for children <24 months old.       Respiratory Viral Panel by PCR [facility]; Nasal Swab [[id number]] Collected:  02/04/19 1154    Order Status:  Sent Specimen:  Respiratory Updated:  02/04/19 1414    Influenza A & B by Molecular [[id number]] Collected:  02/04/19 1153    Order Status:  Canceled Specimen:  Nasopharyngeal Swab Updated:  02/04/19 1414    Gram stain [[id number]]     Order Status:  Completed Specimen:  Urine         Recent Labs   Lab 02/02/19  0852   COLORU Yellow   SPECGRAV 1.010   PHUR 6.0   PROTEINUA Negative   BACTERIA Many*    NITRITE Positive*   LEUKOCYTESUR 2+*         Diagnostic Results:    CT chest  1.  Large areas of consolidation most notably in the left upper lobe and right lower lobe, as well as small consolidations in the posterior right upper lobe and central right middle lobe.  There are areas of volume loss associated with these consolidations and relative sparing of the periphery.  These consolidations have rapidly formal compared to chest radiograph 02/02/2019, and likely represent acute airspace disease favoring pulmonary edema or ARDS, with pneumonia less likely.    2.  Small bilateral layering pleural effusions.    3.  Mild cardiomegaly.      ASSESSMENT/PLAN:        1.  Anemia and thrombocytopenia:  Hemoglobin improved and stable.  Platelet are 25, no active bleeding seen.  Patient has sepsis and on broad-spectrum antibiotics which are likely contributing to her low platelet count  2.  Left-sided pneumonia as evidence on chest x-ray, CT  3.  Urinary tract infection with positive cultures for gram-negative rods  4.  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  Seen by Dr. José Manule baker in 2013. Patient with long standing cytopenias, dating back to at least 2005. She has history of clonal T cell gene rearrangement in 2012.   5.  Fluid overload:  She received multiple transfusions and IV fluid boluses     Plan:      1.  Transfuse for hemoglobin of less than 7 and platelets less than 10  2.  Treat sepsis with antibiotics, as per primary  3.  Will do bone marrow biopsy when more unstable   4. No aspirin or heparin until the platelet count improves to >50,000.  Use SCDs for DVT prophylaxis  5.  Patient likely to be step-down to floor from ICU today     Plan of care discussed with Dr. Rainer Ordaz  PGY 4, hematology/oncology

## 2019-02-06 NOTE — PROGRESS NOTES
"[facility]  Infectious Disease  Progress Note    Patient Name: Vicki Peña  MRN: [id number]  Admission Date: 2/2/2019  Length of Stay: 4 days  Attending Physician: Adin Webb MD  Primary Care Provider: Yaron Waite MD    Isolation Status: No active isolations  Assessment/Plan:      * Sepsis due to urinary tract infection    84 yo female with fever and sepsis who is being admitted to the ICU.     - Possible sources of infection are urine and lung  - Urine culture growing E.Coli   - CXR with possible PNA  - Continue Ceftriaxone x7 days  - Can continue planned 3 doses of Azithromycin as already started   - C.Diff negative  - Will continue to follow pending cultures      Pneumonia involving left lung    - CXR concerning for PNA  - Per family, pt had URI, did not finish abx course due to diarrhea and "feeling better"  - See Sepsis 2/2 UTI  - Current Abx regimen covers most resp pathogens that this patient is at risk for  - Continue planned 3 doses of azithromycin          Thank you for your consult. I will sign off. Please contact us if you have any additional questions.    Guy Sanders MD  Infectious Disease  [facility]    Subjective:     Principal Problem:Sepsis due to urinary tract infection    HPI: 83 year old female with PMH of CVA, osteoporosis, and HLD who presented to the ED (02/02/2019) with complaints of generalized weakness and fatigue for the past 1-2 weeks. Prior to [facility] arrival she had her blood drawn and was found to have a Hgb of 6.6. At that time her PCP instructed her to go to the emergency department for a blood transfusion. Associated symptoms include nausea with 2 prior episodes of vomiting over the past week. She also endorses body aches/bone pains as well as poor appetite and unspecified weight loss. She is also endorsing some headaches with regular use of NSAIDs for relief. Patient has had poor oral intake for past 3 weeks per daughter. In addition to " some nausea and vomiting. She had Chikungunya infection in December. . Approximately 1 month ago she was treated for URI with antibiotics and symptoms are improving. Pt denies any respiratory symptoms other than a residual cough which is reportedly improving. She lives in [place] half the year. Has been using NSAIDs for arthritis. ROS is negative for SOB, PENG, CP, syncope, fever, bloody or black tarry stool, abdominal pain, or dysuria. Denies melena, hematochezia, easy bleeding/bruising, night sweats, fever/chills. Patient denies having a BM biopsy previously. She had a colonoscopy in 2014 with adenoma found, recommend repeat colonoscopy in 5 years.      She became febrile during her admission and ID was consulted for this. She is currently on vanco, azithro, and zosyn                Interval History: NAEON.Pt remains afebrile and clinically improving. Stepped down from ICU this am. Continue Ceftriaxone for UTI and presumed PNA for 7 day course.     Review of Systems   Constitutional: Positive for activity change, appetite change, fatigue and fever. Negative for chills.   HENT: Negative for congestion, rhinorrhea and sore throat.    Respiratory: Positive for cough. Negative for shortness of breath, wheezing and stridor.    Cardiovascular: Negative for chest pain, palpitations and leg swelling.   Gastrointestinal: Positive for nausea. Negative for abdominal pain, blood in stool, constipation, diarrhea and vomiting.   Genitourinary: Negative for decreased urine volume, difficulty urinating, dysuria, flank pain, frequency and urgency.   Musculoskeletal: Negative for arthralgias and myalgias.   Neurological: Positive for weakness and light-headedness. Negative for syncope and headaches.   Psychiatric/Behavioral: Negative for agitation and confusion.     Objective:     Vital Signs (Most Recent):  Temp: 98.6 °F (37 °C) (02/06/19 1017)  Pulse: 92 (02/06/19 1100)  Resp: 20 (02/06/19 1017)  BP: 117/65 (02/06/19 1017)  SpO2:  (!) 90 % (02/06/19 1017) Vital Signs (24h Range):  Temp:  [97.8 °F (36.6 °C)-99.1 °F (37.3 °C)] 98.6 °F (37 °C)  Pulse:  [] 92  Resp:  [14-35] 20  SpO2:  [90 %-100 %] 90 %  BP: ()/(46-78) 117/65     Weight: 52.2 kg (115 lb)  Body mass index is 22.46 kg/m².    Estimated Creatinine Clearance: 39.6 mL/min (based on SCr of 0.8 mg/dL).    Physical Exam   Constitutional: She is oriented to person, place, and time. No distress.   HENT:   Head: Normocephalic.   conjunctival pallor   Eyes: Pupils are equal, round, and reactive to light.   Neck: Normal range of motion. JVD present.   Cardiovascular: Normal rate, regular rhythm and normal heart sounds.   No murmur heard.  Pulmonary/Chest: Effort normal and breath sounds normal. No stridor. No respiratory distress. She has no wheezes.   Abdominal: Soft. Bowel sounds are normal. She exhibits no distension. There is no tenderness. There is no guarding.   Musculoskeletal: Normal range of motion. She exhibits no edema or deformity.   Neurological: She is alert and oriented to person, place, and time.   Skin: Skin is warm and dry. Capillary refill takes less than 2 seconds. She is not diaphoretic.   Psychiatric: She has a normal mood and affect.   Vitals reviewed.      Significant Labs: All pertinent labs within the past 24 hours have been reviewed.    Significant Imaging: I have reviewed all pertinent imaging results/findings within the past 24 hours.

## 2019-02-06 NOTE — NURSING
Pt arrived to unit. Chart with pt. Potassium gtt infusing. VSS. No complaints at this time. Family at bedside. Will continue to monitor.

## 2019-02-06 NOTE — ASSESSMENT & PLAN NOTE
84 yo female with fever and sepsis who is being admitted to the ICU.     - Possible sources of infection are urine and lung  - Urine culture growing E.Coli   - CXR with possible PNA  - Continue Ceftriaxone x7 days  - Can continue planned 3 doses of Azithromycin as already started   - C.Diff negative  - Will continue to follow pending cultures

## 2019-02-06 NOTE — ASSESSMENT & PLAN NOTE
Likely hypervolemic hyponatremia, given pulmonary edema and fluid resuscitation for sepsis  Will trial with Lasix 40 mg IV, repeat BMP at noon  Should hyponatremia not improve with diuresis, will order serum and urine osm, urine sodium

## 2019-02-06 NOTE — PT/OT/SLP DISCHARGE
Physical Therapy Discharge Summary    Name: Vicki Peña  MRN: [id number]   Principal Problem: Symptomatic anemia     Patient Discharged from acute Physical Therapy on 2019.  Please refer to prior PT noted date on 2019 for functional status.     Assessment:     Pt transferred to ICU for closer monitoring. Will need new orders when pt is medically appropriate.     Objective:     GOALS:   Multidisciplinary Problems     Physical Therapy Goals        Problem: Physical Therapy Goal    Goal Priority Disciplines Outcome Goal Variances Interventions   Physical Therapy Goal     PT, PT/OT      Description:  Goals to be met by: 2019      Patient will increase functional independence with mobility by performin. Supine to sit with Stand-by Assistance  2. Sit to supine with Stand-by Assistance  3. Sit to stand transfer with Stand-by Assistance  4. Gait  x 50 feet with Stand-by Assistance using Rolling Walker.   5. Sitting at edge of bed x10 minutes with Supervision  6. Lower extremity exercise program x15 reps per handout, with independence                      Reasons for Discontinuation of Therapy Services  Transfer to ICU.       Plan:     Patient Discharged to: Transfer to ICU. .    Yecenia Motta, PT  2019

## 2019-02-06 NOTE — ASSESSMENT & PLAN NOTE
- Urine cultures growing E. coli, susceptible to ceftriaxone, will complete course  - Does have history of frequent UTIs, previously followed with [facility] (most recently seen 2015), normal cysto in 2014

## 2019-02-06 NOTE — SUBJECTIVE & OBJECTIVE
Interval History: No issues overnight, no complaints this morning.     Review of Systems   Constitutional: Positive for activity change, appetite change and fatigue. Negative for chills and fever.   HENT: Negative for congestion, rhinorrhea and sore throat.    Respiratory: Positive for cough. Negative for shortness of breath, wheezing and stridor.    Cardiovascular: Negative for chest pain, palpitations and leg swelling.   Gastrointestinal: Positive for nausea. Negative for abdominal pain, blood in stool, constipation, diarrhea and vomiting.   Genitourinary: Negative for decreased urine volume, difficulty urinating, dysuria, flank pain, frequency and urgency.   Musculoskeletal: Negative for arthralgias and myalgias.   Neurological: Negative for syncope, weakness, light-headedness and headaches.   Psychiatric/Behavioral: Negative for agitation and confusion.     Objective:     Vital Signs (Most Recent):  Temp: 98.6 °F (37 °C) (02/06/19 1017)  Pulse: 89 (02/06/19 1017)  Resp: 20 (02/06/19 1017)  BP: 117/65 (02/06/19 1017)  SpO2: (!) 90 % (02/06/19 1017) Vital Signs (24h Range):  Temp:  [97.8 °F (36.6 °C)-99.1 °F (37.3 °C)] 98.6 °F (37 °C)  Pulse:  [] 89  Resp:  [14-35] 20  SpO2:  [90 %-100 %] 90 %  BP: ()/(46-78) 117/65     Weight: 52.2 kg (115 lb)  Body mass index is 22.46 kg/m².    Estimated Creatinine Clearance: 39.6 mL/min (based on SCr of 0.8 mg/dL).    Physical Exam   Constitutional: She is oriented to person, place, and time. No distress.   HENT:   Head: Normocephalic.   conjunctival pallor   Eyes: Pupils are equal, round, and reactive to light.   Neck: Normal range of motion. JVD present.   Cardiovascular: Normal rate, regular rhythm and normal heart sounds.   No murmur heard.  Pulmonary/Chest: Effort normal. No stridor. No respiratory distress. She has wheezes. She has rales (Throughout lung fields, worse on left).   Abdominal: Soft. Bowel sounds are normal. She exhibits no distension. There is no  tenderness. There is no guarding.   Musculoskeletal: Normal range of motion. She exhibits no edema or deformity.   Neurological: She is alert and oriented to person, place, and time.   Skin: Skin is warm and dry. Capillary refill takes less than 2 seconds. She is not diaphoretic.   Psychiatric: She has a normal mood and affect.   Nursing note and vitals reviewed.      Significant Labs: All pertinent labs within the past 24 hours have been reviewed.    Significant Imaging: I have reviewed all pertinent imaging results/findings within the past 24 hours.

## 2019-02-06 NOTE — SUBJECTIVE & OBJECTIVE
Interval History: NAEON.Pt remains afebrile and clinically improving. Stepped down from ICU this am. Continue Ceftriaxone for UTI and presumed PNA for 7 day course.     Review of Systems   Constitutional: Positive for activity change, appetite change, fatigue and fever. Negative for chills.   HENT: Negative for congestion, rhinorrhea and sore throat.    Respiratory: Positive for cough. Negative for shortness of breath, wheezing and stridor.    Cardiovascular: Negative for chest pain, palpitations and leg swelling.   Gastrointestinal: Positive for nausea. Negative for abdominal pain, blood in stool, constipation, diarrhea and vomiting.   Genitourinary: Negative for decreased urine volume, difficulty urinating, dysuria, flank pain, frequency and urgency.   Musculoskeletal: Negative for arthralgias and myalgias.   Neurological: Positive for weakness and light-headedness. Negative for syncope and headaches.   Psychiatric/Behavioral: Negative for agitation and confusion.     Objective:     Vital Signs (Most Recent):  Temp: 98.6 °F (37 °C) (02/06/19 1017)  Pulse: 92 (02/06/19 1100)  Resp: 20 (02/06/19 1017)  BP: 117/65 (02/06/19 1017)  SpO2: (!) 90 % (02/06/19 1017) Vital Signs (24h Range):  Temp:  [97.8 °F (36.6 °C)-99.1 °F (37.3 °C)] 98.6 °F (37 °C)  Pulse:  [] 92  Resp:  [14-35] 20  SpO2:  [90 %-100 %] 90 %  BP: ()/(46-78) 117/65     Weight: 52.2 kg (115 lb)  Body mass index is 22.46 kg/m².    Estimated Creatinine Clearance: 39.6 mL/min (based on SCr of 0.8 mg/dL).    Physical Exam   Constitutional: She is oriented to person, place, and time. No distress.   HENT:   Head: Normocephalic.   conjunctival pallor   Eyes: Pupils are equal, round, and reactive to light.   Neck: Normal range of motion. JVD present.   Cardiovascular: Normal rate, regular rhythm and normal heart sounds.   No murmur heard.  Pulmonary/Chest: Effort normal and breath sounds normal. No stridor. No respiratory distress. She has no wheezes.    Abdominal: Soft. Bowel sounds are normal. She exhibits no distension. There is no tenderness. There is no guarding.   Musculoskeletal: Normal range of motion. She exhibits no edema or deformity.   Neurological: She is alert and oriented to person, place, and time.   Skin: Skin is warm and dry. Capillary refill takes less than 2 seconds. She is not diaphoretic.   Psychiatric: She has a normal mood and affect.   Vitals reviewed.      Significant Labs: All pertinent labs within the past 24 hours have been reviewed.    Significant Imaging: I have reviewed all pertinent imaging results/findings within the past 24 hours.

## 2019-02-06 NOTE — PLAN OF CARE
Problem: SLP Goal  Goal: SLP Goal  Speech Language Pathology Goals  Goals expected to be met by 2/11/2019  1. Pt will participate in ongoing swallow assessment   2. Educate Pt and family on S/S aspiration and aspiration precautions      Outcome: Ongoing (interventions implemented as appropriate)  MBSS completed.  Pt presents w/ mild oropharyngeal dysphagia at this time.  REC:  Pt cont po intake w/ regular consistency diet w/ thin liquids, oral meds whole 1-2 at a time, standard aspiration precautions.  Will review recs w/ team.  Cont ST per POC.    Rahel Walker CCC-SLP  2/6/2019

## 2019-02-06 NOTE — NURSING TRANSFER
Nursing Transfer Note      2/6/2019     Transfer To: [id number]    Transfer via wheelchair    Transfer with  to O2, cardiac monitoring    Transported by RN and PCT    Medicines sent: IVPB potassium    Chart send with patient: Yes    Notified: daughter    Patient reassessed at: 2/6/19 1017    Upon arrival to floor: cardiac monitor applied, patient oriented to room, call bell in reach and bed in lowest position

## 2019-02-06 NOTE — RESIDENT HANDOFF
Handoff     Primary Team: [facility] CRITICAL CARE MEDICINE Room Number: 6065/6065 A     Patient Name: Vicki Peña MRN: [id number]     Date of Birth: [birth year] Allergies: Tramadol     Age: 83 y.o. Admit Date: 2/2/2019     Sex: female  BMI: Body mass index is 22.46 kg/m².     Code Status: Full Code        Illness Level (current clinical status): Watcher - No    Reason for Admission: Symptomatic anemia    Brief HPI:   Vicki Peña is an 83 year old female with medical issues of CVA, osteoporosis, and HLD who presents to the ED (02/02/2019) with complaints of generalized weakness and fatigue for the past 1-2 weeks. Prior to [facility] arrival she had her blood drawn and was found to have a Hgb of 6.6. At that time her PCP instructed her to go to the emergency department for a blood transfusion. Associated symptoms include nausea with 2 prior episodes of vomiting over the past week. She also endorses body aches/bone pains as well as poor appetite and unspecified weight loss. She is also endorsing some headaches with regular use of NSAIDs for relief.    Patient has had poor oral intake for past 3 weeks per daughter. In addition to some nausea and vomiting. She had Chikungunya infection in December. . Approximately 1 month ago she was treated for URI with antibiotics and symptoms are improving. Pt denies any respiratory symptoms other than a residual cough which is reportedly improving  She lives in [place] half the year. Has been using NSAIDs for arthritis. ROS is negative for SOB, PENG, CP, syncope, fever, bloody or black tarry stool, abdominal pain, or dysuria. Denies melena, hematochezia, easy bleeding/bruising, night sweats, fever/chills. Patient denies having a BM biopsy previously. She had a colonoscopy in 2014 with adenoma found, recommend repeat colonoscopy in 5 years.      Of note, She is very functional at baseline, and reportedly takes care of all her ADL's (cooks, cleans, and even drives), although over the past few weeks she  has spent most of her time lying in bed.        Hospital Course:   Keshawn is s/p 1unit Platelet transfusion - developed transfusion reaction during this - received IM epi, Benadryl/ 125 methylpred for concerns of throat swelling per Dr. Gonzalez     02/03/2019: Patient febrile this AM and UA c/w with UTI start Ceftriaxone,   At afternoon repeat fever to >102, blood cultures obtained and on abx for UTI, started on sepsis bolus 30cc/kg and monitoring of lactic acid levels. Antibiotics were changed to Piperacillin/Tazobactam and Vancomycin.      02/04/2019: Patient have complaints of left sided lateral chest wall pleuritic pain. Throughout the night patient remained febrile, O2 requirement increasing this Am after 4am patient given 2 500ml saline boluses. This am patient is tachypneic, accessory muscle use, febrile, MAP >65, but pressures 90s/50s. CXR with significant new left sided lower& upper airspace disease, absent breath sounds in left mid to lower fields, dullness to percussion, right basilar absent breath sounds. On Exam patient also with distended JVP to angle of jaw. Critical Care consulted & have accepted patient     02/05/2019: During night patient received 1 L NS bolus for marginal BP. Patient reports her symptoms have improved today. BP now is stable. Afebrile last spike of fever: 39.1 (02/03/3019). No leucocytosis. ID recommended de-escalating antibiotics to rocephin and  CXR today: Worsening severe pulmonary edema.  There is a possible left lung mass. CT chest without contrast showed large areas of consolidation most notably in the left upper lobe and right lower as well as small consolidations in the posterior right upper lobe and central right middle lobe, suggestive of pulmonary edema. Diuresis was withheld as BP was marginally low and O2 saturation was 97% on 3 L O2.     Na has been down trending (130-->126-->123), likely hypervolemic hyponatremia. Patient was given lasix 40 mg IV x 1.     Tasks:    1. Follow-up repeat BMP at noon for sodium. Follow-up sodium studies (urine, serum)  1. Re-assess volume status for additional diuresis.  2. Follow-up hematology and ID recs    Discharge Disposition: Still a Patient    Mentored By: Dr. Moody

## 2019-02-07 LAB
ALBUMIN SERPL BCP-MCNC: 1.6 G/DL
ALP SERPL-CCNC: 238 U/L
ALT SERPL W/O P-5'-P-CCNC: 17 U/L
ANION GAP SERPL CALC-SCNC: 8 MMOL/L
ANISOCYTOSIS BLD QL SMEAR: SLIGHT
ANISOCYTOSIS BLD QL SMEAR: SLIGHT
AST SERPL-CCNC: 15 U/L
BASOPHILS # BLD AUTO: ABNORMAL K/UL
BASOPHILS NFR BLD: 8 %
BASOPHILS NFR BLD: 9 %
BILIRUB SERPL-MCNC: 1.2 MG/DL
BLASTS NFR BLD MANUAL: 10 %
BUN SERPL-MCNC: 17 MG/DL
CALCIUM SERPL-MCNC: 8.5 MG/DL
CHLORIDE SERPL-SCNC: 95 MMOL/L
CO2 SERPL-SCNC: 26 MMOL/L
CREAT SERPL-MCNC: 0.9 MG/DL
DIFFERENTIAL METHOD: ABNORMAL
DIFFERENTIAL METHOD: ABNORMAL
EOSINOPHIL # BLD AUTO: ABNORMAL K/UL
EOSINOPHIL NFR BLD: 0 %
EOSINOPHIL NFR BLD: 6 %
ERYTHROCYTE [DISTWIDTH] IN BLOOD BY AUTOMATED COUNT: 20.2 %
ERYTHROCYTE [DISTWIDTH] IN BLOOD BY AUTOMATED COUNT: 20.4 %
EST. GFR  (AFRICAN AMERICAN): >60 ML/MIN/1.73 M^2
EST. GFR  (NON AFRICAN AMERICAN): >60 ML/MIN/1.73 M^2
GIANT PLATELETS BLD QL SMEAR: PRESENT
GLUCOSE SERPL-MCNC: 86 MG/DL
HCT VFR BLD AUTO: 26 %
HCT VFR BLD AUTO: 26.8 %
HGB BLD-MCNC: 8.7 G/DL
HGB BLD-MCNC: 9 G/DL
HYPOCHROMIA BLD QL SMEAR: ABNORMAL
HYPOCHROMIA BLD QL SMEAR: ABNORMAL
IMM GRANULOCYTES # BLD AUTO: ABNORMAL K/UL
IMM GRANULOCYTES # BLD AUTO: ABNORMAL K/UL
IMM GRANULOCYTES NFR BLD AUTO: ABNORMAL %
IMM GRANULOCYTES NFR BLD AUTO: ABNORMAL %
LYMPHOCYTES # BLD AUTO: ABNORMAL K/UL
LYMPHOCYTES NFR BLD: 18 %
LYMPHOCYTES NFR BLD: 19 %
MAGNESIUM SERPL-MCNC: 1.7 MG/DL
MCH RBC QN AUTO: 26.8 PG
MCH RBC QN AUTO: 27.3 PG
MCHC RBC AUTO-ENTMCNC: 33.5 G/DL
MCHC RBC AUTO-ENTMCNC: 33.6 G/DL
MCV RBC AUTO: 80 FL
MCV RBC AUTO: 82 FL
METAMYELOCYTES NFR BLD MANUAL: 1 %
MONOCYTES # BLD AUTO: ABNORMAL K/UL
MONOCYTES NFR BLD: 16 %
MONOCYTES NFR BLD: 17 %
MYELOCYTES NFR BLD MANUAL: 2 %
MYELOCYTES NFR BLD MANUAL: 2 %
NEUTROPHILS # BLD AUTO: ABNORMAL K/UL
NEUTROPHILS NFR BLD: 37 %
NEUTROPHILS NFR BLD: 40 %
NEUTS BAND NFR BLD MANUAL: 2 %
NEUTS BAND NFR BLD MANUAL: 2 %
NRBC BLD-RTO: 1 /100 WBC
NRBC BLD-RTO: 1 /100 WBC
OVALOCYTES BLD QL SMEAR: ABNORMAL
OVALOCYTES BLD QL SMEAR: ABNORMAL
PARVOVIRUS B19 ABS IGG & IGM: ABNORMAL
PARVOVIRUS B19 IGG ANTIBODY: POSITIVE
PARVOVIRUS B19 IGM ANTIBODY: NEGATIVE
PHOSPHATE SERPL-MCNC: 2.3 MG/DL
PLATELET # BLD AUTO: 25 K/UL
PLATELET # BLD AUTO: 25 K/UL
PLATELET BLD QL SMEAR: ABNORMAL
PMV BLD AUTO: ABNORMAL FL
PMV BLD AUTO: ABNORMAL FL
POIKILOCYTOSIS BLD QL SMEAR: SLIGHT
POIKILOCYTOSIS BLD QL SMEAR: SLIGHT
POLYCHROMASIA BLD QL SMEAR: ABNORMAL
POTASSIUM SERPL-SCNC: 3.4 MMOL/L
PROT SERPL-MCNC: 5 G/DL
RBC # BLD AUTO: 3.19 M/UL
RBC # BLD AUTO: 3.36 M/UL
SCHISTOCYTES BLD QL SMEAR: ABNORMAL
SCHISTOCYTES BLD QL SMEAR: ABNORMAL
SODIUM SERPL-SCNC: 129 MMOL/L
WBC # BLD AUTO: 6.76 K/UL
WBC # BLD AUTO: 7.57 K/UL
WBC OTHER NFR BLD MANUAL: 11 %

## 2019-02-07 PROCEDURE — 94640 AIRWAY INHALATION TREATMENT: CPT

## 2019-02-07 PROCEDURE — 97530 THERAPEUTIC ACTIVITIES: CPT

## 2019-02-07 PROCEDURE — 25000003 PHARM REV CODE 250: Performed by: HOSPITALIST

## 2019-02-07 PROCEDURE — 27000221 HC OXYGEN, UP TO 24 HOURS

## 2019-02-07 PROCEDURE — 36415 COLL VENOUS BLD VENIPUNCTURE: CPT

## 2019-02-07 PROCEDURE — 97168 OT RE-EVAL EST PLAN CARE: CPT

## 2019-02-07 PROCEDURE — 84100 ASSAY OF PHOSPHORUS: CPT

## 2019-02-07 PROCEDURE — 94761 N-INVAS EAR/PLS OXIMETRY MLT: CPT

## 2019-02-07 PROCEDURE — 97116 GAIT TRAINING THERAPY: CPT

## 2019-02-07 PROCEDURE — 25000242 PHARM REV CODE 250 ALT 637 W/ HCPCS: Performed by: STUDENT IN AN ORGANIZED HEALTH CARE EDUCATION/TRAINING PROGRAM

## 2019-02-07 PROCEDURE — 63600175 PHARM REV CODE 636 W HCPCS: Performed by: INTERNAL MEDICINE

## 2019-02-07 PROCEDURE — 92526 ORAL FUNCTION THERAPY: CPT

## 2019-02-07 PROCEDURE — 25000003 PHARM REV CODE 250: Performed by: STUDENT IN AN ORGANIZED HEALTH CARE EDUCATION/TRAINING PROGRAM

## 2019-02-07 PROCEDURE — 20600001 HC STEP DOWN PRIVATE ROOM

## 2019-02-07 PROCEDURE — 99233 SBSQ HOSP IP/OBS HIGH 50: CPT | Mod: ,,, | Performed by: HOSPITALIST

## 2019-02-07 PROCEDURE — 99233 PR SUBSEQUENT HOSPITAL CARE,LEVL III: ICD-10-PCS | Mod: ,,, | Performed by: HOSPITALIST

## 2019-02-07 PROCEDURE — 97164 PT RE-EVAL EST PLAN CARE: CPT

## 2019-02-07 PROCEDURE — 80053 COMPREHEN METABOLIC PANEL: CPT

## 2019-02-07 PROCEDURE — 83735 ASSAY OF MAGNESIUM: CPT

## 2019-02-07 PROCEDURE — 99232 SBSQ HOSP IP/OBS MODERATE 35: CPT | Mod: GC,,, | Performed by: INTERNAL MEDICINE

## 2019-02-07 PROCEDURE — 85025 COMPLETE CBC W/AUTO DIFF WBC: CPT | Mod: 91

## 2019-02-07 PROCEDURE — 99232 PR SUBSEQUENT HOSPITAL CARE,LEVL II: ICD-10-PCS | Mod: GC,,, | Performed by: INTERNAL MEDICINE

## 2019-02-07 RX ORDER — POTASSIUM CHLORIDE 20 MEQ/1
40 TABLET, EXTENDED RELEASE ORAL ONCE
Status: COMPLETED | OUTPATIENT
Start: 2019-02-07 | End: 2019-02-07

## 2019-02-07 RX ORDER — POTASSIUM CHLORIDE 20 MEQ/15ML
40 SOLUTION ORAL ONCE
Status: DISCONTINUED | OUTPATIENT
Start: 2019-02-07 | End: 2019-02-07

## 2019-02-07 RX ADMIN — IPRATROPIUM BROMIDE AND ALBUTEROL SULFATE 3 ML: .5; 3 SOLUTION RESPIRATORY (INHALATION) at 07:02

## 2019-02-07 RX ADMIN — ACETAMINOPHEN 650 MG: 325 TABLET, FILM COATED ORAL at 10:02

## 2019-02-07 RX ADMIN — IPRATROPIUM BROMIDE AND ALBUTEROL SULFATE 3 ML: .5; 3 SOLUTION RESPIRATORY (INHALATION) at 08:02

## 2019-02-07 RX ADMIN — PANTOPRAZOLE SODIUM 40 MG: 40 TABLET, DELAYED RELEASE ORAL at 08:02

## 2019-02-07 RX ADMIN — CEFTRIAXONE SODIUM 1 G: 1 INJECTION, POWDER, FOR SOLUTION INTRAMUSCULAR; INTRAVENOUS at 03:02

## 2019-02-07 RX ADMIN — POTASSIUM CHLORIDE 40 MEQ: 1500 TABLET, EXTENDED RELEASE ORAL at 06:02

## 2019-02-07 RX ADMIN — IPRATROPIUM BROMIDE AND ALBUTEROL SULFATE 3 ML: .5; 3 SOLUTION RESPIRATORY (INHALATION) at 04:02

## 2019-02-07 RX ADMIN — IPRATROPIUM BROMIDE AND ALBUTEROL SULFATE 3 ML: .5; 3 SOLUTION RESPIRATORY (INHALATION) at 12:02

## 2019-02-07 RX ADMIN — ACETAMINOPHEN 650 MG: 325 TABLET, FILM COATED ORAL at 09:02

## 2019-02-07 RX ADMIN — SIMVASTATIN 40 MG: 40 TABLET, FILM COATED ORAL at 08:02

## 2019-02-07 NOTE — PT/OT/SLP RE-EVAL
Occupational Therapy   Re-evaluation    Name: Vicki Peña  MRN: [id number]  Admitting Diagnosis:  Sepsis due to urinary tract infection      Recommendations:     Discharge Recommendations: nursing facility, skilled(may progress to )  Discharge Equipment Recommendations:  walker, rolling  Barriers to discharge:  None    Assessment:     Vicki Peña is a 83 y.o. female with a medical diagnosis of Sepsis due to urinary tract infection.  She presents with performance deficits affecting function are weakness, impaired endurance, impaired functional mobilty, gait instability, impaired self care skills, impaired balance, impaired cardiopulmonary response to activity, decreased coordination.      Rehab Prognosis:  good; patient would benefit from acute skilled OT services to address these deficits and reach maximum level of function.       Plan:     Patient to be seen 3 x/week to address the above listed problems via self-care/home management, therapeutic activities, therapeutic exercises  · Plan of Care Expires: 03/08/19  · Plan of Care Reviewed with: patient, daughter    Subjective     Chief Complaint: weakness   Patient/Family stated goals: to get better and return home     Pain/Comfort:  · Pain Rating 1: 0/10  · Pain Rating Post-Intervention 1: 0/10    Objective:     Communicated with: RN prior to session.  Patient found HOB elevated with: oxygen, peripheral IV, telemetry upon OT entry to room. Pt agreeable to therapy session. Pt's family present during therapy session.     General Precautions: Standard, aspiration, fall   Orthopedic Precautions:N/A   Braces: N/A     Occupational Performance:    Bed Mobility:    · Patient completed Supine to Sit with contact guard assistance    Functional Mobility/Transfers:  · Patient completed Sit <> Stand Transfer with   · moderate assistance from EOB without AD  · minimum assistance from EOB with  rolling walker  · Min A from bedside chair with RW   Functional Mobility: Pt engaged  in functional mobility simulating household/community mobility with CGA <> min A using RW.    Pt completed 3ft + 18ft with seated rest break    Pt required assistance for AD management during directional changes and upright posture     Activities of Daily Living:  · Upper Body Dressing: minimum assistance for gown     Cognitive/Visual Perceptual:  Cognitive/Psychosocial Skills:     -       Oriented to: Person, Place, Time and Situation   -       Follows Commands/attention:Follows two-step commands  -       Communication: primarily Greek speaking; pt able to understand most english. Pt's family assisting with translation   -       Memory: No Deficits noted  -       Safety awareness/insight to disability: intact   -       Mood/Affect/Coping skills/emotional control: Appropriate to situation  Visual/Perceptual:      -not formally assessed       Physical Exam:  Balance:    - Static sit: CGA <> SBA   -  Dynamic sit: CGA <> SBA  - Static standing: mod A <> min A    Postural examination/scapula alignment:    -       Rounded shoulders  -       Abnormal trunk flexion  -       Kyphosis  Dominant hand:    -       right  Upper Extremity Range of Motion:     -       Right Upper Extremity: WFL  -       Left Upper Extremity: WFL  Upper Extremity Strength:    -       Right Upper Extremity: grossly 4/5   -       Left Upper Extremity: grossly 4/5     AMPAC 6 Click:  AMPAC Total Score: 14    Treatment & Education:Education:    Pt educated by therapist on:   - Pt educated on role of OT, POC, and goals for therapy.    - Patient and family aware of patient's deficits and therapy progression.   - Educated pt on being appropriate to transfer with nsg and PCT with min A using RW  - Pt and family educated on d/c recs   - Time provided for therapeutic counseling and discussion of health disposition.   - Importance of OOB ax's with staff member assistance and sitting OOB majority of day.   - Pt completed ADLs and functional mobility for  treatment session as noted above   - Pt verbalized understanding. Pt expressed no further concerns/questions.  - whiteboard updated     Patient left up in chair with all lines intact, call button in reach, RN notified and family  present    GOALS:   Multidisciplinary Problems     Occupational Therapy Goals        Problem: Occupational Therapy Goal    Goal Priority Disciplines Outcome Interventions   Occupational Therapy Goal     OT, PT/OT Ongoing (interventions implemented as appropriate)    Description:  Goals to be met by: 2/21    Patient will increase functional independence with ADLs by performing:    UE Dressing with Set-up A.  LE Dressing with set-up A.  Grooming while standing with Stand-by Assistance.  Toileting from toilet with Stand-by Assistance for hygiene and clothing management.   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    UTI (urinary tract infection)        Past Surgical History:   Procedure Laterality Date    CATARACT EXTRACTION W/  INTRAOCULAR LENS IMPLANT Left 7/21/14    CATARACT EXTRACTION W/  INTRAOCULAR LENS IMPLANT Right 8/11/14    CYSTOSCOPY      INSERTION-INTRAOCULAR LENS (IOL) Right 8/11/2014    [facility]    INSERTION-INTRAOCULAR LENS (IOL) Left 7/21/2014    [facility] by [facility] at [facility]    PHACOEMULSIFICATION-ASPIRATION-CATARACT Right 8/11/2014    Performed by [facility] at [facility]    PHACOEMULSIFICATION-ASPIRATION-CATARACT Left 7/21/2014    Performed by [facility]jo [facility] at [facility]    screening N/A  9/5/2014    Performed by Mt Drake MD at [facility] ([address] [place] [address])       Time Tracking:     OT Date of Treatment: 02/07/19  OT Start Time: 1035  OT Stop Time: 1055  OT Total Time (min): 20 min    Billable Minutes:Re-eval 10  Therapeutic Activity 8    Bela Carcamo, OT  2/7/2019

## 2019-02-07 NOTE — PT/OT/SLP PROGRESS
"Speech Language Pathology Treatment & Discharge Summary    Patient Name:  Vicki Peña   MRN:  [id number]  Admitting Diagnosis: Sepsis due to urinary tract infection    Recommendations:                 General Recommendations:  Follow-up not indicated  Diet recommendations:  Regular, Liquid Diet Level: Thin   Aspiration Precautions: 1 bite/sip at a time, Alternating bites/sips, Feed only when awake/alert, HOB to 90 degrees, Meds whole 1 at a time, Monitor for s/s of aspiration, Small bites/sips and Standard aspiration precautions   General Precautions: Standard, aspiration, fall  Communication strategies:  none    Subjective     SLP reviewed Pt with nurse  Pt presents calm  Pt explains, "no questions"  Daughters explains, "She ate some of her chicken last night, her appetite is increasing"    Pain/Comfort:  · Pain Rating 1: 0/10    Objective:     Has the patient been evaluated by SLP for swallowing?   Yes  Keep patient NPO? No   Current Respiratory Status: nasal cannula      Pt seen for f/u monitoring following MBSS completed day prior. Pt found upright in chair in room, Daughters at bedside interpreted for Patient. Pt denied odynophagia or difficulty with swallowing. Pt reported good appetite. Daughters endorsed Patient's report.  Pt and Daughters educated on safe swallow precautions as recommended following MBSS prior service day with emphasis on single bites/sips, one bite/sip at a time and only when awake and alert, upright at 90 degree angle. Pt and family verbalized understanding. SLP also reviewed ongoing monitoring for S/S aspiration and S/S aspiration.  No questions noted.  Whiteboard current. Pt remained upright in chair in room with daughters at bedside as SLP exited room.     Assessment:     Vicki Peña is a 83 y.o. female with an SLP diagnosis of Mild Oropharyngeal Dysphagia .  She presents with decreased pain this service day. No additional therapy warranted at this time. Please reconsult should status " change.     Goals:   Multidisciplinary Problems     SLP Goals     Not on file          Multidisciplinary Problems (Resolved)        Problem: SLP Goal    Goal Priority Disciplines Outcome   SLP Goal   (Resolved)     SLP Outcome(s) achieved   Description:  Speech Language Pathology Goals  Goals expected to be met by 2/11/2019  1. Pt will participate in ongoing swallow assessment   2. Educate Pt and family on S/S aspiration and aspiration precautions                       Plan:     · Patient to be seen:  4 x/week   · Plan of Care expires:  03/07/19  · Plan of Care reviewed with:  patient   · SLP Follow-Up:  No       Discharge recommendations:  (no additional ST needs anticipated following d/c from acute)     Time Tracking:     SLP Treatment Date:   02/07/19  Speech Start Time:  1119  Speech Stop Time:  1128     Speech Total Time (min):  9 min    Billable Minutes: Treatment Swallowing Dysfunction 9    ALISSA Murdock, [facility]-SLP  Speech-Language Pathology  Pager: [phone]    02/07/2019

## 2019-02-07 NOTE — PROGRESS NOTES
Hospital Medicine  Progress note    Team: [facility] HOSP MED R Adin Webb MD  Admit Date: 2/2/2019  JACK   Length of Stay:  LOS: 5 days   Code status: Full Code    Principal Problem:  Sepsis due to urinary tract infection    Overview:    Interval hx: Patient seen and examined at bedside, patient transferred from the ICU overnight where she was admitted for pneumonia and pancytopenia .    ROS   Constitutional: Positive for activity change, appetite change and fatigue. Negative for chills and fever.   HENT: Negative for congestion, rhinorrhea and sore throat.    Respiratory: Positive for cough. Negative for shortness of breath, wheezing and stridor.    Cardiovascular: Negative for chest pain, palpitations and leg swelling.   Gastrointestinal: Positive for nausea. Negative for abdominal pain, blood in stool, constipation, diarrhea and vomiting.   Genitourinary: Negative for decreased urine volume, difficulty urinating, dysuria, flank pain, frequency and urgency.   Musculoskeletal: Negative for arthralgias and myalgias.   Neurological: Negative for syncope, weakness, light-headedness and headaches.   Psychiatric/Behavioral: Negative for agitation and confusion.         PEx  Temp:  [96.6 °F (35.9 °C)-99.1 °F (37.3 °C)]   Pulse:  [80-98]   Resp:  [14-24]   BP: ()/(51-59)   SpO2:  [90 %-99 %]     Intake/Output Summary (Last 24 hours) at 2/7/2019 1749  Last data filed at 2/7/2019 1206  Gross per 24 hour   Intake 60 ml   Output 1450 ml   Net -1390 ml       Constitutional: She is oriented to person, place, and time. No distress.   HENT:   Head: Normocephalic.   conjunctival pallor   Eyes: Pupils are equal, round, and reactive to light.   Neck: Normal range of motion. JVD present.   Cardiovascular: Normal rate, regular rhythm and normal heart sounds.   No murmur heard.  Pulmonary/Chest: Effort normal. No stridor. No respiratory distress. She has wheezes. She has rales (Throughout lung fields, worse on left).   Abdominal:  Soft. Bowel sounds are normal. She exhibits no distension. There is no tenderness. There is no guarding.   Musculoskeletal: Normal range of motion. She exhibits no edema or deformity.   Neurological: She is alert and oriented to person, place, and time.   Skin: Skin is warm and dry. Capillary refill takes less than 2 seconds. She is not diaphoretic.   Psychiatric: She has a normal mood and affect.   Nursing note and vitals reviewed.        Recent Labs   Lab 02/06/19  0902 02/06/19  1941 02/07/19  0805   WBC 7.05 7.71 6.76   HGB 8.9* 9.5* 9.0*   HCT 26.0* 28.3* 26.8*   PLT 25* 28* 25*     Recent Labs   Lab 02/05/19  0454  02/06/19  0401 02/06/19  0551 02/06/19  1325 02/07/19  0414   *   < > 123* 126* 126* 129*   K 3.4*   < > 3.0* 3.1* 4.5 3.4*   CL 98   < > 96 97 98 95   CO2 18*   < > 20* 19* 18* 26   BUN 19   < > 16 16 15 17   CREATININE 1.0   < > 0.8 0.8 0.8 0.9   GLU 74   < > 84 85 106 86   CALCIUM 7.6*   < > 7.8* 7.8* 8.2* 8.5*   MG 1.0*   < > 1.8 1.7  --  1.7   PHOS 3.5  --  2.3*  --   --  2.3*    < > = values in this interval not displayed.     Recent Labs   Lab 02/02/19 2036 02/05/19  0608 02/06/19  0401 02/07/19  0414   ALKPHOS 277*   < > 126 146* 238*   ALT 33   < > 25 20 17   AST 32   < > 23 16 15   ALBUMIN 2.5*   < > 1.7* 1.5* 1.6*   PROT 6.5   < > 4.9* 4.7* 5.0*   BILITOT 0.6   < > 1.8* 1.2* 1.2*   INR 1.1  --   --   --   --     < > = values in this interval not displayed.        No results for input(s): CPK, CPKMB, MB, TROPONINI in the last 72 hours.  Recent Labs   Lab 02/04/19  1302   POCTGLUCOSE 99     No results found for: HGBA1C    Scheduled Meds:   albuterol-ipratropium  3 mL Nebulization Q4H WAKE    cefTRIAXone (ROCEPHIN) IVPB  1 g Intravenous Q24H    pantoprazole  40 mg Oral Daily    potassium chloride  40 mEq Oral Once    simvastatin  40 mg Oral QHS     Continuous Infusions:  As Needed:  acetaminophen, loperamide, ondansetron, promethazine, ramelteon, sodium chloride 0.9%, sodium  chloride 0.9%, sodium chloride 0.9%    Active Hospital Problems    Diagnosis  POA    *Sepsis due to urinary tract infection [A41.9, N39.0]  Yes    Hypomagnesemia [E83.42]  Unknown    Hypokalemia [E87.6]  Unknown    Pneumonia involving left lung [J18.9]  Unknown    SOB (shortness of breath) [R06.02]  Unknown    Weakness [R53.1]  Yes    History of CVA (cerebrovascular accident) [Z86.73]  Not Applicable    Hyponatremia [E87.1]  Yes    Pancytopenia [D61.818]  Yes    Hyperlipidemia [E78.5]  Yes     Chronic      Resolved Hospital Problems   No resolved problems to display.         Assessment and Plan    History of CVA (cerebrovascular accident)     Patient states that she had a stroke 20 years back. No residual neurological manifestion.  ECHO this admission consistent with PFO and shunt physiology.   - 02/05/2019: Critical Care contacted Vascular Neurology, Dr. Horn, to ask about the role of anti-platelets vs anticoagulation because the patient has PFO with history of stroke. No history of A fib. He recommends to start ASA 81 mg PO qD            Pulmonary   Pneumonia involving left lung     On 02/03/2019: Patient  febrile this AM and UA c/w with UTI start Ceftriaxone,  At afternoon repeat fever to >102, blood cultures obtained and on abx for UTI, started on sepsis bolus 30cc/kg and monitoring of lactic acid levels. Antibiotics were changed to Piperacillin/Tazobactam and Vancomycin.      02/04/2019: Patient have complaints of left sided lateral chest wall pleuritic pain. Throughout the night patient remained febrile, O2 requirement increasing this Am after 4am patient given 2 500ml saline boluses. This am patient is tachypneic, accessory muscle use, febrile, MAP >65, but pressures 90s/50s. CXR with significant new left sided lower& upper airspace disease, absent breath sounds in left mid to lower fields, dullness to percussion, right basilar absent breath sounds. On Exam patient also with distended JVP to angle  of jaw. Critical Care consulted & have accepted patient. Azithromycin started     02/05/2019: During night patient received 1 L NS bolus for marginal BP. Patient reports her symptoms have improved today. BP now is stable. Afebrile last spike of fever: 39.1 (02/03/3019). Np leucocytosis. CXR today: Worsening severe pulmonary edema.       CT Chest consistent with pulmonary edema vs ARDS; however, patient requiring minimal oxygen (3L currently) and has received copious IVF due to sepsis. PNA possible but less likely given how rapid radiographic changes developed.   Will start gentle diuresis  Will complete course of CAP therapy with rocephin and azithromycin      - Strict Is/Os  -will do CT chest without contrast today            Cardiac/Vascular   Hyperlipidemia     - Resume home medication: Statin.  - Lipid panel- sent      Renal/   Hypokalemia     Monitor potassium level and replace as needed      Hypomagnesemia     Monitor Mg level and replace as needed      Hyponatremia     Likely hypervolemic hyponatremia, given pulmonary edema and fluid resuscitation for sepsis  Will trial with Lasix 40 mg IV, repeat BMP at noon  Should hyponatremia not improve with diuresis, will order serum and urine osm, urine sodium      ID   * Sepsis due to urinary tract infection     - Urine cultures growing E. 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  Prior to [facility] arrival she had her blood drawn and was found to have a Hgb of 6.6. At that time her PCP instructed her to go to the emergency department for a blood transfusion.  She is s/p 1unit Platelet transfusion - developed transfusion reaction during this - received IM epi, Benadryl/ 125 methylpred for concerns of throat swelling per   "Carlos.    Heme/oncology consulted (02/03/2019) their recs and plan: " Anemia, thrombocytopenia. Ddx includes primary BM disorder such as MDS, vs secondary BM suppression from infection (recent Chikungunya infection, known to cause thrombocytopenia). Reviewed medication list, no known culprits. Possible also slow GIB from adenoma.  -No evidence of iron deficiency, B12/folate nml  -No evidence of hemolysis  -No evidence of splenomegaly, no evidence of cirrhosis  -No evidence of coagulation      Will try to perform BM biopsy early this week while patient is here. If patient needs to be discharged, can be scheduled to be done as outpatient. Results will take about a 3-7 days to results.  -Hold NSAIDs  -Transfuse Hb<7 or plt<10 unless active bleeding  -Consider repeat colonoscopy given recommended to be done 5 years from adenoma visualized in 2014   Other   SOB (shortness of breath)     - Likely due to volume overload and pulmonary edema  - Will diurese and assess for improvement      Weakness     - Generalized, likely multifactorial (age, acute illness, deconditioning)  - PT/OT consulted              High Risk Conditions  Sepsis, pancytopenia      Diet: regular thin diet  GI PPx:   DVT PPx:    MING/SCD     Goals of Care: Full code  Discharge plan: pending bone marrow biopsy     Time (minutes) spent in care of the patient (Greater than 1/2 spent in direct face-to-face contact) 35 minutes      Adin Webb MD  Staff Hospitalist  Department of Hospital Medicine  [facility]   [phone]        "

## 2019-02-07 NOTE — PROGRESS NOTES
Progress Note      SUBJECTIVE:     Patient seen and examined at the bedside  Patient's breathing is better, currently on 2 L of oxygen via nasal cannula  Patient denied of any chest pain, palpitations, fever, chills or abdominal pain  Her diarrhea have improved  She is on contact precautions for Enterovirus    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    UTI (urinary tract infection)      Past Surgical History:   Procedure Laterality Date    CATARACT EXTRACTION W/  INTRAOCULAR LENS IMPLANT Left 7/21/14    CATARACT EXTRACTION W/  INTRAOCULAR LENS IMPLANT Right 8/11/14    CYSTOSCOPY      INSERTION-INTRAOCULAR LENS (IOL) Right 8/11/2014    Performed by [facility] at [facility] OR    INSERTION-INTRAOCULAR LENS (IOL) Left 7/21/2014    [facility] at [facility] OR    PHACOEMULSIFICATION-ASPIRATION-CATARACT Right 8/11/2014    [facility] by [facility] at [facility] OR    PHACOEMULSIFICATION-ASPIRATION-CATARACT Left 7/21/2014    [facility]formed by [facility] at [facility] OR    screening N/A 9/5/2014    Performed by Mt Drake MD at [facility] (4TH FLR)     Family History   Problem Relation Age of Onset    Cancer Brother     Cancer Brother     Cancer Sister      Social History     Tobacco Use    Smoking status: Never Smoker    Smokeless tobacco: Never Used   Substance Use Topics    Alcohol use: No     Alcohol/week: 0.0 oz     Comment: moderate    Drug use: No     ROS     Constitutional: Positive for malaise/fatigue.   HENT: Negative for nosebleeds.    Respiratory: Positive for cough and shortness of breath.    Cardiovascular: Negative for chest pain and leg swelling.   Gastrointestinal: Positive for abdominal pain.  Negative for blood in stool, heartburn and vomiting.   Genitourinary: Negative for hematuria and urgency.   Musculoskeletal: Positive for back pain and joint pain.   Neurological: Negative for sensory change, seizures and headaches.       OBJECTIVE:     Vital Signs:  Temp:  [96.6 °F (35.9 °C)-98.2 °F (36.8 °C)]   Pulse:  [86-98]   Resp:  [14-18]   BP: ()/(51-54)   SpO2:  [90 %-99 %]     Physical Exam     Constitutional: She is oriented to person, place, and time.   HENT:   Head: Normocephalic and atraumatic.   Eyes: EOM are normal. Pupils are equal, round, and reactive to light.   Neck: Normal range of motion. Neck supple.   Cardiovascular:   Murmur heard.  Pulmonary/Chest: Effort normal. She has wheezes.   Abdominal: Soft. Bowel sounds are normal. There is no tenderness.   Musculoskeletal: Normal range of motion. She exhibits no tenderness or deformity.   Neurological: She is alert and oriented to person, place, and time.   Skin: Skin is warm and dry. No pallor.      Laboratory:  CBC:   Recent Labs   Lab 02/07/19  0805   WBC 6.76   RBC 3.36*   HGB 9.0*   HCT 26.8*   PLT 25*   MCV 80*   MCH 26.8*   MCHC 33.6     CMP:   Recent Labs   Lab 02/07/19  0414   GLU 86   CALCIUM 8.5*   ALBUMIN 1.6*   PROT 5.0*   *   K 3.4*   CO2 26   CL 95   BUN 17   CREATININE 0.9   ALKPHOS 238*   ALT 17   AST 15   BILITOT 1.2*     Coagulation:   Recent Labs   Lab 02/02/19  2036   LABPROT 11.7   INR 1.1   APTT 25.9     ABGs:   Recent Labs   Lab 02/04/19  1059   PH 7.316*   PCO2 28.1*   PO2 83   HCO3 14.3*   POCSATURATED 95   BE -12     Microbiology Results (last 7 days)     Procedure Component Value Units Date/Time    Blood Culture #1 **CANNOT BE ORDERED STAT** [[id number]] Collected:  02/03/19 0968    Order Status:  Completed Specimen:  Blood Updated:  02/07/19 1212     Blood Culture, Routine No Growth to date     Blood Culture, Routine No Growth to date     Blood Culture, Routine No Growth to date     Blood Culture, Routine No  Growth to date     Blood Culture, Routine No Growth to date    Blood Culture #1 **CANNOT BE ORDERED STAT** [[id number]] Collected:  02/03/19 0925    Order Status:  Completed Specimen:  Blood Updated:  02/07/19 1212     Blood Culture, Routine No Growth to date     Blood Culture, Routine No Growth to date     Blood Culture, Routine No Growth to date     Blood Culture, Routine No Growth to date     Blood Culture, Routine No Growth to date    Respiratory Viral Panel by PCR [facility]; Nasal Swab [[id number]]  (Abnormal) Collected:  02/04/19 1154    Order Status:  Completed Specimen:  Respiratory Updated:  02/07/19 0741     Respiratory Virus Panel, source Nasal Swab     RVP - Adenovirus Not Detected     Comment: Detects Serotypes B and E. Detection of Serotype C may   be limited. If Adenovirus infection is suspected and a   Not Detected result is returned the sample should be   re-tested for Adenovirus using an independent method  (e.g. [employer] Adenovirus Quantitative Real-Time  PCR test.          Enterovirus Positive     Comment: Cross-reactivity has been observed between certain Rhinovirus  strains and the Enterovirus assay.          Human Bocavirus Not Detected     Human Coronavirus Not Detected     Comment: The Human Coronavirus assay detects Human coronavirus types  229E, OC43,NL63 and HKU1.          RVP - Human Metapneumovirus (hMPV) Not Detected     RVP - Influenza A Not Detected     Influenza A - [id number] Not Detected     RVP - Influenza B Not Detected     Parainfluenza Not Detected     Respiratory Syncytial VirusVirus (RSV) A Not Detected     Comment: The Respiratory Syncytial Viral assay detects types A and B,  however it does not distinguish between the two.          RVP - Rhinovirus Not Detected     Comment: Cross-Reactivity has been observed between certain   Rhinovirus strains and the Enterovirus assay.  Target Enriched Mulitplex Polymerase Chain Reaction (TEM-PCR)  allows for the detection of multiple  pathogens out of a single  reaction.  This test was developed and its performance   characteristics determined by [employer].  It has not   been cleared or approved by the U.S.Food and Drug Administration.  Results should be used in conjunction with clinical findings,   and should not form the sole basis for a diagnosis or treatment  decision.  TEM-PCR is a licensed technology of [employer].         Narrative:       Receiving Lab:->[facility]    Culture, Respiratory with Gram Stain [[id number]] Collected:  02/04/19 1115    Order Status:  Completed Specimen:  Respiratory from Sputum Updated:  02/06/19 1110     Respiratory Culture Normal respiratory fernando     Respiratory Culture No S aureus or Pseudomonas isolated.     Gram Stain (Respiratory) <10 epithelial cells per low power field.     Gram Stain (Respiratory) Few WBC's     Gram Stain (Respiratory) Few Gram positive cocci     Gram Stain (Respiratory) Few Gram negative rods    Respiratory Viral Panel by PCR [facility]; Sputum [[id number]] Collected:  02/04/19 1115    Order Status:  Completed Specimen:  Respiratory Updated:  02/06/19 0707     Respiratory Virus Panel, source Sputum     RVP - Adenovirus Not Detected     Comment: Detects Serotypes B and E. Detection of Serotype C may   be limited. If Adenovirus infection is suspected and a   Not Detected result is returned the sample should be   re-tested for Adenovirus using an independent method  (e.g. [employer] Adenovirus Quantitative Real-Time  PCR test.          Enterovirus Not Detected     Comment: Cross-reactivity has been observed between certain Rhinovirus  strains and the Enterovirus assay.          Human Bocavirus Not Detected     Human Coronavirus Not Detected     Comment: The Human Coronavirus assay detects Human coronavirus types  229E, OC43,NL63 and HKU1.          RVP - Human Metapneumovirus (hMPV) Not Detected     RVP - Influenza A Not Detected     Influenza A - [id number] Not Detected      RVP - Influenza B Not Detected     Parainfluenza Not Detected     Respiratory Syncytial VirusVirus (RSV) A Not Detected     Comment: The Respiratory Syncytial Viral assay detects types A and B,  however it does not distinguish between the two.          RVP - Rhinovirus Not Detected     Comment: Cross-Reactivity has been observed between certain   Rhinovirus strains and the Enterovirus assay.  Target Enriched Mulitplex Polymerase Chain Reaction (TEM-PCR)  allows for the detection of multiple pathogens out of a single  reaction.  This test was developed and its performance   characteristics determined by [employer].  It has not   been cleared or approved by the U.S.Food and Drug Administration.  Results should be used in conjunction with clinical findings,   and should not form the sole basis for a diagnosis or treatment  decision.  TEM-PCR is a licensed technology of [employer].         Narrative:       Receiving Lab:->[facility]    Clostridium difficile EIA [[id number]] Collected:  02/04/19 1445    Order Status:  Completed Specimen:  Stool Updated:  02/04/19 2256     C. diff Antigen Negative     C difficile Toxins A+B, EIA Negative     Comment: Testing not recommended for children <24 months old.       Influenza A & B by Molecular [[id number]] Collected:  02/04/19 1153    Order Status:  Canceled Specimen:  Nasopharyngeal Swab Updated:  02/04/19 1414    Gram stain [[id number]]     Order Status:  Completed Specimen:  Urine         Recent Labs   Lab 02/02/19  0852   COLORU Yellow   SPECGRAV 1.010   PHUR 6.0   PROTEINUA Negative   BACTERIA Many*   NITRITE Positive*   LEUKOCYTESUR 2+*         Diagnostic Results:      ASSESSMENT/PLAN:     1.  Anemia and thrombocytopenia:  Hemoglobin improved and stable.  Platelet are 25, no active bleeding seen.  Patient has sepsis and on broad-spectrum antibiotics which are likely contributing to her low platelet count  2.  Left-sided pneumonia as evidence on chest  x-ray, CT  3.  Urinary tract infection with positive cultures for gram-negative rods  4.  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  Seen by Dr. José Manuel baker in 2013. Patient with long standing cytopenias, dating back to at least 2005. She has history of clonal T cell gene rearrangement in 2012.   5.  Fluid overload:  She received multiple transfusions and IV fluid boluses     Plan:      1.  Transfuse for hemoglobin of less than 7 and platelets less than 10  2.  Treat sepsis with antibiotics, as per primary  3.  Will do bone marrow biopsy when more unstable   4.  No aspirin or heparin until the platelet count improves to >50,000.  Use SCDs for DVT prophylaxis     Plan of care discussed with Dr. Rainer Ordaz  PGY 4, hematology/oncology

## 2019-02-07 NOTE — PLAN OF CARE
Problem: Adult Inpatient Plan of Care  Goal: Plan of Care Review  Outcome: Ongoing (interventions implemented as appropriate)  Pt remains free from falls and injury. Pt on strict I&O with the exception of a few unmeasured urine occurences d/t diarrhea and bed accidents. Encouraged to ambulate with assistance to maintain strength. Pt placed on contact isolation for enterovirus in sputum. Pt and family states they understand POC. Pt diet advanced to regular diet. Pt currently on 3L NC. VSS. No complaints at this time. Will continue to monitor.

## 2019-02-07 NOTE — PT/OT/SLP RE-EVAL
Physical Therapy Re-evaluation  & Treatment    Patient Name:  Vicki Peña   MRN:  [id number]    Recommendations:     Discharge Recommendations: Short stay SNF (pending progress)  Discharge Equipment Recommendations: (rolling walker)   Barriers to discharge: Decreased caregiver support    Assessment:     Vicki Peña is a 83 y.o. female admitted with a medical diagnosis of sepsis due to urinary tract infection. Pt pleasant and motivated to return to [facility].   She presents with the following impairments/functional limitations:  weakness, impaired endurance, gait instability, impaired functional mobilty, impaired self care skills, impaired balance, pain, impaired cardiopulmonary response to activity. Pt would benefit from skilled PT intervention to address listed functional deficits, reduce fall risk, and maximize (I) and safety with functional mobility.     Rehab Prognosis:  good; patient would benefit from acute skilled PT services to address these deficits and reach maximum level of function.      Recent Surgery: * No surgery found *     Plan:     During this hospitalization, patient to be seen 4 x/week to address the above listed problems via gait training, therapeutic activities, therapeutic exercises, neuromuscular re-education  · Plan of Care Expires:  03/07/19   Plan of Care Reviewed with: patient, daughter    Subjective     Communicated with RN prior to session.  Patient found supine upon PT entry to room, agreeable to evaluation.  Pt's family at bedside.     Chief Complaint: decreased endurance   Patient comments/goals: return home  Pain/Comfort:  · Pain Rating 1: (Pt did not rate pain on numeric scale)  · Location - Side 1: Bilateral  · Location 1: (chest and flank)  · Pain Addressed 1: Reposition, Distraction, Nurse notified    Patients cultural, spiritual, Alevism conflicts given the current situation: no      Objective:     Patient found with: oxygen, peripheral IV, telemetry     General Precautions:  Standard, fall, aspiration, nectar thick, pureed diet   Orthopedic Precautions:N/A   Braces: N/A     Exams:  · Cognitive Exam: Pt alert and pleasant; able to follow commands appropriately; primarily speaks Danish- family assists with translation   · Postural Exam:  Patient presented with the following abnormalities:    · -       Rounded shoulders  · -       Forward head  · -       Kyphosis  · Sensation:    · -       Intact  · RLE ROM: WFL  · RLE Strength: 4/5  · LLE ROM: WFL  · LLE Strength: 4/5  · Cardiopulmonary system: pt on 2 L supplemental 02 via nasal cannula; displays SOB upon exertion     Functional Mobility:    Bed Mobility:   · Supine > Sit: contact guard assistance with HOB elevated ~30 degrees and increased time required  · Sit > Supine: N/T     Transfers:   · Sit <> Stand Transfer: moderate assistance from EOB x 1 trial with no AD, minimum assistance from EOB x 2nd trial with RW, minimum assistance from bedside chair with RW      Standing Balance: impaired  · Pt required mod A to maintain balance in static standing with no AD                  Gait:  · Distance: ~3 ft. + 18 ft. with seated rest break   · Assistance level: contact guard to minimum assistance  · Assistive Device: RW   · Gait Assessment: decreased step length, forward flexed posture, decreased natalie      AM-PAC 6 CLICK MOBILITY  Total Score:16     Therapeutic Activities and Exercises:  Pt and family educated on:  - Role of PT and POC/goals for therapy   - Safety with mobility and fall risk   - Instructed to call nursing staff for assistance with mobility as needed 2/2 fall risk; recommending pt ambulate to bathroom as needed with nursing staff to increase mobilization. RW ordered for room.     Therapist facilitated trial of gait training with introduction to RW management to improve gait stability, endurance, and independence with functional ambulation.     Pt verbalized understanding and expressed no further concerns/questions.      Patient left up in chair with all lines intact, call button in reach, RN notified and family present.    GOALS:   Multidisciplinary Problems     Physical Therapy Goals        Problem: Physical Therapy Goal    Goal Priority Disciplines Outcome Goal Variances Interventions   Physical Therapy Goal     PT, PT/OT Ongoing (interventions implemented as appropriate)     Description:  Goals to be met by: 2019      Patient will increase functional independence with mobility by performin. Supine to sit with supervision  2. Sit to supine with supervision   3. Sit to stand transfer with supervision   4. Gait  X 100 feet with Stand-by Assistance using least restrictive AD.   6. Standing x5 minutes while performing dynamic UE tasks with stand by assistance   6. Lower extremity exercise program x15 reps per handout, with independence                       History:     Past Medical History:   Diagnosis Date    Bilateral cataracts     Chronic kidney disease     CVA (cerebrovascular accident)     Hypercholesterolemia     Hyperlipidemia     Kidney stone     OP (osteoporosis)     Right knee DJD 2014    UTI (urinary tract infection)        Past Surgical History:   Procedure Laterality Date    CATARACT EXTRACTION W/  INTRAOCULAR LENS IMPLANT Left 14    CATARACT EXTRACTION W/  INTRAOCULAR LENS IMPLANT Right 14    CYSTOSCOPY      INSERTION-INTRAOCULAR LENS (IOL) Right 2014    [facility]formed by [facility] at [facility] OR    INSERTION-INTRAOCULAR LENS (IOL) Left 2014    [facility] by [facility] at [facility] OR    PHACOEMULSIFICATION-ASPIRATION-CATARACT Right 2014    [facility] at [facility] OR    PHACOEMULSIFICATION-ASPIRATION-CATARACT Left 2014    [facility] by [facility] at [facility] OR    screening N/A 2014    Performed by Mt Drake MD at [facility] ([address] [place] [address])       Time Tracking:     PT Received On: 19  PT Start Time: 1054     PT Stop Time:  1118  PT Total Time (min): 24 min     Billable Minutes: Re-eval 14 min and Gait Training 10 min    Izzy Degroot PT, DPT   2/7/2019  Pager: [phone]

## 2019-02-07 NOTE — PLAN OF CARE
Problem: SLP Goal  Goal: SLP Goal  Speech Language Pathology Goals  Goals expected to be met by 2/11/2019  1. Pt will participate in ongoing swallow assessment   2. Educate Pt and family on S/S aspiration and aspiration precautions      Outcome: Outcome(s) achieved Date Met: 02/07/19  Family educated on findings from MBSS and safe swallow precautions. Pt with increased appetite and decreased throat pain.  No additional ST warranted at this time. Please reconsult should status change.    FRANDY Murdock., [facility]-SLP  Speech-Language Pathology  Pager: [phone]  2/7/2019

## 2019-02-07 NOTE — PLAN OF CARE
Problem: Occupational Therapy Goal  Goal: Occupational Therapy Goal  Goals to be met by: 2/21    Patient will increase functional independence with ADLs by performing:    UE Dressing with Set-up A.  LE Dressing with set-up A.  Grooming while standing with Stand-by Assistance.  Toileting from toilet with Stand-by Assistance for hygiene and clothing management.   Pt will engage in functional mobility to simulate household distances in order to maximize functional activity tolerance required for engagement in occupations of choice with supervision using RW       Outcome: Ongoing (interventions implemented as appropriate)  Re-eval completed     Bela Carcamo, OTR/L  [phone]  2/7/2019

## 2019-02-07 NOTE — PLAN OF CARE
Problem: Physical Therapy Goal  Goal: Physical Therapy Goal  Goals to be met by: 2019      Patient will increase functional independence with mobility by performin. Supine to sit with supervision  2. Sit to supine with supervision   3. Sit to stand transfer with supervision   4. Gait  X 100 feet with Stand-by Assistance using least restrictive AD.   6. Standing x5 minutes while performing dynamic UE tasks with stand by assistance   6. Lower extremity exercise program x15 reps per handout, with independence     Outcome: Ongoing (interventions implemented as appropriate)  PT re-evaluation completed- see note for details. Goals established and POC initiated.     Izzy Degroot, PT, DPT   2019  Pager: [phone]

## 2019-02-08 LAB
ALBUMIN SERPL BCP-MCNC: 1.5 G/DL
ALP SERPL-CCNC: 385 U/L
ALT SERPL W/O P-5'-P-CCNC: 20 U/L
ANION GAP SERPL CALC-SCNC: 8 MMOL/L
AST SERPL-CCNC: 23 U/L
BACTERIA BLD CULT: NORMAL
BACTERIA BLD CULT: NORMAL
BASOPHILS # BLD AUTO: 0.46 K/UL
BASOPHILS NFR BLD: 3.9 %
BILIRUB SERPL-MCNC: 1.1 MG/DL
BUN SERPL-MCNC: 15 MG/DL
CALCIUM SERPL-MCNC: 8.5 MG/DL
CHLORIDE SERPL-SCNC: 96 MMOL/L
CO2 SERPL-SCNC: 25 MMOL/L
CREAT SERPL-MCNC: 0.7 MG/DL
DIFFERENTIAL METHOD: ABNORMAL
EOSINOPHIL # BLD AUTO: 0.5 K/UL
EOSINOPHIL NFR BLD: 3.8 %
ERYTHROCYTE [DISTWIDTH] IN BLOOD BY AUTOMATED COUNT: 20.7 %
EST. GFR  (AFRICAN AMERICAN): >60 ML/MIN/1.73 M^2
EST. GFR  (NON AFRICAN AMERICAN): >60 ML/MIN/1.73 M^2
GLUCOSE SERPL-MCNC: 94 MG/DL
HCT VFR BLD AUTO: 30.7 %
HGB BLD-MCNC: 10 G/DL
IMM GRANULOCYTES # BLD AUTO: 0.24 K/UL
IMM GRANULOCYTES NFR BLD AUTO: 2 %
LYMPHOCYTES # BLD AUTO: 3.4 K/UL
LYMPHOCYTES NFR BLD: 28.4 %
MAGNESIUM SERPL-MCNC: 1.5 MG/DL
MCH RBC QN AUTO: 27.4 PG
MCHC RBC AUTO-ENTMCNC: 32.6 G/DL
MCV RBC AUTO: 84 FL
MONOCYTES # BLD AUTO: 5.1 K/UL
MONOCYTES NFR BLD: 43.3 %
NEUTROPHILS # BLD AUTO: 2.2 K/UL
NEUTROPHILS NFR BLD: 18.6 %
NRBC BLD-RTO: 0 /100 WBC
PHOSPHATE SERPL-MCNC: 2.3 MG/DL
PLATELET # BLD AUTO: 28 K/UL
PMV BLD AUTO: ABNORMAL FL
POTASSIUM SERPL-SCNC: 3.5 MMOL/L
PROT SERPL-MCNC: 5.1 G/DL
RBC # BLD AUTO: 3.65 M/UL
SODIUM SERPL-SCNC: 129 MMOL/L
WBC # BLD AUTO: 11.79 K/UL

## 2019-02-08 PROCEDURE — 83735 ASSAY OF MAGNESIUM: CPT

## 2019-02-08 PROCEDURE — 99233 SBSQ HOSP IP/OBS HIGH 50: CPT | Mod: ,,, | Performed by: HOSPITALIST

## 2019-02-08 PROCEDURE — 97116 GAIT TRAINING THERAPY: CPT

## 2019-02-08 PROCEDURE — 80053 COMPREHEN METABOLIC PANEL: CPT

## 2019-02-08 PROCEDURE — 63600175 PHARM REV CODE 636 W HCPCS: Performed by: HOSPITALIST

## 2019-02-08 PROCEDURE — 25000003 PHARM REV CODE 250: Performed by: STUDENT IN AN ORGANIZED HEALTH CARE EDUCATION/TRAINING PROGRAM

## 2019-02-08 PROCEDURE — 97110 THERAPEUTIC EXERCISES: CPT

## 2019-02-08 PROCEDURE — 36415 COLL VENOUS BLD VENIPUNCTURE: CPT

## 2019-02-08 PROCEDURE — 85007 BL SMEAR W/DIFF WBC COUNT: CPT

## 2019-02-08 PROCEDURE — 94640 AIRWAY INHALATION TREATMENT: CPT

## 2019-02-08 PROCEDURE — 63600175 PHARM REV CODE 636 W HCPCS: Performed by: INTERNAL MEDICINE

## 2019-02-08 PROCEDURE — 94761 N-INVAS EAR/PLS OXIMETRY MLT: CPT

## 2019-02-08 PROCEDURE — 25000003 PHARM REV CODE 250: Performed by: HOSPITALIST

## 2019-02-08 PROCEDURE — 84100 ASSAY OF PHOSPHORUS: CPT

## 2019-02-08 PROCEDURE — 20600001 HC STEP DOWN PRIVATE ROOM

## 2019-02-08 PROCEDURE — 25000242 PHARM REV CODE 250 ALT 637 W/ HCPCS: Performed by: STUDENT IN AN ORGANIZED HEALTH CARE EDUCATION/TRAINING PROGRAM

## 2019-02-08 PROCEDURE — 85027 COMPLETE CBC AUTOMATED: CPT

## 2019-02-08 PROCEDURE — 99233 PR SUBSEQUENT HOSPITAL CARE,LEVL III: ICD-10-PCS | Mod: ,,, | Performed by: HOSPITALIST

## 2019-02-08 PROCEDURE — 85025 COMPLETE CBC W/AUTO DIFF WBC: CPT

## 2019-02-08 RX ORDER — MAGNESIUM SULFATE HEPTAHYDRATE 40 MG/ML
2 INJECTION, SOLUTION INTRAVENOUS ONCE
Status: COMPLETED | OUTPATIENT
Start: 2019-02-08 | End: 2019-02-08

## 2019-02-08 RX ORDER — PANTOPRAZOLE SODIUM 40 MG/10ML
40 INJECTION, POWDER, LYOPHILIZED, FOR SOLUTION INTRAVENOUS ONCE
Status: DISCONTINUED | OUTPATIENT
Start: 2019-02-08 | End: 2019-02-08

## 2019-02-08 RX ADMIN — MAGNESIUM SULFATE IN WATER 2 G: 40 INJECTION, SOLUTION INTRAVENOUS at 11:02

## 2019-02-08 RX ADMIN — ACETAMINOPHEN 650 MG: 325 TABLET, FILM COATED ORAL at 05:02

## 2019-02-08 RX ADMIN — IPRATROPIUM BROMIDE AND ALBUTEROL SULFATE 3 ML: .5; 3 SOLUTION RESPIRATORY (INHALATION) at 04:02

## 2019-02-08 RX ADMIN — Medication 5 ML: at 08:02

## 2019-02-08 RX ADMIN — IPRATROPIUM BROMIDE AND ALBUTEROL SULFATE 3 ML: .5; 3 SOLUTION RESPIRATORY (INHALATION) at 12:02

## 2019-02-08 RX ADMIN — PANTOPRAZOLE SODIUM 40 MG: 40 TABLET, DELAYED RELEASE ORAL at 10:02

## 2019-02-08 RX ADMIN — IPRATROPIUM BROMIDE AND ALBUTEROL SULFATE 3 ML: .5; 3 SOLUTION RESPIRATORY (INHALATION) at 08:02

## 2019-02-08 RX ADMIN — CEFTRIAXONE SODIUM 1 G: 1 INJECTION, POWDER, FOR SOLUTION INTRAMUSCULAR; INTRAVENOUS at 04:02

## 2019-02-08 RX ADMIN — SIMVASTATIN 40 MG: 40 TABLET, FILM COATED ORAL at 08:02

## 2019-02-08 RX ADMIN — IPRATROPIUM BROMIDE AND ALBUTEROL SULFATE 3 ML: .5; 3 SOLUTION RESPIRATORY (INHALATION) at 09:02

## 2019-02-08 NOTE — PROGRESS NOTES
Hospital Medicine  Progress note    Team: [facility] MED R Adin Webb MD  Admit Date: 2/2/2019  JACK   Length of Stay:  LOS: 6 days   Code status: Full Code    Principal Problem:  Sepsis due to urinary tract infection    Overview:    Interval hx: Patient seen and examined at bedside, plts still low at 28. Oxygen weaned off.    ROS   Constitutional: Positive for activity change, appetite change and fatigue. Negative for chills and fever.   HENT: Negative for congestion, rhinorrhea and sore throat.    Respiratory: Positive for cough. Negative for shortness of breath, wheezing and stridor.    Cardiovascular: Negative for chest pain, palpitations and leg swelling.   Gastrointestinal: Positive for nausea. Negative for abdominal pain, blood in stool, constipation, diarrhea and vomiting.   Genitourinary: Negative for decreased urine volume, difficulty urinating, dysuria, flank pain, frequency and urgency.   Musculoskeletal: Negative for arthralgias and myalgias.   Neurological: Negative for syncope, weakness, light-headedness and headaches.   Psychiatric/Behavioral: Negative for agitation and confusion.         PEx  Temp:  [98 °F (36.7 °C)-100 °F (37.8 °C)]   Pulse:  []   Resp:  [14-20]   BP: ()/(48-67)   SpO2:  [90 %-98 %]     Intake/Output Summary (Last 24 hours) at 2/8/2019 1649  Last data filed at 2/8/2019 1220  Gross per 24 hour   Intake 720 ml   Output 1000 ml   Net -280 ml       Constitutional: She is oriented to person, place, and time. No distress.   HENT:   Head: Normocephalic.   conjunctival pallor   Eyes: Pupils are equal, round, and reactive to light.   Neck: Normal range of motion. JVD present.   Cardiovascular: Normal rate, regular rhythm and normal heart sounds.   No murmur heard.  Pulmonary/Chest: Effort normal. No stridor. No respiratory distress. She has wheezes. She has rales (Throughout lung fields, worse on left).   Abdominal: Soft. Bowel sounds are normal. She exhibits no distension.  There is no tenderness. There is no guarding.   Musculoskeletal: Normal range of motion. She exhibits no edema or deformity.   Neurological: She is alert and oriented to person, place, and time.   Skin: Skin is warm and dry. Capillary refill takes less than 2 seconds. She is not diaphoretic.   Psychiatric: She has a normal mood and affect.   Nursing note and vitals reviewed.        Recent Labs   Lab 02/07/19  0805 02/07/19 2025 02/08/19  0949   WBC 6.76 7.57 11.79   HGB 9.0* 8.7* 10.0*   HCT 26.8* 26.0* 30.7*   PLT 25* 25* 28*     Recent Labs   Lab 02/06/19  0401 02/06/19  0551 02/06/19  1325 02/07/19  0414 02/08/19  0553   * 126* 126* 129* 129*   K 3.0* 3.1* 4.5 3.4* 3.5   CL 96 97 98 95 96   CO2 20* 19* 18* 26 25   BUN 16 16 15 17 15   CREATININE 0.8 0.8 0.8 0.9 0.7   GLU 84 85 106 86 94   CALCIUM 7.8* 7.8* 8.2* 8.5* 8.5*   MG 1.8 1.7  --  1.7 1.5*   PHOS 2.3*  --   --  2.3* 2.3*     Recent Labs   Lab 02/02/19 2036 02/06/19  0401 02/07/19  0414 02/08/19  0553   ALKPHOS 277*   < > 146* 238* 385*   ALT 33   < > 20 17 20   AST 32   < > 16 15 23   ALBUMIN 2.5*   < > 1.5* 1.6* 1.5*   PROT 6.5   < > 4.7* 5.0* 5.1*   BILITOT 0.6   < > 1.2* 1.2* 1.1*   INR 1.1  --   --   --   --     < > = values in this interval not displayed.        No results for input(s): CPK, CPKMB, MB, TROPONINI in the last 72 hours.  Recent Labs   Lab 02/04/19  1302   POCTGLUCOSE 99     No results found for: HGBA1C    Scheduled Meds:   albuterol-ipratropium  3 mL Nebulization Q4H WAKE    cefTRIAXone (ROCEPHIN) IVPB  1 g Intravenous Q24H    pantoprazole  40 mg Oral Daily    simvastatin  40 mg Oral QHS     Continuous Infusions:  As Needed:  acetaminophen, loperamide, ondansetron, promethazine, ramelteon, sodium chloride 0.9%, sodium chloride 0.9%, sodium chloride 0.9%    Active Hospital Problems    Diagnosis  POA    *Sepsis due to urinary tract infection [A41.9, N39.0]  Yes    Hypomagnesemia [E83.42]  Unknown    Hypokalemia [E87.6]   Unknown    Pneumonia involving left lung [J18.9]  Unknown    SOB (shortness of breath) [R06.02]  Unknown    Weakness [R53.1]  Yes    History of CVA (cerebrovascular accident) [Z86.73]  Not Applicable    Hyponatremia [E87.1]  Yes    Pancytopenia [D61.818]  Yes    Hyperlipidemia [E78.5]  Yes     Chronic      Resolved Hospital Problems   No resolved problems to display.         Assessment and Plan    History of CVA (cerebrovascular accident)     Patient states that she had a stroke 20 years back. No residual neurological manifestion.  ECHO this admission consistent with PFO and shunt physiology.   - 02/05/2019: Critical Care contacted Vascular Neurology, Dr. Horn, to ask about the role of anti-platelets vs anticoagulation because the patient has PFO with history of stroke. No history of A fib. He recommends to start ASA 81 mg PO qD   Pulmonary   Pneumonia involving left lung     On 02/03/2019: Patient  febrile this AM and UA c/w with UTI start Ceftriaxone,  At afternoon repeat fever to >102, blood cultures obtained and on abx for UTI, started on sepsis bolus 30cc/kg and monitoring of lactic acid levels. Antibiotics were changed to Piperacillin/Tazobactam and Vancomycin.      02/04/2019: Patient have complaints of left sided lateral chest wall pleuritic pain. Throughout the night patient remained febrile, O2 requirement increasing this Am after 4am patient given 2 500ml saline boluses. This am patient is tachypneic, accessory muscle use, febrile, MAP >65, but pressures 90s/50s. CXR with significant new left sided lower& upper airspace disease, absent breath sounds in left mid to lower fields, dullness to percussion, right basilar absent breath sounds. On Exam patient also with distended JVP to angle of jaw. Critical Care consulted & have accepted patient. Azithromycin started     02/05/2019: During night patient received 1 L NS bolus for marginal BP. Patient reports her symptoms have improved today. BP now is  stable. Afebrile last spike of fever: 39.1 (02/03/3019). Np leucocytosis. CXR today: Worsening severe pulmonary edema.       CT Chest consistent with pulmonary edema vs ARDS; however, patient requiring minimal oxygen (3L currently) and has received copious IVF due to sepsis. PNA possible but less likely given how rapid radiographic changes developed.   Will start gentle diuresis  Will complete course of CAP therapy with rocephin and azithromycin  CT chest 02/5/2019:  Large areas of consolidation most notably in the left upper lobe and right lower lobe, as well as small consolidations in the posterior right upper lobe and central right middle lobe.  There are areas of volume loss associated with these consolidations and relative sparing of the periphery.  These consolidations have rapidly formal compared to chest radiograph 02/02/2019, and likely represent acute airspace disease favoring pulmonary edema or ARDS, with pneumonia less likely.   Cardiac/Vascular   Hyperlipidemia     - Resume home medication: Statin.  - Lipid panel- sent      Renal/   Hypokalemia     Monitor potassium level and replace as needed      Hypomagnesemia     Monitor Mg level and replace as needed      Hyponatremia     Likely hypervolemic hyponatremia, given pulmonary edema and fluid resuscitation for sepsis  Will trial with Lasix 40 mg IV, repeat BMP at noon  Should hyponatremia not improve with diuresis, will order serum and urine osm, urine sodium      ID   * Sepsis due to urinary tract infection     - Urine cultures growing E. 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  Prior to [facility] arrival she had her blood drawn and was found to have a Hgb of 6.6. At that time her PCP instructed her to go to  "the emergency department for a blood transfusion.  She is s/p 1unit Platelet transfusion - developed transfusion reaction during this - received IM epi, Benadryl/ 125 methylpred for concerns of throat swelling per Dr. Gonzalez.    Heme/oncology consulted (02/03/2019) their recs and plan: " Anemia, thrombocytopenia. Ddx includes primary BM disorder such as MDS, vs secondary BM suppression from infection (recent Chikungunya infection, known to cause thrombocytopenia). Reviewed medication list, no known culprits. Possible also slow GIB from adenoma.  -No evidence of iron deficiency, B12/folate nml  -No evidence of hemolysis  -No evidence of splenomegaly, no evidence of cirrhosis  -No evidence of coagulation      Will try to perform BM biopsy early this week while patient is here. If patient needs to be discharged, can be scheduled to be done as outpatient. Results will take about a 3-7 days to results.  -Hold NSAIDs  -Transfuse Hb<7 or plt<10 unless active bleeding  -Consider repeat colonoscopy given recommended to be done 5 years from adenoma visualized in 2014   Other   SOB (shortness of breath)     - Likely due to volume overload and pulmonary edema  - Will diurese and assess for improvement      Weakness     - Generalized, likely multifactorial (age, acute illness, deconditioning)  - PT/OT consulted        High Risk Conditions  Sepsis, pancytopenia, Pneumonia     Diet: regular thin diet  GI PPx:   DVT PPx:    MING/SCD     Goals of Care: Full code  Discharge plan: pending bone marrow biopsy, SS SNF afterwards    Time (minutes) spent in care of the patient (Greater than 1/2 spent in direct face-to-face contact) 35 minutes      Adin Webb MD  Staff Hospitalist  Department of Hospital Medicine  [facility]   [phone]      "

## 2019-02-08 NOTE — PLAN OF CARE
Problem: Adult Inpatient Plan of Care  Goal: Plan of Care Review  Outcome: Ongoing (interventions implemented as appropriate)  Pt remains free from falls and injury. Encouraged to ambulate with assistance to maintain strength. Pt up in chair with meals. Pt placed on contact isolation for enterovirus in sputum. Pt and family state they understand POC. Pt diet advanced to regular diet. Pt currently on 1L NC. VSS. No complaints at this time. Will continue to monitor.

## 2019-02-08 NOTE — PLAN OF CARE
Problem: Physical Therapy Goal  Goal: Physical Therapy Goal  Goals to be met by: 2019      Patient will increase functional independence with mobility by performin. Supine to sit with supervision  2. Sit to supine with supervision   3. Sit to stand transfer with supervision   4. Gait  X 100 feet with Stand-by Assistance using least restrictive AD.   6. Standing x5 minutes while performing dynamic UE tasks with stand by assistance   6. Lower extremity exercise program x15 reps per handout, with independence      Outcome: Ongoing (interventions implemented as appropriate)  Goals remain appropriate; continue current POC.     Izzy Degroot PT, DPT   2019  Pager: [phone]

## 2019-02-08 NOTE — PT/OT/SLP PROGRESS
Physical Therapy Treatment    Patient Name:  Vicki Peña   MRN:  [id number]    Recommendations:     Discharge Recommendations: Short stay skilled nursing facility   Discharge Equipment Recommendations: Rolling walker   Barriers to discharge: increased level of assistance required     Assessment:     Vicki Peña is a 83 y.o. female admitted with a medical diagnosis of sepsis due to urinary tract infection. Pt demonstrated good effort during session; she remains significantly limited by SOB upon exertion/impaired endurance. Strong family support available.  She presents with the following impairments/functional limitations:  weakness, impaired endurance, gait instability, impaired functional mobilty, impaired balance, impaired cardiopulmonary response to activity, pain. Pt would benefit from continued PT intervention to address listed functional deficits, reduce fall risk and maximize return to PLOF.    Rehab Prognosis: Good; patient would benefit from acute skilled PT services to address these deficits and reach maximum level of function.      Recent Surgery: * No surgery found *      Plan:     During this hospitalization, patient to be seen 4 x/week to address the identified rehab impairments via gait training, therapeutic activities, therapeutic exercises, neuromuscular re-education and progress toward the following goals:    · Plan of Care Expires:  03/07/19    Subjective     Chief Complaint: decreased endurance   Patient/Family Comments/goals: pt agreeable to participate in therapy session.   Pain/Comfort:  · Pain Rating 1: 0/10  · Pain Rating Post-Intervention 1: 0/10      Objective:     Communicated with RN prior to session.  Patient found HOB elevated with oxygen, peripheral IV, telemetry  upon PT entry to room. Pt's family at bedside.     General Precautions: Standard, aspiration, fall, contact   Orthopedic Precautions:N/A   Braces: N/A     Functional Mobility:    Bed Mobility:   · Supine > Sit: stand by  assistance with HOB elevated  · Sit > Supine: N/T     Transfers:   · Sit <> Stand Transfer: contact guard assistance from EOB with RW; cueing required for hand placement with RW management      Gait:  · Distance: ~50 ft. With 4 standing rest breaks required   · Assistance level: contact guard assistance  · Assistive Device: RW   · Gait Assessment: pt ambulating with decreased step length, decreased gait speed, forward flexed posture, frequent pausing of gait 2/2 SOB/fatigue      AM-PAC 6 CLICK MOBILITY  Turning over in bed (including adjusting bedclothes, sheets and blankets)?: 4  Sitting down on and standing up from a chair with arms (e.g., wheelchair, bedside commode, etc.): 3  Moving from lying on back to sitting on the side of the bed?: 3  Moving to and from a bed to a chair (including a wheelchair)?: 3  Need to walk in hospital room?: 3  Climbing 3-5 steps with a railing?: 2  Basic Mobility Total Score: 18       Therapeutic Activities and Exercises:  Therapist facilitated progression of gait training to improve gait stability, endurance, and independence with functional ambulation. See above for details. Verbal and tactile cueing required for RW management, pacing of gait and increasing step length.     Therex for BLE strengthening and endurance: x 15 reps performed in sitting   - ankle pumps   - LAQ   - marches     Patient left up in chair with all lines intact, call button in reach, RN notified and family present.    GOALS:   Multidisciplinary Problems     Physical Therapy Goals        Problem: Physical Therapy Goal    Goal Priority Disciplines Outcome Goal Variances Interventions   Physical Therapy Goal     PT, PT/OT Ongoing (interventions implemented as appropriate)     Description:  Goals to be met by: 2019      Patient will increase functional independence with mobility by performin. Supine to sit with supervision  2. Sit to supine with supervision   3. Sit to stand transfer with supervision    4. Gait  X 100 feet with Stand-by Assistance using least restrictive AD.   6. Standing x5 minutes while performing dynamic UE tasks with stand by assistance   6. Lower extremity exercise program x15 reps per handout, with independence                       Time Tracking:     PT Received On: 02/08/19  PT Start Time: 0923     PT Stop Time: 0947  PT Total Time (min): 24 min     Billable Minutes: Gait Training 14 min and Therapeutic Exercise 10 min    Treatment Type: Treatment  PT/PTA: PT     PTA Visit Number: 0     Izzy Degroot PT, DPT   2/8/2019  Pager: [phone]

## 2019-02-08 NOTE — PLAN OF CARE
Problem: Adult Inpatient Plan of Care  Goal: Plan of Care Review  Outcome: Ongoing (interventions implemented as appropriate)  Plan of care reviewed with pt. VS stable. No acute events at this time. No complaints. Purewick placed on pt before bed. O2 sats >92% on 1L NC. Pt able to turn independently, just reinforcing the need to reposition frequently. Pt remains free from falls or injury. Bed low and locked, call light and personal belongings within reach. Will continue to monitor. Shelia Wilson RN

## 2019-02-09 PROBLEM — Q21.12 PFO (PATENT FORAMEN OVALE): Status: ACTIVE | Noted: 2019-02-09

## 2019-02-09 LAB
ABO + RH BLD: NORMAL
ALBUMIN SERPL BCP-MCNC: 1.6 G/DL
ALP SERPL-CCNC: 512 U/L
ALT SERPL W/O P-5'-P-CCNC: 26 U/L
ANION GAP SERPL CALC-SCNC: 8 MMOL/L
ANISOCYTOSIS BLD QL SMEAR: SLIGHT
ANISOCYTOSIS BLD QL SMEAR: SLIGHT
AST SERPL-CCNC: 37 U/L
BASOPHILS # BLD AUTO: ABNORMAL K/UL
BASOPHILS # BLD AUTO: ABNORMAL K/UL
BASOPHILS NFR BLD: 4 %
BASOPHILS NFR BLD: 7 %
BILIRUB SERPL-MCNC: 1.1 MG/DL
BLASTS NFR BLD MANUAL: 20 %
BLASTS NFR BLD MANUAL: 26 %
BLD GP AB SCN CELLS X3 SERPL QL: NORMAL
BUN SERPL-MCNC: 12 MG/DL
BURR CELLS BLD QL SMEAR: ABNORMAL
CALCIUM SERPL-MCNC: 8.3 MG/DL
CHLORIDE SERPL-SCNC: 94 MMOL/L
CO2 SERPL-SCNC: 24 MMOL/L
CREAT SERPL-MCNC: 0.6 MG/DL
DIFFERENTIAL METHOD: ABNORMAL
DIFFERENTIAL METHOD: ABNORMAL
EOSINOPHIL # BLD AUTO: ABNORMAL K/UL
EOSINOPHIL # BLD AUTO: ABNORMAL K/UL
EOSINOPHIL NFR BLD: 0 %
EOSINOPHIL NFR BLD: 1 %
ERYTHROCYTE [DISTWIDTH] IN BLOOD BY AUTOMATED COUNT: 20.3 %
ERYTHROCYTE [DISTWIDTH] IN BLOOD BY AUTOMATED COUNT: 20.5 %
EST. GFR  (AFRICAN AMERICAN): >60 ML/MIN/1.73 M^2
EST. GFR  (NON AFRICAN AMERICAN): >60 ML/MIN/1.73 M^2
GLUCOSE SERPL-MCNC: 95 MG/DL
HCT VFR BLD AUTO: 23.9 %
HCT VFR BLD AUTO: 26 %
HCT VFR BLD AUTO: 27.5 %
HGB BLD-MCNC: 7.9 G/DL
HGB BLD-MCNC: 8.6 G/DL
HGB BLD-MCNC: 9.1 G/DL
HYPOCHROMIA BLD QL SMEAR: ABNORMAL
IMM GRANULOCYTES # BLD AUTO: ABNORMAL K/UL
IMM GRANULOCYTES # BLD AUTO: ABNORMAL K/UL
IMM GRANULOCYTES NFR BLD AUTO: ABNORMAL %
IMM GRANULOCYTES NFR BLD AUTO: ABNORMAL %
LYMPHOCYTES # BLD AUTO: ABNORMAL K/UL
LYMPHOCYTES # BLD AUTO: ABNORMAL K/UL
LYMPHOCYTES NFR BLD: 22 %
LYMPHOCYTES NFR BLD: 27 %
MAGNESIUM SERPL-MCNC: 1.6 MG/DL
MCH RBC QN AUTO: 27 PG
MCH RBC QN AUTO: 27 PG
MCHC RBC AUTO-ENTMCNC: 33.1 G/DL
MCHC RBC AUTO-ENTMCNC: 33.1 G/DL
MCV RBC AUTO: 82 FL
MCV RBC AUTO: 82 FL
MONOCYTES # BLD AUTO: ABNORMAL K/UL
MONOCYTES # BLD AUTO: ABNORMAL K/UL
MONOCYTES NFR BLD: 2 %
MONOCYTES NFR BLD: 2 %
MYELOCYTES NFR BLD MANUAL: 3 %
NEUTROPHILS NFR BLD: 36 %
NEUTROPHILS NFR BLD: 46 %
NEUTS BAND NFR BLD MANUAL: 4 %
NRBC BLD-RTO: 1 /100 WBC
NRBC BLD-RTO: 1 /100 WBC
OVALOCYTES BLD QL SMEAR: ABNORMAL
OVALOCYTES BLD QL SMEAR: ABNORMAL
PHOSPHATE SERPL-MCNC: 2.9 MG/DL
PLATELET # BLD AUTO: 20 K/UL
PLATELET # BLD AUTO: 26 K/UL
PLATELET BLD QL SMEAR: ABNORMAL
PLATELET BLD QL SMEAR: ABNORMAL
PMV BLD AUTO: ABNORMAL FL
PMV BLD AUTO: ABNORMAL FL
POIKILOCYTOSIS BLD QL SMEAR: SLIGHT
POIKILOCYTOSIS BLD QL SMEAR: SLIGHT
POLYCHROMASIA BLD QL SMEAR: ABNORMAL
POLYCHROMASIA BLD QL SMEAR: ABNORMAL
POTASSIUM SERPL-SCNC: 3.5 MMOL/L
PROT SERPL-MCNC: 5.2 G/DL
RBC # BLD AUTO: 3.19 M/UL
RBC # BLD AUTO: 3.37 M/UL
SCHISTOCYTES BLD QL SMEAR: ABNORMAL
SCHISTOCYTES BLD QL SMEAR: ABNORMAL
SODIUM SERPL-SCNC: 126 MMOL/L
WBC # BLD AUTO: 12 K/UL
WBC # BLD AUTO: 8.93 K/UL

## 2019-02-09 PROCEDURE — 85014 HEMATOCRIT: CPT

## 2019-02-09 PROCEDURE — 85007 BL SMEAR W/DIFF WBC COUNT: CPT | Mod: 91

## 2019-02-09 PROCEDURE — 20600001 HC STEP DOWN PRIVATE ROOM

## 2019-02-09 PROCEDURE — 86850 RBC ANTIBODY SCREEN: CPT

## 2019-02-09 PROCEDURE — 94640 AIRWAY INHALATION TREATMENT: CPT

## 2019-02-09 PROCEDURE — 85018 HEMOGLOBIN: CPT

## 2019-02-09 PROCEDURE — 83735 ASSAY OF MAGNESIUM: CPT

## 2019-02-09 PROCEDURE — 25000003 PHARM REV CODE 250: Performed by: HOSPITALIST

## 2019-02-09 PROCEDURE — 80053 COMPREHEN METABOLIC PANEL: CPT

## 2019-02-09 PROCEDURE — 25000003 PHARM REV CODE 250: Performed by: STUDENT IN AN ORGANIZED HEALTH CARE EDUCATION/TRAINING PROGRAM

## 2019-02-09 PROCEDURE — 84100 ASSAY OF PHOSPHORUS: CPT

## 2019-02-09 PROCEDURE — 99233 PR SUBSEQUENT HOSPITAL CARE,LEVL III: ICD-10-PCS | Mod: ,,, | Performed by: HOSPITALIST

## 2019-02-09 PROCEDURE — 94761 N-INVAS EAR/PLS OXIMETRY MLT: CPT

## 2019-02-09 PROCEDURE — 63600175 PHARM REV CODE 636 W HCPCS: Performed by: HOSPITALIST

## 2019-02-09 PROCEDURE — 85027 COMPLETE CBC AUTOMATED: CPT

## 2019-02-09 PROCEDURE — 25000003 PHARM REV CODE 250

## 2019-02-09 PROCEDURE — 36415 COLL VENOUS BLD VENIPUNCTURE: CPT

## 2019-02-09 PROCEDURE — 63600175 PHARM REV CODE 636 W HCPCS: Performed by: INTERNAL MEDICINE

## 2019-02-09 PROCEDURE — 25000242 PHARM REV CODE 250 ALT 637 W/ HCPCS: Performed by: STUDENT IN AN ORGANIZED HEALTH CARE EDUCATION/TRAINING PROGRAM

## 2019-02-09 PROCEDURE — 99233 SBSQ HOSP IP/OBS HIGH 50: CPT | Mod: ,,, | Performed by: HOSPITALIST

## 2019-02-09 PROCEDURE — 27000221 HC OXYGEN, UP TO 24 HOURS

## 2019-02-09 RX ORDER — MAGNESIUM SULFATE HEPTAHYDRATE 40 MG/ML
2 INJECTION, SOLUTION INTRAVENOUS ONCE
Status: COMPLETED | OUTPATIENT
Start: 2019-02-09 | End: 2019-02-09

## 2019-02-09 RX ORDER — FUROSEMIDE 10 MG/ML
40 INJECTION INTRAMUSCULAR; INTRAVENOUS ONCE
Status: COMPLETED | OUTPATIENT
Start: 2019-02-09 | End: 2019-02-09

## 2019-02-09 RX ADMIN — MAGNESIUM SULFATE IN WATER 2 G: 40 INJECTION, SOLUTION INTRAVENOUS at 02:02

## 2019-02-09 RX ADMIN — POTASSIUM BICARBONATE 50 MEQ: 25 TABLET, EFFERVESCENT ORAL at 02:02

## 2019-02-09 RX ADMIN — IPRATROPIUM BROMIDE AND ALBUTEROL SULFATE 3 ML: .5; 3 SOLUTION RESPIRATORY (INHALATION) at 01:02

## 2019-02-09 RX ADMIN — CEFTRIAXONE SODIUM 1 G: 1 INJECTION, POWDER, FOR SOLUTION INTRAMUSCULAR; INTRAVENOUS at 02:02

## 2019-02-09 RX ADMIN — IPRATROPIUM BROMIDE AND ALBUTEROL SULFATE 3 ML: .5; 3 SOLUTION RESPIRATORY (INHALATION) at 05:02

## 2019-02-09 RX ADMIN — ACETAMINOPHEN 650 MG: 325 TABLET, FILM COATED ORAL at 03:02

## 2019-02-09 RX ADMIN — SIMVASTATIN 40 MG: 40 TABLET, FILM COATED ORAL at 09:02

## 2019-02-09 RX ADMIN — PANTOPRAZOLE SODIUM 40 MG: 40 TABLET, DELAYED RELEASE ORAL at 08:02

## 2019-02-09 RX ADMIN — IPRATROPIUM BROMIDE AND ALBUTEROL SULFATE 3 ML: .5; 3 SOLUTION RESPIRATORY (INHALATION) at 08:02

## 2019-02-09 RX ADMIN — LOPERAMIDE HYDROCHLORIDE 2 MG: 2 CAPSULE ORAL at 11:02

## 2019-02-09 RX ADMIN — IPRATROPIUM BROMIDE AND ALBUTEROL SULFATE 3 ML: .5; 3 SOLUTION RESPIRATORY (INHALATION) at 10:02

## 2019-02-09 RX ADMIN — FUROSEMIDE 40 MG: 10 INJECTION, SOLUTION INTRAVENOUS at 04:02

## 2019-02-09 NOTE — PLAN OF CARE
Problem: Adult Inpatient Plan of Care  Goal: Patient-Specific Goal (Individualization)  Outcome: Ongoing (interventions implemented as appropriate)  Plan of care reviewed with pt and family. VS stable. No acute events at this time. No complaints. Platelets remain at critical level. No signs of bleeding. Purewick in place while pt rests. Pt appears to be feeling better than previous night. Pt remains free from falls or injury. Bed low and locked, call light and personal belongings within reach. Will continue to monitor. Shelia Wilson RN

## 2019-02-09 NOTE — PLAN OF CARE
Problem: Adult Inpatient Plan of Care  Goal: Plan of Care Review  Outcome: Ongoing (interventions implemented as appropriate)  Patient free of falls/traumas/injuries. Skin clean, dry, and intact. H/H 8.6/26.0 today. IVP ABXs continued. Patient educated on plan of care and verbalized understanding. Patients VS stable and no distress. Will continue to monitor.

## 2019-02-09 NOTE — PROGRESS NOTES
Hospital Medicine  Progress note    Team: [facility] [place] HOSP MED R Adin Webb MD  Admit Date: 2/2/2019  JACK   Length of Stay:  LOS: 7 days   Code status: Full Code    Principal Problem:  Sepsis due to urinary tract infection    Overview:    Interval hx: Patient seen and examined at bedside, platelets continue to go down, 20 today, hematology following for possible bone marrow biopsy next week.  Na down trending will give one time dose of lasix.     ROS   Constitutional: Positive for activity change, appetite change and fatigue. Negative for chills and fever.   HENT: Negative for congestion, rhinorrhea and sore throat.    Respiratory: Positive for cough. Negative for shortness of breath, wheezing and stridor.    Cardiovascular: Negative for chest pain, palpitations and leg swelling.   Gastrointestinal: Positive for nausea. Negative for abdominal pain, blood in stool, constipation, diarrhea and vomiting.   Genitourinary: Negative for decreased urine volume, difficulty urinating, dysuria, flank pain, frequency and urgency.   Musculoskeletal: Negative for arthralgias and myalgias.   Neurological: Negative for syncope, weakness, light-headedness and headaches.   Psychiatric/Behavioral: Negative for agitation and confusion.         PEx  Temp:  [97.5 °F (36.4 °C)-100 °F (37.8 °C)]   Pulse:  [80-98]   Resp:  [16-18]   BP: (110-156)/(53-74)   SpO2:  [92 %-96 %]     Intake/Output Summary (Last 24 hours) at 2/9/2019 1628  Last data filed at 2/9/2019 1404  Gross per 24 hour   Intake 900 ml   Output 450 ml   Net 450 ml       Constitutional: She is oriented to person, place, and time. No distress.   HENT:   Head: Normocephalic.   conjunctival pallor   Eyes: Pupils are equal, round, and reactive to light.   Neck: Normal range of motion. JVD present.   Cardiovascular: Normal rate, regular rhythm and normal heart sounds.   No murmur heard.  Pulmonary/Chest: Effort normal. No stridor. No respiratory distress. She has wheezes. She has  rales (Throughout lung fields, worse on left).   Abdominal: Soft. Bowel sounds are normal. She exhibits no distension. There is no tenderness. There is no guarding.   Musculoskeletal: Normal range of motion. She exhibits no edema or deformity.   Neurological: She is alert and oriented to person, place, and time.   Skin: Skin is warm and dry. Capillary refill takes less than 2 seconds. She is not diaphoretic.   Psychiatric: She has a normal mood and affect.   Nursing note and vitals reviewed.        Recent Labs   Lab 02/08/19 0949 02/08/19  1928 02/09/19  0039 02/09/19  0911   WBC 11.79 9.14  --  8.93   HGB 10.0* 7.7* 7.9* 8.6*   HCT 30.7* 23.6* 23.9* 26.0*   PLT 28* 25*  --  20*     Recent Labs   Lab 02/07/19 0414 02/08/19  0553 02/09/19  0452   * 129* 126*   K 3.4* 3.5 3.5   CL 95 96 94*   CO2 26 25 24   BUN 17 15 12   CREATININE 0.9 0.7 0.6   GLU 86 94 95   CALCIUM 8.5* 8.5* 8.3*   MG 1.7 1.5* 1.6   PHOS 2.3* 2.3* 2.9     Recent Labs   Lab 02/02/19 2036 02/07/19 0414 02/08/19  0553 02/09/19  0452   ALKPHOS 277*   < > 238* 385* 512*   ALT 33   < > 17 20 26   AST 32   < > 15 23 37   ALBUMIN 2.5*   < > 1.6* 1.5* 1.6*   PROT 6.5   < > 5.0* 5.1* 5.2*   BILITOT 0.6   < > 1.2* 1.1* 1.1*   INR 1.1  --   --   --   --     < > = values in this interval not displayed.        No results for input(s): CPK, CPKMB, MB, TROPONINI in the last 72 hours.  Recent Labs   Lab 02/04/19  1302   POCTGLUCOSE 99     No results found for: HGBA1C    Scheduled Meds:   albuterol-ipratropium  3 mL Nebulization Q4H WAKE    cefTRIAXone (ROCEPHIN) IVPB  1 g Intravenous Q24H    pantoprazole  40 mg Oral Daily    simvastatin  40 mg Oral QHS     Continuous Infusions:  As Needed:  (Magic mouthwash) 1:1:1 Benadryl 12.5mg/5ml liq, aluminum & magnesium hydroxide-simehticone (Maalox), lidocaine viscous 2%, acetaminophen, loperamide, ondansetron, promethazine, ramelteon, sodium chloride 0.9%, sodium chloride 0.9%, sodium chloride 0.9%    Active  Hospital Problems    Diagnosis  POA    *Sepsis due to urinary tract infection [A41.9, N39.0]  Yes    PFO (patent foramen ovale) [Q21.1]  Not Applicable    Hypomagnesemia [E83.42]  No    Hypokalemia [E87.6]  Yes    Pneumonia involving left lung [J18.9]  Yes    SOB (shortness of breath) [R06.02]  Yes    Weakness [R53.1]  Yes    History of CVA (cerebrovascular accident) [Z86.73]  Not Applicable    Hyponatremia [E87.1]  Yes    Pancytopenia [D61.818]  Yes    Hyperlipidemia [E78.5]  Yes     Chronic      Resolved Hospital Problems   No resolved problems to display.         Assessment and Plan    History of CVA (cerebrovascular accident)     Patient states that she had a stroke 20 years back. No residual neurological manifestion.  ECHO this admission consistent with PFO and shunt physiology.   - 02/05/2019: Critical Care contacted Vascular Neurology, Dr. Horn, to ask about the role of anti-platelets vs anticoagulation because the patient has PFO with history of stroke. No history of A fib. He recommends to start ASA 81 mg PO qD   Pulmonary   Pneumonia involving left lung     On 02/03/2019: Patient  febrile this AM and UA c/w with UTI start Ceftriaxone,  At afternoon repeat fever to >102, blood cultures obtained and on abx for UTI, started on sepsis bolus 30cc/kg and monitoring of lactic acid levels. Antibiotics were changed to Piperacillin/Tazobactam and Vancomycin.      02/04/2019: Patient have complaints of left sided lateral chest wall pleuritic pain. Throughout the night patient remained febrile, O2 requirement increasing this Am after 4am patient given 2 500ml saline boluses. This am patient is tachypneic, accessory muscle use, febrile, MAP >65, but pressures 90s/50s. CXR with significant new left sided lower& upper airspace disease, absent breath sounds in left mid to lower fields, dullness to percussion, right basilar absent breath sounds. On Exam patient also with distended JVP to angle of jaw. Critical  "Care consulted & have accepted patient. Azithromycin started     02/05/2019: During night patient received 1 L NS bolus for marginal BP. Patient reports her symptoms have improved today. BP now is stable. Afebrile last spike of fever: 39.1 (02/03/3019). Np leucocytosis. CXR today: Worsening severe pulmonary edema.       CT Chest consistent with pulmonary edema vs ARDS; however, patient requiring minimal oxygen (3L currently) and has received copious IVF due to sepsis. PNA possible but less likely given how rapid radiographic changes developed.   Will start gentle diuresis  Finished course of CAP therapy with rocephin and azithromycin   Cardiac/Vascular   Hyperlipidemia     - Resume home medication: Statin.  - Lipid panel- sent      Renal/   Hypokalemia     Monitor potassium level and replace as needed      Hypomagnesemia     Monitor Mg level and replace as needed      Hyponatremia     Likely hypervolemic hyponatremia, given pulmonary edema and fluid resuscitation for sepsis  Will trial with Lasix 40 mg IV,  Should hyponatremia not improve with diuresis, will order serum and urine osm, urine sodium      ID   * Sepsis due to urinary tract infection     - Urine cultures growing E. 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  [place] [facility] [place] drawn and was found to have a Hgb of 6.6. At that time her PCP instructed her to go to the emergency department for a blood transfusion.  She is s/p 1unit Platelet transfusion - developed transfusion reaction during this - received IM epi, Benadryl/ 125 methylpred for concerns of throat swelling per Dr. Gonzalez.    Heme/oncology consulted (02/03/2019) their recs and plan: " Anemia, thrombocytopenia. Ddx includes " primary BM disorder such as MDS, vs secondary BM suppression from infection (recent Chikungunya infection, known to cause thrombocytopenia). Reviewed medication list, no known culprits. Possible also slow GIB from adenoma.  -No evidence of iron deficiency, B12/folate nml  -No evidence of hemolysis  -No evidence of splenomegaly, no evidence of cirrhosis  -No evidence of coagulation      Will try to perform BM biopsy early this week while patient is here. If patient needs to be discharged, can be scheduled to be done as outpatient. Results will take about a 3-7 days to results.  -Hold NSAIDs  -Transfuse Hb<7 or plt<10 unless active bleeding  -Consider repeat colonoscopy given recommended to be done 5 years from adenoma visualized in 2014   Other   SOB (shortness of breath)     - Likely due to volume overload and pulmonary edema  - Will diurese and assess for improvement      Weakness     - Generalized, likely multifactorial (age, acute illness, deconditioning)  - PT/OT consulted        High Risk Conditions  Sepsis, pancytopenia, Pneumonia     Diet: regular thin diet  GI PPx:   DVT PPx:    MING/SCD, avoid heparin products due to thrombocytopenia     Goals of Care: Full code  Discharge plan: pending bone marrow biopsy, SS SNF afterwards    Time (minutes) spent in care of the patient (Greater than 1/2 spent in direct face-to-face contact) 35 minutes      Adin Webb MD  Staff Hospitalist  Department of Hospital Medicine  [facility]   [phone]

## 2019-02-10 PROBLEM — K12.1 MOUTH ULCERS: Status: ACTIVE | Noted: 2019-02-10

## 2019-02-10 LAB
ALBUMIN SERPL BCP-MCNC: 1.6 G/DL
ALP SERPL-CCNC: 567 U/L
ALT SERPL W/O P-5'-P-CCNC: 31 U/L
ANION GAP SERPL CALC-SCNC: 8 MMOL/L
ANISOCYTOSIS BLD QL SMEAR: SLIGHT
ANISOCYTOSIS BLD QL SMEAR: SLIGHT
AST SERPL-CCNC: 37 U/L
BASOPHILS # BLD AUTO: ABNORMAL K/UL
BASOPHILS NFR BLD: 7 %
BASOPHILS NFR BLD: 8 %
BILIRUB SERPL-MCNC: 0.9 MG/DL
BLASTS NFR BLD MANUAL: 10 %
BLASTS NFR BLD MANUAL: 23 %
BUN SERPL-MCNC: 13 MG/DL
CALCIUM SERPL-MCNC: 8.3 MG/DL
CHLORIDE SERPL-SCNC: 92 MMOL/L
CO2 SERPL-SCNC: 27 MMOL/L
CREAT SERPL-MCNC: 0.6 MG/DL
DIFFERENTIAL METHOD: ABNORMAL
DIFFERENTIAL METHOD: ABNORMAL
EOSINOPHIL # BLD AUTO: ABNORMAL K/UL
EOSINOPHIL NFR BLD: 1 %
EOSINOPHIL NFR BLD: 2 %
ERYTHROCYTE [DISTWIDTH] IN BLOOD BY AUTOMATED COUNT: 20.1 %
ERYTHROCYTE [DISTWIDTH] IN BLOOD BY AUTOMATED COUNT: 20.4 %
EST. GFR  (AFRICAN AMERICAN): >60 ML/MIN/1.73 M^2
EST. GFR  (NON AFRICAN AMERICAN): >60 ML/MIN/1.73 M^2
GLUCOSE SERPL-MCNC: 91 MG/DL
HCT VFR BLD AUTO: 23.5 %
HCT VFR BLD AUTO: 25.1 %
HGB BLD-MCNC: 7.7 G/DL
HGB BLD-MCNC: 8.2 G/DL
HYPOCHROMIA BLD QL SMEAR: ABNORMAL
HYPOCHROMIA BLD QL SMEAR: ABNORMAL
IMM GRANULOCYTES # BLD AUTO: ABNORMAL K/UL
IMM GRANULOCYTES # BLD AUTO: ABNORMAL K/UL
IMM GRANULOCYTES NFR BLD AUTO: ABNORMAL %
IMM GRANULOCYTES NFR BLD AUTO: ABNORMAL %
LYMPHOCYTES # BLD AUTO: ABNORMAL K/UL
LYMPHOCYTES NFR BLD: 29 %
LYMPHOCYTES NFR BLD: 50 %
MAGNESIUM SERPL-MCNC: 2 MG/DL
MCH RBC QN AUTO: 26.4 PG
MCH RBC QN AUTO: 26.5 PG
MCHC RBC AUTO-ENTMCNC: 32.7 G/DL
MCHC RBC AUTO-ENTMCNC: 32.8 G/DL
MCV RBC AUTO: 81 FL
MCV RBC AUTO: 81 FL
MONOCYTES # BLD AUTO: ABNORMAL K/UL
MONOCYTES NFR BLD: 2 %
MONOCYTES NFR BLD: 6 %
MYELOCYTES NFR BLD MANUAL: 1 %
MYELOCYTES NFR BLD MANUAL: 5 %
NEUTROPHILS NFR BLD: 25 %
NEUTROPHILS NFR BLD: 30 %
NEUTS BAND NFR BLD MANUAL: 1 %
NRBC BLD-RTO: 1 /100 WBC
NRBC BLD-RTO: 1 /100 WBC
OVALOCYTES BLD QL SMEAR: ABNORMAL
OVALOCYTES BLD QL SMEAR: ABNORMAL
PHOSPHATE SERPL-MCNC: 3.9 MG/DL
PLATELET # BLD AUTO: 22 K/UL
PLATELET # BLD AUTO: 23 K/UL
PLATELET BLD QL SMEAR: ABNORMAL
PMV BLD AUTO: ABNORMAL FL
PMV BLD AUTO: ABNORMAL FL
POIKILOCYTOSIS BLD QL SMEAR: SLIGHT
POIKILOCYTOSIS BLD QL SMEAR: SLIGHT
POLYCHROMASIA BLD QL SMEAR: ABNORMAL
POLYCHROMASIA BLD QL SMEAR: ABNORMAL
POTASSIUM SERPL-SCNC: 3.9 MMOL/L
PROT SERPL-MCNC: 5.4 G/DL
RBC # BLD AUTO: 2.92 M/UL
RBC # BLD AUTO: 3.1 M/UL
SCHISTOCYTES BLD QL SMEAR: ABNORMAL
SCHISTOCYTES BLD QL SMEAR: PRESENT
SODIUM SERPL-SCNC: 127 MMOL/L
WBC # BLD AUTO: 11.43 K/UL
WBC # BLD AUTO: 9.97 K/UL

## 2019-02-10 PROCEDURE — 94640 AIRWAY INHALATION TREATMENT: CPT

## 2019-02-10 PROCEDURE — 20600001 HC STEP DOWN PRIVATE ROOM

## 2019-02-10 PROCEDURE — 63600175 PHARM REV CODE 636 W HCPCS: Performed by: INTERNAL MEDICINE

## 2019-02-10 PROCEDURE — 25000242 PHARM REV CODE 250 ALT 637 W/ HCPCS: Performed by: STUDENT IN AN ORGANIZED HEALTH CARE EDUCATION/TRAINING PROGRAM

## 2019-02-10 PROCEDURE — 36415 COLL VENOUS BLD VENIPUNCTURE: CPT

## 2019-02-10 PROCEDURE — 99233 SBSQ HOSP IP/OBS HIGH 50: CPT | Mod: ,,, | Performed by: HOSPITALIST

## 2019-02-10 PROCEDURE — 25000003 PHARM REV CODE 250: Performed by: HOSPITALIST

## 2019-02-10 PROCEDURE — 80053 COMPREHEN METABOLIC PANEL: CPT

## 2019-02-10 PROCEDURE — 84100 ASSAY OF PHOSPHORUS: CPT

## 2019-02-10 PROCEDURE — 99233 PR SUBSEQUENT HOSPITAL CARE,LEVL III: ICD-10-PCS | Mod: ,,, | Performed by: HOSPITALIST

## 2019-02-10 PROCEDURE — 83735 ASSAY OF MAGNESIUM: CPT

## 2019-02-10 PROCEDURE — 25000003 PHARM REV CODE 250: Performed by: STUDENT IN AN ORGANIZED HEALTH CARE EDUCATION/TRAINING PROGRAM

## 2019-02-10 PROCEDURE — 85027 COMPLETE CBC AUTOMATED: CPT | Mod: 91

## 2019-02-10 PROCEDURE — 27000221 HC OXYGEN, UP TO 24 HOURS

## 2019-02-10 PROCEDURE — 94761 N-INVAS EAR/PLS OXIMETRY MLT: CPT

## 2019-02-10 PROCEDURE — 85007 BL SMEAR W/DIFF WBC COUNT: CPT | Mod: 91

## 2019-02-10 RX ADMIN — Medication 5 ML: at 09:02

## 2019-02-10 RX ADMIN — IPRATROPIUM BROMIDE AND ALBUTEROL SULFATE 3 ML: .5; 3 SOLUTION RESPIRATORY (INHALATION) at 09:02

## 2019-02-10 RX ADMIN — IPRATROPIUM BROMIDE AND ALBUTEROL SULFATE 3 ML: .5; 3 SOLUTION RESPIRATORY (INHALATION) at 08:02

## 2019-02-10 RX ADMIN — CEFTRIAXONE SODIUM 1 G: 1 INJECTION, POWDER, FOR SOLUTION INTRAMUSCULAR; INTRAVENOUS at 02:02

## 2019-02-10 RX ADMIN — IPRATROPIUM BROMIDE AND ALBUTEROL SULFATE 3 ML: .5; 3 SOLUTION RESPIRATORY (INHALATION) at 04:02

## 2019-02-10 RX ADMIN — Medication 5 ML: at 01:02

## 2019-02-10 RX ADMIN — ACETAMINOPHEN 650 MG: 325 TABLET, FILM COATED ORAL at 08:02

## 2019-02-10 RX ADMIN — PANTOPRAZOLE SODIUM 40 MG: 40 TABLET, DELAYED RELEASE ORAL at 08:02

## 2019-02-10 RX ADMIN — Medication 5 ML: at 05:02

## 2019-02-10 RX ADMIN — SIMVASTATIN 40 MG: 40 TABLET, FILM COATED ORAL at 09:02

## 2019-02-10 NOTE — PROGRESS NOTES
Hospital Medicine  Progress note    Team: [facility] HOSP MED R Adin Webb MD  Admit Date: 2/2/2019  JACK   Length of Stay:  LOS: 8 days   Code status: Full Code    Principal Problem:  Sepsis due to urinary tract infection    Overview:    Interval hx: Patient seen and examined at bedside, platelets still low at 23,000, bone marrow biopsy pending Hematology recommendations.     ROS   Constitutional: Positive for activity change, appetite change and fatigue. Negative for chills and fever.   HENT: Negative for congestion, rhinorrhea and sore throat.    Respiratory: Positive for cough. Negative for shortness of breath, wheezing and stridor.    Cardiovascular: Negative for chest pain, palpitations and leg swelling.   Gastrointestinal: Positive for nausea. Negative for abdominal pain, blood in stool, constipation, diarrhea and vomiting.   Genitourinary: Negative for decreased urine volume, difficulty urinating, dysuria, flank pain, frequency and urgency.   Musculoskeletal: Negative for arthralgias and myalgias.   Neurological: Negative for syncope, weakness, light-headedness and headaches.   Psychiatric/Behavioral: Negative for agitation and confusion.         PEx  Temp:  [97.8 °F (36.6 °C)-98.8 °F (37.1 °C)]   Pulse:  []   Resp:  [16-20]   BP: (104-140)/(52-65)   SpO2:  [91 %-96 %]     Intake/Output Summary (Last 24 hours) at 2/10/2019 1405  Last data filed at 2/10/2019 0500  Gross per 24 hour   Intake 420 ml   Output 850 ml   Net -430 ml       Constitutional: She is oriented to person, place, and time. No distress.   HENT:   Head: Normocephalic.   conjunctival pallor   Eyes: Pupils are equal, round, and reactive to light.   Neck: Normal range of motion. JVD present.   Cardiovascular: Normal rate, regular rhythm and normal heart sounds.   No murmur heard.  Pulmonary/Chest: Effort normal. No stridor. No respiratory distress. She has wheezes. She has rales (Throughout lung fields, worse on left).   Abdominal: Soft.  Bowel sounds are normal. She exhibits no distension. There is no tenderness. There is no guarding.   Musculoskeletal: Normal range of motion. She exhibits no edema or deformity.   Neurological: She is alert and oriented to person, place, and time.   Skin: Skin is warm and dry. Capillary refill takes less than 2 seconds. She is not diaphoretic.   Psychiatric: She has a normal mood and affect.   Nursing note and vitals reviewed.        Recent Labs   Lab 02/09/19  0911 02/09/19  1935 02/10/19  0751   WBC 8.93 12.00 9.97   HGB 8.6* 9.1* 8.2*   HCT 26.0* 27.5* 25.1*   PLT 20* 26* 23*     Recent Labs   Lab 02/08/19  0553 02/09/19  0452 02/10/19  0622   * 126* 127*   K 3.5 3.5 3.9   CL 96 94* 92*   CO2 25 24 27   BUN 15 12 13   CREATININE 0.7 0.6 0.6   GLU 94 95 91   CALCIUM 8.5* 8.3* 8.3*   MG 1.5* 1.6 2.0   PHOS 2.3* 2.9 3.9     Recent Labs   Lab 02/08/19  0553 02/09/19  0452 02/10/19  0622   ALKPHOS 385* 512* 567*   ALT 20 26 31   AST 23 37 37   ALBUMIN 1.5* 1.6* 1.6*   PROT 5.1* 5.2* 5.4*   BILITOT 1.1* 1.1* 0.9        No results for input(s): CPK, CPKMB, MB, TROPONINI in the last 72 hours.  Recent Labs   Lab 02/04/19  1302   POCTGLUCOSE 99     No results found for: HGBA1C    Scheduled Meds:   (Magic mouthwash) 1:1:1 Benadryl 12.5mg/5ml liq, aluminum & magnesium hydroxide-simehticone (Maalox), lidocaine viscous 2%  5 mL Swish & Spit Q4H    albuterol-ipratropium  3 mL Nebulization Q4H WAKE    cefTRIAXone (ROCEPHIN) IVPB  1 g Intravenous Q24H    pantoprazole  40 mg Oral Daily    simvastatin  40 mg Oral QHS     Continuous Infusions:  As Needed:  acetaminophen, loperamide, ondansetron, promethazine, ramelteon, sodium chloride 0.9%, sodium chloride 0.9%, sodium chloride 0.9%    Active Hospital Problems    Diagnosis  POA    *Sepsis due to urinary tract infection [A41.9, N39.0]  Yes    Mouth ulcers [K12.1]  Unknown    PFO (patent foramen ovale) [Q21.1]  Not Applicable    Hypomagnesemia [E83.42]  No     Hypokalemia [E87.6]  Yes    Pneumonia involving left lung [J18.9]  Yes    SOB (shortness of breath) [R06.02]  Yes    Weakness [R53.1]  Yes    History of CVA (cerebrovascular accident) [Z86.73]  Not Applicable    Hyponatremia [E87.1]  Yes    Pancytopenia [D61.818]  Yes    Hyperlipidemia [E78.5]  Yes     Chronic      Resolved Hospital Problems   No resolved problems to display.         Assessment and Plan    History of CVA (cerebrovascular accident)     Patient states that she had a stroke 20 years back. No residual neurological manifestion.  ECHO this admission consistent with PFO and shunt physiology.   - 02/05/2019: Critical Care contacted Vascular Neurology, Dr. Horn, to ask about the role of anti-platelets vs anticoagulation because the patient has PFO with history of stroke. No history of A fib. He recommends to start ASA 81 mg PO qD   Pulmonary   Pneumonia involving left lung     On 02/03/2019: Patient  febrile this AM and UA c/w with UTI start Ceftriaxone,  At afternoon repeat fever to >102, blood cultures obtained and on abx for UTI, started on sepsis bolus 30cc/kg and monitoring of lactic acid levels. Antibiotics were changed to Piperacillin/Tazobactam and Vancomycin.      02/04/2019: Patient have complaints of left sided lateral chest wall pleuritic pain. Throughout the night patient remained febrile, O2 requirement increasing this Am after 4am patient given 2 500ml saline boluses. This am patient is tachypneic, accessory muscle use, febrile, MAP >65, but pressures 90s/50s. CXR with significant new left sided lower& upper airspace disease, absent breath sounds in left mid to lower fields, dullness to percussion, right basilar absent breath sounds. On Exam patient also with distended JVP to angle of jaw. Critical Care consulted & have accepted patient. Azithromycin started     02/05/2019: During night patient received 1 L NS bolus for marginal BP. Patient reports her symptoms have improved today. BP  "now is stable. Afebrile last spike of fever: 39.1 (02/03/3019). Np leucocytosis. CXR today: Worsening severe pulmonary edema.       CT Chest consistent with pulmonary edema vs ARDS; however, patient requiring minimal oxygen (3L currently) and has received copious IVF due to sepsis. PNA possible but less likely given how rapid radiographic changes developed.   Will start gentle diuresis  Finished course of CAP therapy with rocephin and azithromycin   Cardiac/Vascular   Hyperlipidemia     - Resume home medication: Statin.  - Lipid panel- sent      Renal/   Hypokalemia     Monitor potassium level and replace as needed      Hypomagnesemia     Monitor Mg level and replace as needed      Hyponatremia     Likely hypervolemic hyponatremia, given pulmonary edema and fluid resuscitation for sepsis  Will trial with Lasix 40 mg IV,  Should hyponatremia not improve with diuresis, will order serum and urine osm, urine sodium   ID   * Sepsis due to urinary tract infection     - Urine cultures growing E. 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  Prior to [facility] [place] her blood drawn and was found to have a Hgb of 6.6. At that time her PCP instructed her to go to the emergency department for a blood transfusion.  She is s/p 1unit Platelet transfusion - developed transfusion reaction during this - received IM epi, Benadryl/ 125 methylpred for concerns of throat swelling per Dr. Gonzalez.    Heme/oncology consulted (02/03/2019) their recs and plan: " Anemia, thrombocytopenia. Ddx includes primary BM disorder such as MDS, vs secondary BM suppression from infection (recent Chikungunya infection, known to cause thrombocytopenia). Reviewed medication list, no known culprits. " Possible also slow GIB from adenoma.  -No evidence of iron deficiency, B12/folate nml  -No evidence of hemolysis  -No evidence of splenomegaly, no evidence of cirrhosis  -No evidence of coagulation      Will try to perform BM biopsy early this week while patient is here. If patient needs to be discharged, can be scheduled to be done as outpatient. Results will take about a 3-7 days to results.  -Hold NSAIDs  -Transfuse Hb<7 or plt<10 unless active bleeding  -Consider repeat colonoscopy given recommended to be done 5 years from adenoma visualized in 2014   Other   SOB (shortness of breath)     - Likely due to volume overload and pulmonary edema  - now breathing on room air       Weakness     - Generalized, likely multifactorial (age, acute illness, deconditioning)  - PT/OT consulted        High Risk Conditions  Sepsis, pancytopenia, Pneumonia     Diet: regular thin diet  GI PPx:   DVT PPx:    MING/SCD, avoid heparin products due to thrombocytopenia     Goals of Care: Full code  Discharge plan: pending bone marrow biopsy, SS SNF afterwards    Time (minutes) spent in care of the patient (Greater than 1/2 spent in direct face-to-face contact) 35 minutes      Adin Webb MD  Staff Hospitalist  Department of Hospital Medicine  [facility]   [phone]

## 2019-02-10 NOTE — NURSING
Patient states the ulcers in her mouth hurt more and that her dentures no longer fit in her mouth due to slight swelling. Multiple ulcers noted to palate and gum line. Slight redness and swelling noted to areas upon assesment. Patient states she has been using PRN magic mouth wash as directed every 4 hours with no relief. Notified MD Jon. Will continue to monitor.

## 2019-02-10 NOTE — PLAN OF CARE
Problem: Adult Inpatient Plan of Care  Goal: Plan of Care Review  Pt remained free of injuries, falls, and trauma. VSS. No complaints of pain mentioned. Platelets being monitored, last reading 26,000. No transfusion unless less than 10,000. Pt being monitored for bleeding. Stool sample pending. No bm on tonight's shift. Fall precautions maintained. HAPI bundle maintained. Frequent repositioned q 2 hrs.  Plan of care reviewed with pt and daughter's. All questions and concerns addressed. No complaints or distress noted.

## 2019-02-10 NOTE — PLAN OF CARE
Problem: Adult Inpatient Plan of Care  Goal: Plan of Care Review  Outcome: Ongoing (interventions implemented as appropriate)  Patient free of falls/traumas/injuries. Stool sample pending. IVP ABXs continued. Skin clean, dry, and intact. Patient educated on plan of care and verbalized understanding. Patients VS stable and no distress. Will continue to monitor.

## 2019-02-11 ENCOUNTER — TELEPHONE (OUTPATIENT)
Dept: ORTHOPEDICS | Facility: CLINIC | Age: 84
End: 2019-02-11

## 2019-02-11 LAB
ALBUMIN SERPL BCP-MCNC: 1.7 G/DL
ALP SERPL-CCNC: 560 U/L
ALT SERPL W/O P-5'-P-CCNC: 33 U/L
ANION GAP SERPL CALC-SCNC: 10 MMOL/L
ANISOCYTOSIS BLD QL SMEAR: SLIGHT
AST SERPL-CCNC: 37 U/L
BASOPHILS # BLD AUTO: ABNORMAL K/UL
BASOPHILS # BLD AUTO: ABNORMAL K/UL
BASOPHILS NFR BLD: 14 %
BASOPHILS NFR BLD: 5 %
BASOPHILS NFR BLD: 8 %
BILIRUB SERPL-MCNC: 0.9 MG/DL
BLASTS NFR BLD MANUAL: 13 %
BLASTS NFR BLD MANUAL: 9 %
BUN SERPL-MCNC: 12 MG/DL
CALCIUM SERPL-MCNC: 8.4 MG/DL
CHLORIDE SERPL-SCNC: 95 MMOL/L
CO2 SERPL-SCNC: 23 MMOL/L
CREAT SERPL-MCNC: 0.6 MG/DL
DACRYOCYTES BLD QL SMEAR: ABNORMAL
DIFFERENTIAL METHOD: ABNORMAL
ENTEROVIRUS: NOT DETECTED
EOSINOPHIL # BLD AUTO: ABNORMAL K/UL
EOSINOPHIL # BLD AUTO: ABNORMAL K/UL
EOSINOPHIL NFR BLD: 0 %
EOSINOPHIL NFR BLD: 1 %
EOSINOPHIL NFR BLD: 2 %
ERYTHROCYTE [DISTWIDTH] IN BLOOD BY AUTOMATED COUNT: 20 %
ERYTHROCYTE [DISTWIDTH] IN BLOOD BY AUTOMATED COUNT: 20.1 %
ERYTHROCYTE [DISTWIDTH] IN BLOOD BY AUTOMATED COUNT: 20.4 %
EST. GFR  (AFRICAN AMERICAN): >60 ML/MIN/1.73 M^2
EST. GFR  (NON AFRICAN AMERICAN): >60 ML/MIN/1.73 M^2
GLUCOSE SERPL-MCNC: 83 MG/DL
HCT VFR BLD AUTO: 23.6 %
HCT VFR BLD AUTO: 23.8 %
HCT VFR BLD AUTO: 24.2 %
HGB BLD-MCNC: 7.7 G/DL
HGB BLD-MCNC: 7.9 G/DL
HGB BLD-MCNC: 8.1 G/DL
HUMAN BOCAVIRUS: NOT DETECTED
HUMAN CORONAVIRUS, COMMON COLD VIRUS: NOT DETECTED
HYPOCHROMIA BLD QL SMEAR: ABNORMAL
HYPOCHROMIA BLD QL SMEAR: ABNORMAL
IMM GRANULOCYTES # BLD AUTO: ABNORMAL K/UL
IMM GRANULOCYTES NFR BLD AUTO: ABNORMAL %
INFLUENZA A - H1N1-09: NOT DETECTED
LYMPHOCYTES # BLD AUTO: ABNORMAL K/UL
LYMPHOCYTES # BLD AUTO: ABNORMAL K/UL
LYMPHOCYTES NFR BLD: 22 %
LYMPHOCYTES NFR BLD: 43 %
LYMPHOCYTES NFR BLD: 63 %
MAGNESIUM SERPL-MCNC: 1.6 MG/DL
MCH RBC QN AUTO: 26.3 PG
MCH RBC QN AUTO: 26.5 PG
MCH RBC QN AUTO: 26.7 PG
MCHC RBC AUTO-ENTMCNC: 32.6 G/DL
MCHC RBC AUTO-ENTMCNC: 33.2 G/DL
MCHC RBC AUTO-ENTMCNC: 33.5 G/DL
MCV RBC AUTO: 80 FL
MCV RBC AUTO: 80 FL
MCV RBC AUTO: 81 FL
METAMYELOCYTES NFR BLD MANUAL: 1 %
MONOCYTES # BLD AUTO: ABNORMAL K/UL
MONOCYTES # BLD AUTO: ABNORMAL K/UL
MONOCYTES NFR BLD: 11 %
MONOCYTES NFR BLD: 11 %
MONOCYTES NFR BLD: 12 %
MYELOCYTES NFR BLD MANUAL: 1 %
MYELOCYTES NFR BLD MANUAL: 2 %
MYELOCYTES NFR BLD MANUAL: 4 %
NEUTROPHILS NFR BLD: 24 %
NEUTROPHILS NFR BLD: 42 %
NEUTROPHILS NFR BLD: 9 %
NEUTS BAND NFR BLD MANUAL: 1 %
NEUTS BAND NFR BLD MANUAL: 2 %
NRBC BLD-RTO: 0 /100 WBC
NRBC BLD-RTO: 0 /100 WBC
NRBC BLD-RTO: 1 /100 WBC
OSMOLALITY UR: 263 MOSM/KG
OVALOCYTES BLD QL SMEAR: ABNORMAL
PARAINFLUENZA: NOT DETECTED
PHOSPHATE SERPL-MCNC: 3.3 MG/DL
PLATELET # BLD AUTO: 22 K/UL
PLATELET # BLD AUTO: 23 K/UL
PLATELET # BLD AUTO: 25 K/UL
PLATELET BLD QL SMEAR: ABNORMAL
PLATELET BLD QL SMEAR: ABNORMAL
PMV BLD AUTO: ABNORMAL FL
POIKILOCYTOSIS BLD QL SMEAR: SLIGHT
POLYCHROMASIA BLD QL SMEAR: ABNORMAL
POTASSIUM SERPL-SCNC: 3.9 MMOL/L
PROT SERPL-MCNC: 5.7 G/DL
RBC # BLD AUTO: 2.93 M/UL
RBC # BLD AUTO: 2.98 M/UL
RBC # BLD AUTO: 3.03 M/UL
RVP - ADENOVIRUS: NOT DETECTED
RVP - HUMAN METAPNEUMOVIRUS (HMPV): NOT DETECTED
RVP - INFLUENZA A: NOT DETECTED
RVP - INFLUENZA B: NOT DETECTED
RVP - RESPIRATORY SYNCTIAL VIRUS (RSV) A: NOT DETECTED
RVP - RESPIRATORY VIRAL PANEL, SOURCE: NORMAL
RVP - RHINOVIRUS: NOT DETECTED
SCHISTOCYTES BLD QL SMEAR: ABNORMAL
SCHISTOCYTES BLD QL SMEAR: PRESENT
SCHISTOCYTES BLD QL SMEAR: PRESENT
SMUDGE CELLS BLD QL SMEAR: PRESENT
SODIUM SERPL-SCNC: 128 MMOL/L
SODIUM UR-SCNC: 81 MMOL/L
WBC # BLD AUTO: 13.47 K/UL
WBC # BLD AUTO: 13.75 K/UL
WBC # BLD AUTO: 9.14 K/UL

## 2019-02-11 PROCEDURE — 99233 PR SUBSEQUENT HOSPITAL CARE,LEVL III: ICD-10-PCS | Mod: ,,, | Performed by: HOSPITALIST

## 2019-02-11 PROCEDURE — 25000242 PHARM REV CODE 250 ALT 637 W/ HCPCS: Performed by: STUDENT IN AN ORGANIZED HEALTH CARE EDUCATION/TRAINING PROGRAM

## 2019-02-11 PROCEDURE — 94640 AIRWAY INHALATION TREATMENT: CPT

## 2019-02-11 PROCEDURE — 87040 BLOOD CULTURE FOR BACTERIA: CPT

## 2019-02-11 PROCEDURE — 99233 SBSQ HOSP IP/OBS HIGH 50: CPT | Mod: ,,, | Performed by: HOSPITALIST

## 2019-02-11 PROCEDURE — 84100 ASSAY OF PHOSPHORUS: CPT

## 2019-02-11 PROCEDURE — 84300 ASSAY OF URINE SODIUM: CPT

## 2019-02-11 PROCEDURE — 80053 COMPREHEN METABOLIC PANEL: CPT

## 2019-02-11 PROCEDURE — 83735 ASSAY OF MAGNESIUM: CPT

## 2019-02-11 PROCEDURE — 27000221 HC OXYGEN, UP TO 24 HOURS

## 2019-02-11 PROCEDURE — 25000003 PHARM REV CODE 250: Performed by: STUDENT IN AN ORGANIZED HEALTH CARE EDUCATION/TRAINING PROGRAM

## 2019-02-11 PROCEDURE — 63600175 PHARM REV CODE 636 W HCPCS: Performed by: INTERNAL MEDICINE

## 2019-02-11 PROCEDURE — 94761 N-INVAS EAR/PLS OXIMETRY MLT: CPT

## 2019-02-11 PROCEDURE — 36415 COLL VENOUS BLD VENIPUNCTURE: CPT

## 2019-02-11 PROCEDURE — 85007 BL SMEAR W/DIFF WBC COUNT: CPT | Mod: 91

## 2019-02-11 PROCEDURE — 25000003 PHARM REV CODE 250: Performed by: HOSPITALIST

## 2019-02-11 PROCEDURE — 87070 CULTURE OTHR SPECIMN AEROBIC: CPT

## 2019-02-11 PROCEDURE — 20600001 HC STEP DOWN PRIVATE ROOM

## 2019-02-11 PROCEDURE — 85027 COMPLETE CBC AUTOMATED: CPT

## 2019-02-11 PROCEDURE — 83935 ASSAY OF URINE OSMOLALITY: CPT

## 2019-02-11 PROCEDURE — 87529 HSV DNA AMP PROBE: CPT

## 2019-02-11 PROCEDURE — 63600175 PHARM REV CODE 636 W HCPCS: Performed by: HOSPITALIST

## 2019-02-11 RX ORDER — LIDOCAINE HYDROCHLORIDE 20 MG/ML
20 INJECTION, SOLUTION INFILTRATION; PERINEURAL ONCE
Status: DISCONTINUED | OUTPATIENT
Start: 2019-02-12 | End: 2019-02-18

## 2019-02-11 RX ORDER — FUROSEMIDE 10 MG/ML
40 INJECTION INTRAMUSCULAR; INTRAVENOUS ONCE
Status: COMPLETED | OUTPATIENT
Start: 2019-02-11 | End: 2019-02-11

## 2019-02-11 RX ADMIN — Medication 5 ML: at 06:02

## 2019-02-11 RX ADMIN — Medication 5 ML: at 02:02

## 2019-02-11 RX ADMIN — Medication 5 ML: at 05:02

## 2019-02-11 RX ADMIN — CEFTRIAXONE SODIUM 1 G: 1 INJECTION, POWDER, FOR SOLUTION INTRAMUSCULAR; INTRAVENOUS at 05:02

## 2019-02-11 RX ADMIN — Medication 5 ML: at 01:02

## 2019-02-11 RX ADMIN — Medication 5 ML: at 10:02

## 2019-02-11 RX ADMIN — PANTOPRAZOLE SODIUM 40 MG: 40 TABLET, DELAYED RELEASE ORAL at 08:02

## 2019-02-11 RX ADMIN — SIMVASTATIN 40 MG: 40 TABLET, FILM COATED ORAL at 08:02

## 2019-02-11 RX ADMIN — Medication 5 ML: at 08:02

## 2019-02-11 RX ADMIN — IPRATROPIUM BROMIDE AND ALBUTEROL SULFATE 3 ML: .5; 3 SOLUTION RESPIRATORY (INHALATION) at 04:02

## 2019-02-11 RX ADMIN — FUROSEMIDE 40 MG: 10 INJECTION, SOLUTION INTRAVENOUS at 05:02

## 2019-02-11 RX ADMIN — IPRATROPIUM BROMIDE AND ALBUTEROL SULFATE 3 ML: .5; 3 SOLUTION RESPIRATORY (INHALATION) at 07:02

## 2019-02-11 RX ADMIN — IPRATROPIUM BROMIDE AND ALBUTEROL SULFATE 3 ML: .5; 3 SOLUTION RESPIRATORY (INHALATION) at 12:02

## 2019-02-11 NOTE — PT/OT/SLP PROGRESS
Physical Therapy      Patient Name:  Vicki Peña   MRN:  [id number]    Therapist visited pt this PM for tx session. Pt requesting to hold therapy 2/2 not feeling well/fatigued. Per pt's family, pt has been more fatigued today, but was able to ambulate over the weekend with assistance. Therapist encouraged pt to participate in therapy up to tolerance; pt verbalized understanding but continued to decline. Will follow up at next scheduled visit.     Izzy Degroot, PT

## 2019-02-11 NOTE — PROGRESS NOTES
[facility]  Adult Nutrition  Progress Note    SUMMARY       Recommendations    Recommendation/Intervention:   1. Order Boost Plus to aid in nutrient intake.   2. Continue current regular diet and encourage PO intake.   3. RD following.    Goals: 1. Meet >75% EEN/EPN 2. Prevent >2% dry weight loss over one week  Nutrition Goal Status: goal not met  Communication of RD Recs: other (comment)(POC)    Reason for Assessment    Reason For Assessment: RD follow-up  Diagnosis: other (see comments)(severe anemia)  Relevant Medical History: CVA, osteoporosis, HLD    General Information Comments: Pt continues with poor PO intake. Intake limited by mouth sores and inflammed gums that have prevented her from wearing her dentures. Family members at bedside denied need for mechanical soft diet at this time. Ate some chicken soup brought in by family yesterday. Agreeable to Boost. NFPE performed 2/, continues with muscle/fat wasting.    Nutrition Discharge Planning: d/c on regular diet    Nutrition Risk Screen    Nutrition Risk Screen: no indicators present    Nutrition/Diet History    Patient Reported Diet/Restrictions/Preferences: general  Spiritual, Cultural Beliefs, Baptist Practices, Values that Affect Care: no  Factors Affecting Nutritional Intake: decreased appetite, sore mouth, chewing difficulties/inability to chew food    Anthropometrics    Temp: 96.8 °F (36 °C)  Height Method: Stated  Height: 5' (152.4 cm)  Height (inches): 60 in  Weight Method: Bed Scale  Weight: 52.2 kg (115 lb)  Weight (lb): 115 lb  Ideal Body Weight (IBW), Female: 100 lb  % Ideal Body Weight, Female (lb): 115 lb  BMI (Calculated): 22.5  BMI Grade: 18.5-24.9 - normal  Usual Body Weight (UBW), k.43 kg  % Usual Body Weight: 96.47  % Weight Change From Usual Weight: -3.73 %       Lab/Procedures/Meds    Pertinent Labs Reviewed: reviewed  Pertinent Labs Comments: na 128, Alk Phos 560, Alb 1.7  Pertinent Medications Reviewed:  reviewed  Pertinent Medications Comments: Magic Mouthwash, pantoprazole, statin    Estimated/Assessed Needs    Weight Used For Calorie Calculations: 52.4 kg (115 lb 8.3 oz)  Energy Calorie Requirements (kcal): 1310-1575kcal/day(25-30kcal/kg)  Energy Need Method: Kcal/kg  Protein Requirements: 53-63g/day(1.0-1.2g/kg)  Weight Used For Protein Calculations: 52.4 kg (115 lb 8.3 oz)  Fluid Requirements (mL): 1mL/kcal or per MD     RDA Method (mL): 1310       Nutrition Prescription Ordered    Current Diet Order: Regular  Nutrition Order Comments: 1000 ml FR    Evaluation of Received Nutrient/Fluid Intake    Oral Fluid (mL): 810  Energy Calories Required: not meeting needs  Protein Required: not meeting needs  Fluid Required: meeting needs  Comments: LBM 2/11  % Intake of Estimated Energy Needs: 0 - 25 %  % Meal Intake: 0 - 25 %    Nutrition Risk    Level of Risk/Frequency of Follow-up: low     Assessment and Plan    Nutrition Problem  Inadequate oral intake     Related to (etiology):   Poor appetite     Signs and Symptoms (as evidenced by):   PO intake 0-25% PTA for >2 weeks; PO intake 0-25% while admitted; mild weight loss    Recommendations:  Commercial beverage     Nutrition Diagnosis Status:   Continues    Monitor and Evaluation    Food and Nutrient Intake: energy intake, food and beverage intake  Food and Nutrient Adminstration: diet order  Knowledge/Beliefs/Attitudes: food and nutrition knowledge/skill, beliefs and attitudes  Physical Activity and Function: nutrition-related ADLs and IADLs  Anthropometric Measurements: weight, weight change, body mass index  Biochemical Data, Medical Tests and Procedures: lipid profile, inflammatory profile, glucose/endocrine profile, gastrointestinal profile, electrolyte and renal panel  Nutrition-Focused Physical Findings: overall appearance, extremities, muscles and bones, head and eyes, skin     Malnutrition Assessment     Orbital Region (Subcutaneous Fat Loss): moderate  depletion  Upper Arm Region (Subcutaneous Fat Loss): moderate depletion   [place] Region (Muscle Loss): moderate depletion  Clavicle and Acromion Bone Region (Muscle Loss): mild depletion  Patellar Region (Muscle Loss): moderate depletion  Posterior Calf Region (Muscle Loss): moderate depletion     Nutrition Follow-Up    RD Follow-up?: Yes

## 2019-02-11 NOTE — TELEPHONE ENCOUNTER
Spoke with patient's daughter to cancel surgery due to mother being in hospital. Stated that she will call back to reschedule. Contacted Oneyda via IM to cancel surgery.  ----- Message from Elvira Bass sent at 2/11/2019 11:13 AM CST -----  Contact: Bettina (daughter)/[phone]  Patient's daughter called to cancel her upcoming surgery on 02/27/19.    Please call to confirm it is cancelled.

## 2019-02-11 NOTE — PROGRESS NOTES
Hospital Medicine  Progress note    [place] [facility] [place] [facility] [place] Adin Webb MD  Admit Date: 2/2/2019  JACK   Length of Stay:  LOS: 9 days   Code status: Full Code    Principal Problem:  Sepsis due to urinary tract infection    Overview:    Interval hx: Patient seen and examined at bedside, platelets still low at 22,000, contacted hematology-Oncology, will perform Bone marrow biopsy today vs tomorrow. Mouth ulcers noted, will order HSV PCR.    ROS   Constitutional: Positive for activity change, appetite change and fatigue. Negative for chills and fever.   HENT: Negative for congestion, rhinorrhea and sore throat.    Respiratory: Positive for cough. Negative for shortness of breath, wheezing and stridor.    Cardiovascular: Negative for chest pain, palpitations and leg swelling.   Gastrointestinal: Positive for nausea. Negative for abdominal pain, blood in stool, constipation, diarrhea and vomiting.   Genitourinary: Negative for decreased urine volume, difficulty urinating, dysuria, flank pain, frequency and urgency.   Musculoskeletal: Negative for arthralgias and myalgias.   Neurological: Negative for syncope, weakness, light-headedness and headaches.   Psychiatric/Behavioral: Negative for agitation and confusion.         PEx  Temp:  [96.8 °F (36 °C)-99.1 °F (37.3 °C)]   Pulse:  [78-99]   Resp:  [14-18]   BP: (117-137)/(56-63)   SpO2:  [87 %-98 %]     Intake/Output Summary (Last 24 hours) at 2/11/2019 1352  Last data filed at 2/11/2019 0600  Gross per 24 hour   Intake 450 ml   Output 500 ml   Net -50 ml       Constitutional: She is oriented to person, place, and time. No distress.   HENT:   Head: Normocephalic.   conjunctival pallor   Eyes: Pupils are equal, round, and reactive to light.   Neck: Normal range of motion. JVD present.   Cardiovascular: Normal rate, regular rhythm and normal heart sounds.   No murmur heard.  Pulmonary/Chest: Effort normal. No stridor. No respiratory distress. She has wheezes. She has rales  (Throughout lung fields, worse on left).   Abdominal: Soft. Bowel sounds are normal. She exhibits no distension. There is no tenderness. There is no guarding.   Musculoskeletal: Normal range of motion. She exhibits no edema or deformity.   Neurological: She is alert and oriented to person, place, and time.   Skin: Skin is warm and dry. Capillary refill takes less than 2 seconds. She is not diaphoretic.   Psychiatric: She has a normal mood and affect.   Nursing note and vitals reviewed.        Recent Labs   Lab 02/10/19  0751 02/10/19  1947 02/11/19  0754   WBC 9.97 11.43 13.47*   HGB 8.2* 7.7* 8.1*   HCT 25.1* 23.5* 24.2*   PLT 23* 22* 22*     Recent Labs   Lab 02/09/19  0452 02/10/19  0622 02/11/19  0556   * 127* 128*   K 3.5 3.9 3.9   CL 94* 92* 95   CO2 24 27 23   BUN 12 13 12   CREATININE 0.6 0.6 0.6   GLU 95 91 83   CALCIUM 8.3* 8.3* 8.4*   MG 1.6 2.0 1.6   PHOS 2.9 3.9 3.3     Recent Labs   Lab 02/09/19  0452 02/10/19  0622 02/11/19  0556   ALKPHOS 512* 567* 560*   ALT 26 31 33   AST 37 37 37   ALBUMIN 1.6* 1.6* 1.7*   PROT 5.2* 5.4* 5.7*   BILITOT 1.1* 0.9 0.9        No results for input(s): CPK, CPKMB, MB, TROPONINI in the last 72 hours.  No results for input(s): POCTGLUCOSE in the last 168 hours.  No results found for: HGBA1C    Scheduled Meds:   (Magic mouthwash) 1:1:1 Benadryl 12.5mg/5ml liq, aluminum & magnesium hydroxide-simehticone (Maalox), lidocaine viscous 2%  5 mL Swish & Spit Q4H    albuterol-ipratropium  3 mL Nebulization Q4H WAKE    cefTRIAXone (ROCEPHIN) IVPB  1 g Intravenous Q24H    pantoprazole  40 mg Oral Daily    simvastatin  40 mg Oral QHS     Continuous Infusions:  As Needed:  acetaminophen, loperamide, ondansetron, promethazine, ramelteon, sodium chloride 0.9%, sodium chloride 0.9%, sodium chloride 0.9%    Active Hospital Problems    Diagnosis  POA    *Sepsis due to urinary tract infection [A41.9, N39.0]  Yes    Mouth ulcers [K12.1]  No    PFO (patent foramen ovale) [Q21.1]   Not Applicable    Hypomagnesemia [E83.42]  No    Hypokalemia [E87.6]  Yes    Pneumonia involving left lung [J18.9]  Yes    SOB (shortness of breath) [R06.02]  Yes    Weakness [R53.1]  Yes    History of CVA (cerebrovascular accident) [Z86.73]  Not Applicable    Hyponatremia [E87.1]  Yes    Pancytopenia [D61.818]  Yes    Hyperlipidemia [E78.5]  Yes     Chronic      Resolved Hospital Problems   No resolved problems to display.         Assessment and Plan    History of CVA (cerebrovascular accident)     Patient states that she had a stroke 20 years back. No residual neurological manifestion.  ECHO this admission consistent with PFO and shunt physiology.   - 02/05/2019: Critical Care contacted Vascular Neurology, Dr. Horn, to ask about the role of anti-platelets vs anticoagulation because the patient has PFO with history of stroke. No history of A fib. He recommends to start ASA 81 mg PO qD   Pulmonary   Pneumonia involving left lung     On 02/03/2019: Patient  febrile this AM and UA c/w with UTI start Ceftriaxone,  At afternoon repeat fever to >102, blood cultures obtained and on abx for UTI, started on sepsis bolus 30cc/kg and monitoring of lactic acid levels. Antibiotics were changed to Piperacillin/Tazobactam and Vancomycin.      02/04/2019: Patient have complaints of left sided lateral chest wall pleuritic pain. Throughout the night patient remained febrile, O2 requirement increasing this Am after 4am patient given 2 500ml saline boluses. This am patient is tachypneic, accessory muscle use, febrile, MAP >65, but pressures 90s/50s. CXR with significant new left sided lower& upper airspace disease, absent breath sounds in left mid to lower fields, dullness to percussion, right basilar absent breath sounds. On Exam patient also with distended JVP to angle of jaw. Critical Care consulted & have accepted patient. Azithromycin started     02/05/2019: During night patient received 1 L NS bolus for marginal BP.  "Patient reports her symptoms have improved today. BP now is stable. Afebrile last spike of fever: 39.1 (02/03/3019). Np leucocytosis. CXR today: Worsening severe pulmonary edema.       CT Chest consistent with pulmonary edema vs ARDS; however, patient requiring minimal oxygen (3L currently) and has received copious IVF due to sepsis. PNA possible but less likely given how rapid radiographic changes developed.   Will start gentle diuresis  Finished course of CAP therapy with rocephin and azithromycin   Cardiac/Vascular   Hyperlipidemia     - Resume home medication: Statin.  - Lipid panel- sent      Renal/   Hypokalemia     Monitor potassium level and replace as needed      Hypomagnesemia     Monitor Mg level and replace as needed      Hyponatremia     Likely hypervolemic hyponatremia, given pulmonary edema and fluid resuscitation for sepsis  Should hyponatremia not improve with diuresis, will order serum and urine osm, urine sodium   ID   * Sepsis due to urinary tract infection     - Urine cultures growing E. 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  Prior to [facility] [place] she had her blood drawn and was found to have a Hgb of 6.6. At that time her PCP instructed her to go to the emergency department for a blood transfusion.  She is s/p 1unit Platelet transfusion - developed transfusion reaction during this - received IM epi, Benadryl/ 125 methylpred for concerns of throat swelling per Dr. Gonzalez.    Heme/oncology consulted (02/03/2019) their recs and plan: " Anemia, thrombocytopenia. Ddx includes primary BM disorder such as MDS, vs secondary BM suppression from infection (recent Chikungunya infection, known to cause thrombocytopenia). Reviewed medication list, no " known culprits. Possible also slow GIB from adenoma.  -No evidence of iron deficiency, B12/folate nml  -No evidence of hemolysis  -No evidence of splenomegaly, no evidence of cirrhosis  -No evidence of coagulation      Will try to perform BM biopsy early this week while patient is here. If patient needs to be discharged, can be scheduled to be done as outpatient. Results will take about a 3-7 days to results.  -Hold NSAIDs  -Transfuse Hb<7 or plt<10 unless active bleeding  -Consider repeat colonoscopy given recommended to be done 5 years from adenoma visualized in 2014   Other   SOB (shortness of breath)     - Likely due to volume overload and pulmonary edema  - now breathing on room air       Weakness     - Generalized, likely multifactorial (age, acute illness, deconditioning)  - PT/OT consulted        High Risk Conditions  Sepsis, pancytopenia, Pneumonia     Diet: regular thin diet  GI PPx:   DVT PPx:    MING/SCD, avoid heparin products due to thrombocytopenia     Goals of Care: Full code  Discharge plan: pending bone marrow biopsy, SS SNF afterwards    Time (minutes) spent in care of the patient (Greater than 1/2 spent in direct face-to-face contact) 35 minutes      Adin Webb MD  Staff Hospitalist  Department of Hospital Medicine  [facility]   [phone]

## 2019-02-11 NOTE — PLAN OF CARE
Problem: Adult Inpatient Plan of Care  Goal: Plan of Care Review  Pt remained free of injuries, falls, and trauma. VSS. Pt c/o headache at the beginning of the shift. PRN Tylenol administered. Platelets still being monitored. Pt being monitored for bleeding. Magic mouthwash now is scheduled for mouth ulcers. Fall precautions maintained. HAPI bundle maintained. Frequent repositioned q 2 hrs. Bone marrow biopsy pending Hematology.Plan of care reviewed with pt and daughter's. All questions and concerns addressed. No complaints or distress noted.

## 2019-02-11 NOTE — PLAN OF CARE
Problem: Adult Inpatient Plan of Care  Goal: Plan of Care Review    Recommendations     Recommendation/Intervention:   1. Order Boost Plus to aid in nutrient intake.   2. Continue current regular diet and encourage PO intake.   3. RD following.     Goals: 1. Meet >75% EEN/EPN 2. Prevent >2% dry weight loss over one week  Nutrition Goal Status: goal not met

## 2019-02-11 NOTE — PLAN OF CARE
02/11/19 1603   Discharge Reassessment   Assessment Type Discharge Planning Reassessment   Provided patient/caregiver education on the expected discharge date and the discharge plan Yes   Do you have any problems affording any of your prescribed medications? No   Discharge Plan A Skilled Nursing Facility   Discharge Plan B Home Health   DME Needed Upon Discharge  none   Patient choice form signed by patient/caregiver Yes   Anticipated Discharge Disposition SNF   Can the patient answer the patient profile reliably? Yes, cognitively intact   How does the patient rate their overall health at the present time? Fair   Describe the patient's ability to walk at the present time. Walks with the help of equipment   How often would a person be available to care for the patient? Whenever needed   Number of comorbid conditions (as recorded on the chart) Five or more   During the past month, has the patient often been bothered by feeling down, depressed or hopeless? Yes   During the past month, has the patient often been bothered by little interest or pleasure in doing things? Yes

## 2019-02-11 NOTE — PT/OT/SLP PROGRESS
Occupational Therapy      Patient Name:  Vicki Peña   MRN:  [id number]    Patient not seen today secondary to communicated with PT in PM and pt refused to participate in therapy this date 2/2 not feeling well/fatigue. Will follow-up next scheduled OT session .    Bela Carcamo, OT  2/11/2019

## 2019-02-11 NOTE — CONSULTS
Consult received for malnutrition. RD following pt. Per reported UBW of family members, pt with minimal wt loss PTA and poor PO intake over 2-3 weeks. At risk for acute malnutrition at this time. Full assessment completed today, will follow up as scheduled.

## 2019-02-12 ENCOUNTER — RESEARCH ENCOUNTER (OUTPATIENT)
Dept: RESEARCH | Facility: HOSPITAL | Age: 84
End: 2019-02-12

## 2019-02-12 LAB
ABO + RH BLD: NORMAL
ACANTHOCYTES BLD QL SMEAR: PRESENT
ACANTHOCYTES BLD QL SMEAR: PRESENT
ALBUMIN SERPL BCP-MCNC: 1.7 G/DL
ALBUMIN SERPL BCP-MCNC: 1.8 G/DL
ALP SERPL-CCNC: 712 U/L
ALP SERPL-CCNC: 872 U/L
ALT SERPL W/O P-5'-P-CCNC: 110 U/L
ALT SERPL W/O P-5'-P-CCNC: 52 U/L
ANION GAP SERPL CALC-SCNC: 10 MMOL/L
ANION GAP SERPL CALC-SCNC: 11 MMOL/L
ANISOCYTOSIS BLD QL SMEAR: SLIGHT
ANISOCYTOSIS BLD QL SMEAR: SLIGHT
AST SERPL-CCNC: 197 U/L
AST SERPL-CCNC: 74 U/L
BASOPHILS # BLD AUTO: 0.24 K/UL
BASOPHILS NFR BLD: 1.8 %
BASOPHILS NFR BLD: 10 %
BILIRUB SERPL-MCNC: 1.1 MG/DL
BILIRUB SERPL-MCNC: 1.6 MG/DL
BLD GP AB SCN CELLS X3 SERPL QL: NORMAL
BONE MARROW WRIGHT STAIN COMMENT: NORMAL
BUN SERPL-MCNC: 13 MG/DL
BUN SERPL-MCNC: 19 MG/DL
BURR CELLS BLD QL SMEAR: ABNORMAL
BURR CELLS BLD QL SMEAR: ABNORMAL
CALCIUM SERPL-MCNC: 8.1 MG/DL
CALCIUM SERPL-MCNC: 8.3 MG/DL
CHLORIDE SERPL-SCNC: 88 MMOL/L
CHLORIDE SERPL-SCNC: 90 MMOL/L
CO2 SERPL-SCNC: 22 MMOL/L
CO2 SERPL-SCNC: 24 MMOL/L
CREAT SERPL-MCNC: 0.6 MG/DL
CREAT SERPL-MCNC: 0.8 MG/DL
DIFFERENTIAL METHOD: ABNORMAL
DIFFERENTIAL METHOD: ABNORMAL
DOHLE BOD BLD QL SMEAR: PRESENT
EOSINOPHIL # BLD AUTO: 0.1 K/UL
EOSINOPHIL NFR BLD: 0.4 %
EOSINOPHIL NFR BLD: 2 %
ERYTHROCYTE [DISTWIDTH] IN BLOOD BY AUTOMATED COUNT: 20 %
ERYTHROCYTE [DISTWIDTH] IN BLOOD BY AUTOMATED COUNT: 20.3 %
EST. GFR  (AFRICAN AMERICAN): >60 ML/MIN/1.73 M^2
EST. GFR  (AFRICAN AMERICAN): >60 ML/MIN/1.73 M^2
EST. GFR  (NON AFRICAN AMERICAN): >60 ML/MIN/1.73 M^2
EST. GFR  (NON AFRICAN AMERICAN): >60 ML/MIN/1.73 M^2
FIBRINOGEN PPP-MCNC: 778 MG/DL
GIANT PLATELETS BLD QL SMEAR: PRESENT
GIANT PLATELETS BLD QL SMEAR: PRESENT
GLUCOSE SERPL-MCNC: 151 MG/DL
GLUCOSE SERPL-MCNC: 89 MG/DL
HCT VFR BLD AUTO: 23 %
HCT VFR BLD AUTO: 23.3 %
HGB BLD-MCNC: 7.7 G/DL
HGB BLD-MCNC: 7.8 G/DL
HSV1 DNA SPEC QL NAA+PROBE: NEGATIVE
HSV2 DNA SPEC QL NAA+PROBE: NEGATIVE
HYPOCHROMIA BLD QL SMEAR: ABNORMAL
HYPOCHROMIA BLD QL SMEAR: ABNORMAL
IMM GRANULOCYTES # BLD AUTO: 0.11 K/UL
IMM GRANULOCYTES # BLD AUTO: ABNORMAL K/UL
IMM GRANULOCYTES NFR BLD AUTO: 0.8 %
IMM GRANULOCYTES NFR BLD AUTO: ABNORMAL %
LACTATE SERPL-SCNC: 1.6 MMOL/L
LDH SERPL L TO P-CCNC: 551 U/L
LYMPHOCYTES # BLD AUTO: 3.8 K/UL
LYMPHOCYTES NFR BLD: 18 %
LYMPHOCYTES NFR BLD: 28.2 %
MAGNESIUM SERPL-MCNC: 1.5 MG/DL
MCH RBC QN AUTO: 27.3 PG
MCH RBC QN AUTO: 27.6 PG
MCHC RBC AUTO-ENTMCNC: 33 G/DL
MCHC RBC AUTO-ENTMCNC: 33.9 G/DL
MCV RBC AUTO: 81 FL
MCV RBC AUTO: 83 FL
METAMYELOCYTES NFR BLD MANUAL: 2 %
MONOCYTES # BLD AUTO: 1.4 K/UL
MONOCYTES NFR BLD: 10.6 %
MONOCYTES NFR BLD: 15 %
MYELOCYTES NFR BLD MANUAL: 1 %
NEUTROPHILS # BLD AUTO: 7.8 K/UL
NEUTROPHILS NFR BLD: 37 %
NEUTROPHILS NFR BLD: 58.2 %
NEUTS BAND NFR BLD MANUAL: 9 %
NRBC BLD-RTO: 0 /100 WBC
NRBC BLD-RTO: 0 /100 WBC
OVALOCYTES BLD QL SMEAR: ABNORMAL
OVALOCYTES BLD QL SMEAR: ABNORMAL
PHOSPHATE SERPL-MCNC: 3 MG/DL
PLATELET # BLD AUTO: 18 K/UL
PLATELET # BLD AUTO: 20 K/UL
PLATELET BLD QL SMEAR: ABNORMAL
PLATELET BLD QL SMEAR: ABNORMAL
PMV BLD AUTO: ABNORMAL FL
PMV BLD AUTO: ABNORMAL FL
POIKILOCYTOSIS BLD QL SMEAR: ABNORMAL
POIKILOCYTOSIS BLD QL SMEAR: SLIGHT
POLYCHROMASIA BLD QL SMEAR: ABNORMAL
POTASSIUM SERPL-SCNC: 3.9 MMOL/L
POTASSIUM SERPL-SCNC: 4 MMOL/L
PROCALCITONIN SERPL IA-MCNC: 1.37 NG/ML
PROT SERPL-MCNC: 5.6 G/DL
PROT SERPL-MCNC: 5.9 G/DL
RBC # BLD AUTO: 2.82 M/UL
RBC # BLD AUTO: 2.83 M/UL
SCHISTOCYTES BLD QL SMEAR: ABNORMAL
SCHISTOCYTES BLD QL SMEAR: ABNORMAL
SCHISTOCYTES BLD QL SMEAR: PRESENT
SCHISTOCYTES BLD QL SMEAR: PRESENT
SMUDGE CELLS BLD QL SMEAR: PRESENT
SODIUM SERPL-SCNC: 122 MMOL/L
SODIUM SERPL-SCNC: 123 MMOL/L
TOXIC GRANULES BLD QL SMEAR: PRESENT
TOXIC GRANULES BLD QL SMEAR: PRESENT
WBC # BLD AUTO: 13.26 K/UL
WBC # BLD AUTO: 13.45 K/UL
WBC OTHER NFR BLD MANUAL: 6 %

## 2019-02-12 PROCEDURE — 83615 LACTATE (LD) (LDH) ENZYME: CPT

## 2019-02-12 PROCEDURE — 83605 ASSAY OF LACTIC ACID: CPT

## 2019-02-12 PROCEDURE — 97530 THERAPEUTIC ACTIVITIES: CPT

## 2019-02-12 PROCEDURE — 86920 COMPATIBILITY TEST SPIN: CPT

## 2019-02-12 PROCEDURE — 88271 CYTOGENETICS DNA PROBE: CPT | Mod: 59

## 2019-02-12 PROCEDURE — 84145 PROCALCITONIN (PCT): CPT

## 2019-02-12 PROCEDURE — 88313 SPECIAL STAINS GROUP 2: CPT

## 2019-02-12 PROCEDURE — 85097 BONE MARROW INTERPRETATION: CPT | Mod: ,,, | Performed by: PATHOLOGY

## 2019-02-12 PROCEDURE — 88342 TISSUE SPECIMEN TO PATHOLOGY, BONE MARROW ASPIRATION/BIOPSY PROCEDURE: ICD-10-PCS | Mod: 26,59,, | Performed by: PATHOLOGY

## 2019-02-12 PROCEDURE — 85384 FIBRINOGEN ACTIVITY: CPT

## 2019-02-12 PROCEDURE — 88305 TISSUE EXAM BY PATHOLOGIST: CPT | Mod: 26,,, | Performed by: PATHOLOGY

## 2019-02-12 PROCEDURE — 88275 CYTOGENETICS 100-300: CPT

## 2019-02-12 PROCEDURE — 80053 COMPREHEN METABOLIC PANEL: CPT | Mod: 91

## 2019-02-12 PROCEDURE — 88189 PR  FLOWCYTOMETRY/READ, 16 & > MARKERS: ICD-10-PCS | Mod: ,,, | Performed by: PATHOLOGY

## 2019-02-12 PROCEDURE — 88264 CHROMOSOME ANALYSIS 20-25: CPT

## 2019-02-12 PROCEDURE — 25000003 PHARM REV CODE 250: Performed by: HOSPITALIST

## 2019-02-12 PROCEDURE — 88311 DECALCIFY TISSUE: CPT | Mod: 26,,, | Performed by: PATHOLOGY

## 2019-02-12 PROCEDURE — 88185 FLOWCYTOMETRY/TC ADD-ON: CPT | Mod: 59 | Performed by: PATHOLOGY

## 2019-02-12 PROCEDURE — 88342 IMHCHEM/IMCYTCHM 1ST ANTB: CPT | Mod: 26,59,, | Performed by: PATHOLOGY

## 2019-02-12 PROCEDURE — 20600001 HC STEP DOWN PRIVATE ROOM

## 2019-02-12 PROCEDURE — 88305 TISSUE SPECIMEN TO PATHOLOGY, BONE MARROW ASPIRATION/BIOPSY PROCEDURE: ICD-10-PCS | Mod: 26,,, | Performed by: PATHOLOGY

## 2019-02-12 PROCEDURE — 88341 TISSUE SPECIMEN TO PATHOLOGY, BONE MARROW ASPIRATION/BIOPSY PROCEDURE: ICD-10-PCS | Mod: 26,59,, | Performed by: PATHOLOGY

## 2019-02-12 PROCEDURE — 80053 COMPREHEN METABOLIC PANEL: CPT

## 2019-02-12 PROCEDURE — 84100 ASSAY OF PHOSPHORUS: CPT

## 2019-02-12 PROCEDURE — 63600175 PHARM REV CODE 636 W HCPCS: Performed by: HOSPITALIST

## 2019-02-12 PROCEDURE — 88313 SPECIAL STAINS GROUP 2: CPT | Mod: 26,,, | Performed by: PATHOLOGY

## 2019-02-12 PROCEDURE — 88342 IMHCHEM/IMCYTCHM 1ST ANTB: CPT | Performed by: PATHOLOGY

## 2019-02-12 PROCEDURE — 88311 TISSUE SPECIMEN TO PATHOLOGY, BONE MARROW ASPIRATION/BIOPSY PROCEDURE: ICD-10-PCS | Mod: 26,,, | Performed by: PATHOLOGY

## 2019-02-12 PROCEDURE — 88271 CYTOGENETICS DNA PROBE: CPT

## 2019-02-12 PROCEDURE — 88189 FLOWCYTOMETRY/READ 16 & >: CPT | Mod: ,,, | Performed by: PATHOLOGY

## 2019-02-12 PROCEDURE — 85097 TISSUE SPECIMEN TO PATHOLOGY, BONE MARROW ASPIRATION/BIOPSY PROCEDURE: ICD-10-PCS | Mod: ,,, | Performed by: PATHOLOGY

## 2019-02-12 PROCEDURE — 88341 IMHCHEM/IMCYTCHM EA ADD ANTB: CPT | Performed by: PATHOLOGY

## 2019-02-12 PROCEDURE — 88237 TISSUE CULTURE BONE MARROW: CPT

## 2019-02-12 PROCEDURE — 30000890 MAYO MISCELLANEOUS TEST (REFLEX)

## 2019-02-12 PROCEDURE — 86901 BLOOD TYPING SEROLOGIC RH(D): CPT

## 2019-02-12 PROCEDURE — 36415 COLL VENOUS BLD VENIPUNCTURE: CPT

## 2019-02-12 PROCEDURE — 88299 UNLISTED CYTOGENETIC STUDY: CPT

## 2019-02-12 PROCEDURE — 83735 ASSAY OF MAGNESIUM: CPT

## 2019-02-12 PROCEDURE — 38221 DX BONE MARROW BIOPSIES: CPT

## 2019-02-12 PROCEDURE — 25000003 PHARM REV CODE 250: Performed by: STUDENT IN AN ORGANIZED HEALTH CARE EDUCATION/TRAINING PROGRAM

## 2019-02-12 PROCEDURE — 99233 SBSQ HOSP IP/OBS HIGH 50: CPT | Mod: ,,, | Performed by: HOSPITALIST

## 2019-02-12 PROCEDURE — 88313 TISSUE SPECIMEN TO PATHOLOGY, BONE MARROW ASPIRATION/BIOPSY PROCEDURE: ICD-10-PCS | Mod: 26,,, | Performed by: PATHOLOGY

## 2019-02-12 PROCEDURE — 88305 TISSUE EXAM BY PATHOLOGIST: CPT | Performed by: PATHOLOGY

## 2019-02-12 PROCEDURE — 88184 FLOWCYTOMETRY/ TC 1 MARKER: CPT | Performed by: PATHOLOGY

## 2019-02-12 PROCEDURE — 81450 HL NEO GSAP 5-50DNA/DNA&RNA: CPT

## 2019-02-12 PROCEDURE — 99233 PR SUBSEQUENT HOSPITAL CARE,LEVL III: ICD-10-PCS | Mod: ,,, | Performed by: HOSPITALIST

## 2019-02-12 PROCEDURE — 25000003 PHARM REV CODE 250: Performed by: INTERNAL MEDICINE

## 2019-02-12 PROCEDURE — 94640 AIRWAY INHALATION TREATMENT: CPT

## 2019-02-12 PROCEDURE — 85025 COMPLETE CBC W/AUTO DIFF WBC: CPT

## 2019-02-12 PROCEDURE — 88341 IMHCHEM/IMCYTCHM EA ADD ANTB: CPT | Mod: 26,59,, | Performed by: PATHOLOGY

## 2019-02-12 PROCEDURE — 25000242 PHARM REV CODE 250 ALT 637 W/ HCPCS: Performed by: STUDENT IN AN ORGANIZED HEALTH CARE EDUCATION/TRAINING PROGRAM

## 2019-02-12 RX ORDER — MAGNESIUM SULFATE HEPTAHYDRATE 40 MG/ML
2 INJECTION, SOLUTION INTRAVENOUS ONCE
Status: COMPLETED | OUTPATIENT
Start: 2019-02-12 | End: 2019-02-12

## 2019-02-12 RX ORDER — ACETAMINOPHEN 325 MG/1
650 TABLET ORAL ONCE
Status: COMPLETED | OUTPATIENT
Start: 2019-02-12 | End: 2019-02-12

## 2019-02-12 RX ADMIN — SODIUM CHLORIDE 500 ML: 0.9 INJECTION, SOLUTION INTRAVENOUS at 08:02

## 2019-02-12 RX ADMIN — IPRATROPIUM BROMIDE AND ALBUTEROL SULFATE 3 ML: .5; 3 SOLUTION RESPIRATORY (INHALATION) at 07:02

## 2019-02-12 RX ADMIN — Medication 5 ML: at 08:02

## 2019-02-12 RX ADMIN — Medication 5 ML: at 03:02

## 2019-02-12 RX ADMIN — MAGNESIUM SULFATE IN WATER 2 G: 40 INJECTION, SOLUTION INTRAVENOUS at 06:02

## 2019-02-12 RX ADMIN — PANTOPRAZOLE SODIUM 40 MG: 40 TABLET, DELAYED RELEASE ORAL at 08:02

## 2019-02-12 RX ADMIN — IPRATROPIUM BROMIDE AND ALBUTEROL SULFATE 3 ML: .5; 3 SOLUTION RESPIRATORY (INHALATION) at 11:02

## 2019-02-12 RX ADMIN — Medication 5 ML: at 01:02

## 2019-02-12 RX ADMIN — RAMELTEON 8 MG: 8 TABLET, FILM COATED ORAL at 09:02

## 2019-02-12 RX ADMIN — ACETAMINOPHEN 650 MG: 325 TABLET ORAL at 10:02

## 2019-02-12 RX ADMIN — Medication 5 ML: at 05:02

## 2019-02-12 RX ADMIN — IPRATROPIUM BROMIDE AND ALBUTEROL SULFATE 3 ML: .5; 3 SOLUTION RESPIRATORY (INHALATION) at 03:02

## 2019-02-12 RX ADMIN — IPRATROPIUM BROMIDE AND ALBUTEROL SULFATE 3 ML: .5; 3 SOLUTION RESPIRATORY (INHALATION) at 08:02

## 2019-02-12 NOTE — PLAN OF CARE
Problem: Adult Inpatient Plan of Care  Goal: Patient-Specific Goal (Individualization)  Outcome: Ongoing (interventions implemented as appropriate)  Plan of care reviewed with pt and daughters. VS stable, low grade temp overnight. No acute events at this time. No complaints. Bone marrow biopsy planned for AM. Pt remains free from falls or injury. Bed low and locked, call light and personal belongings within reach. Will continue to monitor. Shelia Wilson RN

## 2019-02-12 NOTE — PROGRESS NOTES
Platelets remain critical, 23,000 this evening. 22,000 this morning. No signs of bleeding. VS stable. MD on call notified.

## 2019-02-12 NOTE — PLAN OF CARE
Problem: Physical Therapy Goal  Goal: Physical Therapy Goal  Goals to be met by: 2019      Patient will increase functional independence with mobility by performin. Supine to sit with supervision  2. Sit to supine with supervision   3. Sit to stand transfer with supervision   4. Gait  X 100 feet with Stand-by Assistance using least restrictive AD.   6. Standing x5 minutes while performing dynamic UE tasks with stand by assistance   6. Lower extremity exercise program x15 reps per handout, with independence      Outcome: Ongoing (interventions implemented as appropriate)  Goals remain appropriate; continue current POC.     Izzy Degroot PT, DPT   2019  Pager: [phone]

## 2019-02-12 NOTE — OP NOTE
PROCEDURE NOTE:    Bone Marrow Biopsy  Indication: Pancytopenia, elevated peripheral circulating blasts  Consent: Informed consent was obtained from patient.  Timeout: Done and documented.  Site: Left posterior illiac crest.  Prep: Betadine.  Needle used: 11 gauge Jamshidi needle.  Anesthetic: 2% lidocaine 10 cc.  Biopsy: The biopsy needle was introduced into the marrow cavity and a 11cc aspirate was obtained without complications. Core biopsy obtained and sent for routine histologic examination, AML FISH, DNA hold and cytogenetics.    Complications: None.    Disposition: The patient stayed in her bed, and advised to lie on her back for at least 30 min.  Nurse made aware.     Ignacio Ordaz MD PGY4  Hematology/oncology

## 2019-02-12 NOTE — PHYSICIAN QUERY
"PT Name: Vicki Peña  MR #: [id number]    Physician Query Form - Pneumonia Clarification     CDS/: Meg Wilder RN              Contact information: [phone]  This form is a permanent document in the medical record.    Query Date:  February 12, 2019    By submitting this query, we are merely seeking further clarification of documentation. Please utilize your independent clinical judgment when addressing the question(s) below.    The Medical record contains the following:   Indicators   Supporting Clinical Findings Location in Medical Record   x "Pneumonia" documented Pneumonia involing the left lung    Finished course of CAP therapy with rocephin and azithromycin    2/11 Hosp med note   x Chest X-Ray: There is cardiomegaly.  There is aortic plaque.  There is a left lung infiltrate.  Right lung is clear. 2/4 CXR   x PaO2    PaCO2     O2 sat O2 sats 90-98%  RR- 28, 25, 26 2/5-11 Flowsheet    Cultures      x Treatment  Azithromycin IVPB  Ceftriaxone IVPB  Piperacillin IVPB  Vancomycin IVPB    CXR  Supplemental oxygen  Pulse oximetry  Cardiac monitoring   2/4-5 MAR  2/3-11 MAR  2/4-11 MAR  2/3-4 MAR      2/4-12 Nsg orders   x Supplemental O2 O2 1-3L NC 2/4-12  Flowsheet    Other         Provider, please specify type of pneumonia.    [  x ] Bacterial Pneumonia            (if known-Specify organism):   [   ] Bacterial, Gram Negative organism Pneumonia   [   ] Other type of pneumonia (please specify):   [  ] Clinically undetermined         Please document in your progress notes daily for the duration of treatment, until resolved, and include in your discharge summary.    .                                                                                    "

## 2019-02-12 NOTE — PHYSICIAN QUERY
PT Name: Vicki Peña  MR #: [id number]     PHYSICIAN QUERY -  ACUITY OF CONDITION CLARIFICATION      CDS/: Meg Wilder RN               Contact information: [phone]  This form is a permanent document in the medical record.     Query Date: February 12, 2019    By submitting this query, we are merely seeking further clarification of documentation to reflect the severity of illness of your patient. Please utilize your independent clinical judgment when addressing the question(s) below.    The Medical record reflects the following:     Indicators   Supporting Clinical Findings Location in Medical Record   x Documentation of condition CXR today: Worsening severe pulmonary edema      CT Chest consistent with pulmonary edema vs ARDS; however, patient requiring minimal oxygen (3L currently) and has received copious IVF due to sepsis.    Likely hypervolemic hyponatremia, given pulmonary edema and fluid resuscitation for sepsis - Likely due to volume overload and pulmonary edema      2/11 Hosp med note    Lab Value(s)     x Radiology Findings Worsening severe pulmonary edema.    There is a possible left lung mass. 2/5 CXR     x Treatment                        Medication CXR  Continuous oxygen  Pulse oximetry  Cardiac monitoring    Lasix 40mg IVP x5 doses   2/4-2/12 NSG ordrers          2/4-11 MAR    Other       Provider, please specify the acuity/chronicity of Pulmonary edema:    [   x] Acute   [   ] Chronic   [   ] Acute and/on chronic   [   ] Ruled Out   [   ]  Clinically Undetermined       Please document in your progress notes daily for the duration of treatment until resolved, and include in your discharge summary.

## 2019-02-12 NOTE — PROGRESS NOTES
Tuesday, February 12th, 2019     Protocol: A Phase 3, Multicenter, Double-Blind, Randomized, Placebo-Controlled Study of AG-120 in Combination with Azacitidine in Subjects ? 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  Patient resting in bed. Daughters speaking with Dr. Ordaz at bedside.   Summary of the trial explained to the patient's daughters, who state they are interested in their mother being screened for the study.  However daughter relayed study information to the patient in Lao and stated she has been making all of her healthcare decisions.  As patient is Mexican speaking only, she would require the use of an , which would delay the bone marrow biopsy that is planned at this time.  This in combination with eligibility issues including but not limited to ongoing active infection, questionable ECOG status, mouth sores that may inhibit tolerating PO study med, and plan for SNF placement post discharge, Research RN did not consent and screen patient for above-mentioned trial. Dr.'s Up notified.  Patient and family also notified by Dr. Ordaz.

## 2019-02-12 NOTE — PT/OT/SLP PROGRESS
Occupational Therapy      Patient Name:  Vicki Peña   MRN:  [id number]    Pt attempted to be seen in AM. Patient not seen today secondary to bone marrow biopsy being performed in room. OT unable to return in PM. Will follow-up next scheduled OT session .    Bela Carcamo, OT  2/12/2019

## 2019-02-12 NOTE — PT/OT/SLP PROGRESS
Physical Therapy Treatment    Patient Name:  Vicki Peña   MRN:  [id number]    Recommendations:     Discharge Recommendations:  Skilled nursing facility  Discharge Equipment Recommendations: (rolling walker, manual wheelchair)   Barriers to discharge: increased level of assistance required    Assessment:     Vicki Peña is a 83 y.o. female admitted with a medical diagnosis of sepsis due to urinary tract infection. Pt is s/p bone  biopsy this AM. Ms. Peña presented more drowsy and fatigued this PM compared to previous sessions. She required increased assistance to complete bed mobility tasks and maintain balance while seated EOB. Activity limited to EOB 2/2 fatigue and weakness. She presents with the following impairments/functional limitations:  weakness, impaired endurance, impaired self care skills, impaired balance, gait instability, impaired functional mobilty, decreased safety awareness, decreased lower extremity function, impaired cardiopulmonary response to activity. Pt would benefit from continued PT intervention to address listed functional deficits, reduce fall risk and maximize return to PLOF.    Rehab Prognosis: Good; patient would benefit from acute skilled PT services to address these deficits and reach maximum level of function.      Recent Surgery: * No surgery found *     Plan:     During this hospitalization, patient to be seen 4 x/week to address the identified rehab impairments via gait training, therapeutic activities, therapeutic exercises, neuromuscular re-education and progress toward the following goals:    · Plan of Care Expires:  03/07/19    Subjective     Chief Complaint: fatigue   Patient/Family Comments/goals: pt appeared drowsy; agreeable to participating in therapy.   Pain/Comfort:  · Pain Rating 1: 0/10  · Pain Rating Post-Intervention 1: 0/10      Objective:     Communicated with RN prior to session.  Patient found HOB elevated with oxygen, peripheral IV, telemetry  upon PT  entry to room. Pt's family at bedside. Pt alert, oriented to person and place only.     General Precautions: Standard, fall, contact, aspiration   Orthopedic Precautions:N/A   Braces: N/A     Functional Mobility:    Bed Mobility:   · Rolling: to right and left with moderate assistance  · Supine > Sit: maximum assistance   · Sit > Supine: maximum assistance     Transfers: Unable to perform 2/2 generalized weakness and fatigue                 Gait: Unable to perform this visit       AM-PAC 6 CLICK MOBILITY  Turning over in bed (including adjusting bedclothes, sheets and blankets)?: 2  Sitting down on and standing up from a chair with arms (e.g., wheelchair, bedside commode, etc.): 2  Moving from lying on back to sitting on the side of the bed?: 2  Moving to and from a bed to a chair (including a wheelchair)?: 1  Need to walk in hospital room?: 1  Climbing 3-5 steps with a railing?: 1  Basic Mobility Total Score: 9       Therapeutic Activities and Exercises:  Pt alert, but drowsy upon PT arrival. Therapeutic activities aimed to increase pt's independence, safety, and efficiency with bed mobility.See above for assistance levels. Increased time, cueing and assistance required this visit compared to previous sessions. Pt with delayed responses and command following; appeared fatigued throughout. RN notified. Pt tolerated sitting EOB ~ 10 minutes with assistance ranging from CGA-min A to maintain sitting balance 2/2 intermittent increased posterior lean. Able to perform bilateral ankle pumps x 10 reps while seated EOB, but unable to complete further therex. Verbal and tactile cueing provided while pt EOB to improve midline orientation and posture. Pt and family educated on supine therex and PT POC. Pt's family member verbalized understanding and expressed no further questions.     Patient left HOB elevated with all lines intact, call button in reach, bed alarm on, RN notified and family present.    GOALS:    Multidisciplinary Problems     Physical Therapy Goals        Problem: Physical Therapy Goal    Goal Priority Disciplines Outcome Goal Variances Interventions   Physical Therapy Goal     PT, PT/OT Ongoing (interventions implemented as appropriate)     Description:  Goals to be met by: 2019      Patient will increase functional independence with mobility by performin. Supine to sit with supervision  2. Sit to supine with supervision   3. Sit to stand transfer with supervision   4. Gait  X 100 feet with Stand-by Assistance using least restrictive AD.   6. Standing x5 minutes while performing dynamic UE tasks with stand by assistance   6. Lower extremity exercise program x15 reps per handout, with independence                       Time Tracking:     PT Received On: 19  PT Start Time: 1259     PT Stop Time: 1322  PT Total Time (min): 23 min     Billable Minutes: Therapeutic Activity 23 min    Treatment Type: Treatment  PT/PTA: PT     PTA Visit Number: 0     Izzy Degroot PT, DPT   2019  Pager: [phone]

## 2019-02-12 NOTE — PHYSICIAN QUERY
PT Name: Vicki Peña  MR #: [id number]  Physician Query Form - CKD Clarification     CDS/: Meg Wilder RN             Contact information: [phone]  This form is a permanent document in the medical record.     Query Date: February 12, 2019    By submitting this query, we are merely seeking further clarification of documentation. Please utilize your independent clinical judgment when addressing the question(s) below.    The Medical record contains the following:     Indicators   Supporting Clinical Findings   Location in Medical Record   x CKD or Chronic Kidney (Renal) Failure / Disease PMH:  Chronic kidney disease 2/11 Hosp med note   x BUN/Creatinine                          GFR Bun/Cr= 19/ 1.0  GFR= >60  Bun/Cr= 16/ 0.8   GFR= >60  Bun/Cr= 17/ 0.9   GFR= >60 2/5 Lab  2/6 Lab  2/7 Lab    Dehydration     x Nausea / Vomiting Positive for nausea 2/3 HP    Dialysis / CRRT      Medication     x Treatment Daily BMP 2/4- 2/12 NSG orders   x Other Chronic Conditions CKD  Kidney stone   OP  Hyperlipidemia  Hypercholesterolemia 2/3 HP    Other       Provider, please further specify the stage of CKD.    [   ] Chronic Kidney Disease (CKD) (please specify stage* below)      [employer] Definitions     Stage Description eGFR (mL/min)   [   ]    I Slight kidney damage with normal or increased filtration 90+   [   ]   II Mildly reduced kidney function 60-89   [   ]    III Moderately reduced kidney function 30-59   [   ]    IV Severely reduced kidney function 15-29   [   ]  V Kidney failure, requiring transplant or dialysis <15         [   ] CKD on Chronic Hemodialysis (please specify stage*): _________    [   ] End Stage Renal Disease (ESRD)    [  x ] Other (please specify): ____Does not have CKD________    [   ]  Clinically Undetermined          Please document in your progress notes daily for the duration of treatment until resolved and include in your discharge summary.

## 2019-02-12 NOTE — PROGRESS NOTES
Progress Note      SUBJECTIVE:     Patient seen and examined at the bedside.  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    UTI (urinary tract infection)      Past Surgical History:   Procedure Laterality Date    CATARACT EXTRACTION W/  INTRAOCULAR LENS IMPLANT Left 7/21/14    CATARACT EXTRACTION W/  INTRAOCULAR LENS IMPLANT Right 8/11/14    CYSTOSCOPY      INSERTION-INTRAOCULAR LENS (IOL) Right 8/11/2014    [facility]    INSERTION-INTRAOCULAR LENS (IOL) Left 7/21/2014    [facility] at [facility] OR    PHACOEMULSIFICATION-ASPIRATION-CATARACT Right 8/11/2014    [facility] at [facility] OR    PHACOEMULSIFICATION-ASPIRATION-CATARACT Left 7/21/2014    [facility] at [facility] OR    screening N/A 9/5/2014    Performed by [facility] Drake MD at [facility] ([facility])     Family History   Problem Relation Age of Onset    Cancer Brother     Cancer Brother     Cancer Sister      Social History     Tobacco Use    Smoking status: Never Smoker    Smokeless tobacco: Never Used   Substance Use Topics    Alcohol use: No     Alcohol/week: 0.0 oz     Comment: moderate    Drug use: No     ROS   Constitutional: Positive for malaise/fatigue.   HENT: Negative for nosebleeds.    Respiratory: Positive for cough and shortness of breath.    Cardiovascular: Negative for chest pain and leg swelling.   Gastrointestinal:  Negative for blood in stool, heartburn and  vomiting.   Genitourinary: Negative for hematuria and urgency.   Neurological: Negative for sensory change, seizures and headaches.       OBJECTIVE:     Vital Signs:  Temp:  [96.8 °F (36 °C)-99 °F (37.2 °C)]   Pulse:  []   Resp:  [14-18]   BP: (120-137)/(58-63)   SpO2:  [87 %-98 %]     Physical Exam   Constitutional: She is oriented to person, place, and time.   HENT:   Head: Normocephalic and atraumatic.   Eyes: EOM are normal. Pupils are equal, round, and reactive to light.   Neck: Normal range of motion. Neck supple.   Cardiovascular:   Murmur heard.  Pulmonary/Chest: Effort normal. She has wheezes.   Abdominal: Soft. Bowel sounds are normal. There is no tenderness.   Musculoskeletal: Normal range of motion. She exhibits no tenderness or deformity.   Neurological: She is alert and oriented to person, place, and time.   Skin: Skin is warm and dry. No pallor.      Laboratory:  CBC:   Recent Labs   Lab 02/11/19  0754   WBC 13.47*   RBC 3.03*   HGB 8.1*   HCT 24.2*   PLT 22*   MCV 80*   MCH 26.7*   MCHC 33.5     CMP:   Recent Labs   Lab 02/11/19  0556   GLU 83   CALCIUM 8.4*   ALBUMIN 1.7*   PROT 5.7*   *   K 3.9   CO2 23   CL 95   BUN 12   CREATININE 0.6   ALKPHOS 560*   ALT 33   AST 37   BILITOT 0.9     Coagulation: No results for input(s): LABPROT, INR, APTT in the last 168 hours.  Microbiology Results (last 7 days)     Procedure Component Value Units Date/Time    Aerobic culture [[id number]] Collected:  02/11/19 1811    Order Status:  Sent Specimen:  Body Fluid from Mouth Updated:  02/11/19 1815    Blood culture [[id number]] Collected:  02/11/19 1359    Order Status:  Sent Specimen:  Blood Updated:  02/11/19 1402    Respiratory Viral Panel by PCR [facility]; Nasal Swab [[id number]] Collected:  02/04/19 1154    Order Status:  Completed Specimen:  Respiratory Updated:  02/11/19 1301     Respiratory Virus Panel, source Nasal Swab     RVP - Adenovirus Not Detected     Comment: Detects Serotypes B and E.  Detection of Serotype C may   be limited. If Adenovirus infection is suspected and a   Not Detected result is returned the sample should be   re-tested for Adenovirus using an independent method  (e.g. [employer] Adenovirus Quantitative Real-Time  PCR test.          Enterovirus Not Detected     Comment: Cross-reactivity has been observed between certain Rhinovirus  strains and the Enterovirus assay.  False positive reported by [employer]. There was cross contamination   during testing process.  Corrected result; previously reported as Positive on 02/07/2019 at   07:41.          Human Bocavirus Not Detected     Human Coronavirus Not Detected     Comment: The Human Coronavirus assay detects Human coronavirus types  229E, OC43,NL63 and HKU1.          RVP - Human Metapneumovirus (hMPV) Not Detected     RVP - Influenza A Not Detected     Influenza A - B2W0-71 Not Detected     RVP - Influenza B Not Detected     Parainfluenza Not Detected     Respiratory Syncytial VirusVirus (RSV) A Not Detected     Comment: The Respiratory Syncytial Viral assay detects types A and B,  however it does not distinguish between the two.          RVP - Rhinovirus Not Detected     Comment: Cross-Reactivity has been observed between certain   Rhinovirus strains and the Enterovirus assay.  Target Enriched Mulitplex Polymerase Chain Reaction (TEM-PCR)  allows for the detection of multiple pathogens out of a single  reaction.  This test was developed and its performance   characteristics determined by [employer].  It has not   been cleared or approved by the U.S.Food and Drug Administration.  Results should be used in conjunction with clinical findings,   and should not form the sole basis for a diagnosis or treatment  decision.  TEM-PCR is a licensed technology of [employer].         Narrative:       Receiving Lab:->[facility]    Stool culture [[id number]]     Order Status:  No result Specimen:  Stool     Blood Culture #1  **CANNOT BE ORDERED STAT** [[id number]] Collected:  02/03/19 0925    Order Status:  Completed Specimen:  Blood Updated:  02/08/19 1212     Blood Culture, Routine No growth after 5 days.    Blood Culture #1 **CANNOT BE ORDERED STAT** [[id number]] Collected:  02/03/19 0925    Order Status:  Completed Specimen:  Blood Updated:  02/08/19 1212     Blood Culture, Routine No growth after 5 days.    Culture, Respiratory with Gram Stain [[id number]] Collected:  02/04/19 1115    Order Status:  Completed Specimen:  Respiratory from Sputum Updated:  02/06/19 1110     Respiratory Culture Normal respiratory fernando     Respiratory Culture No S aureus or Pseudomonas isolated.     Gram Stain (Respiratory) <10 epithelial cells per low power field.     Gram Stain (Respiratory) Few WBC's     Gram Stain (Respiratory) Few Gram positive cocci     Gram Stain (Respiratory) Few Gram negative rods    Respiratory Viral Panel by PCR [facility]; Sputum [[id number]] Collected:  02/04/19 1115    Order Status:  Completed Specimen:  Respiratory Updated:  02/06/19 0707     Respiratory Virus Panel, source Sputum     RVP - Adenovirus Not Detected     Comment: Detects Serotypes B and E. Detection of Serotype C may   be limited. If Adenovirus infection is suspected and a   Not Detected result is returned the sample should be   re-tested for Adenovirus using an independent method  (e.g. [employer] Adenovirus Quantitative Real-Time  PCR test.          Enterovirus Not Detected     Comment: Cross-reactivity has been observed between certain Rhinovirus  strains and the Enterovirus assay.          Human Bocavirus Not Detected     Human Coronavirus Not Detected     Comment: The Human Coronavirus assay detects Human coronavirus types  229E, OC43,NL63 and HKU1.          RVP - Human Metapneumovirus (hMPV) Not Detected     RVP - Influenza A Not Detected     Influenza A - P5G0-10 Not Detected     RVP - Influenza B Not Detected     Parainfluenza Not Detected      Respiratory Syncytial VirusVirus (RSV) A Not Detected     Comment: The Respiratory Syncytial Viral assay detects types A and B,  however it does not distinguish between the two.          RVP - Rhinovirus Not Detected     Comment: Cross-Reactivity has been observed between certain   Rhinovirus strains and the Enterovirus assay.  Target Enriched Mulitplex Polymerase Chain Reaction (TEM-PCR)  allows for the detection of multiple pathogens out of a single  reaction.  This test was developed and its performance   characteristics determined by [employer].  It has not   been cleared or approved by the U.S.Food and Drug Administration.  Results should be used in conjunction with clinical findings,   and should not form the sole basis for a diagnosis or treatment  decision.  TEM-PCR is a licensed technology of [employer].         Narrative:       Receiving Lab:->[facility]    Clostridium difficile EIA [[id number]] Collected:  02/04/19 1445    Order Status:  Completed Specimen:  Stool Updated:  02/04/19 0584     C. diff Antigen Negative     C difficile Toxins A+B, EIA Negative     Comment: Testing not recommended for children <24 months old.             Diagnostic Results:      ASSESSMENT/PLAN:     1.  Anemia and thrombocytopenia:  Hemoglobin improved and stable.  Platelet are 22, no active bleeding seen.    Increased circulating blast count noted in the lab over the weekend.  Suspect primary myeloid process versus reactive  2.  Left-sided pneumonia as evidence on chest x-ray, CT  3.  Urinary tract infection with positive cultures for gram-negative rods  4.  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  Seen by Dr. José Manuel baker in 2013. Patient with long standing cytopenias, dating back to at least 2005. She has history of clonal T cell gene rearrangement in 2012.   5.  Fluid overload:  She received multiple transfusions and IV fluid boluses     Plan:      1.  Transfuse  for hemoglobin of less than 7 and platelets less than 10  2.  Will do bone marrow biopsy tomorrow   3.  No aspirin or heparin until the platelet count improves to >50,000.  Use SCDs for DVT prophylaxis     Plan of care discussed with Dr. Kareen Oradz  PGY 4, hematology/oncology

## 2019-02-13 LAB
ALBUMIN SERPL BCP-MCNC: 1.7 G/DL
ALP SERPL-CCNC: 679 U/L
ALT SERPL W/O P-5'-P-CCNC: 75 U/L
ANION GAP SERPL CALC-SCNC: 8 MMOL/L
ANION GAP SERPL CALC-SCNC: 9 MMOL/L
ANISOCYTOSIS BLD QL SMEAR: SLIGHT
APTT BLDCRRT: 33.1 SEC
AST SERPL-CCNC: 75 U/L
BACTERIA SPEC AEROBE CULT: NORMAL
BASOPHILS # BLD AUTO: 0.3 K/UL
BASOPHILS NFR BLD: 2.2 %
BILIRUB SERPL-MCNC: 1.4 MG/DL
BONE MARROW IRON STAIN COMMENT: NORMAL
BUN SERPL-MCNC: 17 MG/DL
BUN SERPL-MCNC: 24 MG/DL
CALCIUM SERPL-MCNC: 7.9 MG/DL
CALCIUM SERPL-MCNC: 8.6 MG/DL
CHLORIDE SERPL-SCNC: 91 MMOL/L
CHLORIDE SERPL-SCNC: 92 MMOL/L
CHOLEST SERPL-MCNC: 59 MG/DL
CHOLEST/HDLC SERPL: 3.3 {RATIO}
CO2 SERPL-SCNC: 22 MMOL/L
CO2 SERPL-SCNC: 24 MMOL/L
CREAT SERPL-MCNC: 0.6 MG/DL
CREAT SERPL-MCNC: 0.7 MG/DL
DIFFERENTIAL METHOD: ABNORMAL
EOSINOPHIL # BLD AUTO: 0 K/UL
EOSINOPHIL NFR BLD: 0.3 %
ERYTHROCYTE [DISTWIDTH] IN BLOOD BY AUTOMATED COUNT: 20.1 %
EST. GFR  (AFRICAN AMERICAN): >60 ML/MIN/1.73 M^2
EST. GFR  (AFRICAN AMERICAN): >60 ML/MIN/1.73 M^2
EST. GFR  (NON AFRICAN AMERICAN): >60 ML/MIN/1.73 M^2
EST. GFR  (NON AFRICAN AMERICAN): >60 ML/MIN/1.73 M^2
GLUCOSE SERPL-MCNC: 106 MG/DL
GLUCOSE SERPL-MCNC: 92 MG/DL
HAV IGM SERPL QL IA: NEGATIVE
HBV CORE IGM SERPL QL IA: NEGATIVE
HBV SURFACE AG SERPL QL IA: NEGATIVE
HCT VFR BLD AUTO: 22.2 %
HCV AB SERPL QL IA: NEGATIVE
HDLC SERPL-MCNC: 18 MG/DL
HDLC SERPL: 30.5 %
HGB BLD-MCNC: 7.5 G/DL
HYPOCHROMIA BLD QL SMEAR: ABNORMAL
IMM GRANULOCYTES # BLD AUTO: 0 K/UL
IMM GRANULOCYTES NFR BLD AUTO: 0 %
INR PPP: 1.3
LACTATE SERPL-SCNC: 1.5 MMOL/L
LDLC SERPL CALC-MCNC: 27.8 MG/DL
LYMPHOCYTES # BLD AUTO: 1.6 K/UL
LYMPHOCYTES NFR BLD: 11.5 %
MAGNESIUM SERPL-MCNC: 2.1 MG/DL
MCH RBC QN AUTO: 27 PG
MCHC RBC AUTO-ENTMCNC: 33.8 G/DL
MCV RBC AUTO: 80 FL
MONOCYTES # BLD AUTO: 1.4 K/UL
MONOCYTES NFR BLD: 10.3 %
NEUTROPHILS # BLD AUTO: 10.6 K/UL
NEUTROPHILS NFR BLD: 75.7 %
NONHDLC SERPL-MCNC: 41 MG/DL
NRBC BLD-RTO: 0 /100 WBC
OSMOLALITY SERPL: 259 MOSM/KG
OVALOCYTES BLD QL SMEAR: ABNORMAL
PHOSPHATE SERPL-MCNC: 3.1 MG/DL
PLATELET # BLD AUTO: 20 K/UL
PMV BLD AUTO: ABNORMAL FL
POIKILOCYTOSIS BLD QL SMEAR: SLIGHT
POLYCHROMASIA BLD QL SMEAR: ABNORMAL
POTASSIUM SERPL-SCNC: 3.8 MMOL/L
POTASSIUM SERPL-SCNC: 3.9 MMOL/L
PROT SERPL-MCNC: 5.6 G/DL
PROTHROMBIN TIME: 12.6 SEC
RBC # BLD AUTO: 2.78 M/UL
SODIUM SERPL-SCNC: 123 MMOL/L
SODIUM SERPL-SCNC: 123 MMOL/L
TRIGL SERPL-MCNC: 66 MG/DL
WBC # BLD AUTO: 13.94 K/UL

## 2019-02-13 PROCEDURE — 97530 THERAPEUTIC ACTIVITIES: CPT

## 2019-02-13 PROCEDURE — 20600001 HC STEP DOWN PRIVATE ROOM

## 2019-02-13 PROCEDURE — 80048 BASIC METABOLIC PNL TOTAL CA: CPT

## 2019-02-13 PROCEDURE — 83605 ASSAY OF LACTIC ACID: CPT

## 2019-02-13 PROCEDURE — 94761 N-INVAS EAR/PLS OXIMETRY MLT: CPT

## 2019-02-13 PROCEDURE — 99233 SBSQ HOSP IP/OBS HIGH 50: CPT | Mod: ,,, | Performed by: HOSPITALIST

## 2019-02-13 PROCEDURE — 83930 ASSAY OF BLOOD OSMOLALITY: CPT

## 2019-02-13 PROCEDURE — 25000003 PHARM REV CODE 250: Performed by: INTERNAL MEDICINE

## 2019-02-13 PROCEDURE — 85730 THROMBOPLASTIN TIME PARTIAL: CPT

## 2019-02-13 PROCEDURE — 85610 PROTHROMBIN TIME: CPT

## 2019-02-13 PROCEDURE — 83735 ASSAY OF MAGNESIUM: CPT

## 2019-02-13 PROCEDURE — 25000242 PHARM REV CODE 250 ALT 637 W/ HCPCS: Performed by: STUDENT IN AN ORGANIZED HEALTH CARE EDUCATION/TRAINING PROGRAM

## 2019-02-13 PROCEDURE — 84100 ASSAY OF PHOSPHORUS: CPT

## 2019-02-13 PROCEDURE — 94640 AIRWAY INHALATION TREATMENT: CPT

## 2019-02-13 PROCEDURE — 25000003 PHARM REV CODE 250: Performed by: HOSPITALIST

## 2019-02-13 PROCEDURE — 80053 COMPREHEN METABOLIC PANEL: CPT

## 2019-02-13 PROCEDURE — 25000003 PHARM REV CODE 250: Performed by: STUDENT IN AN ORGANIZED HEALTH CARE EDUCATION/TRAINING PROGRAM

## 2019-02-13 PROCEDURE — 99233 PR SUBSEQUENT HOSPITAL CARE,LEVL III: ICD-10-PCS | Mod: ,,, | Performed by: HOSPITALIST

## 2019-02-13 PROCEDURE — 80074 ACUTE HEPATITIS PANEL: CPT

## 2019-02-13 PROCEDURE — 87040 BLOOD CULTURE FOR BACTERIA: CPT

## 2019-02-13 PROCEDURE — 27000221 HC OXYGEN, UP TO 24 HOURS

## 2019-02-13 PROCEDURE — 80061 LIPID PANEL: CPT

## 2019-02-13 PROCEDURE — 85025 COMPLETE CBC W/AUTO DIFF WBC: CPT

## 2019-02-13 PROCEDURE — 63600175 PHARM REV CODE 636 W HCPCS: Performed by: HOSPITALIST

## 2019-02-13 PROCEDURE — 36415 COLL VENOUS BLD VENIPUNCTURE: CPT

## 2019-02-13 RX ORDER — LIDOCAINE 50 MG/G
1 PATCH TOPICAL
Status: DISCONTINUED | OUTPATIENT
Start: 2019-02-13 | End: 2019-02-19 | Stop reason: HOSPADM

## 2019-02-13 RX ORDER — LEVOFLOXACIN 5 MG/ML
750 INJECTION, SOLUTION INTRAVENOUS
Status: DISCONTINUED | OUTPATIENT
Start: 2019-02-13 | End: 2019-02-13

## 2019-02-13 RX ORDER — ACETAMINOPHEN 325 MG/1
650 TABLET ORAL EVERY 6 HOURS PRN
Status: DISCONTINUED | OUTPATIENT
Start: 2019-02-13 | End: 2019-02-19 | Stop reason: HOSPADM

## 2019-02-13 RX ADMIN — SODIUM CHLORIDE 500 ML: 0.9 INJECTION, SOLUTION INTRAVENOUS at 01:02

## 2019-02-13 RX ADMIN — ACETAMINOPHEN 650 MG: 325 TABLET, FILM COATED ORAL at 02:02

## 2019-02-13 RX ADMIN — Medication 5 ML: at 09:02

## 2019-02-13 RX ADMIN — RAMELTEON 8 MG: 8 TABLET, FILM COATED ORAL at 11:02

## 2019-02-13 RX ADMIN — PIPERACILLIN AND TAZOBACTAM 4.5 G: 4; .5 INJECTION, POWDER, LYOPHILIZED, FOR SOLUTION INTRAVENOUS; PARENTERAL at 06:02

## 2019-02-13 RX ADMIN — VANCOMYCIN HYDROCHLORIDE 750 MG: 750 INJECTION, POWDER, LYOPHILIZED, FOR SOLUTION INTRAVENOUS at 11:02

## 2019-02-13 RX ADMIN — Medication 5 ML: at 02:02

## 2019-02-13 RX ADMIN — IPRATROPIUM BROMIDE AND ALBUTEROL SULFATE 3 ML: .5; 3 SOLUTION RESPIRATORY (INHALATION) at 04:02

## 2019-02-13 RX ADMIN — SODIUM CHLORIDE 500 ML: 0.9 INJECTION, SOLUTION INTRAVENOUS at 06:02

## 2019-02-13 RX ADMIN — LEVOFLOXACIN 750 MG: 750 INJECTION, SOLUTION INTRAVENOUS at 03:02

## 2019-02-13 RX ADMIN — IPRATROPIUM BROMIDE AND ALBUTEROL SULFATE 3 ML: .5; 3 SOLUTION RESPIRATORY (INHALATION) at 12:02

## 2019-02-13 RX ADMIN — IPRATROPIUM BROMIDE AND ALBUTEROL SULFATE 3 ML: .5; 3 SOLUTION RESPIRATORY (INHALATION) at 07:02

## 2019-02-13 RX ADMIN — LIDOCAINE 1 PATCH: 50 PATCH TOPICAL at 08:02

## 2019-02-13 RX ADMIN — Medication 5 ML: at 06:02

## 2019-02-13 RX ADMIN — PANTOPRAZOLE SODIUM 40 MG: 40 TABLET, DELAYED RELEASE ORAL at 09:02

## 2019-02-13 RX ADMIN — IPRATROPIUM BROMIDE AND ALBUTEROL SULFATE 3 ML: .5; 3 SOLUTION RESPIRATORY (INHALATION) at 08:02

## 2019-02-13 NOTE — PLAN OF CARE
Problem: Physical Therapy Goal  Goal: Physical Therapy Goal  Goals to be met by: 2019      Patient will increase functional independence with mobility by performin. Supine to sit with supervision  2. Sit to supine with supervision   3. Sit to stand transfer with supervision   4. Gait  X 100 feet with Stand-by Assistance using least restrictive AD.   6. Standing x5 minutes while performing dynamic UE tasks with stand by assistance   6. Lower extremity exercise program x15 reps per handout, with independence      Outcome: Ongoing (interventions implemented as appropriate)  Goals remain appropriate; continue current POC.     Izzy Degroot PT, DPT   2019  Pager: [phone]

## 2019-02-13 NOTE — PROGRESS NOTES
02/12/19 2313   Vital Signs   Temp (!) 101.8 °F (38.8 °C)   Temp src Oral     Pt febrile. Orders d/c'd for tylenol and no NSAIDs. MD on call notified. Informed pt and daughter of need to lower temp, removed blankets and left sheet and lowered temp in room. Will recheck

## 2019-02-13 NOTE — PROGRESS NOTES
02/13/19 1609 02/13/19 1657   Vital Signs   BP (!) 85/41 (!) 94/51   MAP (mmHg) 59 69   BP Location Left arm Left arm   BP Method --  Automatic   Patient Position Lying Lying     Notified MARYANA Jones MD of pt's BP this afternoon. BP trending down after 500 cc Bolus, minimal ouput noted. Pt's mentation has not changed since AM, still lethargic. MD Robert stated would look over chart and place orders accordingly. Will continue to monitor.

## 2019-02-13 NOTE — PROGRESS NOTES
"Hospital Medicine  Progress note    Team: [facility] R Marcelle Jones MD  Admit Date: 2/2/2019  JACK   Length of Stay:  LOS: 10 days   Code status: Full Code    Principal Problem:  Sepsis due to urinary tract infection    Overview:    Interval hx: Patient seen and examined at bedside, daughters updated. S/p BMBx 2/12 at bedside. Per daughter, patient had a restless night. Ill-appearing on examination today, with minimal interaction. Per daughter, this is unchanged. However, upon afternoon rounds, patient appeared more alert and interactive. Stated she was "tired".     ROS   Constitutional: Positive for activity change, appetite change and fatigue. Negative for chills and fever.   HENT: Negative for congestion, rhinorrhea and sore throat.    Respiratory: Positive for cough. Negative for shortness of breath, wheezing and stridor.    Cardiovascular: Negative for chest pain, palpitations and leg swelling.   Gastrointestinal: Positive for nausea. Negative for abdominal pain, blood in stool, constipation, diarrhea and vomiting.   Genitourinary: Negative for decreased urine volume, difficulty urinating, dysuria, flank pain, frequency and urgency.   Musculoskeletal: Negative for arthralgias and myalgias.   Neurological: Negative for syncope, weakness, light-headedness and headaches.   Psychiatric/Behavioral: Negative for agitation and confusion.         PEx  Temp:  [97.7 °F (36.5 °C)-100.1 °F (37.8 °C)]   Pulse:  []   Resp:  [16-20]   BP: ()/(47-63)   SpO2:  [92 %-98 %]     Intake/Output Summary (Last 24 hours) at 2/12/2019 1836  Last data filed at 2/12/2019 1400  Gross per 24 hour   Intake 772 ml   Output 650 ml   Net 122 ml       Constitutional: She is oriented to person, place, and time. No distress.   HENT:   Head: Normocephalic.   conjunctival pallor   Eyes: Pupils are equal, round, and reactive to light.   Neck: Normal range of motion. JVD present.   Cardiovascular: Normal rate, regular rhythm and " normal heart sounds.   No murmur heard.  Pulmonary/Chest: Effort normal. No stridor. No respiratory distress. No wheezes. She has no rales.  Abdominal: Soft. Bowel sounds are normal. She exhibits no distension. There is no tenderness. There is no guarding.   Musculoskeletal: Normal range of motion. She exhibits no edema or deformity.   Neurological: She is alert and oriented to person, place, and time.   Skin: Skin is warm and dry. Capillary refill takes less than 2 seconds. She is not diaphoretic.   Psychiatric: She has a normal mood and affect.   Nursing note and vitals reviewed.        Recent Labs   Lab 02/11/19  0754 02/11/19  1927 02/12/19  0811 02/12/19  1807   WBC 13.47* 13.75* 13.26* 13.45*   HGB 8.1* 7.9* 7.7* 7.8*   HCT 24.2* 23.8* 23.3* 23.0*   PLT 22* 23* 18*  --      Recent Labs   Lab 02/10/19  0622 02/11/19  0556 02/12/19  0404   * 128* 123*   K 3.9 3.9 3.9   CL 92* 95 90*   CO2 27 23 22*   BUN 13 12 13   CREATININE 0.6 0.6 0.6   GLU 91 83 89   CALCIUM 8.3* 8.4* 8.1*   MG 2.0 1.6 1.5*   PHOS 3.9 3.3 3.0     Recent Labs   Lab 02/10/19  0622 02/11/19  0556 02/12/19  0404   ALKPHOS 567* 560* 712*   ALT 31 33 52*   AST 37 37 74*   ALBUMIN 1.6* 1.7* 1.7*   PROT 5.4* 5.7* 5.6*   BILITOT 0.9 0.9 1.1*          Scheduled Meds:   (Magic mouthwash) 1:1:1 Benadryl 12.5mg/5ml liq, aluminum & magnesium hydroxide-simehticone (Maalox), lidocaine viscous 2%  5 mL Swish & Spit Q4H    albuterol-ipratropium  3 mL Nebulization Q4H WAKE    lidocaine HCL 20 mg/ml (2%)  20 mL Other Once    magnesium sulfate IVPB  2 g Intravenous Once    pantoprazole  40 mg Oral Daily    simvastatin  40 mg Oral QHS     Continuous Infusions:  As Needed:  acetaminophen, loperamide, ondansetron, promethazine, ramelteon, sodium chloride 0.9%, sodium chloride 0.9%, sodium chloride 0.9%    Active Hospital Problems    Diagnosis  POA    *Sepsis due to urinary tract infection [A41.9, N39.0]  Yes    Mouth ulcers [K12.1]  No    PFO (patent  foramen ovale) [Q21.1]  Not Applicable    Hypomagnesemia [E83.42]  No    Hypokalemia [E87.6]  Yes    Pneumonia involving left lung [J18.9]  Yes    SOB (shortness of breath) [R06.02]  Yes    Weakness [R53.1]  Yes    History of CVA (cerebrovascular accident) [Z86.73]  Not Applicable    Hyponatremia [E87.1]  Yes    Pancytopenia [D61.818]  Yes    Hyperlipidemia [E78.5]  Yes     Chronic      Resolved Hospital Problems   No resolved problems to display.         Assessment and Plan    History of CVA (cerebrovascular accident)     Patient states that she had a stroke 20 years back. No residual neurological manifestion.  ECHO this admission consistent with PFO and shunt physiology.   - 02/05/2019: Critical Care contacted Vascular Neurology, Dr. Horn, to ask about the role of anti-platelets vs anticoagulation because the patient has PFO with history of stroke. No history of A fib. He recommends to start ASA 81 mg PO qD      Pneumonia involving left lung     On 02/03/2019: Patient  febrile this AM and UA c/w with UTI start Ceftriaxone,  At afternoon repeat fever to >102, blood cultures obtained and on abx for UTI, started on sepsis bolus 30cc/kg and monitoring of lactic acid levels. Antibiotics were changed to Piperacillin/Tazobactam and Vancomycin.      02/04/2019: Patient have complaints of left sided lateral chest wall pleuritic pain. Throughout the night patient remained febrile, O2 requirement increasing this Am after 4am patient given 2 500ml saline boluses. This am patient is tachypneic, accessory muscle use, febrile, MAP >65, but pressures 90s/50s. CXR with significant new left sided lower& upper airspace disease, absent breath sounds in left mid to lower fields, dullness to percussion, right basilar absent breath sounds. On Exam patient also with distended JVP to angle of jaw. Critical Care consulted & have accepted patient. Azithromycin started     02/05/2019: During night patient received 1 L NS bolus for  "marginal BP. Patient reports her symptoms have improved today. BP now is stable. Afebrile last spike of fever: 39.1 (02/03/3019). Np leucocytosis. CXR today: Worsening severe pulmonary edema.       CT Chest consistent with pulmonary edema vs ARDS; however, patient requiring minimal oxygen (3L currently) and has received copious IVF due to sepsis. PNA possible but less likely given how rapid radiographic changes developed.   Will start gentle diuresis  Finished course of CAP therapy with rocephin and azithromycin   Cardiac/Vascular   Hyperlipidemia     - Resume home medication: Statin.  - Lipid panel- sent      Renal/   Hypokalemia     Monitor potassium level and replace as needed      Hypomagnesemia     Monitor Mg level and replace as needed      Hyponatremia     Likely hypervolemic hyponatremia, given pulmonary edema and fluid resuscitation for sepsis  Worsening, despite diuresis. Will check serum osmol and urine lytes.   ID   * Sepsis due to urinary tract infection     - Urine cultures growing E. coli, susceptible to ceftriaxone, will complete course  - Does have history of frequent UTIs, previously followed with [facility] (most recently seen 2015), normal cysto in 2014      Transaminitis  Etiology unclear. Will check RUQ US, hep panel and trend. Check synthetic function with am labs.      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  Prior to [facility] arrival she had her blood drawn and was found to have a Hgb of 6.6. At that time her PCP instructed her to go to the emergency department for a blood transfusion.  She is s/p 1unit Platelet transfusion - developed transfusion reaction during this - received IM epi, Benadryl/ 125 methylpred for concerns of throat swelling per Dr. Gonzalez.    Heme/oncology consulted (02/03/2019) their recs and plan: " Anemia, thrombocytopenia. Ddx includes primary BM disorder such as MDS, vs secondary BM suppression from infection " (recent Chikungunya infection, known to cause thrombocytopenia). Reviewed medication list, no known culprits. Possible also slow GIB from adenoma.  -No evidence of iron deficiency, B12/folate nml  -No evidence of hemolysis  -No evidence of splenomegaly, no evidence of cirrhosis  -No evidence of coagulation      S/p bone marrow biopsy on 2/12. Pending.  -Hold NSAIDs  -Transfuse Hb<7 or plt<10 unless active bleeding  -Consider repeat colonoscopy given recommended to be done 5 years from adenoma visualized in 2014   Other   SOB (shortness of breath)     - Likely due to volume overload and pulmonary edema  - now breathing on room air       Weakness     - Generalized, likely multifactorial (age, acute illness, deconditioning)  - PT/OT consulted        High Risk Conditions  Sepsis, pancytopenia, Pneumonia     Diet: regular thin diet  GI PPx:   DVT PPx:    MING/SCD, avoid heparin products due to thrombocytopenia     Goals of Care: Full code  Discharge plan: pending bone marrow biopsy, SS SNF afterwards    Time (minutes) spent in care of the patient (Greater than 1/2 spent in direct face-to-face contact) 35 minutes      Marcelle Jones MD  Staff Hospitalist  Department of Hospital Medicine  [facility]   [id number]

## 2019-02-13 NOTE — PT/OT/SLP PROGRESS
Physical Therapy Treatment    Patient Name:  Vicki Peña   MRN:  [id number]    Recommendations:     Discharge Recommendations:  nursing facility, skilled   Discharge Equipment Recommendations: (rolling walker, manual wheelchair)   Barriers to discharge: increased level of assistance required    Assessment:     Vicki Peña is a 83 y.o. female admitted with a medical diagnosis of sepsis due to urinary tract infection. Pt continues to present with increased lethargy and weakness; she required maximum assistance to perform sit<>stand and stand pivot t/f to chair this visit. Activity tolerance significantly limited by fatigue and weakness.  She presents with the following impairments/functional limitations:  weakness, impaired endurance, impaired self care skills, impaired balance, gait instability, impaired functional mobilty, decreased safety awareness, pain, decreased lower extremity function, impaired cardiopulmonary response to activity. Pt would benefit from continued PT intervention to address listed functional deficits, reduce fall risk and maximize return to PLOF.     Rehab Prognosis: Good; patient would benefit from acute skilled PT services to address these deficits and reach maximum level of function.      Recent Surgery: * No surgery found *      Plan:     During this hospitalization, patient to be seen 4 x/week to address the identified rehab impairments via gait training, therapeutic activities, therapeutic exercises, neuromuscular re-education and progress toward the following goals:    · Plan of Care Expires:  03/07/19    Subjective     Chief Complaint: weakness   Patient/Family Comments/goals: Pt agreeable to participate in therapy session   Pain/Comfort:  · Pain Rating 1: (Pt did not rate pain on numeric scale; reported pain in back )  · Location - Orientation 1: lower  · Location 1: back  · Pain Addressed 1: Reposition, Distraction, Nurse notified      Objective:     Communicated with RN prior to  session.  Patient found supine with oxygen, peripheral IV, telemetry  upon PT entry to room. Pt's family at bedside- assisting with translation, as pt's primary language is [place].     General Precautions: Standard, fall, contact, aspiration   Orthopedic Precautions:N/A   Braces: N/A     Functional Mobility:    Bed Mobility:   · Rolling: to right with moderate assistance  · Supine > Sit: moderate assistance   · Sit > Supine: N/T     Transfers:   · Sit <> Stand Transfer: maximum assistance from EOB x 2 trials with no AD   · Stand pivot transfer from EOB>chair: maximum assistance with no AD    Sitting Balance:   · Assistance level: fluctuated from contact guard assistance to moderate assistance   · Duration: ~10 minutes  · Postural deviation(s) noted: anterior lean, forward flexed posture                  Gait:  · Pt unable to perform 2/2 weakness and fatigue     AM-PAC 6 CLICK MOBILITY  Turning over in bed (including adjusting bedclothes, sheets and blankets)?: 2  Sitting down on and standing up from a chair with arms (e.g., wheelchair, bedside commode, etc.): 2  Moving from lying on back to sitting on the side of the bed?: 2  Moving to and from a bed to a chair (including a wheelchair)?: 2  Need to walk in hospital room?: 1  Climbing 3-5 steps with a railing?: 1  Basic Mobility Total Score: 10       Therapeutic Activities and Exercises:  Pt awake upon PT arrival, but appearing lethargic. Daughter at bedside. Therapeutic activities aimed to increase pt's independence, safety, and efficiency with bed mobility and functional transfers. See above for assistance levels.  Pt required frequent cueing to improve posture and sitting balance while seated EOB 2/2 increased anterior lean and poor postural awareness. Pt performed ankle pumps in sitting x 10 reps.   Pt and daughter educated on safety with mobility and PT POC. Pt's daughter expressed no further concerns/questions.     Patient left up in chair with all lines  intact, call button in reach, RN notified and family present.     GOALS:   Multidisciplinary Problems     Physical Therapy Goals        Problem: Physical Therapy Goal    Goal Priority Disciplines Outcome Goal Variances Interventions   Physical Therapy Goal     PT, PT/OT Ongoing (interventions implemented as appropriate)     Description:  Goals to be met by: 2019      Patient will increase functional independence with mobility by performin. Supine to sit with supervision  2. Sit to supine with supervision   3. Sit to stand transfer with supervision   4. Gait  X 100 feet with Stand-by Assistance using least restrictive AD.   6. Standing x5 minutes while performing dynamic UE tasks with stand by assistance   6. Lower extremity exercise program x15 reps per handout, with independence                       Time Tracking:     PT Received On: 19  PT Start Time: 904     PT Stop Time: 930  PT Total Time (min): 26 min     Billable Minutes: Therapeutic Activity 26 min    Treatment Type: Treatment  PT/PTA: PT     PTA Visit Number: 0     Izzy Degroot PT, DPT   2019  Pager: [phone]

## 2019-02-13 NOTE — NURSING TRANSFER
Nursing Transfer Note      2/13/2019     Transfer To: Ultrasound    Transfer via stretcher    Transfer with to O2, cardiac monitoring    Transported by transport    Medicines sent: n/a

## 2019-02-13 NOTE — PROGRESS NOTES
Hospital Medicine  Progress note    Team: [facility]OSP MED R Marcelle Jones MD  Admit Date: 2/2/2019  JACK   Length of Stay:  LOS: 11 days   Code status: Full Code    Principal Problem:  Sepsis due to urinary tract infection    Overview:    Interval hx: Patient seen and examined at bedside, daughters updated. S/p BMBx 2/12 at bedside. Up in chair. NAEON. Mentation at baseline, but still complaining of fatigue/malaise, and pain at BMBx site.     ROS   Constitutional: Positive for activity change, appetite change and fatigue. Positive for chills and fever.   HENT: Negative for congestion, rhinorrhea and sore throat.    Respiratory: Positive for (nonproducti cough. Negative for shortness of breath, wheezing and stridor.    Cardiovascular: Negative for chest pain, palpitations and leg swelling.   Gastrointestinal: Positive for nausea. Negative for abdominal pain, blood in stool, constipation, diarrhea and vomiting.   Genitourinary: Negative for decreased urine volume, difficulty urinating, dysuria, flank pain, frequency and urgency.   Musculoskeletal: Negative for arthralgias and myalgias.   Neurological: Negative for syncope, weakness, light-headedness and headaches.   Psychiatric/Behavioral: Negative for agitation and confusion.         PEx  Temp:  [97.1 °F (36.2 °C)-101.8 °F (38.8 °C)]   Pulse:  []   Resp:  [10-20]   BP: ()/(41-61)   SpO2:  [87 %-98 %]     Intake/Output Summary (Last 24 hours) at 2/13/2019 1758  Last data filed at 2/13/2019 1200  Gross per 24 hour   Intake 574 ml   Output 100 ml   Net 474 ml       Constitutional: She is oriented to person, place, and time. No distress.   HENT:   Head: Normocephalic.   conjunctival pallor   Eyes: Pupils are equal, round, and reactive to light.   Neck: Normal range of motion. JVD present.   Cardiovascular: Normal rate, regular rhythm and normal heart sounds.   No murmur heard.  Pulmonary/Chest: Effort normal. No stridor. No respiratory distress. No  wheezes. She has no rales.  Abdominal: Soft. Bowel sounds are normal. She exhibits no distension. There is no tenderness. There is no guarding.   Musculoskeletal: Normal range of motion. She exhibits 2+ edema of b/l LE, no deformity.   Neurological: She is alert and oriented to person, place, and time.   Skin: Skin is warm and dry. Capillary refill takes less than 2 seconds. She is not diaphoretic.   Psychiatric: She has a normal mood and affect.   Nursing note and vitals reviewed.        Recent Labs   Lab 02/12/19  0811 02/12/19  1807 02/13/19  0819   WBC 13.26* 13.45* 13.94*   HGB 7.7* 7.8* 7.5*   HCT 23.3* 23.0* 22.2*   PLT 18* 20* 20*     Recent Labs   Lab 02/11/19  0556 02/12/19  0404 02/12/19  1807 02/13/19  0500   * 123* 122* 123*   K 3.9 3.9 4.0 3.9   CL 95 90* 88* 91*   CO2 23 22* 24 24   BUN 12 13 19 17   CREATININE 0.6 0.6 0.8 0.6   GLU 83 89 151* 92   CALCIUM 8.4* 8.1* 8.3* 8.6*   MG 1.6 1.5*  --  2.1   PHOS 3.3 3.0  --  3.1     Recent Labs   Lab 02/12/19  0404 02/12/19  1807 02/13/19  0500   ALKPHOS 712* 872* 679*   ALT 52* 110* 75*   AST 74* 197* 75*   ALBUMIN 1.7* 1.8* 1.7*   PROT 5.6* 5.9* 5.6*   BILITOT 1.1* 1.6* 1.4*   INR  --   --  1.3*          Scheduled Meds:   (Magic mouthwash) 1:1:1 Benadryl 12.5mg/5ml liq, aluminum & magnesium hydroxide-simehticone (Maalox), lidocaine viscous 2%  5 mL Swish & Spit Q4H    albuterol-ipratropium  3 mL Nebulization Q4H WAKE    lidocaine HCL 20 mg/ml (2%)  20 mL Other Once    pantoprazole  40 mg Oral Daily    piperacillin-tazobactam (ZOSYN) IVPB  4.5 g Intravenous Q8H    sodium chloride 0.9%  500 mL Intravenous Once    vancomycin (VANCOCIN) IVPB  15 mg/kg Intravenous Q24H     Continuous Infusions:  As Needed:  acetaminophen, loperamide, ondansetron, promethazine, ramelteon, sodium chloride 0.9%, sodium chloride 0.9%    Active Hospital Problems    Diagnosis  POA    *Sepsis due to urinary tract infection [A41.9, N39.0]  Yes    Mouth ulcers [K12.1]  No     PFO (patent foramen ovale) [Q21.1]  Not Applicable    Hypomagnesemia [E83.42]  No    Hypokalemia [E87.6]  Yes    Pneumonia involving left lung [J18.9]  Yes    SOB (shortness of breath) [R06.02]  Yes    Weakness [R53.1]  Yes    History of CVA (cerebrovascular accident) [Z86.73]  Not Applicable    Hyponatremia [E87.1]  Yes    Pancytopenia [D61.818]  Yes    Hyperlipidemia [E78.5]  Yes     Chronic      Resolved Hospital Problems   No resolved problems to display.         Assessment and Plan    History of CVA (cerebrovascular accident)     Patient states that she had a stroke 20 years back. No residual neurological manifestion.  ECHO this admission consistent with PFO and shunt physiology.   - 02/05/2019: Critical Care contacted Vascular Neurology, Dr. Horn, to ask about the role of anti-platelets vs anticoagulation because the patient has PFO with history of stroke. No history of A fib. He recommends to start ASA 81 mg PO qD      Pneumonia involving left lung     On 02/03/2019: Patient  febrile this AM and UA c/w with UTI start Ceftriaxone,  At afternoon repeat fever to >102, blood cultures obtained and on abx for UTI, started on sepsis bolus 30cc/kg and monitoring of lactic acid levels. Antibiotics were changed to Piperacillin/Tazobactam and Vancomycin.      02/04/2019: Patient have complaints of left sided lateral chest wall pleuritic pain. Throughout the night patient remained febrile, O2 requirement increasing this Am after 4am patient given 2 500ml saline boluses. This am patient is tachypneic, accessory muscle use, febrile, MAP >65, but pressures 90s/50s. CXR with significant new left sided lower& upper airspace disease, absent breath sounds in left mid to lower fields, dullness to percussion, right basilar absent breath sounds. On Exam patient also with distended JVP to angle of jaw. Critical Care consulted & have accepted patient. Azithromycin started     02/05/2019: During night patient received 1  L NS bolus for marginal BP. Patient reports her symptoms have improved today. BP now is stable. Afebrile last spike of fever: 39.1 (02/03/3019). Np leucocytosis. CXR today: Worsening severe pulmonary edema.  CT Chest consistent with pulmonary edema vs ARDS; however, patient requiring minimal oxygen (3L currently) and had received copious IVF due to sepsis. PNA possible but less likely given how rapid radiographic changes developed. Will start gentle diuresis. Finished course of CAP therapy with rocephin.     2/13/2019: Rpt CXR showed decreased bilateral infiltrates. However, patient with increased work of breathing, elevated PCT, elevated WBC w/ L shift, fever, mild tachycardia, and hypotension. Initiated broad spectrum antibiotics pending further workup (Bcx, LA, UA).      Cardiac/Vascular   Hyperlipidemia     - Resume home medication: Statin.  - Lipid panel- sent      Renal/   Hypokalemia     Monitor potassium level and replace as needed      Hypomagnesemia     Monitor Mg level and replace as needed      Hyponatremia     Likely hypervolemic hyponatremia, given pulmonary edema and fluid resuscitation for sepsis. Complicated by hypoalbuminemia.   Worsening, despite diuresis. Will check serum osmol (low) and urine lytes.  Complex case as ideally, fluid restriction and diuresis would be appropriate. However, currently, patient is hypotensive, with concern for sepsis. Additional fluid boluses likely to exacerbate pulmonary edema and hyponatremia. Will consult Critical Care for further assistance. May trial albumin and lasix IVP once hemodynamically stable.    ID   * Sepsis due to urinary tract infection     - Urine cultures grew E. coli, susceptible to ceftriaxone, completed course  - Does have history of frequent UTIs, previously followed with [facility] (most recently seen 2015), normal cysto in 2014      Transaminitis  Etiology unclear. Will check RUQ US (benign), hep panel (-ve) and trend. Trend synthetic  "function with am labs.   LFTs trending down 2/13.     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  Prior to [facility] arrival she had her blood drawn and was found to have a Hgb of 6.6. At that time her PCP instructed her to go to the emergency department for a blood transfusion.  She is s/p 1unit Platelet transfusion - developed transfusion reaction during this - received IM epi, Benadryl/ 125 methylpred for concerns of throat swelling per Dr. Gonzalez.    Heme/oncology consulted (02/03/2019) their recs and plan: " Anemia, thrombocytopenia. Ddx includes primary BM disorder such as MDS, vs secondary BM suppression from infection (recent Chikungunya infection, known to cause thrombocytopenia). Reviewed medication list, no known culprits. Possible also slow GIB from adenoma.  -No evidence of iron deficiency, B12/folate nml  -No evidence of hemolysis  -No evidence of splenomegaly, no evidence of cirrhosis  -No evidence of coagulation      S/p bone marrow biopsy on 2/12. Pending.  -Hold NSAIDs  -Transfuse Hb<7 or plt<10 unless active bleeding  -Consider repeat colonoscopy given recommended to be done 5 years from adenoma visualized in 2014   Other   SOB (shortness of breath)     - Likely due to volume overload and pulmonary edema  - currently dependent on 2L nc  - continue to wean as tolerated       Weakness     - Generalized, likely multifactorial (age, acute illness, deconditioning)  - PT/OT consulted        High Risk Conditions  Sepsis, pancytopenia, Pneumonia     Diet: regular thin diet  GI PPx:   DVT PPx:    MING/SCD, avoid heparin products due to thrombocytopenia     Goals of Care: Full code  Discharge plan: pending bone marrow biopsy, SS SNF afterwards    Time (minutes) spent in care of the patient (Greater than 1/2 spent in direct face-to-face contact) 35 minutes      Marcelle Jones MD  Staff Hospitalist  Department of Hospital Medicine  [facility]   [id number]

## 2019-02-13 NOTE — PLAN OF CARE
Problem: Adult Inpatient Plan of Care  Goal: Plan of Care Review  Outcome: Ongoing (interventions implemented as appropriate)  Plan of care reviewed with pt and daughter. VS stable, febrile overnight. NPO pending abd US. No acute events at this time. No complaints. Pt remains free from falls or injury. Bed low and locked, call light and personal belongings within reach. Will continue to monitor. Shelia Wilson RN

## 2019-02-14 LAB
ALBUMIN SERPL BCP-MCNC: 1.4 G/DL
ALP SERPL-CCNC: 486 U/L
ALT SERPL W/O P-5'-P-CCNC: 58 U/L
ANION GAP SERPL CALC-SCNC: 12 MMOL/L
ANION GAP SERPL CALC-SCNC: 8 MMOL/L
ANISOCYTOSIS BLD QL SMEAR: SLIGHT
ANISOCYTOSIS BLD QL SMEAR: SLIGHT
AST SERPL-CCNC: 51 U/L
BASOPHILS # BLD AUTO: 0.17 K/UL
BASOPHILS NFR BLD: 1.4 %
BASOPHILS NFR BLD: 7 %
BILIRUB SERPL-MCNC: 1.8 MG/DL
BLASTS NFR BLD MANUAL: 6 %
BLD PROD TYP BPU: NORMAL
BLOOD UNIT EXPIRATION DATE: NORMAL
BLOOD UNIT TYPE CODE: 5100
BLOOD UNIT TYPE: NORMAL
BODY SITE - BONE MARROW: NORMAL
BUN SERPL-MCNC: 23 MG/DL
BUN SERPL-MCNC: 25 MG/DL
BURR CELLS BLD QL SMEAR: ABNORMAL
BURR CELLS BLD QL SMEAR: ABNORMAL
CALCIUM SERPL-MCNC: 8.1 MG/DL
CALCIUM SERPL-MCNC: 8.5 MG/DL
CHLORIDE SERPL-SCNC: 93 MMOL/L
CHLORIDE SERPL-SCNC: 95 MMOL/L
CLINICAL DIAGNOSIS - BONE MARROW: NORMAL
CO2 SERPL-SCNC: 20 MMOL/L
CO2 SERPL-SCNC: 21 MMOL/L
CODING SYSTEM: NORMAL
CREAT SERPL-MCNC: 0.8 MG/DL
CREAT SERPL-MCNC: 0.8 MG/DL
DACRYOCYTES BLD QL SMEAR: ABNORMAL
DACRYOCYTES BLD QL SMEAR: ABNORMAL
DIFFERENTIAL METHOD: ABNORMAL
DIFFERENTIAL METHOD: ABNORMAL
DISPENSE STATUS: NORMAL
DOHLE BOD BLD QL SMEAR: PRESENT
DOHLE BOD BLD QL SMEAR: PRESENT
EOSINOPHIL # BLD AUTO: 0.1 K/UL
EOSINOPHIL NFR BLD: 0.9 %
EOSINOPHIL NFR BLD: 1 %
ERYTHROCYTE [DISTWIDTH] IN BLOOD BY AUTOMATED COUNT: 18.6 %
ERYTHROCYTE [DISTWIDTH] IN BLOOD BY AUTOMATED COUNT: 20.1 %
EST. GFR  (AFRICAN AMERICAN): >60 ML/MIN/1.73 M^2
EST. GFR  (AFRICAN AMERICAN): >60 ML/MIN/1.73 M^2
EST. GFR  (NON AFRICAN AMERICAN): >60 ML/MIN/1.73 M^2
EST. GFR  (NON AFRICAN AMERICAN): >60 ML/MIN/1.73 M^2
FLOW CYTOMETRY ANTIBODIES ANALYZED - BONE MARROW: NORMAL
FLOW CYTOMETRY COMMENT - BONE MARROW: NORMAL
FLOW CYTOMETRY INTERPRETATION - BONE MARROW: NORMAL
GIANT PLATELETS BLD QL SMEAR: PRESENT
GLUCOSE SERPL-MCNC: 108 MG/DL
GLUCOSE SERPL-MCNC: 89 MG/DL
HCT VFR BLD AUTO: 19.8 %
HCT VFR BLD AUTO: 24.9 %
HGB BLD-MCNC: 6.7 G/DL
HGB BLD-MCNC: 8.4 G/DL
HYPOCHROMIA BLD QL SMEAR: ABNORMAL
IMM GRANULOCYTES # BLD AUTO: 0.06 K/UL
IMM GRANULOCYTES # BLD AUTO: ABNORMAL K/UL
IMM GRANULOCYTES NFR BLD AUTO: 0.5 %
IMM GRANULOCYTES NFR BLD AUTO: ABNORMAL %
INR PPP: 1.3
LYMPHOCYTES # BLD AUTO: 1.7 K/UL
LYMPHOCYTES NFR BLD: 14.7 %
LYMPHOCYTES NFR BLD: 37 %
MAGNESIUM SERPL-MCNC: 1.9 MG/DL
MCH RBC QN AUTO: 27.6 PG
MCH RBC QN AUTO: 27.6 PG
MCHC RBC AUTO-ENTMCNC: 33.7 G/DL
MCHC RBC AUTO-ENTMCNC: 33.8 G/DL
MCV RBC AUTO: 82 FL
MCV RBC AUTO: 82 FL
MONOCYTES # BLD AUTO: 1.2 K/UL
MONOCYTES NFR BLD: 12 %
MONOCYTES NFR BLD: 9.8 %
NEUTROPHILS # BLD AUTO: 8.5 K/UL
NEUTROPHILS NFR BLD: 35 %
NEUTROPHILS NFR BLD: 72.7 %
NEUTS BAND NFR BLD MANUAL: 2 %
NRBC BLD-RTO: 0 /100 WBC
NRBC BLD-RTO: 0 /100 WBC
OVALOCYTES BLD QL SMEAR: ABNORMAL
OVALOCYTES BLD QL SMEAR: ABNORMAL
PHOSPHATE SERPL-MCNC: 2.9 MG/DL
PLATELET # BLD AUTO: 15 K/UL
PLATELET # BLD AUTO: 16 K/UL
PLATELET BLD QL SMEAR: ABNORMAL
PLATELET BLD QL SMEAR: ABNORMAL
PMV BLD AUTO: ABNORMAL FL
PMV BLD AUTO: ABNORMAL FL
POCT GLUCOSE: 130 MG/DL (ref 70–110)
POIKILOCYTOSIS BLD QL SMEAR: SLIGHT
POIKILOCYTOSIS BLD QL SMEAR: SLIGHT
POLYCHROMASIA BLD QL SMEAR: ABNORMAL
POLYCHROMASIA BLD QL SMEAR: ABNORMAL
POTASSIUM SERPL-SCNC: 3.8 MMOL/L
POTASSIUM SERPL-SCNC: 4.1 MMOL/L
PROT SERPL-MCNC: 5.1 G/DL
PROTHROMBIN TIME: 12.9 SEC
RBC # BLD AUTO: 2.43 M/UL
RBC # BLD AUTO: 3.04 M/UL
SCHISTOCYTES BLD QL SMEAR: ABNORMAL
SCHISTOCYTES BLD QL SMEAR: PRESENT
SCHISTOCYTES BLD QL SMEAR: PRESENT
SODIUM SERPL-SCNC: 122 MMOL/L
SODIUM SERPL-SCNC: 127 MMOL/L
TOXIC GRANULES BLD QL SMEAR: PRESENT
TOXIC GRANULES BLD QL SMEAR: PRESENT
TRANS ERYTHROCYTES VOL PATIENT: NORMAL ML
WBC # BLD AUTO: 11.14 K/UL
WBC # BLD AUTO: 11.73 K/UL

## 2019-02-14 PROCEDURE — P9021 RED BLOOD CELLS UNIT: HCPCS

## 2019-02-14 PROCEDURE — 76937 US GUIDE VASCULAR ACCESS: CPT

## 2019-02-14 PROCEDURE — 94640 AIRWAY INHALATION TREATMENT: CPT

## 2019-02-14 PROCEDURE — 85007 BL SMEAR W/DIFF WBC COUNT: CPT

## 2019-02-14 PROCEDURE — 80053 COMPREHEN METABOLIC PANEL: CPT

## 2019-02-14 PROCEDURE — 25000003 PHARM REV CODE 250: Performed by: PHYSICIAN ASSISTANT

## 2019-02-14 PROCEDURE — 80048 BASIC METABOLIC PNL TOTAL CA: CPT

## 2019-02-14 PROCEDURE — 36410 VNPNXR 3YR/> PHY/QHP DX/THER: CPT

## 2019-02-14 PROCEDURE — 25000242 PHARM REV CODE 250 ALT 637 W/ HCPCS: Performed by: STUDENT IN AN ORGANIZED HEALTH CARE EDUCATION/TRAINING PROGRAM

## 2019-02-14 PROCEDURE — 63600175 PHARM REV CODE 636 W HCPCS: Performed by: HOSPITALIST

## 2019-02-14 PROCEDURE — 25000003 PHARM REV CODE 250: Performed by: HOSPITALIST

## 2019-02-14 PROCEDURE — 36415 COLL VENOUS BLD VENIPUNCTURE: CPT

## 2019-02-14 PROCEDURE — C1751 CATH, INF, PER/CENT/MIDLINE: HCPCS

## 2019-02-14 PROCEDURE — 25000003 PHARM REV CODE 250: Performed by: STUDENT IN AN ORGANIZED HEALTH CARE EDUCATION/TRAINING PROGRAM

## 2019-02-14 PROCEDURE — 83735 ASSAY OF MAGNESIUM: CPT

## 2019-02-14 PROCEDURE — 99232 SBSQ HOSP IP/OBS MODERATE 35: CPT | Mod: ,,, | Performed by: HOSPITALIST

## 2019-02-14 PROCEDURE — 84100 ASSAY OF PHOSPHORUS: CPT

## 2019-02-14 PROCEDURE — 85027 COMPLETE CBC AUTOMATED: CPT

## 2019-02-14 PROCEDURE — 85025 COMPLETE CBC W/AUTO DIFF WBC: CPT

## 2019-02-14 PROCEDURE — 99232 PR SUBSEQUENT HOSPITAL CARE,LEVL II: ICD-10-PCS | Mod: ,,, | Performed by: HOSPITALIST

## 2019-02-14 PROCEDURE — 20600001 HC STEP DOWN PRIVATE ROOM

## 2019-02-14 PROCEDURE — 85610 PROTHROMBIN TIME: CPT

## 2019-02-14 PROCEDURE — 94761 N-INVAS EAR/PLS OXIMETRY MLT: CPT

## 2019-02-14 RX ORDER — ALBUMIN HUMAN 250 G/1000ML
12.5 SOLUTION INTRAVENOUS EVERY 6 HOURS
Status: DISCONTINUED | OUTPATIENT
Start: 2019-02-14 | End: 2019-02-14

## 2019-02-14 RX ORDER — FUROSEMIDE 10 MG/ML
40 INJECTION INTRAMUSCULAR; INTRAVENOUS EVERY 6 HOURS
Status: DISCONTINUED | OUTPATIENT
Start: 2019-02-14 | End: 2019-02-14

## 2019-02-14 RX ORDER — HYDROXYZINE HYDROCHLORIDE 25 MG/1
25 TABLET, FILM COATED ORAL 3 TIMES DAILY PRN
Status: DISCONTINUED | OUTPATIENT
Start: 2019-02-14 | End: 2019-02-19 | Stop reason: HOSPADM

## 2019-02-14 RX ORDER — HYDROCODONE BITARTRATE AND ACETAMINOPHEN 500; 5 MG/1; MG/1
TABLET ORAL
Status: DISCONTINUED | OUTPATIENT
Start: 2019-02-14 | End: 2019-02-18

## 2019-02-14 RX ADMIN — IPRATROPIUM BROMIDE AND ALBUTEROL SULFATE 3 ML: .5; 3 SOLUTION RESPIRATORY (INHALATION) at 11:02

## 2019-02-14 RX ADMIN — ACETAMINOPHEN 650 MG: 325 TABLET, FILM COATED ORAL at 08:02

## 2019-02-14 RX ADMIN — Medication 5 ML: at 02:02

## 2019-02-14 RX ADMIN — IPRATROPIUM BROMIDE AND ALBUTEROL SULFATE 3 ML: .5; 3 SOLUTION RESPIRATORY (INHALATION) at 04:02

## 2019-02-14 RX ADMIN — ACETAMINOPHEN 650 MG: 325 TABLET, FILM COATED ORAL at 09:02

## 2019-02-14 RX ADMIN — PANTOPRAZOLE SODIUM 40 MG: 40 TABLET, DELAYED RELEASE ORAL at 09:02

## 2019-02-14 RX ADMIN — Medication 5 ML: at 06:02

## 2019-02-14 RX ADMIN — IPRATROPIUM BROMIDE AND ALBUTEROL SULFATE 3 ML: .5; 3 SOLUTION RESPIRATORY (INHALATION) at 07:02

## 2019-02-14 RX ADMIN — LIDOCAINE 1 PATCH: 50 PATCH TOPICAL at 08:02

## 2019-02-14 RX ADMIN — HYDROXYZINE HYDROCHLORIDE 25 MG: 25 TABLET, FILM COATED ORAL at 09:02

## 2019-02-14 RX ADMIN — PIPERACILLIN AND TAZOBACTAM 4.5 G: 4; .5 INJECTION, POWDER, LYOPHILIZED, FOR SOLUTION INTRAVENOUS; PARENTERAL at 06:02

## 2019-02-14 RX ADMIN — VANCOMYCIN HYDROCHLORIDE 750 MG: 750 INJECTION, POWDER, LYOPHILIZED, FOR SOLUTION INTRAVENOUS at 10:02

## 2019-02-14 RX ADMIN — Medication 5 ML: at 09:02

## 2019-02-14 RX ADMIN — SODIUM CHLORIDE TAB 1 GM 1 G: 1 TAB at 08:02

## 2019-02-14 RX ADMIN — SODIUM CHLORIDE TAB 1 GM 1 G: 1 TAB at 07:02

## 2019-02-14 RX ADMIN — PIPERACILLIN AND TAZOBACTAM 4.5 G: 4; .5 INJECTION, POWDER, LYOPHILIZED, FOR SOLUTION INTRAVENOUS; PARENTERAL at 02:02

## 2019-02-14 NOTE — CONSULTS
Placed 18g X 8cm midline in left basilic vein using realtime ultrasound guidance. Lot#[id number]. Remove on or before 03/15/2019.

## 2019-02-14 NOTE — CARE UPDATE
RN Proactive Rounding Note  Time of Visit: 1045    Admit Date: 2019  LOS: 12  Code Status: Full Code   Date of Visit: 2019  : 1935  Age: 83 y.o.  Sex: female  Race: White  Bed: [facility] 3098/[facility] 3098 A:   MRN: [id number]  Was the patient discharged from an ICU this admission? yes   Was the patient discharged from a PACU within last 24 hours?  no  Did the patient receive conscious sedation/general anesthesia in last 24 hours?  no  Was the patient in the ED within the past 24 hours?  no  Was the patient started on NIPPV within the past 24 hours?  no  Attending Physician: Marcelle Jones*  Primary Service: [facility]OSP MED R      ASSESSMENT:     Abnormal Vital Signs: BP 96/50  Clinical Issues: Circulatory     INTERVENTIONS/ RECOMMENDATIONS:     Received call from Dr. Jones regarding patient's elevated procalcitonin and hyponatremia.   Previous ICU stay for sepsis, stepped down on  from ICU.   Hypotensive this am, BP now 96/50. Na 122, may recommend salt tabs if no contraindication ?   BMB pending given thrombocytopenia. Plts 16, previous anaphylactic rxn to dose of platelets. May consider crossmatched platelets for future transfusions.     Discussed plan of care with Rachel GALVEZ     PHYSICIAN ESCALATION:     Yes/No  yes    Orders received and case discussed with Dr. Jones .    Disposition: Remain in room 3098.    FOLLOW-UP/CONTINGENCY:     Call back the Rapid Response Nurse at [phone] for additional questions or concerns.

## 2019-02-14 NOTE — PT/OT/SLP PROGRESS
Physical Therapy      Patient Name:  Vicki Peña   MRN:  [id number]    Therapy hold this date 2/2 critical lab values; pt awaiting blood transfusion. See below. Will follow up tomorrow.      Lab Results   Component Value Date/Time    HCT 19.8 (LL) 02/14/2019 04:06 AM    HGB 6.7 (L) 02/14/2019 04:06 AM    PLT 16 (LL) 02/14/2019 04:06 AM       Izzy Degroot, PT, DPT   2/14/2019

## 2019-02-14 NOTE — PLAN OF CARE
Problem: Adult Inpatient Plan of Care  Goal: Plan of Care Review  Outcome: Ongoing (interventions implemented as appropriate)  Patient verbalizes no complaints overnight; denies chest pain, SOB, or other discomfort. IV Abx administered as ordered. Daughter at BS. Pt remains free of falls or injuries. Pt verbalizes complete understanding of plan of care. Will continue to monitor.

## 2019-02-14 NOTE — SUBJECTIVE & OBJECTIVE
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    UTI (urinary tract infection)        Past Surgical History:   Procedure Laterality Date    CATARACT EXTRACTION W/  INTRAOCULAR LENS IMPLANT Left 7/21/14    CATARACT EXTRACTION W/  INTRAOCULAR LENS IMPLANT Right 8/11/14    CYSTOSCOPY      INSERTION-INTRAOCULAR LENS (IOL) Right 8/11/2014    Per[facility] by [facility] at [facility]    INSERTION-INTRAOCULAR LENS (IOL) Left 7/21/2014    Performed by [facility] at [facility] OR    PHACOEMULSIFICATION-ASPIRATION-CATARACT Right 8/11/2014    Performed by [facility] at [facility] OR    PHACOEMULSIFICATION-ASPIRATION-CATARACT Left 7/21/2014    Performed by [facility] at [facility] OR    screening N/A 9/5/2014    Performed by Mt Drake MD at [facility] (4TH FLR)       Review of patient's allergies indicates:   Allergen Reactions    Tramadol Rash       Family History     Problem Relation (Age of Onset)    Cancer Brother, Brother, Sister        Tobacco Use    Smoking status: Never Smoker    Smokeless tobacco: Never Used   Substance and Sexual Activity    Alcohol use: No     Alcohol/week: 0.0 oz     Comment: moderate    Drug use: No    Sexual activity: Yes     Partners: Male      Review of Systems   Constitutional: Positive for activity change, appetite change, fatigue and fever.   HENT: Negative for congestion, rhinorrhea and sore throat.    Eyes: Negative for pain.   Respiratory: Positive for cough and shortness of breath. Negative for chest tightness.    Cardiovascular: Positive for chest pain. Negative for palpitations.   Gastrointestinal: Negative for abdominal distention, abdominal pain, constipation, diarrhea, nausea and vomiting.   Genitourinary: Negative for dysuria and frequency.   Neurological: Positive  for weakness (  generalized). Negative for dizziness, light-headedness, numbness and headaches.   Psychiatric/Behavioral: Negative for agitation and confusion.     Objective:     Vital Signs (Most Recent):  Temp: 97.1 °F (36.2 °C) (02/13/19 1609)  Pulse: 88 (02/13/19 1657)  Resp: 13 (02/13/19 1651)  BP: (!) 94/51 (02/13/19 1657)  SpO2: 98 % (02/13/19 1651) Vital Signs (24h Range):  Temp:  [97.1 °F (36.2 °C)-101.8 °F (38.8 °C)] 97.1 °F (36.2 °C)  Pulse:  [] 88  Resp:  [10-20] 13  SpO2:  [87 %-98 %] 98 %  BP: ()/(41-61) 94/51   Weight: 52.2 kg (115 lb)  Body mass index is 22.46 kg/m².      Intake/Output Summary (Last 24 hours) at 2/13/2019 1916  Last data filed at 2/13/2019 1200  Gross per 24 hour   Intake 574 ml   Output 100 ml   Net 474 ml       Physical Exam   Constitutional: She is oriented to person, place, and time. She appears well-developed and well-nourished.   HENT:   Head: Normocephalic and atraumatic.   Mouth/Throat: Oropharynx is clear and moist.   Eyes: EOM are normal. Pupils are equal, round, and reactive to light.   Neck: Normal range of motion. Neck supple. No JVD present.   Cardiovascular: Normal rate, regular rhythm, normal heart sounds and intact distal pulses.   No murmur heard.  Pulmonary/Chest: Effort normal. No respiratory distress. She has no rales.   Abdominal: Soft. Bowel sounds are normal. She exhibits no distension. There is no tenderness.   Musculoskeletal: Normal range of motion. She exhibits no edema.   Lymphadenopathy:     She has no cervical adenopathy.   Neurological: She is alert and oriented to person, place, and time. No cranial nerve deficit.   Skin: Skin is warm and dry. Capillary refill takes less than 2 seconds. No erythema.   Psychiatric: She has a normal mood and affect.       Vents:     Lines/Drains/Airways     Drain            Female External Urinary Catheter 02/10/19 2000 2 days          Peripheral Intravenous Line                 Peripheral IV - Single Lumen  02/12/19 1822 Right Forearm 1 day              Significant Labs:    CBC/Anemia Profile:  Recent Labs   Lab 02/12/19  0811 02/12/19  1807 02/13/19  0819   WBC 13.26* 13.45* 13.94*   HGB 7.7* 7.8* 7.5*   HCT 23.3* 23.0* 22.2*   PLT 18* 20* 20*   MCV 83 81* 80*   RDW 20.3* 20.0* 20.1*        Chemistries:  Recent Labs   Lab 02/12/19  0404 02/12/19  1807 02/13/19  0500 02/13/19  1800   * 122* 123* 123*   K 3.9 4.0 3.9 3.8   CL 90* 88* 91* 92*   CO2 22* 24 24 22*   BUN 13 19 17 24*   CREATININE 0.6 0.8 0.6 0.7   CALCIUM 8.1* 8.3* 8.6* 7.9*   ALBUMIN 1.7* 1.8* 1.7*  --    PROT 5.6* 5.9* 5.6*  --    BILITOT 1.1* 1.6* 1.4*  --    ALKPHOS 712* 872* 679*  --    ALT 52* 110* 75*  --    AST 74* 197* 75*  --    MG 1.5*  --  2.1  --    PHOS 3.0  --  3.1  --        ABGs: No results for input(s): PH, PCO2, HCO3, POCSATURATED, BE in the last 48 hours.  Blood Culture:   Recent Labs   Lab 02/13/19  0010   LABBLOO No Growth to date  No Growth to date     Respiratory Culture: No results for input(s): GSRESP, RESPIRATORYC in the last 48 hours.  Urine Culture: No results for input(s): LABURIN in the last 48 hours.  Urine Studies: No results for input(s): COLORU, APPEARANCEUA, PHUR, SPECGRAV, PROTEINUA, GLUCUA, KETONESU, BILIRUBINUA, OCCULTUA, NITRITE, UROBILINOGEN, LEUKOCYTESUR, RBCUA, WBCUA, BACTERIA, SQUAMEPITHEL, HYALINECASTS in the last 48 hours.    Invalid input(s): ARLIN    Significant Imaging: I have reviewed all pertinent imaging results/findings within the past 24 hours.

## 2019-02-14 NOTE — PT/OT/SLP PROGRESS
Occupational Therapy      Patient Name:  Vicki Peña   MRN:  [id number]    Attempted to see patient for OT treatment session, however RN reporting pt receiving a unit a blood and having to replace an IV. RN requesting OT to return later. OT unable to re-attempt and will follow up according to POC.     Julia Duncan OT  2/14/2019

## 2019-02-14 NOTE — CONSULTS
[facility]  Critical Care Medicine  Consult Note    Patient Name: Vicki Peña  MRN: [id number]  Admission Date: 2/2/2019  Hospital Length of Stay: 11 days  Code Status: Full Code  Attending Physician: Marcelle Jones*   Primary Care Provider: Yaron Waite MD   Principal Problem: Sepsis due to urinary tract infection    Inpatient consult to Critical Care Medicine  Consult performed by: Alberto Maldonado MD  Consult ordered by: Marcelle Jones MD        Subjective:     HPI:   Vicki Peña is an 83 year old female with medical issues of CVA, osteoporosis, and HLD who presents to the ED (02/02/2019) with complaints of generalized weakness and fatigue for the past 1-2 weeks. Prior to [facility] arrival she had her blood drawn and was found to have a Hgb of 6.6. At that time her PCP instructed her to go to the emergency department for a blood transfusion. Associated symptoms include nausea with 2 prior episodes of vomiting over the past week. She also endorses body aches/bone pains as well as poor appetite and unspecified weight loss. She is also endorsing some headaches with regular use of NSAIDs for relief.    On initial presentation, patient was found to be anemic with a Hgb of 6.8 and thrombocytopenic with a platelet count of 36k. Later discovered to have a UTI  And started on a sepsis workup. She was briefly admitted to the ICU for treatment of UTI and suspected CAP vs pulmonary edema given findings on exam and CXR. Patient was started on 5 day course of abx and diuresed while in the ICU. She was clinically stabilized and stepped down to the floor with Hospital medicine for placement on 02/06. Since then, she has completed her full course of antibiotics and continued to be diuresed with concerns for pulmonary edema. She was being worked up for pancytopenia per Heme/Onc.and hyponatremia per primary. Overnight, patient with increased work of breathing, elevated PCT, elevated WBC w/ L  shift, fever, mild tachycardia, and worsened hypotension. Critical care consulted.       Hospital/ICU Course:  She is s/p 1unit Platelet transfusion - developed transfusion reaction during this - received IM epi, Benadryl/ 125 methylpred for concerns of throat swelling per Dr. Gonzalez    02/03/2019: Patient febrile this AM and UA c/w with UTI start Ceftriaxone,   At afternoon repeat fever to >102, blood cultures obtained and on abx for UTI, started on sepsis bolus 30cc/kg and monitoring of lactic acid levels. Antibiotics were changed to Piperacillin/Tazobactam and Vancomycin.     02/04/2019: Patient have complaints of left sided lateral chest wall pleuritic pain. Throughout the night patient remained febrile, O2 requirement increasing this Am after 4am patient given 2 500ml saline boluses. This am patient is tachypneic, accessory muscle use, febrile, MAP >65, but pressures 90s/50s. CXR with significant new left sided lower& upper airspace disease, absent breath sounds in left mid to lower fields, dullness to percussion, right basilar absent breath sounds. On Exam patient also with distended JVP to angle of jaw. Critical Care consulted & have accepted patient    02/05/2019: During night patient received 1 L NS bolus for marginal BP. Patient reports her symptoms have improved today. BP now is stable. Afebrile last spike of fever: 39.1 (02/03/3019). No leukocytosis. CXR today: Worsening severe pulmonary edema.  There is a possible left lung mass.Will proceed with CT chest without contrast.    02/06/2019: CT chest performed yesterday which was consistent with pulmonary edema vs ARDS. Pneumonia is possible, but unlikely given how quickly radiographic changes developed. Will complete course for CAP and UTI, rocephin x 5 days and azithromycin x 3 days. Hyponatremia worsened slightly; however, patient remains asymptomatic. Likely hypervolemic hyponatremia. Given lasix 40 IV for 1 dose and has repeat BMP at noon. Patient  is without ICU needs at this time and was stepped down to Hospital Medicine.     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    UTI (urinary tract infection)        Past Surgical History:   Procedure Laterality Date    CATARACT EXTRACTION W/  INTRAOCULAR LENS IMPLANT Left 7/21/14    CATARACT EXTRACTION W/  INTRAOCULAR LENS IMPLANT Right 8/11/14    CYSTOSCOPY      INSERTION-INTRAOCULAR LENS (IOL) Right 8/11/2014    [facility]    INSERTION-INTRAOCULAR LENS (IOL) Left 7/21/2014    [facility] at [facility] OR    PHACOEMULSIFICATION-ASPIRATION-CATARACT Right 8/11/2014    [facility]    PHACOEMULSIFICATION-ASPIRATION-CATARACT Left 7/21/2014    [facility] at [facility] OR    screening N/A 9/5/2014    Performed by Mt Drake MD at [facility] (4TH FLR)       Review of patient's allergies indicates:   Allergen Reactions    Tramadol Rash       Family History     Problem Relation (Age of Onset)    Cancer Brother, Brother, Sister        Tobacco Use    Smoking status: Never Smoker    Smokeless tobacco: Never Used   Substance and Sexual Activity    Alcohol use: No     Alcohol/week: 0.0 oz     Comment: moderate    Drug use: No    Sexual activity: Yes     Partners: Male      Review of Systems   Constitutional: Positive for activity change, appetite change, fatigue and fever.   HENT: Negative for congestion, rhinorrhea and sore throat.    Eyes: Negative for pain.   Respiratory: Positive for cough and shortness of breath. Negative for chest tightness.    Cardiovascular: Positive for chest pain. Negative for palpitations.   Gastrointestinal: Negative for abdominal distention, abdominal pain, constipation, diarrhea, nausea and  vomiting.   Genitourinary: Negative for dysuria and frequency.   Neurological: Positive for weakness (  generalized). Negative for dizziness, light-headedness, numbness and headaches.   Psychiatric/Behavioral: Negative for agitation and confusion.     Objective:     Vital Signs (Most Recent):  Temp: 97.1 °F (36.2 °C) (02/13/19 1609)  Pulse: 88 (02/13/19 1657)  Resp: 13 (02/13/19 1651)  BP: (!) 94/51 (02/13/19 1657)  SpO2: 98 % (02/13/19 1651) Vital Signs (24h Range):  Temp:  [97.1 °F (36.2 °C)-101.8 °F (38.8 °C)] 97.1 °F (36.2 °C)  Pulse:  [] 88  Resp:  [10-20] 13  SpO2:  [87 %-98 %] 98 %  BP: ()/(41-61) 94/51   Weight: 52.2 kg (115 lb)  Body mass index is 22.46 kg/m².      Intake/Output Summary (Last 24 hours) at 2/13/2019 1916  Last data filed at 2/13/2019 1200  Gross per 24 hour   Intake 574 ml   Output 100 ml   Net 474 ml       Physical Exam   Constitutional: She is oriented to person, place, and time. She appears well-developed and well-nourished.   HENT:   Head: Normocephalic and atraumatic.   Mouth/Throat: Oropharynx is clear and moist.   Eyes: EOM are normal. Pupils are equal, round, and reactive to light.   Neck: Normal range of motion. Neck supple. No JVD present.   Cardiovascular: Normal rate, regular rhythm, normal heart sounds and intact distal pulses.   No murmur heard.  Pulmonary/Chest: Effort normal. No respiratory distress. She has no rales.   Abdominal: Soft. Bowel sounds are normal. She exhibits no distension. There is no tenderness.   Musculoskeletal: Normal range of motion. She exhibits no edema.   Lymphadenopathy:     She has no cervical adenopathy.   Neurological: She is alert and oriented to person, place, and time. No cranial nerve deficit.   Skin: Skin is warm and dry. Capillary refill takes less than 2 seconds. No erythema.   Psychiatric: She has a normal mood and affect.       Vents:     Lines/Drains/Airways     Drain            Female External Urinary Catheter 02/10/19 2000 2  days          Peripheral Intravenous Line                 Peripheral IV - Single Lumen 02/12/19 1822 Right Forearm 1 day              Significant Labs:    CBC/Anemia Profile:  Recent Labs   Lab 02/12/19  0811 02/12/19  1807 02/13/19  0819   WBC 13.26* 13.45* 13.94*   HGB 7.7* 7.8* 7.5*   HCT 23.3* 23.0* 22.2*   PLT 18* 20* 20*   MCV 83 81* 80*   RDW 20.3* 20.0* 20.1*        Chemistries:  Recent Labs   Lab 02/12/19  0404 02/12/19  1807 02/13/19  0500 02/13/19  1800   * 122* 123* 123*   K 3.9 4.0 3.9 3.8   CL 90* 88* 91* 92*   CO2 22* 24 24 22*   BUN 13 19 17 24*   CREATININE 0.6 0.8 0.6 0.7   CALCIUM 8.1* 8.3* 8.6* 7.9*   ALBUMIN 1.7* 1.8* 1.7*  --    PROT 5.6* 5.9* 5.6*  --    BILITOT 1.1* 1.6* 1.4*  --    ALKPHOS 712* 872* 679*  --    ALT 52* 110* 75*  --    AST 74* 197* 75*  --    MG 1.5*  --  2.1  --    PHOS 3.0  --  3.1  --        ABGs: No results for input(s): PH, PCO2, HCO3, POCSATURATED, BE in the last 48 hours.  Blood Culture:   Recent Labs   Lab 02/13/19  0010   LABBLOO No Growth to date  No Growth to date     Respiratory Culture: No results for input(s): GSRESP, RESPIRATORYC in the last 48 hours.  Urine Culture: No results for input(s): LABURIN in the last 48 hours.  Urine Studies: No results for input(s): COLORU, APPEARANCEUA, PHUR, SPECGRAV, PROTEINUA, GLUCUA, KETONESU, BILIRUBINUA, OCCULTUA, NITRITE, UROBILINOGEN, LEUKOCYTESUR, RBCUA, WBCUA, BACTERIA, SQUAMEPITHEL, HYALINECASTS in the last 48 hours.    Invalid input(s): ARLIN    Significant Imaging: I have reviewed all pertinent imaging results/findings within the past 24 hours.      ABG  No results for input(s): PH, PO2, PCO2, HCO3, BE in the last 168 hours.  Assessment/Plan:     Neuro   History of CVA (cerebrovascular accident)    Patient states that she had a stroke 20 years back. No residual neurological manifestion.  ECHO this admission consistent with PFO and shunt physiology.   - 02/05/2019: Critical Care contacted Vascular Neurology,  Dr. Horn, to ask about the role of anti-platelets vs anticoagulation because the patient has PFO with history of stroke. No history of A fib. He recommends to start ASA 81 mg PO qD         Pulmonary   Pneumonia involving left lung    On 02/03/2019: Patient  febrile this AM and UA c/w with UTI start Ceftriaxone,  At afternoon repeat fever to >102, blood cultures obtained and on abx for UTI, started on sepsis bolus 30cc/kg and monitoring of lactic acid levels. Antibiotics were changed to Piperacillin/Tazobactam and Vancomycin.     02/04/2019: Patient have complaints of left sided lateral chest wall pleuritic pain. Throughout the night patient remained febrile, O2 requirement increasing this Am after 4am patient given 2 500ml saline boluses. This am patient is tachypneic, accessory muscle use, febrile, MAP >65, but pressures 90s/50s. CXR with significant new left sided lower& upper airspace disease, absent breath sounds in left mid to lower fields, dullness to percussion, right basilar absent breath sounds. On Exam patient also with distended JVP to angle of jaw. Critical Care consulted & have accepted patient. Azithromycin started    02/05/2019: During night patient received 1 L NS bolus for marginal BP. Patient reports her symptoms have improved today. BP now is stable. Afebrile last spike of fever: 39.1 (02/03/3019). Np leucocytosis. CXR today: Worsening severe pulmonary edema.      CT Chest consistent with pulmonary edema vs ARDS; however, patient requiring minimal oxygen (3L currently) and has received copious IVF due to sepsis. PNA possible but less likely given how rapid radiographic changes developed.   Will start gentle diuresis  Will complete course of CAP therapy with rocephin and azithromycin      - Strict Is/Os  -will do CT chest without contrast today         Cardiac/Vascular   Hyperlipidemia    - Resume home medication: Statin.  - Lipid panel- sent     Renal/   Hypokalemia    Monitor potassium level  and replace as needed     Hypomagnesemia    Monitor Mg level and replace as needed     Hyponatremia    - Give small NS fluid bolus (250-500 mL), re-assess with repeat BMP for improvement. Can consider further boluses if deemed necessary.  - Hold further diuresis for now     ID   * Sepsis due to urinary tract infection    - Completed 5-day course of Ceftriaxone for E. Coli + UTI.  - Does have history of frequent UTIs, previously followed with [facility] (most recently seen 2015), normal cysto in 2014  - On isolation for enterovirus, but no positive detection on respiratory viral panel?  - New concern for recurrent sepsis, unlikely as patient has finished courses of Ceftriaxone and Rocephin. Pt is now more hypotensive, fever of 101.8 F overnight.     Plan:  - F/u initiated sepsis workup  - Recommend small NS fluid bolus (about 250-500 ml), looking for response in blood pressure. Patient has had poor PO intake during this admission, patient's hypotension likely due to hpovolemia. Does not appear volume overloaded on exam. Can consider further boluses if necessary.  - Continue to monitor oxygen saturation, titrate nasal cannula as needed  - Avoid further diuresis for now, continue to monitor pt's sodium levels closely. Look for response from fluid bolus.  - If patient's hypotension does not respond to fluid bolus, and there is concern for worsening hypotension or deterioration in clinical status, consider adding patient to rapid response nursing rounds. Reach out to Critical Care if if patient becomes urgently unstable.            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  Prior to [facility] arrival she had her blood drawn and was found to have a Hgb of 6.6. At that time her PCP instructed her to go to the emergency department for a blood transfusion.  She is s/p 1unit Platelet transfusion - developed transfusion reaction during this - received IM epi,  "Benadryl/ 125 methylpred for concerns of throat swelling per Dr. Gonzalez.    Heme/oncology consulted (02/03/2019) their recs and plan: " Anemia, thrombocytopenia. Ddx includes primary BM disorder such as MDS, vs secondary BM suppression from infection (recent Chikungunya infection, known to cause thrombocytopenia). Reviewed medication list, no known culprits. Possible also slow GIB from adenoma.  -No evidence of iron deficiency, B12/folate nml  -No evidence of hemolysis  -No evidence of splenomegaly, no evidence of cirrhosis  -No evidence of coagulation     Will try to perform BM biopsy early this week while patient is here. If patient needs to be discharged, can be scheduled to be done as outpatient. Results will take about a 3-7 days to results.  -Hold NSAIDs  -Transfuse Hb<7 or plt<10 unless active bleeding  -Consider repeat colonoscopy given recommended to be done 5 years from adenoma visualized in 2014"           Other   SOB (shortness of breath)    - Likely due to volume overload and pulmonary edema  - Will diurese and assess for improvement     Weakness    - Generalized, likely multifactorial (age, acute illness, deconditioning)  - PT/OT consulted          Thank you for your consult. I will follow-up with patient. Please contact us if you have any additional questions.     Alberto Maldonado MD  Critical Care Medicine  [facility]  "

## 2019-02-14 NOTE — PROGRESS NOTES
Pt post 500 cc bolus BP is 94/55 MAP 68. PA-Ilya notified. No new orders given at this time. Will continue to monitor.

## 2019-02-14 NOTE — ASSESSMENT & PLAN NOTE
- Give small NS fluid bolus (250-500 mL), re-assess with repeat BMP for improvement. Can consider further boluses if deemed necessary.  - Hold further diuresis for now

## 2019-02-14 NOTE — ASSESSMENT & PLAN NOTE
- Completed 5-day course of Ceftriaxone for E. Coli + UTI.  - Does have history of frequent UTIs, previously followed with [facility] (most recently seen 2015), normal cysto in 2014  - On isolation for enterovirus, but no positive detection on respiratory viral panel?  - New concern for recurrent sepsis, unlikely as patient has finished courses of Ceftriaxone and Rocephin. Pt is now more hypotensive, fever of 101.8 F overnight.     Plan:  - F/u initiated sepsis workup  - Recommend small NS fluid bolus (about 250-500 ml), looking for response in blood pressure. Patient has had poor PO intake during this admission, patient's hypotension likely due to hpovolemia. Does not appear volume overloaded on exam. Can consider further boluses if necessary.  - Continue to monitor oxygen saturation, titrate nasal cannula as needed  - Avoid further diuresis for now, continue to monitor pt's sodium levels closely. Look for response from fluid bolus.  - If patient's hypotension does not respond to fluid bolus, and there is concern for worsening hypotension or deterioration in clinical status, consider adding patient to rapid response nursing rounds. Reach out to Critical Care if if patient becomes urgently unstable.

## 2019-02-15 PROBLEM — Z51.5 PALLIATIVE CARE ENCOUNTER: Status: ACTIVE | Noted: 2019-02-15

## 2019-02-15 LAB
ACANTHOCYTES BLD QL SMEAR: PRESENT
ALBUMIN SERPL BCP-MCNC: 1.4 G/DL
ALP SERPL-CCNC: 459 U/L
ALT SERPL W/O P-5'-P-CCNC: 53 U/L
ANION GAP SERPL CALC-SCNC: 10 MMOL/L
ANISOCYTOSIS BLD QL SMEAR: SLIGHT
ANISOCYTOSIS BLD QL SMEAR: SLIGHT
AST SERPL-CCNC: 53 U/L
BASOPHILS # BLD AUTO: 0.26 K/UL
BASOPHILS # BLD AUTO: 0.27 K/UL
BASOPHILS NFR BLD: 1.7 %
BASOPHILS NFR BLD: 2.3 %
BILIRUB SERPL-MCNC: 2.7 MG/DL
BUN SERPL-MCNC: 28 MG/DL
BURR CELLS BLD QL SMEAR: ABNORMAL
CALCIUM SERPL-MCNC: 8.5 MG/DL
CHLORIDE SERPL-SCNC: 97 MMOL/L
CO2 SERPL-SCNC: 22 MMOL/L
CREAT SERPL-MCNC: 0.9 MG/DL
DIFFERENTIAL METHOD: ABNORMAL
DIFFERENTIAL METHOD: ABNORMAL
EOSINOPHIL # BLD AUTO: 0.1 K/UL
EOSINOPHIL # BLD AUTO: 0.1 K/UL
EOSINOPHIL NFR BLD: 0.4 %
EOSINOPHIL NFR BLD: 0.6 %
ERYTHROCYTE [DISTWIDTH] IN BLOOD BY AUTOMATED COUNT: 18.6 %
ERYTHROCYTE [DISTWIDTH] IN BLOOD BY AUTOMATED COUNT: 19.1 %
EST. GFR  (AFRICAN AMERICAN): >60 ML/MIN/1.73 M^2
EST. GFR  (NON AFRICAN AMERICAN): >60 ML/MIN/1.73 M^2
GIANT PLATELETS BLD QL SMEAR: PRESENT
GIANT PLATELETS BLD QL SMEAR: PRESENT
GLUCOSE SERPL-MCNC: 121 MG/DL
HCT VFR BLD AUTO: 24.5 %
HCT VFR BLD AUTO: 26.2 %
HGB BLD-MCNC: 8.8 G/DL
HGB BLD-MCNC: 9 G/DL
HYPOCHROMIA BLD QL SMEAR: ABNORMAL
HYPOCHROMIA BLD QL SMEAR: ABNORMAL
IMM GRANULOCYTES # BLD AUTO: 0.04 K/UL
IMM GRANULOCYTES # BLD AUTO: 0.38 K/UL
IMM GRANULOCYTES NFR BLD AUTO: 0.3 %
IMM GRANULOCYTES NFR BLD AUTO: 2.5 %
LYMPHOCYTES # BLD AUTO: 2.1 K/UL
LYMPHOCYTES # BLD AUTO: 3 K/UL
LYMPHOCYTES NFR BLD: 17.5 %
LYMPHOCYTES NFR BLD: 19.7 %
MAGNESIUM SERPL-MCNC: 1.7 MG/DL
MCH RBC QN AUTO: 27.1 PG
MCH RBC QN AUTO: 28.1 PG
MCHC RBC AUTO-ENTMCNC: 33.6 G/DL
MCHC RBC AUTO-ENTMCNC: 36.7 G/DL
MCV RBC AUTO: 77 FL
MCV RBC AUTO: 81 FL
MONOCYTES # BLD AUTO: 1.8 K/UL
MONOCYTES # BLD AUTO: 2.1 K/UL
MONOCYTES NFR BLD: 14.2 %
MONOCYTES NFR BLD: 14.8 %
NEUTROPHILS # BLD AUTO: 7.7 K/UL
NEUTROPHILS # BLD AUTO: 9.3 K/UL
NEUTROPHILS NFR BLD: 61.3 %
NEUTROPHILS NFR BLD: 64.7 %
NRBC BLD-RTO: 0 /100 WBC
NRBC BLD-RTO: 0 /100 WBC
OVALOCYTES BLD QL SMEAR: ABNORMAL
PHOSPHATE SERPL-MCNC: 2.7 MG/DL
PLATELET # BLD AUTO: 13 K/UL
PLATELET # BLD AUTO: ABNORMAL K/UL
PLATELET BLD QL SMEAR: ABNORMAL
PLATELET BLD QL SMEAR: ABNORMAL
PMV BLD AUTO: ABNORMAL FL
PMV BLD AUTO: ABNORMAL FL
POIKILOCYTOSIS BLD QL SMEAR: SLIGHT
POIKILOCYTOSIS BLD QL SMEAR: SLIGHT
POLYCHROMASIA BLD QL SMEAR: ABNORMAL
POTASSIUM SERPL-SCNC: 3.2 MMOL/L
PROT SERPL-MCNC: 5 G/DL
RBC # BLD AUTO: 3.2 M/UL
RBC # BLD AUTO: 3.25 M/UL
SCHISTOCYTES BLD QL SMEAR: PRESENT
SCHISTOCYTES BLD QL SMEAR: PRESENT
SODIUM SERPL-SCNC: 129 MMOL/L
TOXIC GRANULES BLD QL SMEAR: PRESENT
VANCOMYCIN TROUGH SERPL-MCNC: 9.8 UG/ML
WBC # BLD AUTO: 11.92 K/UL
WBC # BLD AUTO: 15.11 K/UL

## 2019-02-15 PROCEDURE — 94640 AIRWAY INHALATION TREATMENT: CPT

## 2019-02-15 PROCEDURE — 63600175 PHARM REV CODE 636 W HCPCS: Performed by: HOSPITALIST

## 2019-02-15 PROCEDURE — 25000003 PHARM REV CODE 250: Performed by: PHYSICIAN ASSISTANT

## 2019-02-15 PROCEDURE — 36415 COLL VENOUS BLD VENIPUNCTURE: CPT

## 2019-02-15 PROCEDURE — P9047 ALBUMIN (HUMAN), 25%, 50ML: HCPCS | Mod: JG | Performed by: HOSPITALIST

## 2019-02-15 PROCEDURE — 20600001 HC STEP DOWN PRIVATE ROOM

## 2019-02-15 PROCEDURE — 99221 1ST HOSP IP/OBS SF/LOW 40: CPT | Mod: ,,, | Performed by: INTERNAL MEDICINE

## 2019-02-15 PROCEDURE — 94761 N-INVAS EAR/PLS OXIMETRY MLT: CPT

## 2019-02-15 PROCEDURE — 63600175 PHARM REV CODE 636 W HCPCS: Mod: JG | Performed by: HOSPITALIST

## 2019-02-15 PROCEDURE — 99233 SBSQ HOSP IP/OBS HIGH 50: CPT | Mod: ,,, | Performed by: HOSPITALIST

## 2019-02-15 PROCEDURE — 25000242 PHARM REV CODE 250 ALT 637 W/ HCPCS: Performed by: PHYSICIAN ASSISTANT

## 2019-02-15 PROCEDURE — 25000003 PHARM REV CODE 250: Performed by: HOSPITALIST

## 2019-02-15 PROCEDURE — 84100 ASSAY OF PHOSPHORUS: CPT

## 2019-02-15 PROCEDURE — 99233 PR SUBSEQUENT HOSPITAL CARE,LEVL III: ICD-10-PCS | Mod: ,,, | Performed by: HOSPITALIST

## 2019-02-15 PROCEDURE — 63600175 PHARM REV CODE 636 W HCPCS: Performed by: INTERNAL MEDICINE

## 2019-02-15 PROCEDURE — 80202 ASSAY OF VANCOMYCIN: CPT

## 2019-02-15 PROCEDURE — 99221 PR INITIAL HOSPITAL CARE,LEVL I: ICD-10-PCS | Mod: ,,, | Performed by: INTERNAL MEDICINE

## 2019-02-15 PROCEDURE — 25000242 PHARM REV CODE 250 ALT 637 W/ HCPCS: Performed by: STUDENT IN AN ORGANIZED HEALTH CARE EDUCATION/TRAINING PROGRAM

## 2019-02-15 PROCEDURE — 80053 COMPREHEN METABOLIC PANEL: CPT

## 2019-02-15 PROCEDURE — 83735 ASSAY OF MAGNESIUM: CPT

## 2019-02-15 PROCEDURE — 85025 COMPLETE CBC W/AUTO DIFF WBC: CPT | Mod: 91

## 2019-02-15 PROCEDURE — 27000221 HC OXYGEN, UP TO 24 HOURS

## 2019-02-15 PROCEDURE — 25000003 PHARM REV CODE 250: Performed by: STUDENT IN AN ORGANIZED HEALTH CARE EDUCATION/TRAINING PROGRAM

## 2019-02-15 PROCEDURE — 97530 THERAPEUTIC ACTIVITIES: CPT

## 2019-02-15 RX ORDER — IPRATROPIUM BROMIDE AND ALBUTEROL SULFATE 2.5; .5 MG/3ML; MG/3ML
3 SOLUTION RESPIRATORY (INHALATION) EVERY 4 HOURS PRN
Status: DISCONTINUED | OUTPATIENT
Start: 2019-02-15 | End: 2019-02-19 | Stop reason: HOSPADM

## 2019-02-15 RX ORDER — MORPHINE SULFATE 2 MG/ML
1 INJECTION, SOLUTION INTRAMUSCULAR; INTRAVENOUS
Status: DISCONTINUED | OUTPATIENT
Start: 2019-02-15 | End: 2019-02-18

## 2019-02-15 RX ORDER — VANCOMYCIN HCL IN 5 % DEXTROSE 1G/250ML
1000 PLASTIC BAG, INJECTION (ML) INTRAVENOUS
Status: DISCONTINUED | OUTPATIENT
Start: 2019-02-15 | End: 2019-02-18

## 2019-02-15 RX ORDER — IPRATROPIUM BROMIDE AND ALBUTEROL SULFATE 2.5; .5 MG/3ML; MG/3ML
3 SOLUTION RESPIRATORY (INHALATION) ONCE
Status: DISCONTINUED | OUTPATIENT
Start: 2019-02-15 | End: 2019-02-15

## 2019-02-15 RX ORDER — ALBUMIN HUMAN 250 G/1000ML
12.5 SOLUTION INTRAVENOUS EVERY 8 HOURS
Status: COMPLETED | OUTPATIENT
Start: 2019-02-15 | End: 2019-02-16

## 2019-02-15 RX ADMIN — PIPERACILLIN AND TAZOBACTAM 4.5 G: 4; .5 INJECTION, POWDER, LYOPHILIZED, FOR SOLUTION INTRAVENOUS; PARENTERAL at 06:02

## 2019-02-15 RX ADMIN — IPRATROPIUM BROMIDE AND ALBUTEROL SULFATE 3 ML: .5; 3 SOLUTION RESPIRATORY (INHALATION) at 04:02

## 2019-02-15 RX ADMIN — Medication 1 MG: at 07:02

## 2019-02-15 RX ADMIN — LIDOCAINE 1 PATCH: 50 PATCH TOPICAL at 09:02

## 2019-02-15 RX ADMIN — HYDROXYZINE HYDROCHLORIDE 25 MG: 25 TABLET, FILM COATED ORAL at 09:02

## 2019-02-15 RX ADMIN — Medication 5 ML: at 06:02

## 2019-02-15 RX ADMIN — PIPERACILLIN AND TAZOBACTAM 4.5 G: 4; .5 INJECTION, POWDER, LYOPHILIZED, FOR SOLUTION INTRAVENOUS; PARENTERAL at 11:02

## 2019-02-15 RX ADMIN — PANTOPRAZOLE SODIUM 40 MG: 40 TABLET, DELAYED RELEASE ORAL at 09:02

## 2019-02-15 RX ADMIN — ALBUMIN HUMAN 12.5 G: 0.25 SOLUTION INTRAVENOUS at 09:02

## 2019-02-15 RX ADMIN — Medication 5 ML: at 09:02

## 2019-02-15 RX ADMIN — Medication 5 ML: at 02:02

## 2019-02-15 RX ADMIN — IPRATROPIUM BROMIDE AND ALBUTEROL SULFATE 3 ML: .5; 3 SOLUTION RESPIRATORY (INHALATION) at 12:02

## 2019-02-15 RX ADMIN — PIPERACILLIN AND TAZOBACTAM 4.5 G: 4; .5 INJECTION, POWDER, LYOPHILIZED, FOR SOLUTION INTRAVENOUS; PARENTERAL at 05:02

## 2019-02-15 RX ADMIN — ACETAMINOPHEN 650 MG: 325 TABLET, FILM COATED ORAL at 06:02

## 2019-02-15 RX ADMIN — ACETAMINOPHEN 650 MG: 325 TABLET, FILM COATED ORAL at 07:02

## 2019-02-15 RX ADMIN — IPRATROPIUM BROMIDE AND ALBUTEROL SULFATE 3 ML: .5; 3 SOLUTION RESPIRATORY (INHALATION) at 08:02

## 2019-02-15 RX ADMIN — ALBUMIN HUMAN 12.5 G: 0.25 SOLUTION INTRAVENOUS at 02:02

## 2019-02-15 NOTE — PLAN OF CARE
Problem: Adult Inpatient Plan of Care  Goal: Plan of Care Review  Outcome: Ongoing (interventions implemented as appropriate)  Pt free of falls, injury this shift. POC reviewed with pt at bedside, verbalized understanding. 1 unit PRBC given today for H/H 6.7/19.8. BP borderline but stable, trending 90-100s/50s. Pt weak, midly febrile (Temp max 100.7) this shift. Critical care consulted again, see note. Sodium tabs ordered for hyponatremia. Midline placed for poor venous access. D/C to SNF when medically stable. Will continue to monitor.

## 2019-02-15 NOTE — NURSING
Pt vomitted shortly after receiving PM dose of Sodium Chloride tablet. INA Botello notified. Ordered an additional dose for tonight, and stated to resume normal TID schedule in AM. Will continue to monitor.

## 2019-02-15 NOTE — PROGRESS NOTES
Hospital Medicine  Progress note    [place] [facility] [place] Marcelle Jones MD  Admit Date: 2/2/2019  JACK   Length of Stay:  LOS: 13 days   Code status: Full Code    Principal Problem:  Sepsis due to urinary tract infection    Overview:    Interval hx: Patient seen and examined at bedside. Clinical picture about the same. Very frail appearing. Complaining of fatigue. New ulceration on tongue, with persistent lesions of the buccal mucosa, no improvement. ID consulted for evaluation.     ROS   Constitutional: Positive for activity change, appetite change and fatigue. Positive for chills and fever.   HENT: Negative for congestion, rhinorrhea and sore throat.  +ve multiple painful, nonexudative lesions of upper/lower gum line and hard palate.  Respiratory: Positive for (nonproducti cough. Negative for shortness of breath, wheezing and stridor.    Cardiovascular: Negative for chest pain, palpitations and leg swelling.   Gastrointestinal: Positive for nausea. Negative for abdominal pain, blood in stool, constipation, diarrhea and vomiting.   Genitourinary: Negative for decreased urine volume, difficulty urinating, dysuria, flank pain, frequency and urgency.   Musculoskeletal: Negative for arthralgias and myalgias.   Neurological: Negative for syncope, weakness, light-headedness and headaches.   Psychiatric/Behavioral: Negative for agitation and confusion.         PEx  Temp:  [98.1 °F (36.7 °C)-103 °F (39.4 °C)]   Pulse:  []   Resp:  [16-18]   BP: ()/(50-58)   SpO2:  [92 %-99 %]     Intake/Output Summary (Last 24 hours) at 2/15/2019 1544  Last data filed at 2/15/2019 0600  Gross per 24 hour   Intake 60 ml   Output --   Net 60 ml       Constitutional: She is oriented to person, place, and time. She is ill appearing.  HENT:   Head: Normocephalic.   conjunctival pallor   Eyes: Pupils are equal, round, and reactive to light.   Throat: She has several ulcerations on her gum line and hard palate. No bleeding, no  exudate.  Neck: Normal range of motion. Supple.   Cardiovascular:Tachycardic, regular rhythm and normal heart sounds.   No murmur heard.  Pulmonary/Chest: Effort normal. No stridor. No respiratory distress. No wheezes. She has no rales.  Abdominal: Soft. Bowel sounds are normal. She exhibits no distension. There is no tenderness. There is no guarding.   Musculoskeletal: Normal range of motion. Improved edema of LE, no deformity.   Neurological: She is alert and oriented to person, place, and time.   Skin: Skin is warm and dry. Capillary refill takes less than 2 seconds. She is not diaphoretic.   Psychiatric: She has a normal mood and affect.   Nursing note and vitals reviewed.        Recent Labs   Lab 02/14/19  1738 02/15/19  1152 02/15/19  1344   WBC 11.14 15.11* 11.92   HGB 8.4* 9.0* 8.8*   HCT 24.9* 24.5* 26.2*   PLT 15* SEE COMMENT 13*     Recent Labs   Lab 02/13/19  0500  02/14/19  0406 02/14/19 1738 02/15/19  0642   *   < > 122* 127* 129*   K 3.9   < > 4.1 3.8 3.2*   CL 91*   < > 93* 95 97   CO2 24   < > 21* 20* 22*   BUN 17   < > 23 25* 28*   CREATININE 0.6   < > 0.8 0.8 0.9   GLU 92   < > 89 108 121*   CALCIUM 8.6*   < > 8.1* 8.5* 8.5*   MG 2.1  --  1.9  --  1.7   PHOS 3.1  --  2.9  --  2.7    < > = values in this interval not displayed.     Recent Labs   Lab 02/13/19  0500 02/14/19  0406 02/15/19  0642   ALKPHOS 679* 486* 459*   ALT 75* 58* 53*   AST 75* 51* 53*   ALBUMIN 1.7* 1.4* 1.4*   PROT 5.6* 5.1* 5.0*   BILITOT 1.4* 1.8* 2.7*   INR 1.3* 1.3*  --           Scheduled Meds:   (Magic mouthwash) 1:1:1 Benadryl 12.5mg/5ml liq, aluminum & magnesium hydroxide-simehticone (Maalox), lidocaine viscous 2%  5 mL Swish & Spit Q4H    albumin human 25%  12.5 g Intravenous Q8H    albuterol-ipratropium  3 mL Nebulization Q4H WAKE    lidocaine  1 patch Transdermal Q24H    lidocaine HCL 20 mg/ml (2%)  20 mL Other Once    pantoprazole  40 mg Oral Daily    piperacillin-tazobactam (ZOSYN) IVPB  4.5 g  Intravenous Q8H    vancomycin (VANCOCIN) IVPB  15 mg/kg Intravenous Q24H     Continuous Infusions:  As Needed:  sodium chloride, sodium chloride, acetaminophen, albuterol-ipratropium, hydrOXYzine HCl, loperamide, ondansetron, promethazine, ramelteon, sodium chloride 0.9%, sodium chloride 0.9%    Active Hospital Problems    Diagnosis  POA    *Sepsis due to urinary tract infection [A41.9, N39.0]  Yes    Mouth ulcers [K12.1]  No    PFO (patent foramen ovale) [Q21.1]  Not Applicable    Hypomagnesemia [E83.42]  No    Hypokalemia [E87.6]  Yes    Pneumonia involving left lung [J18.9]  Yes    SOB (shortness of breath) [R06.02]  Yes    Weakness [R53.1]  Yes    History of CVA (cerebrovascular accident) [Z86.73]  Not Applicable    Hyponatremia [E87.1]  Yes    Pancytopenia [D61.818]  Yes    Hyperlipidemia [E78.5]  Yes     Chronic      Resolved Hospital Problems   No resolved problems to display.         Assessment and Plan    History of CVA (cerebrovascular accident)     Patient states that she had a stroke 20 years back. No residual neurological manifestion.  ECHO this admission consistent with PFO and shunt physiology.   - 02/05/2019: Critical Care contacted Vascular Neurology, Dr. Horn, to ask about the role of anti-platelets vs anticoagulation because the patient has PFO with history of stroke. No history of A fib. He recommends to start ASA 81 mg PO qD      Pneumonia involving left lung     On 02/03/2019: Patient  febrile this AM and UA c/w with UTI start Ceftriaxone,  At afternoon repeat fever to >102, blood cultures obtained and on abx for UTI, started on sepsis bolus 30cc/kg and monitoring of lactic acid levels. Antibiotics were changed to Piperacillin/Tazobactam and Vancomycin.      02/04/2019: Patient have complaints of left sided lateral chest wall pleuritic pain. Throughout the night patient remained febrile, O2 requirement increasing this Am after 4am patient given 2 500ml saline boluses. This am  patient is tachypneic, accessory muscle use, febrile, MAP >65, but pressures 90s/50s. CXR with significant new left sided lower& upper airspace disease, absent breath sounds in left mid to lower fields, dullness to percussion, right basilar absent breath sounds. On Exam patient also with distended JVP to angle of jaw. Critical Care consulted & have accepted patient. Azithromycin started     02/05/2019: During night patient received 1 L NS bolus for marginal BP. Patient reports her symptoms have improved today. BP now is stable. Afebrile last spike of fever: 39.1 (02/03/3019). Np leucocytosis. CXR today: Worsening severe pulmonary edema.  CT Chest consistent with pulmonary edema vs ARDS; however, patient requiring minimal oxygen (3L currently) and had received copious IVF due to sepsis. PNA possible but less likely given how rapid radiographic changes developed. Will start gentle diuresis. Finished course of CAP therapy with rocephin.     2/13/2019: Rpt CXR showed decreased bilateral infiltrates. However, patient with increased work of breathing, elevated PCT, elevated WBC w/ L shift, fever, mild tachycardia, and hypotension. Initiated broad spectrum antibiotics pending further workup (Bcx, LA, UA). Plan to de-escalate/discontinue pending cultures, clinical response.    2/15/2019: Increased work of breathing this morning, rpt CXR pending. Continuing Vanc/Zosyn due to concern for sepsis. ID consulted for assistance in evaluating oral lesions and antibiotic mgmt.      Cardiac/Vascular   Hyperlipidemia     - Resume home medication: Statin.  - Lipid panel- sent      Renal/   Hypokalemia     Monitor potassium level and replace as needed      Hypomagnesemia     Monitor Mg level and replace as needed      Hyponatremia     Likely hypervolemic hyponatremia, given pulmonary edema and fluid resuscitation for sepsis. Complicated by hypoalbuminemia.   Initially worsening, despite diuresis. Serum osmol (low) and urine lytes  "ordered (pending).  Complex case as ideally, fluid restriction and diuresis would be appropriate. However, currently, patient is hypotensive, with concern for sepsis. Additional fluid boluses likely to exacerbate pulmonary edema and hyponatremia. Trial of salt tabs , IV albumin. To receive 1U pRBC.   S/p 1U pRBC 2/14. Trial of albumin x3 doses today.    ID   * Sepsis due to urinary tract infection     - Urine cultures grew E. coli, susceptible to ceftriaxone, completed course  - Does have history of frequent UTIs, previously followed with [facility] (most recently seen 2015), normal cysto in 2014      Transaminitis  Etiology unclear. Will check RUQ US (benign), hep panel (-ve) and trend. Trend synthetic function with am labs.   LFTs trending down 2/13.     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  Prior to [facility] [place] arrival she had her blood drawn and was found to have a Hgb of 6.6. At that time her PCP instructed her to go to the emergency department for a blood transfusion.  She is s/p 1unit Platelet transfusion - developed transfusion reaction during this - received IM epi, Benadryl/ 125 methylpred for concerns of throat swelling per Dr. Gonzalez.    Heme/oncology consulted (02/03/2019) their recs and plan: " Anemia, thrombocytopenia. Ddx includes primary BM disorder such as MDS, vs secondary BM suppression from infection (recent Chikungunya infection, known to cause thrombocytopenia). Reviewed medication list, no known culprits. Possible also slow GIB from adenoma.  -No evidence of iron deficiency, B12/folate nml  -No evidence of hemolysis  -No evidence of splenomegaly, no evidence of cirrhosis  -No evidence of coagulation      S/p bone marrow biopsy on 2/12. Consistent with high grade myeloid disorder, cytogenics pending. Hematology consulted regarding further management and are recommending OP f/u.   -Hold NSAIDs  -Transfuse Hb<7 or plt<10 unless active " bleeding. Required 1u pRBC 2/14.  -Consider repeat colonoscopy given recommended to be done 5 years from adenoma visualized in 2014   Other   SOB (shortness of breath)     - Likely due to volume overload and pulmonary edema  - currently dependent on 2L nc  - continue to wean as tolerated   - unable to wean off 1-2L. Will trial albumin, lasix if BP tolerates. Rpt CXR pending.      Weakness     - Generalized, likely multifactorial (age, acute illness, malignancy, infection, deconditioning)  - PT/OT consulted        High Risk Conditions  Sepsis, pancytopenia, Pneumonia     Diet: regular thin diet  GI PPx:   DVT PPx:    MING/SCD, avoid heparin products due to thrombocytopenia     Goals of Care: Full code  Discharge plan: pending hematology recs, palliative care recs    Time (minutes) spent in care of the patient (Greater than 1/2 spent in direct face-to-face contact) 35 minutes      Marcelle Jones MD  Staff Hospitalist  Department of Hospital Medicine  [facility]   [id number]

## 2019-02-15 NOTE — SUBJECTIVE & OBJECTIVE
Interval History: Patient is lying in bed hoping to improve. She has had poor sleep and increased anxiety.    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    UTI (urinary tract infection)        Past Surgical History:   Procedure Laterality Date    CATARACT EXTRACTION W/  INTRAOCULAR LENS IMPLANT Left 7/21/14    CATARACT EXTRACTION W/  INTRAOCULAR LENS IMPLANT Right 8/11/14    CYSTOSCOPY      INSERTION-INTRAOCULAR LENS (IOL) Right 8/11/2014    [facility]    INSERTION-INTRAOCULAR LENS (IOL) Left 7/21/2014    [facility]    PHACOEMULSIFICATION-ASPIRATION-CATARACT Right 8/11/2014    [facility]    PHACOEMULSIFICATION-ASPIRATION-CATARACT Left 7/21/2014    [facility]    screening N/A 9/5/2014    Performed by Mt Drake MD at [facility] ([address] [place] [address])       Review of patient's allergies indicates:   Allergen Reactions    Tramadol Rash       Medications:  Continuous Infusions:  Scheduled Meds:   (Magic mouthwash) 1:1:1 Benadryl 12.5mg/5ml liq, aluminum & magnesium hydroxide-simehticone (Maalox), lidocaine viscous 2%  5 mL Swish & Spit Q4H    albumin human 25%  12.5 g Intravenous Q8H    albuterol-ipratropium  3 mL Nebulization Q4H WAKE    lidocaine  1 patch Transdermal Q24H    lidocaine HCL 20 mg/ml (2%)  20 mL Other Once    pantoprazole  40 mg Oral Daily    piperacillin-tazobactam (ZOSYN) IVPB  4.5 g Intravenous Q8H    vancomycin (VANCOCIN) IVPB  15 mg/kg Intravenous Q24H     PRN Meds:sodium chloride, sodium chloride, acetaminophen, albuterol-ipratropium, hydrOXYzine HCl, loperamide, morphine, ondansetron, promethazine, ramelteon, sodium chloride 0.9%, sodium chloride 0.9%    Family History      Problem Relation (Age of Onset)    Cancer Brother, Brother, Sister        Tobacco Use    Smoking status: Never Smoker    Smokeless tobacco: Never Used   Substance and Sexual Activity    Alcohol use: No     Alcohol/week: 0.0 oz     Comment: moderate    Drug use: No    Sexual activity: Yes     Partners: Male       Review of Systems   Constitutional: Positive for appetite change and fatigue.   HENT: Positive for mouth sores.    Eyes: Negative.    Respiratory: Negative.    Cardiovascular: Negative.    Gastrointestinal: Negative.    Endocrine: Negative.    Genitourinary: Negative.    Musculoskeletal: Negative.    Skin: Negative.         Redness of sacrum and soreness.   Allergic/Immunologic: Negative.    Neurological: Negative.    Hematological: Negative.    Psychiatric/Behavioral: Negative.      Objective:     Vital Signs (Most Recent):  Temp: 98.1 °F (36.7 °C) (02/15/19 0730)  Pulse: 78 (02/15/19 1205)  Resp: 18 (02/15/19 1205)  BP: (!) 93/50 (02/15/19 0730)  SpO2: 97 % (02/15/19 1205) Vital Signs (24h Range):  Temp:  [98.1 °F (36.7 °C)-103 °F (39.4 °C)] 98.1 °F (36.7 °C)  Pulse:  [] 78  Resp:  [18] 18  SpO2:  [92 %-99 %] 97 %  BP: ()/(50-58) 93/50     Weight: 66 kg (145 lb 8.1 oz)  Body mass index is 28.42 kg/m².    Review of Symptoms  Symptom Assessment (ESAS 0-10 scale)   ESAS 0 1 2 3 4 5 6 7 8 9 10   Pain    x          Dyspnea x             Anxiety   x           Nausea x             Depression  x             Anorexia   x           Fatigue  x            Insomnia x             Restlessness  x             Agitation x             CAM / Delirium x__ --  ___+   Constipation     _x_ --  ___+   Diarrhea           _x_ --  ___+  Bowel Management Plan (BMP): No  Comments: add senna    Pain Assessment: Mouth with many oral ulcerations. Unable to wear dentures. Viscous lidocaine has not helped.    OME in 24 hours: 0    Performance Status: 50    ECOG Performance Status Grade: 3 - Confined to bed or chair 50%  of waking hours    Physical Exam   Constitutional: She appears cachectic. She is cooperative. She has a sickly appearance.   HENT:   Mouth/Throat: Oral lesions present.   Neurological: She is alert.   Nursing note and vitals reviewed.      Significant Labs: All pertinent labs within the past 24 hours have been reviewed.  CBC:   Recent Labs   Lab 02/15/19  1344   WBC 11.92   HGB 8.8*   HCT 26.2*   MCV 81*   PLT 13*     BMP:  Recent Labs   Lab 02/15/19  0642   *   *   K 3.2*   CL 97   CO2 22*   BUN 28*   CREATININE 0.9   CALCIUM 8.5*   MG 1.7     LFT:  Lab Results   Component Value Date    AST 53 (H) 02/15/2019     (H) 02/02/2019    ALKPHOS 459 (H) 02/15/2019    BILITOT 2.7 (H) 02/15/2019     Albumin:   Albumin   Date Value Ref Range Status   02/15/2019 1.4 (L) 3.5 - 5.2 g/dL Final     Protein:   Total Protein   Date Value Ref Range Status   02/15/2019 5.0 (L) 6.0 - 8.4 g/dL Final     Lactic acid:   Lab Results   Component Value Date    LACTATE 1.5 02/13/2019    LACTATE 1.6 02/12/2019       Significant Imaging: I have reviewed all pertinent imaging results/findings within the past 24 hours.    Advance Care Planning   Advanced Directives::  Living Will: No  LaPOST: No  Do Not Resuscitate Status: No  Medical Power of : No has several daughters and granddaughters in room to whom she defers.  Decision-Making Capacity: Family answered questions. Patient primary Palauan speaker and daughter who is  speaks for her with other sister.       Living Arrangements: Lives with family, Lives in home    Psychosocial/Cultural:  Patient's most important priorities:  Getting better    Patient's biggest concerns/fears:  Not getting better    Previous death/end of life care history:  unknown    Patient's goals/hopes:  Getting better    Spiritual:     F- [place] and Belief: Episcopalian    I - Importance: yes  .  C - Community: yes    A - Address in Care: yes Did have anointing of the sick.

## 2019-02-15 NOTE — PROGRESS NOTES
Progress Note      SUBJECTIVE:     Patient seen and examined at the bedside  Patient complained of soreness in her mouth and difficulty swallowing.  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    UTI (urinary tract infection)      Past Surgical History:   Procedure Laterality Date    CATARACT EXTRACTION W/  INTRAOCULAR LENS IMPLANT Left 7/21/14    CATARACT EXTRACTION W/  INTRAOCULAR LENS IMPLANT Right 8/11/14    CYSTOSCOPY      INSERTION-INTRAOCULAR LENS (IOL) Right 8/11/2014    [facility]    INSERTION-INTRAOCULAR LENS (IOL) Left 7/21/2014    [facility]    PHACOEMULSIFICATION-ASPIRATION-CATARACT Right 8/11/2014    [facility]    PHACOEMULSIFICATION-ASPIRATION-CATARACT Left 7/21/2014    [facility]    screening N/A 9/5/2014    Performed by Mt Drake [facility] at [facility] ([address] [place] [address])     Family History   Problem Relation Age of Onset    Cancer Brother     Cancer Brother     Cancer Sister      Social History     Tobacco Use    Smoking status: Never Smoker    Smokeless tobacco: Never Used   Substance Use Topics    Alcohol use: No     Alcohol/week: 0.0 oz     Comment: moderate    Drug use: No     Review of Systems   Constitutional: Positive for fever and malaise/fatigue.   HENT: Negative for nosebleeds.    Respiratory: Positive for shortness of breath. Negative for  cough.    Cardiovascular: Negative for chest pain.   Gastrointestinal: Negative for abdominal pain, blood in stool, nausea and vomiting.   Genitourinary: Negative for frequency and urgency.   Neurological: Positive for weakness. Negative for sensory change, seizures and headaches.     OBJECTIVE:     Vital Signs:  Temp:  [98.1 °F (36.7 °C)-99.1 °F (37.3 °C)]   Pulse:  []   Resp:  [18]   BP: ()/(50-59)   SpO2:  [93 %-99 %]     Physical Exam   Constitutional: She is oriented to person, place, and time.   Patient very frail   HENT:   Gingival ulcerations seen   Eyes: EOM are normal. Pupils are equal, round, and reactive to light.   Neck: Normal range of motion. Neck supple.   Cardiovascular: Normal rate and regular rhythm.   Pulmonary/Chest: Effort normal. She has rales.   Decreased breath sounds at the bases   Abdominal: Soft. Bowel sounds are normal.   Musculoskeletal: Normal range of motion. She exhibits no edema.   Neurological: She is alert and oriented to person, place, and time.   Skin: Skin is warm and dry.     Laboratory:  CBC:   Recent Labs   Lab 02/15/19  1344   WBC 11.92   RBC 3.25*   HGB 8.8*   HCT 26.2*   PLT 13*   MCV 81*   MCH 27.1   MCHC 33.6     CMP:   Recent Labs   Lab 02/15/19  0642   *   CALCIUM 8.5*   ALBUMIN 1.4*   PROT 5.0*   *   K 3.2*   CO2 22*   CL 97   BUN 28*   CREATININE 0.9   ALKPHOS 459*   ALT 53*   AST 53*   BILITOT 2.7*     Coagulation:   Recent Labs   Lab 02/13/19  0500 02/14/19  0406   LABPROT 12.6* 12.9*   INR 1.3* 1.3*   APTT 33.1*  --            Diagnostic Results:    Bone marrow biopsy  CONSISTENT WITH HIGH-GRADE MYELOID NEOPLASM WITH EXCESS BLASTS-2. FINAL CLASSIFICATION  PENDING CYTOGENETICS RESULT AND NGS RESULT. SEE COMMENT.  FOCAL GRADE 1 RETICULAR FIBROSIS.  MARKED DYSGRANULOPOIESIS.  DECREASED ERYTHROPOIESIS AND INCREASED ATYPICAL MONOCYTES.  ADEQUATE STORAGE IRON.  INCREASED NUMBER OF MEGAKARYOCYTES WITH DYSPLASTIC MORPHOLOGY.    Findings are  consistent with high-grade myeloid neoplasm with excess blasts    ASSESSMENT/PLAN:     1.  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  Sepsis:  Patient is still spiking fevers with a T-max of 103° in the last 24 hr.  She was treated for UTI and pneumonia.  Currently placed on vancomycin and Zosyn.  3.  She has chronic leukopenia with neutropenia dating back to 2005.  She has history of clonal T-cell gene rearrangement in 2012    Plan:   1.  Follow-up with Hematology as outpatient after discharge. She might be a candidate for hypomethylating agents, however her cytogenetics and NGS panel is pending  2.  Infectious Disease on board  3.  Would transfuse to keep hemoglobin over 7 and platelets over 10  4.  Will get erythropoietin level    Plan of care discussed with Dr. Kareen Ordaz MD PGY 4  Hematology/oncology

## 2019-02-15 NOTE — PLAN OF CARE
Problem: Occupational Therapy Goal  Goal: Occupational Therapy Goal  Goals to be met by: 2/21    Patient will increase functional independence with ADLs by performing:    UE Dressing with Set-up A.  LE Dressing with set-up A.  Grooming while standing with Stand-by Assistance.  Toileting from toilet with Stand-by Assistance for hygiene and clothing management.   Pt will engage in functional mobility to simulate household distances in order to maximize functional activity tolerance required for engagement in occupations of choice with supervision using RW        Outcome: Ongoing (interventions implemented as appropriate)  Goals reviewed and remain appropriate.

## 2019-02-15 NOTE — ASSESSMENT & PLAN NOTE
Patient has multiple medical problems which are being addressed.  She has many oral ulcers and await ID recs for additional treatment.  Opioids are the drug of choice after topical meds. Dose should be titrated for pain control without drowsiness. Patient had a rash to tramadol. Up to date reviewed and discussed with pharmacist.   Will monitor.   Family interested in treatment for longevity. Patient very active up until 1 week prior to hospitalization.   Did not address advance directives at this time.  Will follow.

## 2019-02-15 NOTE — PT/OT/SLP PROGRESS
Physical Therapy      Patient Name:  Vicki Peña   MRN:  [id number]    Patient not seen today secondary to pt fatigued following OT session in PM; pt later off the floor for x-ray. Will follow up at next scheduled visit.     Izzy Degroot, PT, DPT   2/15/2019

## 2019-02-15 NOTE — CARE UPDATE
RN Proactive Rounding Note  Time of Visit: 819    Admit Date: 2019  LOS: 13  Code Status: Full Code   Date of Visit: 02/15/2019  : 1935  Age: 83 y.o.  Sex: female  Race: White  Bed: [facility] 3098/[facility] 3098 A:   MRN: [id number]  Was the patient discharged from an ICU this admission? yes   Was the patient discharged from a PACU within last 24 hours?  no  Did the patient receive conscious sedation/general anesthesia in last 24 hours?  no  Was the patient in the ED within the past 24 hours?  no  Was the patient started on NIPPV within the past 24 hours?  no  Attending Physician: Marcelle Jones*  Primary Service: [facility]      ASSESSMENT:     Abnormal Vital Signs: hypotension  Clinical Issues: Circulatory     INTERVENTIONS/ RECOMMENDATIONS:     Upon entering room, pt noted to be resting in bed comfortably. Pt arouses easily to voice. States that she is doing ok. Pt nurse at bedside and obtaining VS, temp 98.1, heart rate 104, pulse ox 96%, respiratory rate 18, blood pressure 93/50. Pt daughter at bedside. States that she is not drinking or eating much d/t sores in mouth. Can recommend magic mouth wash to assist with discomfort. Pt in no acute distress at this time.     Discussed plan of care with RNLucy.    PHYSICIAN ESCALATION:     Yes/No  no    Disposition: Remain in room 3098.    FOLLOW-UP/CONTINGENCY:     Call back the Rapid Response Nurse at x [phone] for additional questions or concerns.

## 2019-02-15 NOTE — PLAN OF CARE
Problem: Adult Inpatient Plan of Care  Goal: Plan of Care Review  Outcome: Ongoing (interventions implemented as appropriate)  Patient remains free from falls and injuries through out shift. Patient AAO. Patient continues to have high temps; lowered with tylenol. Piperacillin q 8hrs and Vanc q 24 hours continued. Patient denies chest pain and SOB. Patient's family at bedside. Plan of care reviewed with patient. Patient verbalizes understanding of plan.  Will continue to monitor.

## 2019-02-15 NOTE — CONSULTS
Palliative Care Acknowledgement of Consult - .date    Consult received. Palliative Care Provider: Dr. STEFFI Holloway will touch base with team prior to seeing patient. Full consult to follow.    Thank you for allowing us to be a part of the care of this patient.          Alicia Lr, BINTA, ACHP-SW

## 2019-02-15 NOTE — CONSULTS
[facility]  Palliative Medicine  Consult Note    Patient Name: Vicki Peña  MRN: [id number]  Admission Date: 2/2/2019  Hospital Length of Stay: 13 days  Code Status: Full Code   Attending Provider: Marcelle Jones*  Consulting Provider: Mounika Holloway MD  Primary Care Physician: Yaron Waite MD  Principal Problem:Sepsis due to urinary tract infection    Patient information was obtained from patient, relative(s) and past medical records.      Consults  Assessment/Plan:     Palliative care encounter    Patient has multiple medical problems which are being addressed.  She has many oral ulcers and await ID recs for additional treatment.  Opioids are the drug of choice after topical meds. Dose should be titrated for pain control without drowsiness. Patient had a rash to tramadol. Up to date reviewed and discussed with pharmacist.   Will monitor.   Family interested in treatment for longevity. Patient very active up until 1 week prior to hospitalization.   Did not address advance directives at this time.  Will follow.         Thank you for your consult. I will follow-up with patient. Please contact us if you have any additional questions.    Subjective:     HPI:   Vicki Peña is an 83 year old female with a PMHx of CVA, osteoporosis, and HLD who presents to the ED with complaints of generalized weakness and fatigue for the past 1-2 weeks. [place] [facility] [place] her blood drawn and was found to have a Hgb of 6.6. She is currently hospitalized for evaluation and treatment of myelodysplastic syndrome and pneumonia. We have been asked to see her at the request of one of the daughters.  Patient speaks Mongolian (HONDURAS) and one of the daughters is a . Patient is complaining of sever mouth pain and inability to eat. She is getting weaker and they are anxious for a diagnosis. BM biopsy returned with MDS that may be amenable to treatment. Family interested in longevity and  improvement. She was driving her car one week before admission.      Hospital Course:  No notes on file    Interval History: Patient is lying in bed hoping to improve. She has had poor sleep and increased anxiety.    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    UTI (urinary tract infection)        Past Surgical History:   Procedure Laterality Date    CATARACT EXTRACTION W/  INTRAOCULAR LENS IMPLANT Left 7/21/14    CATARACT EXTRACTION W/  INTRAOCULAR LENS IMPLANT Right 8/11/14    CYSTOSCOPY      INSERTION-INTRAOCULAR LENS (IOL) Right 8/11/2014    [facility]    INSERTION-INTRAOCULAR LENS (IOL) Left 7/21/2014    [facility] at [facility]    PHACOEMULSIFICATION-ASPIRATION-CATARACT Right 8/11/2014    [facility]    PHACOEMULSIFICATION-ASPIRATION-CATARACT Left 7/21/2014    [facility] at [facility]    screening N/A 9/5/2014    Performed by Mt Drake MD at [facility] (4TH FLR)       Review of patient's allergies indicates:   Allergen Reactions    Tramadol Rash       Medications:  Continuous Infusions:  Scheduled Meds:   (Magic mouthwash) 1:1:1 Benadryl 12.5mg/5ml liq, aluminum & magnesium hydroxide-simehticone (Maalox), lidocaine viscous 2%  5 mL Swish & Spit Q4H    albumin human 25%  12.5 g Intravenous Q8H    albuterol-ipratropium  3 mL Nebulization Q4H WAKE    lidocaine  1 patch Transdermal Q24H    lidocaine HCL 20 mg/ml (2%)  20 mL Other Once    pantoprazole  40 mg Oral Daily    piperacillin-tazobactam (ZOSYN) IVPB  4.5 g Intravenous Q8H    vancomycin (VANCOCIN) IVPB  15 mg/kg Intravenous Q24H     PRN Meds:sodium chloride, sodium chloride, acetaminophen, albuterol-ipratropium, hydrOXYzine HCl, loperamide,  morphine, ondansetron, promethazine, ramelteon, sodium chloride 0.9%, sodium chloride 0.9%    Family History     Problem Relation (Age of Onset)    Cancer Brother, Brother, Sister        Tobacco Use    Smoking status: Never Smoker    Smokeless tobacco: Never Used   Substance and Sexual Activity    Alcohol use: No     Alcohol/week: 0.0 oz     Comment: moderate    Drug use: No    Sexual activity: Yes     Partners: Male       Review of Systems   Constitutional: Positive for appetite change and fatigue.   HENT: Positive for mouth sores.    Eyes: Negative.    Respiratory: Negative.    Cardiovascular: Negative.    Gastrointestinal: Negative.    Endocrine: Negative.    Genitourinary: Negative.    Musculoskeletal: Negative.    Skin: Negative.         Redness of sacrum and soreness.   Allergic/Immunologic: Negative.    Neurological: Negative.    Hematological: Negative.    Psychiatric/Behavioral: Negative.      Objective:     Vital Signs (Most Recent):  Temp: 98.1 °F (36.7 °C) (02/15/19 0730)  Pulse: 78 (02/15/19 1205)  Resp: 18 (02/15/19 1205)  BP: (!) 93/50 (02/15/19 0730)  SpO2: 97 % (02/15/19 1205) Vital Signs (24h Range):  Temp:  [98.1 °F (36.7 °C)-103 °F (39.4 °C)] 98.1 °F (36.7 °C)  Pulse:  [] 78  Resp:  [18] 18  SpO2:  [92 %-99 %] 97 %  BP: ()/(50-58) 93/50     Weight: 66 kg (145 lb 8.1 oz)  Body mass index is 28.42 kg/m².    Review of Symptoms  Symptom Assessment (ESAS 0-10 scale)   ESAS 0 1 2 3 4 5 6 7 8 9 10   Pain    x          Dyspnea x             Anxiety   x           Nausea x             Depression  x             Anorexia   x           Fatigue  x            Insomnia x             Restlessness  x             Agitation x             CAM / Delirium x__ --  ___+   Constipation     _x_ --  ___+   Diarrhea           _x_ --  ___+  Bowel Management Plan (BMP): No  Comments: add senna    Pain Assessment: Mouth with many oral ulcerations. Unable to wear dentures. Viscous lidocaine has not  helped.    OME in 24 hours: 0    Performance Status: 50    ECOG Performance Status Grade: 3 - Confined to bed or chair 50% of waking hours    Physical Exam   Constitutional: She appears cachectic. She is cooperative. She has a sickly appearance.   HENT:   Mouth/Throat: Oral lesions present.   Neurological: She is alert.   Nursing note and vitals reviewed.      Significant Labs: All pertinent labs within the past 24 hours have been reviewed.  CBC:   Recent Labs   Lab 02/15/19  1344   WBC 11.92   HGB 8.8*   HCT 26.2*   MCV 81*   PLT 13*     BMP:  Recent Labs   Lab 02/15/19  0642   *   *   K 3.2*   CL 97   CO2 22*   BUN 28*   CREATININE 0.9   CALCIUM 8.5*   MG 1.7     LFT:  Lab Results   Component Value Date    AST 53 (H) 02/15/2019     (H) 02/02/2019    ALKPHOS 459 (H) 02/15/2019    BILITOT 2.7 (H) 02/15/2019     Albumin:   Albumin   Date Value Ref Range Status   02/15/2019 1.4 (L) 3.5 - 5.2 g/dL Final     Protein:   Total Protein   Date Value Ref Range Status   02/15/2019 5.0 (L) 6.0 - 8.4 g/dL Final     Lactic acid:   Lab Results   Component Value Date    LACTATE 1.5 02/13/2019    LACTATE 1.6 02/12/2019       Significant Imaging: I have reviewed all pertinent imaging results/findings within the past 24 hours.    Advance Care Planning   Advanced Directives::  Living Will: No  LaPOST: No  Do Not Resuscitate Status: No  Medical Power of : No has several daughters and granddaughters in room to whom she defers.  Decision-Making Capacity: Family answered questions. Patient primary Wolof speaker and daughter who is  speaks for her with other sister.       Living Arrangements: Lives with family, Lives in home    Psychosocial/Cultural:  Patient's most important priorities:  Getting better    Patient's biggest concerns/fears:  Not getting better    Previous death/end of life care history:  unknown    Patient's goals/hopes:  Getting better    Spiritual:     F- Lynn and Belief:  Cheondoism    I - Importance: yes  .  C - Community: yes    A - Address in Care: yes Did have anointing of the sick.      > 50% of 30 min visit spent in chart review, face to face discussion of goals of care,  symptom assessment, coordination of care and emotional support.    Mounika Holloway MD  Palliative Medicine  [facility]

## 2019-02-15 NOTE — PROGRESS NOTES
02/15/19 0544   Vital Signs   Temp (!) 103 °F (39.4 °C)   INA Nelson notified. Orders to give prn tylenol. MD will place further orders. Will continue to monitor.

## 2019-02-15 NOTE — HPI
Vicki Peña is an 83 year old female with a PMHx of CVA, osteoporosis, and HLD who presents to the ED with complaints of generalized weakness and fatigue for the past 1-2 weeks. Prior to [facility] arrival she had her blood drawn and was found to have a Hgb of 6.6. She is currently hospitalized for evaluation and treatment of myelodysplastic syndrome and pneumonia. We have been asked to see her at the request of one of the daughters.  Patient speaks Uzbek ([facility]) and one of the daughters is a . Patient is complaining of sever mouth pain and inability to eat. She is getting weaker and they are anxious for a diagnosis. BM biopsy returned with MDS that may be amenable to treatment. Family interested in longevity and improvement. She was driving her car one week before admission.

## 2019-02-15 NOTE — PT/OT/SLP PROGRESS
Occupational Therapy   Treatment    Name: Vicki Peña  MRN: [id number]  Admitting Diagnosis:  Sepsis due to urinary tract infection       Recommendations:     Discharge Recommendations: nursing facility, skilled  Discharge Equipment Recommendations:  walker, rolling, wheelchair, manual  Barriers to discharge:  None    Assessment:     Vicki Peña is a 83 y.o. female with a medical diagnosis of Sepsis due to urinary tract infection. Performance deficits affecting function are weakness, impaired endurance, impaired self care skills, impaired functional mobilty, impaired balance, pain, impaired cardiopulmonary response to activity. Pt with good motivation to engage in therapy session and requiring mod A for standing from chair in prep for BSC t/f's. Pt is limited by generalized weakness and body pains.     Rehab Prognosis:  Fair; patient would benefit from acute skilled OT services to address these deficits and reach maximum level of function.       Plan:     Patient to be seen 3 x/week to address the above listed problems via self-care/home management, therapeutic activities, therapeutic exercises, neuromuscular re-education  · Plan of Care Expires: 03/08/19  · Plan of Care Reviewed with: patient, daughter    Subjective     Pain/Comfort:  · Pain Rating 1: (Pt reporting pain throughout body, however not indicated numerical values )  · Pain Addressed 1: Reposition, Distraction  · Pain Rating Post-Intervention 1: (pt continuing to report pain)    Objective:     Communicated with: RN prior to session.  Patient found up in chair with oxygen, peripheral IV, telemetry upon OT entry to room.    General Precautions: Standard, fall, contact, aspiration   Orthopedic Precautions:N/A   Braces: N/A     Occupational Performance:    Bed Mobility:   · N/A as pt found seated in chair     Transfers:   · Sit<>Stand Transfer: moderate assistance from/onto chair with RW   · x3 trials in prep for BSC t/f's  · Min vc's for hand placement    · tactile cues for anterior pelvic tilt and thoracic extension     Therapeutic Intervention & Education:  · Pt with ability to shift weight from R <> L x3 trials during 2nd trial with min A required   · Pt with ability to maitnain standing position x45 seconds each trial  · Seated rest break required between trials  · Education provided on BUE and BLE exercises to engage in 3x/day with family in order to maximize ROM and strength required for engagement in occupations of choice.   · VSS throughout with O2 remaining at 97%  · Ukrainian speaking only with family in room to assist with translation   · Communicated OT POC  · Updated communication board  · Educated on importance of OOB mobility with assist, sitting up in chair, and maximizing independence with ADLs  · Family present during session    [facility] 6 Click ADL: 13    Patient left up in chair with all lines intact, call button in reach, RN notified and family presentEducation:      GOALS:   Multidisciplinary Problems     Occupational Therapy Goals        Problem: Occupational Therapy Goal    Goal Priority Disciplines Outcome Interventions   Occupational Therapy Goal     OT, PT/OT Ongoing (interventions implemented as appropriate)    Description:  Goals to be met by: 2/21    Patient will increase functional independence with ADLs by performing:    UE Dressing with Set-up A.  LE Dressing with set-up A.  Grooming while standing with Stand-by Assistance.  Toileting from toilet with Stand-by Assistance for hygiene and clothing management.   Pt will engage in functional mobility to simulate household distances in order to maximize functional activity tolerance required for engagement in occupations of choice with supervision using RW                         Time Tracking:     OT Date of Treatment: 02/15/19  OT Start Time: 1313  OT Stop Time: 1336  OT Total Time (min): 23 min    Billable Minutes:Therapeutic Activity 23 minutes    Julia Duncan OT  2/15/2019

## 2019-02-15 NOTE — PROGRESS NOTES
Hospital Medicine  Progress note    Team: [facility] [place] Marcelle Jones MD  Admit Date: 2/2/2019  JACK   Length of Stay:  LOS: 12 days   Code status: Full Code    Principal Problem:  Sepsis due to urinary tract infection    Overview:    Interval hx: Patient seen and examined at bedside, daughters updated. S/p BMBx 2/12 at bedside. Very frail and ill appearing. Complains of fatigue. Did not sleep well. Broad spectrum antibiotics initiated yesterday due to clinical deterioration and concern for sepsis.    ROS   Constitutional: Positive for activity change, appetite change and fatigue. Positive for chills and fever.   HENT: Negative for congestion, rhinorrhea and sore throat.    Respiratory: Positive for (nonproducti cough. Negative for shortness of breath, wheezing and stridor.    Cardiovascular: Negative for chest pain, palpitations and leg swelling.   Gastrointestinal: Positive for nausea. Negative for abdominal pain, blood in stool, constipation, diarrhea and vomiting.   Genitourinary: Negative for decreased urine volume, difficulty urinating, dysuria, flank pain, frequency and urgency.   Musculoskeletal: Negative for arthralgias and myalgias.   Neurological: Negative for syncope, weakness, light-headedness and headaches.   Psychiatric/Behavioral: Negative for agitation and confusion.         PEx  Temp:  [98.2 °F (36.8 °C)-100.7 °F (38.2 °C)]   Pulse:  []   Resp:  [16-18]   BP: ()/(41-58)   SpO2:  [89 %-98 %]     Intake/Output Summary (Last 24 hours) at 2/14/2019 2309  Last data filed at 2/14/2019 1400  Gross per 24 hour   Intake 410 ml   Output 300 ml   Net 110 ml       Constitutional: She is oriented to person, place, and time. She is ill appearing.  HENT:   Head: Normocephalic.   conjunctival pallor   Eyes: Pupils are equal, round, and reactive to light.   Throat: She has several ulcerations on her gum line and hard palate. No bleeding, no exudate.  Neck: Normal range of motion. Supple.    Cardiovascular:Tachycardic, regular rhythm and normal heart sounds.   No murmur heard.  Pulmonary/Chest: Effort normal. No stridor. No respiratory distress. No wheezes. She has no rales.  Abdominal: Soft. Bowel sounds are normal. She exhibits no distension. There is no tenderness. There is no guarding.   Musculoskeletal: Normal range of motion. Improved edema of LE, no deformity.   Neurological: She is alert and oriented to person, place, and time.   Skin: Skin is warm and dry. Capillary refill takes less than 2 seconds. She is not diaphoretic.   Psychiatric: She has a normal mood and affect.   Nursing note and vitals reviewed.        Recent Labs   Lab 02/13/19  0819 02/14/19  0406 02/14/19  1738   WBC 13.94* 11.73 11.14   HGB 7.5* 6.7* 8.4*   HCT 22.2* 19.8* 24.9*   PLT 20* 16* 15*     Recent Labs   Lab 02/12/19  0404  02/13/19  0500 02/13/19  1800 02/14/19  0406 02/14/19  1738   *   < > 123* 123* 122* 127*   K 3.9   < > 3.9 3.8 4.1 3.8   CL 90*   < > 91* 92* 93* 95   CO2 22*   < > 24 22* 21* 20*   BUN 13   < > 17 24* 23 25*   CREATININE 0.6   < > 0.6 0.7 0.8 0.8   GLU 89   < > 92 106 89 108   CALCIUM 8.1*   < > 8.6* 7.9* 8.1* 8.5*   MG 1.5*  --  2.1  --  1.9  --    PHOS 3.0  --  3.1  --  2.9  --     < > = values in this interval not displayed.     Recent Labs   Lab 02/12/19  1807 02/13/19  0500 02/14/19  0406   ALKPHOS 872* 679* 486*   * 75* 58*   * 75* 51*   ALBUMIN 1.8* 1.7* 1.4*   PROT 5.9* 5.6* 5.1*   BILITOT 1.6* 1.4* 1.8*   INR  --  1.3* 1.3*          Scheduled Meds:   (Magic mouthwash) 1:1:1 Benadryl 12.5mg/5ml liq, aluminum & magnesium hydroxide-simehticone (Maalox), lidocaine viscous 2%  5 mL Swish & Spit Q4H    albuterol-ipratropium  3 mL Nebulization Q4H WAKE    lidocaine  1 patch Transdermal Q24H    lidocaine HCL 20 mg/ml (2%)  20 mL Other Once    pantoprazole  40 mg Oral Daily    piperacillin-tazobactam (ZOSYN) IVPB  4.5 g Intravenous Q8H    sodium chloride  1 g Oral BID     vancomycin (VANCOCIN) IVPB  15 mg/kg Intravenous Q24H     Continuous Infusions:  As Needed:  sodium chloride, sodium chloride, acetaminophen, hydrOXYzine HCl, loperamide, ondansetron, promethazine, ramelteon, sodium chloride 0.9%, sodium chloride 0.9%    Active Hospital Problems    Diagnosis  POA    *Sepsis due to urinary tract infection [A41.9, N39.0]  Yes    Mouth ulcers [K12.1]  No    PFO (patent foramen ovale) [Q21.1]  Not Applicable    Hypomagnesemia [E83.42]  No    Hypokalemia [E87.6]  Yes    Pneumonia involving left lung [J18.9]  Yes    SOB (shortness of breath) [R06.02]  Yes    Weakness [R53.1]  Yes    History of CVA (cerebrovascular accident) [Z86.73]  Not Applicable    Hyponatremia [E87.1]  Yes    Pancytopenia [D61.818]  Yes    Hyperlipidemia [E78.5]  Yes     Chronic      Resolved Hospital Problems   No resolved problems to display.         Assessment and Plan    History of CVA (cerebrovascular accident)     Patient states that she had a stroke 20 years back. No residual neurological manifestion.  ECHO this admission consistent with PFO and shunt physiology.   - 02/05/2019: Critical Care contacted Vascular Neurology, Dr. Horn, to ask about the role of anti-platelets vs anticoagulation because the patient has PFO with history of stroke. No history of A fib. He recommends to start ASA 81 mg PO qD      Pneumonia involving left lung     On 02/03/2019: Patient  febrile this AM and UA c/w with UTI start Ceftriaxone,  At afternoon repeat fever to >102, blood cultures obtained and on abx for UTI, started on sepsis bolus 30cc/kg and monitoring of lactic acid levels. Antibiotics were changed to Piperacillin/Tazobactam and Vancomycin.      02/04/2019: Patient have complaints of left sided lateral chest wall pleuritic pain. Throughout the night patient remained febrile, O2 requirement increasing this Am after 4am patient given 2 500ml saline boluses. This am patient is tachypneic, accessory muscle  use, febrile, MAP >65, but pressures 90s/50s. CXR with significant new left sided lower& upper airspace disease, absent breath sounds in left mid to lower fields, dullness to percussion, right basilar absent breath sounds. On Exam patient also with distended JVP to angle of jaw. Critical Care consulted & have accepted patient. Azithromycin started     02/05/2019: During night patient received 1 L NS bolus for marginal BP. Patient reports her symptoms have improved today. BP now is stable. Afebrile last spike of fever: 39.1 (02/03/3019). Np leucocytosis. CXR today: Worsening severe pulmonary edema.  CT Chest consistent with pulmonary edema vs ARDS; however, patient requiring minimal oxygen (3L currently) and had received copious IVF due to sepsis. PNA possible but less likely given how rapid radiographic changes developed. Will start gentle diuresis. Finished course of CAP therapy with rocephin.     2/13/2019: Rpt CXR showed decreased bilateral infiltrates. However, patient with increased work of breathing, elevated PCT, elevated WBC w/ L shift, fever, mild tachycardia, and hypotension. Initiated broad spectrum antibiotics pending further workup (Bcx, LA, UA). Plan to de-escalate/discontinue pending cultures, clinical response.     Cardiac/Vascular   Hyperlipidemia     - Resume home medication: Statin.  - Lipid panel- sent      Renal/   Hypokalemia     Monitor potassium level and replace as needed      Hypomagnesemia     Monitor Mg level and replace as needed      Hyponatremia     Likely hypervolemic hyponatremia, given pulmonary edema and fluid resuscitation for sepsis. Complicated by hypoalbuminemia.   Worsening, despite diuresis. Will check serum osmol (low) and urine lytes.  Complex case as ideally, fluid restriction and diuresis would be appropriate. However, currently, patient is hypotensive, with concern for sepsis. Additional fluid boluses likely to exacerbate pulmonary edema and hyponatremia. Trial of salt  "tabs , IV albumin. To receive 1U pRBC.    ID   * Sepsis due to urinary tract infection     - Urine cultures grew E. coli, susceptible to ceftriaxone, completed course  - Does have history of frequent UTIs, previously followed with [facility] (most recently seen 2015), normal cysto in 2014      Transaminitis  Etiology unclear. Will check RUQ US (benign), hep panel (-ve) and trend. Trend synthetic function with am labs.   LFTs trending down 2/13.     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  [place] to [facility] [place] her blood drawn and was found to have a Hgb of 6.6. At that time her PCP instructed her to go to the emergency department for a blood transfusion.  She is s/p 1unit Platelet transfusion - developed transfusion reaction during this - received IM epi, Benadryl/ 125 methylpred for concerns of throat swelling per Dr. Gonzalez.    Heme/oncology consulted (02/03/2019) their recs and plan: " Anemia, thrombocytopenia. Ddx includes primary BM disorder such as MDS, vs secondary BM suppression from infection (recent Chikungunya infection, known to cause thrombocytopenia). Reviewed medication list, no known culprits. Possible also slow GIB from adenoma.  -No evidence of iron deficiency, B12/folate nml  -No evidence of hemolysis  -No evidence of splenomegaly, no evidence of cirrhosis  -No evidence of coagulation      S/p bone marrow biopsy on 2/12. Pending.  -Hold NSAIDs  -Transfuse Hb<7 or plt<10 unless active bleeding. Required 1u pRBC 2/14.  -Consider repeat colonoscopy given recommended to be done 5 years from adenoma visualized in 2014   Other   SOB (shortness of breath)     - Likely due to volume overload and pulmonary edema  - currently dependent on 2L nc  - continue to wean as tolerated       Weakness     - Generalized, likely multifactorial (age, acute illness, deconditioning)  - PT/OT consulted        High Risk Conditions  Sepsis, pancytopenia, " Pneumonia     Diet: regular thin diet  GI PPx:   DVT PPx:    MING/SCD, avoid heparin products due to thrombocytopenia     Goals of Care: Full code  Discharge plan: pending bone marrow biopsy, SS SNF afterwards    Time (minutes) spent in care of the patient (Greater than 1/2 spent in direct face-to-face contact) 35 minutes      Marcelle Jones MD  Staff Hospitalist  Department of Hospital Medicine  [facility]   [phone]

## 2019-02-16 PROBLEM — R50.9 FEVER IN ADULT: Status: ACTIVE | Noted: 2019-02-16

## 2019-02-16 LAB
ABO + RH BLD: NORMAL
ACANTHOCYTES BLD QL SMEAR: PRESENT
ALBUMIN SERPL BCP-MCNC: 1.8 G/DL
ALLENS TEST: ABNORMAL
ALP SERPL-CCNC: 370 U/L
ALT SERPL W/O P-5'-P-CCNC: 36 U/L
ANION GAP SERPL CALC-SCNC: 9 MMOL/L
ANISOCYTOSIS BLD QL SMEAR: SLIGHT
ANISOCYTOSIS BLD QL SMEAR: SLIGHT
AST SERPL-CCNC: 25 U/L
BACTERIA BLD CULT: NORMAL
BASOPHILS # BLD AUTO: ABNORMAL K/UL
BASOPHILS # BLD AUTO: ABNORMAL K/UL
BASOPHILS NFR BLD: 2 %
BASOPHILS NFR BLD: 7 %
BILIRUB SERPL-MCNC: 2.9 MG/DL
BLASTS NFR BLD MANUAL: 19 %
BLASTS NFR BLD MANUAL: 3 %
BLD GP AB SCN CELLS X3 SERPL QL: NORMAL
BLD PROD TYP BPU: NORMAL
BLOOD UNIT EXPIRATION DATE: NORMAL
BLOOD UNIT TYPE CODE: 5100
BLOOD UNIT TYPE: NORMAL
BUN SERPL-MCNC: 35 MG/DL
BURR CELLS BLD QL SMEAR: ABNORMAL
BURR CELLS BLD QL SMEAR: ABNORMAL
CALCIUM SERPL-MCNC: 8.8 MG/DL
CHLORIDE SERPL-SCNC: 97 MMOL/L
CO2 SERPL-SCNC: 22 MMOL/L
CODING SYSTEM: NORMAL
CREAT SERPL-MCNC: 1 MG/DL
DACRYOCYTES BLD QL SMEAR: ABNORMAL
DELSYS: ABNORMAL
DIFFERENTIAL METHOD: ABNORMAL
DIFFERENTIAL METHOD: ABNORMAL
DISPENSE STATUS: NORMAL
EOSINOPHIL # BLD AUTO: ABNORMAL K/UL
EOSINOPHIL # BLD AUTO: ABNORMAL K/UL
EOSINOPHIL NFR BLD: 0 %
EOSINOPHIL NFR BLD: 5 %
ERYTHROCYTE [DISTWIDTH] IN BLOOD BY AUTOMATED COUNT: 18.8 %
ERYTHROCYTE [DISTWIDTH] IN BLOOD BY AUTOMATED COUNT: 19.1 %
EST. GFR  (AFRICAN AMERICAN): >60 ML/MIN/1.73 M^2
EST. GFR  (NON AFRICAN AMERICAN): 53 ML/MIN/1.73 M^2
FIO2: 28
FLOW: 2
GLUCOSE SERPL-MCNC: 100 MG/DL
HCO3 UR-SCNC: 23.7 MMOL/L (ref 24–28)
HCT VFR BLD AUTO: 20.9 %
HCT VFR BLD AUTO: 21.6 %
HGB BLD-MCNC: 7.1 G/DL
HGB BLD-MCNC: 7.4 G/DL
HYPOCHROMIA BLD QL SMEAR: ABNORMAL
IMM GRANULOCYTES # BLD AUTO: ABNORMAL K/UL
IMM GRANULOCYTES # BLD AUTO: ABNORMAL K/UL
IMM GRANULOCYTES NFR BLD AUTO: ABNORMAL %
IMM GRANULOCYTES NFR BLD AUTO: ABNORMAL %
LACTATE SERPL-SCNC: 2.1 MMOL/L
LYMPHOCYTES # BLD AUTO: ABNORMAL K/UL
LYMPHOCYTES # BLD AUTO: ABNORMAL K/UL
LYMPHOCYTES NFR BLD: 12 %
LYMPHOCYTES NFR BLD: 24 %
MAGNESIUM SERPL-MCNC: 1.9 MG/DL
MCH RBC QN AUTO: 27.7 PG
MCH RBC QN AUTO: 28.1 PG
MCHC RBC AUTO-ENTMCNC: 34 G/DL
MCHC RBC AUTO-ENTMCNC: 34.3 G/DL
MCV RBC AUTO: 81 FL
MCV RBC AUTO: 83 FL
METAMYELOCYTES NFR BLD MANUAL: 2 %
METAMYELOCYTES NFR BLD MANUAL: 8 %
MODE: ABNORMAL
MONOCYTES # BLD AUTO: ABNORMAL K/UL
MONOCYTES # BLD AUTO: ABNORMAL K/UL
MONOCYTES NFR BLD: 10 %
MONOCYTES NFR BLD: 9 %
MYELOCYTES NFR BLD MANUAL: 2 %
MYELOCYTES NFR BLD MANUAL: 5 %
NEUTROPHILS NFR BLD: 33 %
NEUTROPHILS NFR BLD: 49 %
NEUTS BAND NFR BLD MANUAL: 4 %
NEUTS BAND NFR BLD MANUAL: 5 %
NRBC BLD-RTO: 0 /100 WBC
NRBC BLD-RTO: 0 /100 WBC
OVALOCYTES BLD QL SMEAR: ABNORMAL
OVALOCYTES BLD QL SMEAR: ABNORMAL
PCO2 BLDA: 30 MMHG (ref 35–45)
PH SMN: 7.51 [PH] (ref 7.35–7.45)
PHOSPHATE SERPL-MCNC: 3.3 MG/DL
PLATELET # BLD AUTO: 10 K/UL
PLATELET # BLD AUTO: 11 K/UL
PLATELET BLD QL SMEAR: ABNORMAL
PLATELET BLD QL SMEAR: ABNORMAL
PMV BLD AUTO: ABNORMAL FL
PMV BLD AUTO: ABNORMAL FL
PO2 BLDA: 88 MMHG (ref 80–100)
POC BE: 1 MMOL/L
POC SATURATED O2: 98 % (ref 95–100)
POC TCO2: 25 MMOL/L (ref 23–27)
POIKILOCYTOSIS BLD QL SMEAR: SLIGHT
POIKILOCYTOSIS BLD QL SMEAR: SLIGHT
POLYCHROMASIA BLD QL SMEAR: ABNORMAL
POLYCHROMASIA BLD QL SMEAR: ABNORMAL
POTASSIUM SERPL-SCNC: 3.2 MMOL/L
PROMYELOCYTES NFR BLD MANUAL: 1 %
PROT SERPL-MCNC: 5.5 G/DL
RBC # BLD AUTO: 2.53 M/UL
RBC # BLD AUTO: 2.67 M/UL
SAMPLE: ABNORMAL
SCHISTOCYTES BLD QL SMEAR: ABNORMAL
SCHISTOCYTES BLD QL SMEAR: PRESENT
SITE: ABNORMAL
SODIUM SERPL-SCNC: 128 MMOL/L
SODIUM UR-SCNC: <20 MMOL/L
SP02: 97
SPHEROCYTES BLD QL SMEAR: ABNORMAL
TRANS ERYTHROCYTES VOL PATIENT: NORMAL ML
WBC # BLD AUTO: 8.91 K/UL
WBC # BLD AUTO: 9 K/UL
WBC TOXIC VACUOLES BLD QL SMEAR: PRESENT

## 2019-02-16 PROCEDURE — 83605 ASSAY OF LACTIC ACID: CPT

## 2019-02-16 PROCEDURE — 99233 SBSQ HOSP IP/OBS HIGH 50: CPT | Mod: GC,,, | Performed by: INTERNAL MEDICINE

## 2019-02-16 PROCEDURE — 99233 PR SUBSEQUENT HOSPITAL CARE,LEVL III: ICD-10-PCS | Mod: GC,,, | Performed by: INTERNAL MEDICINE

## 2019-02-16 PROCEDURE — 84100 ASSAY OF PHOSPHORUS: CPT

## 2019-02-16 PROCEDURE — 94761 N-INVAS EAR/PLS OXIMETRY MLT: CPT

## 2019-02-16 PROCEDURE — 87385 HISTOPLASMA CAPSUL AG IA: CPT

## 2019-02-16 PROCEDURE — 25000242 PHARM REV CODE 250 ALT 637 W/ HCPCS: Performed by: STUDENT IN AN ORGANIZED HEALTH CARE EDUCATION/TRAINING PROGRAM

## 2019-02-16 PROCEDURE — 86850 RBC ANTIBODY SCREEN: CPT

## 2019-02-16 PROCEDURE — 27000221 HC OXYGEN, UP TO 24 HOURS

## 2019-02-16 PROCEDURE — 94640 AIRWAY INHALATION TREATMENT: CPT

## 2019-02-16 PROCEDURE — 99233 PR SUBSEQUENT HOSPITAL CARE,LEVL III: ICD-10-PCS | Mod: ,,, | Performed by: HOSPITALIST

## 2019-02-16 PROCEDURE — 25000003 PHARM REV CODE 250: Performed by: PHYSICIAN ASSISTANT

## 2019-02-16 PROCEDURE — 25000003 PHARM REV CODE 250: Performed by: INTERNAL MEDICINE

## 2019-02-16 PROCEDURE — 25000003 PHARM REV CODE 250: Performed by: HOSPITALIST

## 2019-02-16 PROCEDURE — 99233 SBSQ HOSP IP/OBS HIGH 50: CPT | Mod: ,,, | Performed by: HOSPITALIST

## 2019-02-16 PROCEDURE — 36600 WITHDRAWAL OF ARTERIAL BLOOD: CPT

## 2019-02-16 PROCEDURE — 63600175 PHARM REV CODE 636 W HCPCS: Mod: JG | Performed by: HOSPITALIST

## 2019-02-16 PROCEDURE — P9047 ALBUMIN (HUMAN), 25%, 50ML: HCPCS | Mod: JG | Performed by: HOSPITALIST

## 2019-02-16 PROCEDURE — 87040 BLOOD CULTURE FOR BACTERIA: CPT

## 2019-02-16 PROCEDURE — 87449 NOS EACH ORGANISM AG IA: CPT

## 2019-02-16 PROCEDURE — 86920 COMPATIBILITY TEST SPIN: CPT

## 2019-02-16 PROCEDURE — 99900035 HC TECH TIME PER 15 MIN (STAT)

## 2019-02-16 PROCEDURE — 25000003 PHARM REV CODE 250: Performed by: STUDENT IN AN ORGANIZED HEALTH CARE EDUCATION/TRAINING PROGRAM

## 2019-02-16 PROCEDURE — 84300 ASSAY OF URINE SODIUM: CPT

## 2019-02-16 PROCEDURE — 87449 NOS EACH ORGANISM AG IA: CPT | Mod: 91

## 2019-02-16 PROCEDURE — 20600001 HC STEP DOWN PRIVATE ROOM

## 2019-02-16 PROCEDURE — 36415 COLL VENOUS BLD VENIPUNCTURE: CPT

## 2019-02-16 PROCEDURE — 86403 PARTICLE AGGLUT ANTBDY SCRN: CPT

## 2019-02-16 PROCEDURE — 63600175 PHARM REV CODE 636 W HCPCS: Performed by: PHYSICIAN ASSISTANT

## 2019-02-16 PROCEDURE — 85027 COMPLETE CBC AUTOMATED: CPT

## 2019-02-16 PROCEDURE — 80053 COMPREHEN METABOLIC PANEL: CPT

## 2019-02-16 PROCEDURE — 83735 ASSAY OF MAGNESIUM: CPT

## 2019-02-16 PROCEDURE — 85007 BL SMEAR W/DIFF WBC COUNT: CPT

## 2019-02-16 PROCEDURE — 87305 ASPERGILLUS AG IA: CPT

## 2019-02-16 PROCEDURE — 63600175 PHARM REV CODE 636 W HCPCS: Performed by: INTERNAL MEDICINE

## 2019-02-16 PROCEDURE — 25500020 PHARM REV CODE 255: Performed by: HOSPITALIST

## 2019-02-16 PROCEDURE — 82668 ASSAY OF ERYTHROPOIETIN: CPT

## 2019-02-16 RX ORDER — FLUCONAZOLE 2 MG/ML
400 INJECTION, SOLUTION INTRAVENOUS
Status: DISCONTINUED | OUTPATIENT
Start: 2019-02-16 | End: 2019-02-18

## 2019-02-16 RX ADMIN — FLUCONAZOLE, SODIUM CHLORIDE 400 MG: 2 INJECTION INTRAVENOUS at 04:02

## 2019-02-16 RX ADMIN — ACETAMINOPHEN 650 MG: 325 TABLET, FILM COATED ORAL at 03:02

## 2019-02-16 RX ADMIN — ACYCLOVIR SODIUM 670 MG: 1000 INJECTION, SOLUTION INTRAVENOUS at 04:02

## 2019-02-16 RX ADMIN — Medication 5 ML: at 03:02

## 2019-02-16 RX ADMIN — IPRATROPIUM BROMIDE AND ALBUTEROL SULFATE 3 ML: .5; 3 SOLUTION RESPIRATORY (INHALATION) at 11:02

## 2019-02-16 RX ADMIN — ACETAMINOPHEN 650 MG: 325 TABLET, FILM COATED ORAL at 06:02

## 2019-02-16 RX ADMIN — Medication 5 ML: at 10:02

## 2019-02-16 RX ADMIN — Medication 5 ML: at 06:02

## 2019-02-16 RX ADMIN — IOHEXOL 75 ML: 350 INJECTION, SOLUTION INTRAVENOUS at 10:02

## 2019-02-16 RX ADMIN — PIPERACILLIN AND TAZOBACTAM 4.5 G: 4; .5 INJECTION, POWDER, LYOPHILIZED, FOR SOLUTION INTRAVENOUS; PARENTERAL at 03:02

## 2019-02-16 RX ADMIN — IPRATROPIUM BROMIDE AND ALBUTEROL SULFATE 3 ML: .5; 3 SOLUTION RESPIRATORY (INHALATION) at 08:02

## 2019-02-16 RX ADMIN — LIDOCAINE 1 PATCH: 50 PATCH TOPICAL at 08:02

## 2019-02-16 RX ADMIN — VANCOMYCIN HYDROCHLORIDE 1000 MG: 1 INJECTION, POWDER, LYOPHILIZED, FOR SOLUTION INTRAVENOUS at 12:02

## 2019-02-16 RX ADMIN — ALBUMIN HUMAN 12.5 G: 0.25 SOLUTION INTRAVENOUS at 07:02

## 2019-02-16 RX ADMIN — ACYCLOVIR SODIUM 670 MG: 1000 INJECTION, SOLUTION INTRAVENOUS at 11:02

## 2019-02-16 RX ADMIN — PIPERACILLIN AND TAZOBACTAM 4.5 G: 4; .5 INJECTION, POWDER, LYOPHILIZED, FOR SOLUTION INTRAVENOUS; PARENTERAL at 10:02

## 2019-02-16 RX ADMIN — HYDROXYZINE HYDROCHLORIDE 25 MG: 25 TABLET, FILM COATED ORAL at 08:02

## 2019-02-16 RX ADMIN — ACETAMINOPHEN 650 MG: 325 TABLET, FILM COATED ORAL at 08:02

## 2019-02-16 RX ADMIN — PANTOPRAZOLE SODIUM 40 MG: 40 TABLET, DELAYED RELEASE ORAL at 10:02

## 2019-02-16 RX ADMIN — PIPERACILLIN AND TAZOBACTAM 4.5 G: 4; .5 INJECTION, POWDER, LYOPHILIZED, FOR SOLUTION INTRAVENOUS; PARENTERAL at 07:02

## 2019-02-16 RX ADMIN — Medication 5 ML: at 02:02

## 2019-02-16 RX ADMIN — IPRATROPIUM BROMIDE AND ALBUTEROL SULFATE 3 ML: .5; 3 SOLUTION RESPIRATORY (INHALATION) at 03:02

## 2019-02-16 NOTE — PLAN OF CARE
Problem: Adult Inpatient Plan of Care  Goal: Patient-Specific Goal (Individualization)  Plan of care discussed with patient. Patient is free of fall/trauma/injury. Denies CP, SOB, or pain/discomfort. All questions addressed. IV abx administered per order. IV albumin administered per order.  Will continue to monitor

## 2019-02-16 NOTE — CONSULTS
[facility]  Infectious Disease  Consult Note    Patient Name: Vicki Peña  MRN: [id number]  Admission Date: 2/2/2019  Hospital Length of Stay: 14 days  Attending Physician: Marcelle Jones*  Primary Care Provider: Yaron Waite MD     Isolation Status: No active isolations    Patient information was obtained from patient, past medical records and ER records.      Inpatient consult to Infectious Diseases  Consult performed by: Osvaldo Maldonado MD  Consult ordered by: Marcelle Jones MD        Assessment/Plan:     Fever in adult    82 y/o female admitted with fatigue, whole body aches and pancytopenia with a new diagnosis of an acute myeloid neoplasm.  I am consulted for her persistent fevers despite being on antibiotics through most of her hospitalization.  Work up has included blood cultures that have been negative.  Initial urine cultures were positive but patient hasn't reported any symptoms since.  A CT scan of her chest showed a consolidated area in her chest.  She has also been noted to have painful mouth ulcers.  Although her hematologic malignancy can cause fevers, her immunocompromised state means that we have to consider infection as a cause.  Most likely source of her fevers is the consolidative process in her lungs.  This process could be infection vs leukemic infiltrates.  Given her immunocompromised state, the infectious differential is large but includes endemic fungi, opportunistic molds.  Her lack of response to broad antibacterial coverage makes bacterial lung infection less likely and infection with a fungi/mold more likely.  Other less likely sources of her fevers included urinary system infection.  Recommendations are as follows;    1.  Serologies for aspergillus, cryptococcus, histoplasmosis, blastomyces were sent.     2.  If her thrombocytopenia can be addressed, and a bronch can be safely done, this may help determine the cause of her lung infiltrates.   Because of her relative immunocompromised state, the differential is broad.  A bronch with biopsy would be helpful.  Consult pulmonary to get their input.    3.  Will initiate IV fluconazole and IV acyclovir for her oral lesions.    4.  Repeat urine cultures.    5.  Continue vancomycin and zosyn for now.     Mouth ulcers    In this patient with hematologic malignancy, her mouth ulcers could represent atypical presentation of thrush vs HSV infection.  Alternatively there are atypical presentations of histoplasmosis that involve mouth ulcers.  Recommendations are;    1.  Start empiric acyclovir IV  2.  Start empiric fluconazole IV  3.  Send serology for histoplasmosis.         Thank you for your consult. I will follow-up with patient. Please contact us if you have any additional questions.    Osvaldo Maldonado MD  Infectious Disease  [facility]    Subjective:     Principal Problem: Sepsis due to urinary tract infection    HPI: 83 year old female with PMH of CVA, osteoporosis, and HLD who presented to the ED (02/02/2019) with complaints of generalized weakness, whole body aches and fatigue for 6 weeks.  She was evaluated by her PCP for this and was found to be anemic and thrombocytopenic and subsequently referred to the ER.  She has been hospitalized since 2/2/19.  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  She has essentially been on some form of antibiotics since her admission.  She was initially febrile and then had several days were she didn't have a fever.  She has since developed fevers again.  Her work up has included blood cultures that have been negative.  A chest CT shows an area of consolidation that is concerning.  She has ulcers in her mouth.  CT of maxillofacial area was unremarkable.  We are re-consulted to provide input into her ongoing fevers.      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    UTI (urinary tract infection)        Past Surgical History:   Procedure Laterality Date    CATARACT EXTRACTION W/  INTRAOCULAR LENS IMPLANT Left 7/21/14    CATARACT EXTRACTION W/  INTRAOCULAR LENS IMPLANT Right 8/11/14    CYSTOSCOPY      INSERTION-INTRAOCULAR LENS (IOL) Right 8/11/2014    [facility]    INSERTION-INTRAOCULAR LENS (IOL) Left 7/21/2014    Per[facility] at [facility] OR    PHACOEMULSIFICATION-ASPIRATION-CATARACT Right 8/11/2014    Performed [facility] at [facility] OR    PHACOEMULSIFICATION-ASPIRATION-CATARACT Left 7/21/2014    Performed by [facility] at [facility] OR    screening N/A 9/5/2014    Performed by [facility] Drake [facility] at [facility] ([facility]       Review of patient's allergies indicates:   Allergen Reactions    Tramadol Rash       Medications:  Medications Prior to Admission   Medication Sig    albuterol 90 mcg/actuation inhaler Inhale 1-2 puffs into the lungs every 6 (six) hours as needed for Wheezing.    ascorbic acid (VITAMIN C) 500 MG tablet Take 1 tablet (500 mg total) by mouth 2 (two) times daily.    cranberry 400 mg Cap Take 1 capsule by mouth once daily.     GLUCOSAMINE SULFATE (GLUCOSAMINE ORAL) Take 2 tablets by mouth once daily.    ibuprofen (ADVIL,MOTRIN) 600 MG tablet Take 1 tablet (600 mg total) by mouth every 6 (six) hours as needed for Pain.    multivitamin capsule Take 1 capsule by mouth once daily.      naproxen (NAPROSYN) 500 MG tablet Take 1 tablet (500 mg total) by mouth 2 (two) times daily. (Patient taking differently: Take 500 mg by mouth 2 (two) times daily as needed. )    simvastatin (ZOCOR) 40 MG tablet Take 1 tablet (40 mg total) by mouth every evening.    ondansetron (ZOFRAN-ODT) 8 MG TbDL Take 1 tablet (8 mg total) by mouth every 12  (twelve) hours as needed.    ranitidine (ZANTAC) 150 MG tablet Take 1 tablet (150 mg total) by mouth 2 (two) times daily. PRN heartburn     Antibiotics (From admission, onward)    Start     Stop Route Frequency Ordered    02/15/19 2345  vancomycin in dextrose 5 % 1 gram/250 mL IVPB 1,000 mg  (Vancomycin IVPB with levels panel)      -- IV Every 24 hours (non-standard times) 02/15/19 2330    02/13/19 1900  piperacillin-tazobactam 4.5 g in sodium chloride 0.9% 100 mL IVPB (ready to mix system)      -- IV Every 8 hours (non-standard times) 02/13/19 1756        Antifungals (From admission, onward)    None        Antivirals (From admission, onward)    None           Immunization History   Administered Date(s) Administered    Influenza - High Dose 11/17/2015    Pneumococcal Conjugate - 13 Valent 11/17/2015       Family History     Problem Relation (Age of Onset)    Cancer Brother, Brother, Sister        Social History     Socioeconomic History    Marital status:      Spouse name: None    Number of children: None    Years of education: None    Highest education level: None   Social Needs    Financial resource strain: None    Food insecurity - worry: None    Food insecurity - inability: None    Transportation needs - medical: None    Transportation needs - non-medical: None   Occupational History    None   Tobacco Use    Smoking status: Never Smoker    Smokeless tobacco: Never Used   Substance and Sexual Activity    Alcohol use: No     Alcohol/week: 0.0 oz     Comment: moderate    Drug use: No    Sexual activity: Yes     Partners: Male   Other Topics Concern    None   Social History Narrative    None     Review of Systems   Constitutional: Positive for chills, fatigue and fever.   Musculoskeletal: Positive for arthralgias and myalgias.   All other systems reviewed and are negative.    Objective:     Vital Signs (Most Recent):  Temp: 99.9 °F (37.7 °C) (02/16/19 1352)  Pulse: 92 (02/16/19  1211)  Resp: 18 (02/16/19 1211)  BP: (!) 114/53 (02/16/19 1211)  SpO2: 95 % (02/16/19 1211) Vital Signs (24h Range):  Temp:  [98 °F (36.7 °C)-101.2 °F (38.4 °C)] 99.9 °F (37.7 °C)  Pulse:  [] 92  Resp:  [18-22] 18  SpO2:  [92 %-97 %] 95 %  BP: (104-129)/(51-59) 114/53     Weight: 66.6 kg (146 lb 13.2 oz)  Body mass index is 28.68 kg/m².    Estimated Creatinine Clearance: 37.5 mL/min (based on SCr of 1 mg/dL).    Physical Exam   Constitutional: She is oriented to person, place, and time.   Frail appearing female.    Appears cachectic.   HENT:   Head: Normocephalic and atraumatic.   Right Ear: External ear normal.   Left Ear: External ear normal.   Nose: Nose normal.   Mouth/Throat: Oropharynx is clear and moist. Oropharyngeal exudate: oral ulcers present.   Eyes: Conjunctivae and EOM are normal. Pupils are equal, round, and reactive to light.   Neck: No JVD present. No tracheal deviation present. No thyromegaly present.   Cardiovascular: Normal rate, regular rhythm, normal heart sounds and intact distal pulses. Exam reveals no gallop and no friction rub.   No murmur heard.  Pulmonary/Chest: Effort normal and breath sounds normal. No stridor. No respiratory distress. She has no wheezes. She has no rales. She exhibits no tenderness.   Abdominal: Soft. Bowel sounds are normal. She exhibits no distension and no mass. There is no tenderness. There is no guarding.   Musculoskeletal: She exhibits no edema or tenderness.   Lymphadenopathy:     She has no cervical adenopathy.   Neurological: She is oriented to person, place, and time. No cranial nerve deficit. Coordination normal.   Skin: Skin is warm. No erythema.   Nursing note and vitals reviewed.      Significant Labs:   Microbiology Results (last 7 days)     Procedure Component Value Units Date/Time    Blood culture [[id number]] Collected:  02/16/19 0831    Order Status:  Sent Specimen:  Blood Updated:  02/16/19 0843    Blood culture [[id number]] Collected:  02/16/19  0831    Order Status:  Sent Specimen:  Blood Updated:  02/16/19 0843    Blood culture [[id number]] Collected:  02/13/19 0010    Order Status:  Completed Specimen:  Blood Updated:  02/16/19 0612     Blood Culture, Routine No Growth to date     Blood Culture, Routine No Growth to date     Blood Culture, Routine No Growth to date     Blood Culture, Routine No Growth to date    Blood culture [[id number]] Collected:  02/13/19 0010    Order Status:  Completed Specimen:  Blood Updated:  02/16/19 0612     Blood Culture, Routine No Growth to date     Blood Culture, Routine No Growth to date     Blood Culture, Routine No Growth to date     Blood Culture, Routine No Growth to date    Blood culture [[id number]] Collected:  02/11/19 1359    Order Status:  Completed Specimen:  Blood Updated:  02/15/19 1612     Blood Culture, Routine No Growth to date     Blood Culture, Routine No Growth to date     Blood Culture, Routine No Growth to date     Blood Culture, Routine No Growth to date     Blood Culture, Routine No Growth to date    Aerobic culture [[id number]] Collected:  02/11/19 1811    Order Status:  Completed Specimen:  Body Fluid from Mouth Updated:  02/13/19 1151     Aerobic Bacterial Culture --     ROTHIA MUCILAGINOSA  Many  Susceptibility testing not routinely performed  No other significant isolate      Narrative:       Please culture sores on the upper gum    Respiratory Viral Panel by PCR [facility]; Nasal Swab [[id number]] Collected:  02/04/19 1154    Order Status:  Completed Specimen:  Respiratory Updated:  02/11/19 1301     Respiratory Virus Panel, source Nasal Swab     RVP - Adenovirus Not Detected     Comment: Detects Serotypes B and E. Detection of Serotype C may   be limited. If Adenovirus infection is suspected and a   Not Detected result is returned the sample should be   re-tested for Adenovirus using an independent method  (e.g. [employer] Adenovirus Quantitative Real-Time  PCR test.          Enterovirus Not  Detected     Comment: Cross-reactivity has been observed between certain Rhinovirus  strains and the Enterovirus assay.  False positive reported by [employer]. There was cross contamination   during testing process.  Corrected result; previously reported as Positive on 02/07/2019 at   07:41.          Human Bocavirus Not Detected     Human Coronavirus Not Detected     Comment: The Human Coronavirus assay detects Human coronavirus types  229E, OC43,NL63 and HKU1.          RVP - Human Metapneumovirus (hMPV) Not Detected     RVP - Influenza A Not Detected     Influenza A - K0B0-64 Not Detected     RVP - Influenza B Not Detected     Parainfluenza Not Detected     Respiratory Syncytial VirusVirus (RSV) A Not Detected     Comment: The Respiratory Syncytial Viral assay detects types A and B,  however it does not distinguish between the two.          RVP - Rhinovirus Not Detected     Comment: Cross-Reactivity has been observed between certain   Rhinovirus strains and the Enterovirus assay.  Target Enriched Mulitplex Polymerase Chain Reaction (TEM-PCR)  allows for the detection of multiple pathogens out of a single  reaction.  This test was developed and its performance   characteristics determined by [employer].  It has not   been cleared or approved by the U.S.Food and Drug Administration.  Results should be used in conjunction with clinical findings,   and should not form the sole basis for a diagnosis or treatment  decision.  TEM-PCR is a licensed technology of [employer].         Narrative:       Receiving Lab:->[facility]    Stool culture [[id number]]     Order Status:  No result Specimen:  Stool           Significant Imaging: I have reviewed all pertinent imaging results/findings within the past 24 hours.

## 2019-02-16 NOTE — PROGRESS NOTES
Dr. Jones at bedside to assess patient for shivering and to  speak with family.  Plan of care reviewed.  Test results reviewed.  Lactic acid and CXR ordered after finding crackles in lungs.

## 2019-02-16 NOTE — ASSESSMENT & PLAN NOTE
In this patient with hematologic malignancy, her mouth ulcers could represent atypical presentation of thrush vs HSV infection.  Alternatively there are atypical presentations of histoplasmosis that involve mouth ulcers.  Recommendations are;    1.  Start empiric acyclovir IV  2.  Start empiric fluconazole IV  3.  Send serology for histoplasmosis.

## 2019-02-16 NOTE — ASSESSMENT & PLAN NOTE
84 y/o female admitted with fatigue, whole body aches and pancytopenia with a new diagnosis of an acute myeloid neoplasm.  I am consulted for her persistent fevers despite being on antibiotics through most of her hospitalization.  Work up has included blood cultures that have been negative.  Initial urine cultures were positive but patient hasn't reported any symptoms since.  A CT scan of her chest showed a consolidated area in her chest.  She has also been noted to have painful mouth ulcers.  Although her hematologic malignancy can cause fevers, her immunocompromised state means that we have to consider infection as a cause.  Most likely source of her fevers is the consolidative process in her lungs.  This process could be infection vs leukemic infiltrates.  Given her immunocompromised state, the infectious differential is large but includes endemic fungi, opportunistic molds and bacterial pneumonia.  Other less likely sources of her fevers included urinary system infection.  Recommendations are as follows;    1.  Serologies for aspergillus, cryptococcus, histoplasmosis, blastomyces were sent.     2.  If her thrombocytopenia can be addressed, and a bronch can be safely done, this may help determine the cause of her lung infiltrates.  Because of her relative immunocompromised state, the differential is broad.  A bronch with biopsy would be helpful.  Consult pulmonary to get their input.    3.  Will initiate IV fluconazole and IV acyclovir for her oral lesions.    4.  Repeat urine cultures.    5.  Continue vancomycin and zosyn for now.

## 2019-02-16 NOTE — PROGRESS NOTES
02/15/19 1936   Vital Signs   Temp (!) 100.8 °F (38.2 °C)   Temp src Oral   Pulse 92   Heart Rate Source Monitor   Resp (!) 22   SpO2 (!) 92 %   Pulse Oximetry Type Intermittent   O2 Device (Oxygen Therapy) nasal cannula   BP (!) 107/55   BP Location Right arm   BP Method Automatic   Patient Position Lying   Notified MD on call about patient's vital signs. No new orders at this time. Tylenol given. Will continue to monitor.   risk factors

## 2019-02-16 NOTE — NURSING
Spoke with Dr Jones via telephone concerning pt new symptoms of shivering, axillary temp of 99.9 MD to go see pt at bedside to speak with family.

## 2019-02-16 NOTE — NURSING TRANSFER
Nursing Transfer Note    Patient arrived to the unit via stretcher. Patient has no complaints of pain at this time. Patient is AAOx4 and oriented to room, fall precautions inititated. Patient educated on fall precautions and verbalizes understanding. Patient has no skin breakdown. Will continue to monitor.

## 2019-02-16 NOTE — PLAN OF CARE
Problem: Adult Inpatient Plan of Care  Goal: Plan of Care Review  Outcome: Ongoing (interventions implemented as appropriate)  Patient remains free from falls and injury this shift. Bed in low, locked position with call light in reach. Family at bedside. Patient/pt family encouraged to call for assistance when needed.  Patient verbalized understanding. Pt had TMAX 100.9, given tylenol 1 hr prior for mild back/body aches pain. Pt has had chills/shivers today, MD macarena was at pt bedside several times consulting with family. Zosyn acyclovir and fluconazole given this shift. ABGs being done at bedside currently. All belongings within reach will continue to monitor.'

## 2019-02-16 NOTE — SUBJECTIVE & OBJECTIVE
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    UTI (urinary tract infection)        Past Surgical History:   Procedure Laterality Date    CATARACT EXTRACTION W/  INTRAOCULAR LENS IMPLANT Left 7/21/14    CATARACT EXTRACTION W/  INTRAOCULAR LENS IMPLANT Right 8/11/14    CYSTOSCOPY      INSERTION-INTRAOCULAR LENS (IOL) Right 8/11/2014    Performed by [facility] Camejo [facility] at [facility] OR    INSERTION-INTRAOCULAR LENS (IOL) Left 7/21/2014    Performed by [facility]jo MD at [facility] OR    PHACOEMULSIFICATION-ASPIRATION-CATARACT Right 8/11/2014    Performed by [facility]amejo [facility] at [facility] OR    PHACOEMULSIFICATION-ASPIRATION-CATARACT Left 7/21/2014    Performed by [facility]amejo MD at [facility] OR    screening N/A 9/5/2014    Performed by Mt Drake MD at [facility] (4TH FLR)       Review of patient's allergies indicates:   Allergen Reactions    Tramadol Rash       Medications:  Medications Prior to Admission   Medication Sig    albuterol 90 mcg/actuation inhaler Inhale 1-2 puffs into the lungs every 6 (six) hours as needed for Wheezing.    ascorbic acid (VITAMIN C) 500 MG tablet Take 1 tablet (500 mg total) by mouth 2 (two) times daily.    cranberry 400 mg Cap Take 1 capsule by mouth once daily.     GLUCOSAMINE SULFATE (GLUCOSAMINE ORAL) Take 2 tablets by mouth once daily.    ibuprofen (ADVIL,MOTRIN) 600 MG tablet Take 1 tablet (600 mg total) by mouth every 6 (six) hours as needed for Pain.    multivitamin capsule Take 1 capsule by mouth once daily.      naproxen (NAPROSYN) 500 MG tablet Take 1 tablet (500 mg total) by mouth 2 (two) times daily. (Patient taking differently: Take 500 mg by mouth 2 (two) times daily as needed. )    simvastatin (ZOCOR) 40 MG tablet Take 1 tablet (40 mg total) by mouth every evening.     ondansetron (ZOFRAN-ODT) 8 MG TbDL Take 1 tablet (8 mg total) by mouth every 12 (twelve) hours as needed.    ranitidine (ZANTAC) 150 MG tablet Take 1 tablet (150 mg total) by mouth 2 (two) times daily. PRN heartburn     Antibiotics (From admission, onward)    Start     Stop Route Frequency Ordered    02/15/19 2345  vancomycin in dextrose 5 % 1 gram/250 mL IVPB 1,000 mg  (Vancomycin IVPB with levels panel)      -- IV Every 24 hours (non-standard times) 02/15/19 2330    02/13/19 1900  piperacillin-tazobactam 4.5 g in sodium chloride 0.9% 100 mL IVPB (ready to mix system)      -- IV Every 8 hours (non-standard times) 02/13/19 1756        Antifungals (From admission, onward)    None        Antivirals (From admission, onward)    None           Immunization History   Administered Date(s) Administered    Influenza - High Dose 11/17/2015    Pneumococcal Conjugate - 13 Valent 11/17/2015       Family History     Problem Relation (Age of Onset)    Cancer Brother, Brother, Sister        Social History     Socioeconomic History    Marital status:      Spouse name: None    Number of children: None    Years of education: None    Highest education level: None   Social Needs    Financial resource strain: None    Food insecurity - worry: None    Food insecurity - inability: None    Transportation needs - medical: None    Transportation needs - non-medical: None   Occupational History    None   Tobacco Use    Smoking status: Never Smoker    Smokeless tobacco: Never Used   Substance and Sexual Activity    Alcohol use: No     Alcohol/week: 0.0 oz     Comment: moderate    Drug use: No    Sexual activity: Yes     Partners: Male   Other Topics Concern    None   Social History Narrative    None     Review of Systems   Constitutional: Positive for chills, fatigue and fever.   Musculoskeletal: Positive for arthralgias and myalgias.   All other systems reviewed and are negative.    Objective:     Vital Signs (Most  Recent):  Temp: 99.9 °F (37.7 °C) (02/16/19 1352)  Pulse: 92 (02/16/19 1211)  Resp: 18 (02/16/19 1211)  BP: (!) 114/53 (02/16/19 1211)  SpO2: 95 % (02/16/19 1211) Vital Signs (24h Range):  Temp:  [98 °F (36.7 °C)-101.2 °F (38.4 °C)] 99.9 °F (37.7 °C)  Pulse:  [] 92  Resp:  [18-22] 18  SpO2:  [92 %-97 %] 95 %  BP: (104-129)/(51-59) 114/53     Weight: 66.6 kg (146 lb 13.2 oz)  Body mass index is 28.68 kg/m².    Estimated Creatinine Clearance: 37.5 mL/min (based on SCr of 1 mg/dL).    Physical Exam   Constitutional: She is oriented to person, place, and time.   Frail appearing female.    Appears cachectic.   HENT:   Head: Normocephalic and atraumatic.   Right Ear: External ear normal.   Left Ear: External ear normal.   Nose: Nose normal.   Mouth/Throat: Oropharynx is clear and moist. Oropharyngeal exudate: oral ulcers present.   Eyes: Conjunctivae and EOM are normal. Pupils are equal, round, and reactive to light.   Neck: No JVD present. No tracheal deviation present. No thyromegaly present.   Cardiovascular: Normal rate, regular rhythm, normal heart sounds and intact distal pulses. Exam reveals no gallop and no friction rub.   No murmur heard.  Pulmonary/Chest: Effort normal and breath sounds normal. No stridor. No respiratory distress. She has no wheezes. She has no rales. She exhibits no tenderness.   Abdominal: Soft. Bowel sounds are normal. She exhibits no distension and no mass. There is no tenderness. There is no guarding.   Musculoskeletal: She exhibits no edema or tenderness.   Lymphadenopathy:     She has no cervical adenopathy.   Neurological: She is oriented to person, place, and time. No cranial nerve deficit. Coordination normal.   Skin: Skin is warm. No erythema.   Nursing note and vitals reviewed.      Significant Labs:   Microbiology Results (last 7 days)     Procedure Component Value Units Date/Time    Blood culture [[id number]] Collected:  02/16/19 0831    Order Status:  Sent Specimen:  Blood  Updated:  02/16/19 0843    Blood culture [[id number]] Collected:  02/16/19 0831    Order Status:  Sent Specimen:  Blood Updated:  02/16/19 0843    Blood culture [[id number]] Collected:  02/13/19 0010    Order Status:  Completed Specimen:  Blood Updated:  02/16/19 0612     Blood Culture, Routine No Growth to date     Blood Culture, Routine No Growth to date     Blood Culture, Routine No Growth to date     Blood Culture, Routine No Growth to date    Blood culture [[id number]] Collected:  02/13/19 0010    Order Status:  Completed Specimen:  Blood Updated:  02/16/19 0612     Blood Culture, Routine No Growth to date     Blood Culture, Routine No Growth to date     Blood Culture, Routine No Growth to date     Blood Culture, Routine No Growth to date    Blood culture [[id number]] Collected:  02/11/19 1359    Order Status:  Completed Specimen:  Blood Updated:  02/15/19 1612     Blood Culture, Routine No Growth to date     Blood Culture, Routine No Growth to date     Blood Culture, Routine No Growth to date     Blood Culture, Routine No Growth to date     Blood Culture, Routine No Growth to date    Aerobic culture [[id number]] Collected:  02/11/19 1811    Order Status:  Completed Specimen:  Body Fluid from Mouth Updated:  02/13/19 1151     Aerobic Bacterial Culture --     ROTHIA MUCILAGINOSA  Many  Susceptibility testing not routinely performed  No other significant isolate      Narrative:       Please culture sores on the upper gum    Respiratory Viral Panel by PCR [facility]; Nasal Swab [[id number]] Collected:  02/04/19 1154    Order Status:  Completed Specimen:  Respiratory Updated:  02/11/19 1301     Respiratory Virus Panel, source Nasal Swab     RVP - Adenovirus Not Detected     Comment: Detects Serotypes B and E. Detection of Serotype C may   be limited. If Adenovirus infection is suspected and a   Not Detected result is returned the sample should be   re-tested for Adenovirus using an independent method  (e.g. [employer]  Adenovirus Quantitative Real-Time  PCR test.          Enterovirus Not Detected     Comment: Cross-reactivity has been observed between certain Rhinovirus  strains and the Enterovirus assay.  False positive reported by [employer]. There was cross contamination   during testing process.  Corrected result; previously reported as Positive on 02/07/2019 at   07:41.          Human Bocavirus Not Detected     Human Coronavirus Not Detected     Comment: The Human Coronavirus assay detects Human coronavirus types  229E, OC43,NL63 and HKU1.          RVP - Human Metapneumovirus (hMPV) Not Detected     RVP - Influenza A Not Detected     Influenza A - I3L4-20 Not Detected     RVP - Influenza B Not Detected     Parainfluenza Not Detected     Respiratory Syncytial VirusVirus (RSV) A Not Detected     Comment: The Respiratory Syncytial Viral assay detects types A and B,  however it does not distinguish between the two.          RVP - Rhinovirus Not Detected     Comment: Cross-Reactivity has been observed between certain   Rhinovirus strains and the Enterovirus assay.  Target Enriched Mulitplex Polymerase Chain Reaction (TEM-PCR)  allows for the detection of multiple pathogens out of a single  reaction.  This test was developed and its performance   characteristics determined by [employer].  It has not   been cleared or approved by the U.S.Food and Drug Administration.  Results should be used in conjunction with clinical findings,   and should not form the sole basis for a diagnosis or treatment  decision.  TEM-PCR is a licensed technology of [employer].         Narrative:       Receiving Lab:->[facility]    Stool culture [[id number]]     Order Status:  No result Specimen:  Stool           Significant Imaging: I have reviewed all pertinent imaging results/findings within the past 24 hours.

## 2019-02-16 NOTE — PLAN OF CARE
Problem: Adult Inpatient Plan of Care  Goal: Plan of Care Review  Outcome: Ongoing (interventions implemented as appropriate)  POC reviewed at the start of the shift. Tmax of 100.7, Tylenol given. Complaints of right arm, inner elbow pain, RUSSELL Pressley notified, and an Ultrasound order was put in, still waiting on ultrasound to take patient. IV Vancomycin and Zosyn continued overnight. Albumin continued. Patient on 2L NC. Telemetry monitoring continued. Patient is stable. Instructed patient to call for assistance. Bed low and locked, call bell within reach, nonskid socks on, pt verbalized understanding. Infection, pressure ulcer, and fall risks precautions maintained. Will continue to monitor.

## 2019-02-17 LAB
ALBUMIN SERPL BCP-MCNC: 1.7 G/DL
ALP SERPL-CCNC: 364 U/L
ALT SERPL W/O P-5'-P-CCNC: 46 U/L
ANION GAP SERPL CALC-SCNC: 10 MMOL/L
ANISOCYTOSIS BLD QL SMEAR: SLIGHT
ANISOCYTOSIS BLD QL SMEAR: SLIGHT
AST SERPL-CCNC: 37 U/L
BASOPHILS # BLD AUTO: ABNORMAL K/UL
BASOPHILS # BLD AUTO: ABNORMAL K/UL
BASOPHILS NFR BLD: 5 %
BASOPHILS NFR BLD: 5 %
BILIRUB SERPL-MCNC: 2.8 MG/DL
BLASTS NFR BLD MANUAL: 9 %
BLASTS NFR BLD MANUAL: 9 %
BUN SERPL-MCNC: 44 MG/DL
CALCIUM SERPL-MCNC: 8.6 MG/DL
CHLORIDE SERPL-SCNC: 97 MMOL/L
CO2 SERPL-SCNC: 21 MMOL/L
CREAT SERPL-MCNC: 1.2 MG/DL
CRYPTOC AG SER QL LA: NEGATIVE
DIFFERENTIAL METHOD: ABNORMAL
DIFFERENTIAL METHOD: ABNORMAL
EOSINOPHIL # BLD AUTO: ABNORMAL K/UL
EOSINOPHIL # BLD AUTO: ABNORMAL K/UL
EOSINOPHIL NFR BLD: 3 %
EOSINOPHIL NFR BLD: 3 %
ERYTHROCYTE [DISTWIDTH] IN BLOOD BY AUTOMATED COUNT: 18.8 %
ERYTHROCYTE [DISTWIDTH] IN BLOOD BY AUTOMATED COUNT: 18.8 %
EST. GFR  (AFRICAN AMERICAN): 49 ML/MIN/1.73 M^2
EST. GFR  (NON AFRICAN AMERICAN): 42.5 ML/MIN/1.73 M^2
GLUCOSE SERPL-MCNC: 104 MG/DL
HCT VFR BLD AUTO: 19 %
HCT VFR BLD AUTO: 19 %
HGB BLD-MCNC: 6.5 G/DL
HGB BLD-MCNC: 6.5 G/DL
HYPOCHROMIA BLD QL SMEAR: ABNORMAL
HYPOCHROMIA BLD QL SMEAR: ABNORMAL
IMM GRANULOCYTES # BLD AUTO: ABNORMAL K/UL
IMM GRANULOCYTES # BLD AUTO: ABNORMAL K/UL
IMM GRANULOCYTES NFR BLD AUTO: ABNORMAL %
IMM GRANULOCYTES NFR BLD AUTO: ABNORMAL %
LYMPHOCYTES # BLD AUTO: ABNORMAL K/UL
LYMPHOCYTES # BLD AUTO: ABNORMAL K/UL
LYMPHOCYTES NFR BLD: 9 %
LYMPHOCYTES NFR BLD: 9 %
MAGNESIUM SERPL-MCNC: 2 MG/DL
MCH RBC QN AUTO: 27.1 PG
MCH RBC QN AUTO: 27.1 PG
MCHC RBC AUTO-ENTMCNC: 34.2 G/DL
MCHC RBC AUTO-ENTMCNC: 34.2 G/DL
MCV RBC AUTO: 79 FL
MCV RBC AUTO: 79 FL
METAMYELOCYTES NFR BLD MANUAL: 1 %
METAMYELOCYTES NFR BLD MANUAL: 1 %
MONOCYTES # BLD AUTO: ABNORMAL K/UL
MONOCYTES # BLD AUTO: ABNORMAL K/UL
MONOCYTES NFR BLD: 16 %
MONOCYTES NFR BLD: 16 %
MYELOCYTES NFR BLD MANUAL: 4 %
MYELOCYTES NFR BLD MANUAL: 4 %
NEUTROPHILS NFR BLD: 42 %
NEUTROPHILS NFR BLD: 42 %
NEUTS BAND NFR BLD MANUAL: 8 %
NEUTS BAND NFR BLD MANUAL: 8 %
NRBC BLD-RTO: 1 /100 WBC
NRBC BLD-RTO: 1 /100 WBC
PHOSPHATE SERPL-MCNC: 3.3 MG/DL
PLATELET # BLD AUTO: 11 K/UL
PLATELET # BLD AUTO: 11 K/UL
PLATELET BLD QL SMEAR: ABNORMAL
PLATELET BLD QL SMEAR: ABNORMAL
PMV BLD AUTO: ABNORMAL FL
PMV BLD AUTO: ABNORMAL FL
POIKILOCYTOSIS BLD QL SMEAR: SLIGHT
POIKILOCYTOSIS BLD QL SMEAR: SLIGHT
POLYCHROMASIA BLD QL SMEAR: ABNORMAL
POLYCHROMASIA BLD QL SMEAR: ABNORMAL
POTASSIUM SERPL-SCNC: 3.1 MMOL/L
PROMYELOCYTES NFR BLD MANUAL: 3 %
PROMYELOCYTES NFR BLD MANUAL: 3 %
PROT SERPL-MCNC: 5.3 G/DL
RBC # BLD AUTO: 2.4 M/UL
RBC # BLD AUTO: 2.4 M/UL
SMUDGE CELLS BLD QL SMEAR: PRESENT
SMUDGE CELLS BLD QL SMEAR: PRESENT
SODIUM SERPL-SCNC: 128 MMOL/L
WBC # BLD AUTO: 8.52 K/UL
WBC # BLD AUTO: 8.52 K/UL

## 2019-02-17 PROCEDURE — 99223 PR INITIAL HOSPITAL CARE,LEVL III: ICD-10-PCS | Mod: GC,,, | Performed by: INTERNAL MEDICINE

## 2019-02-17 PROCEDURE — 25000003 PHARM REV CODE 250: Performed by: INTERNAL MEDICINE

## 2019-02-17 PROCEDURE — 80053 COMPREHEN METABOLIC PANEL: CPT

## 2019-02-17 PROCEDURE — 63600175 PHARM REV CODE 636 W HCPCS: Performed by: PHYSICIAN ASSISTANT

## 2019-02-17 PROCEDURE — 63600175 PHARM REV CODE 636 W HCPCS: Performed by: HOSPITALIST

## 2019-02-17 PROCEDURE — 83735 ASSAY OF MAGNESIUM: CPT

## 2019-02-17 PROCEDURE — 25000003 PHARM REV CODE 250: Performed by: STUDENT IN AN ORGANIZED HEALTH CARE EDUCATION/TRAINING PROGRAM

## 2019-02-17 PROCEDURE — 25000003 PHARM REV CODE 250: Performed by: PHYSICIAN ASSISTANT

## 2019-02-17 PROCEDURE — 63600175 PHARM REV CODE 636 W HCPCS: Performed by: INTERNAL MEDICINE

## 2019-02-17 PROCEDURE — 84100 ASSAY OF PHOSPHORUS: CPT

## 2019-02-17 PROCEDURE — 25000003 PHARM REV CODE 250: Performed by: HOSPITALIST

## 2019-02-17 PROCEDURE — 99223 1ST HOSP IP/OBS HIGH 75: CPT | Mod: GC,,, | Performed by: INTERNAL MEDICINE

## 2019-02-17 PROCEDURE — 85027 COMPLETE CBC AUTOMATED: CPT

## 2019-02-17 PROCEDURE — 94640 AIRWAY INHALATION TREATMENT: CPT

## 2019-02-17 PROCEDURE — 27000221 HC OXYGEN, UP TO 24 HOURS

## 2019-02-17 PROCEDURE — 99233 SBSQ HOSP IP/OBS HIGH 50: CPT | Mod: GC,,, | Performed by: INTERNAL MEDICINE

## 2019-02-17 PROCEDURE — 36415 COLL VENOUS BLD VENIPUNCTURE: CPT

## 2019-02-17 PROCEDURE — 94761 N-INVAS EAR/PLS OXIMETRY MLT: CPT

## 2019-02-17 PROCEDURE — 25000242 PHARM REV CODE 250 ALT 637 W/ HCPCS: Performed by: STUDENT IN AN ORGANIZED HEALTH CARE EDUCATION/TRAINING PROGRAM

## 2019-02-17 PROCEDURE — 99233 PR SUBSEQUENT HOSPITAL CARE,LEVL III: ICD-10-PCS | Mod: GC,,, | Performed by: INTERNAL MEDICINE

## 2019-02-17 PROCEDURE — 99233 SBSQ HOSP IP/OBS HIGH 50: CPT | Mod: ,,, | Performed by: HOSPITALIST

## 2019-02-17 PROCEDURE — 85007 BL SMEAR W/DIFF WBC COUNT: CPT

## 2019-02-17 PROCEDURE — 99233 PR SUBSEQUENT HOSPITAL CARE,LEVL III: ICD-10-PCS | Mod: ,,, | Performed by: HOSPITALIST

## 2019-02-17 PROCEDURE — 11000001 HC ACUTE MED/SURG PRIVATE ROOM

## 2019-02-17 RX ORDER — ACETAMINOPHEN 10 MG/ML
1000 INJECTION, SOLUTION INTRAVENOUS ONCE
Status: DISCONTINUED | OUTPATIENT
Start: 2019-02-17 | End: 2019-02-17

## 2019-02-17 RX ORDER — HYDROCODONE BITARTRATE AND ACETAMINOPHEN 500; 5 MG/1; MG/1
TABLET ORAL
Status: DISCONTINUED | OUTPATIENT
Start: 2019-02-17 | End: 2019-02-18

## 2019-02-17 RX ORDER — DIPHENHYDRAMINE HYDROCHLORIDE 50 MG/ML
25 INJECTION INTRAMUSCULAR; INTRAVENOUS ONCE
Status: COMPLETED | OUTPATIENT
Start: 2019-02-17 | End: 2019-02-18

## 2019-02-17 RX ORDER — ACETAMINOPHEN 650 MG/1
650 SUPPOSITORY RECTAL ONCE
Status: COMPLETED | OUTPATIENT
Start: 2019-02-17 | End: 2019-02-18

## 2019-02-17 RX ORDER — POTASSIUM CHLORIDE 7.45 MG/ML
10 INJECTION INTRAVENOUS
Status: DISPENSED | OUTPATIENT
Start: 2019-02-17 | End: 2019-02-17

## 2019-02-17 RX ORDER — DIPHENHYDRAMINE HCL 25 MG
25 CAPSULE ORAL ONCE
Status: DISCONTINUED | OUTPATIENT
Start: 2019-02-17 | End: 2019-02-17

## 2019-02-17 RX ORDER — DIPHENHYDRAMINE HYDROCHLORIDE 50 MG/ML
25 INJECTION INTRAMUSCULAR; INTRAVENOUS ONCE
Status: DISCONTINUED | OUTPATIENT
Start: 2019-02-17 | End: 2019-02-17

## 2019-02-17 RX ORDER — ACETAMINOPHEN 325 MG/1
650 TABLET ORAL ONCE
Status: DISCONTINUED | OUTPATIENT
Start: 2019-02-17 | End: 2019-02-17

## 2019-02-17 RX ADMIN — ACETAMINOPHEN 650 MG: 325 TABLET, FILM COATED ORAL at 12:02

## 2019-02-17 RX ADMIN — POTASSIUM BICARBONATE 50 MEQ: 977.5 TABLET, EFFERVESCENT ORAL at 11:02

## 2019-02-17 RX ADMIN — PANTOPRAZOLE SODIUM 40 MG: 40 TABLET, DELAYED RELEASE ORAL at 08:02

## 2019-02-17 RX ADMIN — IPRATROPIUM BROMIDE AND ALBUTEROL SULFATE 3 ML: .5; 3 SOLUTION RESPIRATORY (INHALATION) at 11:02

## 2019-02-17 RX ADMIN — POTASSIUM CHLORIDE 10 MEQ: 10 INJECTION, SOLUTION INTRAVENOUS at 05:02

## 2019-02-17 RX ADMIN — POTASSIUM CHLORIDE 10 MEQ: 10 INJECTION, SOLUTION INTRAVENOUS at 07:02

## 2019-02-17 RX ADMIN — VANCOMYCIN HYDROCHLORIDE 1000 MG: 1 INJECTION, POWDER, LYOPHILIZED, FOR SOLUTION INTRAVENOUS at 12:02

## 2019-02-17 RX ADMIN — PIPERACILLIN AND TAZOBACTAM 4.5 G: 4; .5 INJECTION, POWDER, LYOPHILIZED, FOR SOLUTION INTRAVENOUS; PARENTERAL at 11:02

## 2019-02-17 RX ADMIN — FLUCONAZOLE, SODIUM CHLORIDE 400 MG: 2 INJECTION INTRAVENOUS at 03:02

## 2019-02-17 RX ADMIN — POTASSIUM CHLORIDE 10 MEQ: 10 INJECTION, SOLUTION INTRAVENOUS at 06:02

## 2019-02-17 RX ADMIN — IPRATROPIUM BROMIDE AND ALBUTEROL SULFATE 3 ML: .5; 3 SOLUTION RESPIRATORY (INHALATION) at 04:02

## 2019-02-17 RX ADMIN — PIPERACILLIN AND TAZOBACTAM 4.5 G: 4; .5 INJECTION, POWDER, LYOPHILIZED, FOR SOLUTION INTRAVENOUS; PARENTERAL at 09:02

## 2019-02-17 RX ADMIN — ACYCLOVIR SODIUM 670 MG: 1000 INJECTION, SOLUTION INTRAVENOUS at 03:02

## 2019-02-17 RX ADMIN — IPRATROPIUM BROMIDE AND ALBUTEROL SULFATE 3 ML: .5; 3 SOLUTION RESPIRATORY (INHALATION) at 07:02

## 2019-02-17 RX ADMIN — PIPERACILLIN AND TAZOBACTAM 4.5 G: 4; .5 INJECTION, POWDER, LYOPHILIZED, FOR SOLUTION INTRAVENOUS; PARENTERAL at 03:02

## 2019-02-17 RX ADMIN — IPRATROPIUM BROMIDE AND ALBUTEROL SULFATE 3 ML: .5; 3 SOLUTION RESPIRATORY (INHALATION) at 08:02

## 2019-02-17 RX ADMIN — POTASSIUM CHLORIDE 10 MEQ: 10 INJECTION, SOLUTION INTRAVENOUS at 09:02

## 2019-02-17 RX ADMIN — DOXYCYCLINE 100 MG: 100 INJECTION, POWDER, LYOPHILIZED, FOR SOLUTION INTRAVENOUS at 05:02

## 2019-02-17 RX ADMIN — ACYCLOVIR SODIUM 670 MG: 1000 INJECTION, SOLUTION INTRAVENOUS at 08:02

## 2019-02-17 NOTE — PROGRESS NOTES
[facility]  Infectious Disease  Progress Note    Patient Name: Vicki Peña  MRN: [id number]  Admission Date: 2/2/2019  Length of Stay: 15 days  Attending Physician: Marcelle Jones*  Primary Care Provider: Yaron Waite MD    Isolation Status: No active isolations  Assessment/Plan:      Fever in adult    - 84 y/o female admitted with fatigue, whole body aches and pancytopenia with a new diagnosis of an acute myeloid neoplasm.    - ID consulted for her persistent fevers despite being on antibiotics through most of her hospitalization.  - Work up has included blood cultures that have been negative.   - Initial urine cultures were positive but patient hasn't reported any symptoms since.    - CT scan of her chest showed a consolidated area in her chest.  She has also been noted to have painful mouth ulcers.   -  Although her hematologic malignancy can cause fevers, her immunocompromised state means that we have to consider infection as a cause.  Most likely source of her fevers is the consolidative process in her lungs.    - This process could be infection vs leukemic infiltrates.   -  Given her immunocompromised state, the infectious differential is large but includes endemic fungi, opportunistic molds and bacterial pneumonia.   -  Other less likely sources of her fevers included urinary system infection.   - pending serologies for aspergillus, cryptococcus, histoplasmosis, blastomyces    - recommend  a bronch to help determine the cause of her lung infiltrates.  Because of her relative immunocompromised state, the differential is broad.  A bronch with biopsy would be helpful.    - will order legionella for further work up     Recommendations:   - continue fluconazole  - continue pip-tazo   - continue acyclovir   - continue vanc   - order legionella urine antigen   - perform bronchoscopy                Anticipated Disposition:     Thank you for your consult. I will follow-up with patient. Please  contact us if you have any additional questions.    Brad Ramirez MD  Infectious Disease  [facility]    Subjective:     Principal Problem:Sepsis due to urinary tract infection    HPI: 83 year old female with PMH of CVA, osteoporosis, and HLD who presented to the ED (02/02/2019) with complaints of generalized weakness, whole body aches and fatigue for 6 weeks.  She was evaluated by her PCP for this and was found to be anemic and thrombocytopenic and subsequently referred to the ER.  She has been hospitalized since 2/2/19.  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  She has essentially been on some form of antibiotics since her admission.  She was initially febrile and then had several days were she didn't have a fever.  She has since developed fevers again.  Her work up has included blood cultures that have been negative.  A chest CT shows an area of consolidation that is concerning.  She has ulcers in her mouth.  CT of maxillofacial area was unremarkable.  We are re-consulted to provide input into her ongoing fevers.    Interval History: Patient febrile at time of evaluation, was unable to swallow tylenol     Review of Systems   Constitutional: Negative for chills, diaphoresis, fatigue and fever.   HENT: Negative for congestion, mouth sores, rhinorrhea and sore throat.    Respiratory: Negative for cough and shortness of breath.    Gastrointestinal: Negative for abdominal distention, abdominal pain, diarrhea, nausea and vomiting.   Genitourinary: Negative for dysuria and urgency.   Musculoskeletal: Negative for joint swelling and myalgias.   Allergic/Immunologic: Negative for environmental allergies, food allergies and immunocompromised state.   Neurological: Negative for dizziness and numbness.   Hematological: Negative for adenopathy.   Psychiatric/Behavioral: Negative for decreased concentration, hallucinations and sleep  disturbance.     Objective:     Vital Signs (Most Recent):  Temp: 100 °F (37.8 °C) (02/17/19 1608)  Pulse: 102 (02/17/19 1608)  Resp: 20 (02/17/19 1608)  BP: (!) 119/58 (02/17/19 1608)  SpO2: 95 % (02/17/19 1608) Vital Signs (24h Range):  Temp:  [98.1 °F (36.7 °C)-102.9 °F (39.4 °C)] 100 °F (37.8 °C)  Pulse:  [] 102  Resp:  [16-20] 20  SpO2:  [93 %-98 %] 95 %  BP: (115-133)/(53-61) 119/58     Weight: 66.6 kg (146 lb 13.2 oz)  Body mass index is 28.68 kg/m².    Estimated Creatinine Clearance: 31.3 mL/min (based on SCr of 1.2 mg/dL).    Physical Exam   Constitutional: She is oriented to person, place, and time. She appears well-developed and well-nourished.   HENT:   Head: Normocephalic and atraumatic.   Eyes: EOM are normal. Pupils are equal, round, and reactive to light.   Neck: Normal range of motion.   Cardiovascular: Normal rate, regular rhythm and normal heart sounds.   Pulmonary/Chest: Effort normal and breath sounds normal.   Abdominal: Soft. Bowel sounds are normal.   Musculoskeletal: Normal range of motion. She exhibits no edema.   Neurological: She is alert and oriented to person, place, and time.   Skin: No rash noted.       Significant Labs:   Blood Culture:   Recent Labs   Lab 02/03/19  0925 02/11/19  1359 02/13/19  0010 02/16/19  0831   LABBLOO No growth after 5 days.  No growth after 5 days. No growth after 5 days. No Growth to date  No Growth to date  No Growth to date  No Growth to date  No Growth to date  No Growth to date  No Growth to date  No Growth to date  No Growth to date  No Growth to date No Growth to date  No Growth to date  No Growth to date  No Growth to date     BMP:   Recent Labs   Lab 02/17/19  0545      *   K 3.1*   CL 97   CO2 21*   BUN 44*   CREATININE 1.2   CALCIUM 8.6*   MG 2.0     CBC:   Recent Labs   Lab 02/16/19  0335 02/16/19  0831 02/17/19  1013   WBC 9.00 8.91 8.52  8.52   HGB 7.1* 7.4* 6.5*  6.5*   HCT 20.9* 21.6* 19.0*  19.0*   PLT 10*  11* 11*  11*     CMP:   Recent Labs   Lab 02/16/19  0527 02/17/19  0545   * 128*   K 3.2* 3.1*   CL 97 97   CO2 22* 21*    104   BUN 35* 44*   CREATININE 1.0 1.2   CALCIUM 8.8 8.6*   PROT 5.5* 5.3*   ALBUMIN 1.8* 1.7*   BILITOT 2.9* 2.8*   ALKPHOS 370* 364*   AST 25 37   ALT 36 46*   ANIONGAP 9 10   EGFRNONAA 53.0* 42.5*     Microbiology Results (last 7 days)     Procedure Component Value Units Date/Time    Cryptococcal antigen [[id number]] Collected:  02/16/19 1547    Order Status:  Completed Specimen:  Blood Updated:  02/17/19 1606     Cryptococcal Ag, Blood Negative    Blood culture [[id number]] Collected:  02/16/19 0831    Order Status:  Completed Specimen:  Blood Updated:  02/17/19 1212     Blood Culture, Routine No Growth to date     Blood Culture, Routine No Growth to date    Blood culture [[id number]] Collected:  02/16/19 0831    Order Status:  Completed Specimen:  Blood Updated:  02/17/19 1212     Blood Culture, Routine No Growth to date     Blood Culture, Routine No Growth to date    Blood culture [[id number]] Collected:  02/13/19 0010    Order Status:  Completed Specimen:  Blood Updated:  02/17/19 0612     Blood Culture, Routine No Growth to date     Blood Culture, Routine No Growth to date     Blood Culture, Routine No Growth to date     Blood Culture, Routine No Growth to date     Blood Culture, Routine No Growth to date    Blood culture [[id number]] Collected:  02/13/19 0010    Order Status:  Completed Specimen:  Blood Updated:  02/17/19 0612     Blood Culture, Routine No Growth to date     Blood Culture, Routine No Growth to date     Blood Culture, Routine No Growth to date     Blood Culture, Routine No Growth to date     Blood Culture, Routine No Growth to date    Blood culture [[id number]] Collected:  02/11/19 1359    Order Status:  Completed Specimen:  Blood Updated:  02/16/19 1612     Blood Culture, Routine No growth after 5 days.    Aerobic culture [[id number]] Collected:  02/11/19 1818     Order Status:  Completed Specimen:  Body Fluid from Mouth Updated:  02/13/19 1151     Aerobic Bacterial Culture --     ROTHIA MUCILAGINOSA  Many  Susceptibility testing not routinely performed  No other significant isolate      Narrative:       Please culture sores on the upper gum    Respiratory Viral Panel by PCR [facility]; Nasal Swab [[id number]] Collected:  02/04/19 1154    Order Status:  Completed Specimen:  Respiratory Updated:  02/11/19 1301     Respiratory Virus Panel, source Nasal Swab     RVP - Adenovirus Not Detected     Comment: Detects Serotypes B and E. Detection of Serotype C may   be limited. If Adenovirus infection is suspected and a   Not Detected result is returned the sample should be   re-tested for Adenovirus using an independent method  (e.g. [employer] Adenovirus Quantitative Real-Time  PCR test.          Enterovirus Not Detected     Comment: Cross-reactivity has been observed between certain Rhinovirus  strains and the Enterovirus assay.  False positive reported by [email or web address]. There was cross contamination   during testing process.  Corrected result; previously reported as Positive on 02/07/2019 at   07:41.          Human Bocavirus Not Detected     Human Coronavirus Not Detected     Comment: The Human Coronavirus assay detects Human coronavirus types  229E, OC43,NL63 and HKU1.          RVP - Human Metapneumovirus (hMPV) Not Detected     RVP - Influenza A Not Detected     Influenza A - S2E2-12 Not Detected     RVP - Influenza B Not Detected     Parainfluenza Not Detected     Respiratory Syncytial VirusVirus (RSV) A Not Detected     Comment: The Respiratory Syncytial Viral assay detects types A and B,  however it does not distinguish between the two.          RVP - Rhinovirus Not Detected     Comment: Cross-Reactivity has been observed between certain   Rhinovirus strains and the Enterovirus assay.  Target Enriched Mulitplex Polymerase Chain Reaction (TEM-PCR)  allows for the detection of  multiple pathogens out of a single  reaction.  This test was developed and its performance   characteristics determined by [employer].  It has not   been cleared or approved by the U.S.Food and Drug Administration.  Results should be used in conjunction with clinical findings,   and should not form the sole basis for a diagnosis or treatment  decision.  TEM-PCR is a licensed technology of [employer].         Narrative:       Receiving Lab:->[facility]          Significant Imaging: I have reviewed all pertinent imaging results/findings within the past 24 hours.

## 2019-02-17 NOTE — ASSESSMENT & PLAN NOTE
- 82 y/o female admitted with fatigue, whole body aches and pancytopenia with a new diagnosis of an acute myeloid neoplasm.    - ID consulted for her persistent fevers despite being on antibiotics through most of her hospitalization.  - Work up has included blood cultures that have been negative.   - Initial urine cultures were positive but patient hasn't reported any symptoms since.    - CT scan of her chest showed a consolidated area in her chest.  She has also been noted to have painful mouth ulcers.   -  Although her hematologic malignancy can cause fevers, her immunocompromised state means that we have to consider infection as a cause.  Most likely source of her fevers is the consolidative process in her lungs.    - This process could be infection vs leukemic infiltrates.   -  Given her immunocompromised state, the infectious differential is large but includes endemic fungi, opportunistic molds and bacterial pneumonia.   -  Other less likely sources of her fevers included urinary system infection.   - pending serologies for aspergillus, cryptococcus, histoplasmosis, blastomyces    - recommend  a bronch to help determine the cause of her lung infiltrates.  Because of her relative immunocompromised state, the differential is broad.  A bronch with biopsy would be helpful.    - will order legionella for further work up     Recommendations:   - continue fluconazole  - continue pip-tazo   - continue acyclovir   - continue vanc   - order legionella urine antigen   - perform bronchoscopy

## 2019-02-17 NOTE — NURSING
Dr Jones paged to report lab results, sodium (128)potassium (3.1) MD to return call , left message

## 2019-02-17 NOTE — NURSING
Spoke with Dr Jones concerning pt inbility to swallow but 1 tylenol for elevated temp. New orders placed for NG tube. Also informed MD of pt increasing right arm swelling and pt increased drowsiness this shift.

## 2019-02-17 NOTE — SUBJECTIVE & OBJECTIVE
Interval History: Patient febrile at time of evaluation, was unable to swallow tylenol     Review of Systems   Constitutional: Negative for chills, diaphoresis, fatigue and fever.   HENT: Negative for congestion, mouth sores, rhinorrhea and sore throat.    Respiratory: Negative for cough and shortness of breath.    Gastrointestinal: Negative for abdominal distention, abdominal pain, diarrhea, nausea and vomiting.   Genitourinary: Negative for dysuria and urgency.   Musculoskeletal: Negative for joint swelling and myalgias.   Allergic/Immunologic: Negative for environmental allergies, food allergies and immunocompromised state.   Neurological: Negative for dizziness and numbness.   Hematological: Negative for adenopathy.   Psychiatric/Behavioral: Negative for decreased concentration, hallucinations and sleep disturbance.     Objective:     Vital Signs (Most Recent):  Temp: 100 °F (37.8 °C) (02/17/19 1608)  Pulse: 102 (02/17/19 1608)  Resp: 20 (02/17/19 1608)  BP: (!) 119/58 (02/17/19 1608)  SpO2: 95 % (02/17/19 1608) Vital Signs (24h Range):  Temp:  [98.1 °F (36.7 °C)-102.9 °F (39.4 °C)] 100 °F (37.8 °C)  Pulse:  [] 102  Resp:  [16-20] 20  SpO2:  [93 %-98 %] 95 %  BP: (115-133)/(53-61) 119/58     Weight: 66.6 kg (146 lb 13.2 oz)  Body mass index is 28.68 kg/m².    Estimated Creatinine Clearance: 31.3 mL/min (based on SCr of 1.2 mg/dL).    Physical Exam   Constitutional: She is oriented to person, place, and time. She appears well-developed and well-nourished.   HENT:   Head: Normocephalic and atraumatic.   Eyes: EOM are normal. Pupils are equal, round, and reactive to light.   Neck: Normal range of motion.   Cardiovascular: Normal rate, regular rhythm and normal heart sounds.   Pulmonary/Chest: Effort normal and breath sounds normal.   Abdominal: Soft. Bowel sounds are normal.   Musculoskeletal: Normal range of motion. She exhibits no edema.   Neurological: She is alert and oriented to person, place, and time.    Skin: No rash noted.       Significant Labs:   Blood Culture:   Recent Labs   Lab 02/03/19  0925 02/11/19  1359 02/13/19  0010 02/16/19 0831   LABBLOO No growth after 5 days.  No growth after 5 days. No growth after 5 days. No Growth to date  No Growth to date  No Growth to date  No Growth to date  No Growth to date  No Growth to date  No Growth to date  No Growth to date  No Growth to date  No Growth to date No Growth to date  No Growth to date  No Growth to date  No Growth to date     BMP:   Recent Labs   Lab 02/17/19  0545      *   K 3.1*   CL 97   CO2 21*   BUN 44*   CREATININE 1.2   CALCIUM 8.6*   MG 2.0     CBC:   Recent Labs   Lab 02/16/19  0335 02/16/19  0831 02/17/19  1013   WBC 9.00 8.91 8.52  8.52   HGB 7.1* 7.4* 6.5*  6.5*   HCT 20.9* 21.6* 19.0*  19.0*   PLT 10* 11* 11*  11*     CMP:   Recent Labs   Lab 02/16/19  0527 02/17/19  0545   * 128*   K 3.2* 3.1*   CL 97 97   CO2 22* 21*    104   BUN 35* 44*   CREATININE 1.0 1.2   CALCIUM 8.8 8.6*   PROT 5.5* 5.3*   ALBUMIN 1.8* 1.7*   BILITOT 2.9* 2.8*   ALKPHOS 370* 364*   AST 25 37   ALT 36 46*   ANIONGAP 9 10   EGFRNONAA 53.0* 42.5*     Microbiology Results (last 7 days)     Procedure Component Value Units Date/Time    Cryptococcal antigen [[id number]] Collected:  02/16/19 1547    Order Status:  Completed Specimen:  Blood Updated:  02/17/19 1606     Cryptococcal Ag, Blood Negative    Blood culture [[id number]] Collected:  02/16/19 0831    Order Status:  Completed Specimen:  Blood Updated:  02/17/19 1212     Blood Culture, Routine No Growth to date     Blood Culture, Routine No Growth to date    Blood culture [[id number]] Collected:  02/16/19 0831    Order Status:  Completed Specimen:  Blood Updated:  02/17/19 1212     Blood Culture, Routine No Growth to date     Blood Culture, Routine No Growth to date    Blood culture [[id number]] Collected:  02/13/19 0010    Order Status:  Completed Specimen:  Blood Updated:   02/17/19 0612     Blood Culture, Routine No Growth to date     Blood Culture, Routine No Growth to date     Blood Culture, Routine No Growth to date     Blood Culture, Routine No Growth to date     Blood Culture, Routine No Growth to date    Blood culture [[id number]] Collected:  02/13/19 0010    Order Status:  Completed Specimen:  Blood Updated:  02/17/19 0612     Blood Culture, Routine No Growth to date     Blood Culture, Routine No Growth to date     Blood Culture, Routine No Growth to date     Blood Culture, Routine No Growth to date     Blood Culture, Routine No Growth to date    Blood culture [[id number]] Collected:  02/11/19 1359    Order Status:  Completed Specimen:  Blood Updated:  02/16/19 1612     Blood Culture, Routine No growth after 5 days.    Aerobic culture [[id number]] Collected:  02/11/19 1811    Order Status:  Completed Specimen:  Body Fluid from Mouth Updated:  02/13/19 1151     Aerobic Bacterial Culture --     ROTHIA MUCILAGINOSA  Many  Susceptibility testing not routinely performed  No other significant isolate      Narrative:       Please culture sores on the upper gum    Respiratory Viral Panel by PCR [facility]; Nasal Swab [[id number]] Collected:  02/04/19 1154    Order Status:  Completed Specimen:  Respiratory Updated:  02/11/19 1301     Respiratory Virus Panel, source Nasal Swab     RVP - Adenovirus Not Detected     Comment: Detects Serotypes B and E. Detection of Serotype C may   be limited. If Adenovirus infection is suspected and a   Not Detected result is returned the sample should be   re-tested for Adenovirus using an independent method  (e.g. [employer] Adenovirus Quantitative Real-Time  PCR test.          Enterovirus Not Detected     Comment: Cross-reactivity has been observed between certain Rhinovirus  strains and the Enterovirus assay.  False positive reported by [employer]. There was cross contamination   during testing process.  Corrected result; previously reported as Positive on  02/07/2019 at   07:41.          Human Bocavirus Not Detected     Human Coronavirus Not Detected     Comment: The Human Coronavirus assay detects Human coronavirus types  229E, OC43,NL63 and HKU1.          RVP - Human Metapneumovirus (hMPV) Not Detected     RVP - Influenza A Not Detected     Influenza A - [id number] Not Detected     RVP - Influenza B Not Detected     Parainfluenza Not Detected     Respiratory Syncytial VirusVirus (RSV) A Not Detected     Comment: The Respiratory Syncytial Viral assay detects types A and B,  however it does not distinguish between the two.          RVP - Rhinovirus Not Detected     Comment: Cross-Reactivity has been observed between certain   Rhinovirus strains and the Enterovirus assay.  Target Enriched Mulitplex Polymerase Chain Reaction (TEM-PCR)  allows for the detection of multiple pathogens out of a single  reaction.  This test was developed and its performance   characteristics determined by [employer].  It has not   been cleared or approved by the U.S.Food and Drug Administration.  Results should be used in conjunction with clinical findings,   and should not form the sole basis for a diagnosis or treatment  decision.  TEM-PCR is a licensed technology of [employer].         Narrative:       Receiving Lab:->[facility]          Significant Imaging: I have reviewed all pertinent imaging results/findings within the past 24 hours.

## 2019-02-17 NOTE — PLAN OF CARE
Problem: Adult Inpatient Plan of Care  Goal: Plan of Care Review  Outcome: Ongoing (interventions implemented as appropriate)  Remains free from falls and injury this shift. Bed in low, locked position with call light in reach. Family at bedside. Patient/ family encouraged to call for assistance when needed; verbalized understanding.  Tylenol x1 dose for Back pain.Vanc, Zosyn acyclovir and fluconazole given this shift. Purewick external urinary drainage in place w dark sonali urine  output. No BM this shift. All belongings within reach will continue to monitor.

## 2019-02-17 NOTE — PROGRESS NOTES
Hospital Medicine  Progress note    [place] [facility] [place] [place] Marcelle Jonse MD  Admit Date: 2/2/2019  JACK   Length of Stay:  LOS: 14 days   Code status: Full Code    Principal Problem:  Sepsis due to urinary tract infection    Overview:    Interval hx: Patient seen and examined at bedside. Clinical picture about the same. Daughters updated at bedside. Prognosis is guarded. Very frail appearing. Complaining of fatigue. Developed rigors in the afternoon.     ROS   Constitutional: Positive for activity change, appetite change and fatigue. Positive for chills and fever.   HENT: Negative for congestion, rhinorrhea and sore throat.  +ve multiple painful, nonexudative lesions of upper/lower gum line and hard palate.  Respiratory: Positive for nonproductive cough. Positive for shortness of breath, -ve wheezing and stridor.    Cardiovascular: Negative for chest pain, palpitations and leg swelling.   Gastrointestinal: Positive for nausea. Negative for abdominal pain, blood in stool, constipation, diarrhea and vomiting.   Genitourinary: Negative for decreased urine volume, difficulty urinating, dysuria, flank pain, frequency and urgency.   Musculoskeletal: Negative for arthralgias and +ve myalgias.   Neurological: Negative for syncope, weakness, light-headedness and headaches.   Psychiatric/Behavioral: Negative for agitation and confusion.         PEx  Temp:  [98 °F (36.7 °C)-101.2 °F (38.4 °C)]   Pulse:  []   Resp:  [18-20]   BP: ()/(48-59)   SpO2:  [92 %-97 %]     Intake/Output Summary (Last 24 hours) at 2/16/2019 1936  Last data filed at 2/16/2019 1400  Gross per 24 hour   Intake 1170 ml   Output 750 ml   Net 420 ml       Constitutional: She is oriented to person, place, and time. She is toxic appearing.  HENT:   Head: Normocephalic.   conjunctival pallor   Eyes: Pupils are equal, round, and reactive to light.   Throat: She has several ulcerations on her gum line and hard palate. No bleeding, no  exudate.  Neck: Normal range of motion. Supple.   Cardiovascular:Tachycardic, regular rhythm and normal heart sounds.   No murmur heard.  Pulmonary/Chest: Effort labored. No stridor. Mild respiratory distress. No wheezes. She has rales, L>R.  Abdominal: Soft. Bowel sounds are normal. She exhibits no distension. There is mild tenderness. There is no guarding.   Musculoskeletal: Normal range of motion. Improved edema of LE, no deformity.   Neurological: She is alert and oriented to person, place, and time.   Skin: Skin is warm and dry. Capillary refill takes less than 2 seconds. She is not diaphoretic.   Psychiatric: She has a normal mood and affect.   Nursing note and vitals reviewed.        Recent Labs   Lab 02/15/19  1344 02/16/19  0335 02/16/19  0831   WBC 11.92 9.00 8.91   HGB 8.8* 7.1* 7.4*   HCT 26.2* 20.9* 21.6*   PLT 13* 10* 11*     Recent Labs   Lab 02/14/19  0406 02/14/19  1738 02/15/19  0642 02/16/19  0527   * 127* 129* 128*   K 4.1 3.8 3.2* 3.2*   CL 93* 95 97 97   CO2 21* 20* 22* 22*   BUN 23 25* 28* 35*   CREATININE 0.8 0.8 0.9 1.0   GLU 89 108 121* 100   CALCIUM 8.1* 8.5* 8.5* 8.8   MG 1.9  --  1.7 1.9   PHOS 2.9  --  2.7 3.3     Recent Labs   Lab 02/13/19  0500 02/14/19  0406 02/15/19  0642 02/16/19  0527   ALKPHOS 679* 486* 459* 370*   ALT 75* 58* 53* 36   AST 75* 51* 53* 25   ALBUMIN 1.7* 1.4* 1.4* 1.8*   PROT 5.6* 5.1* 5.0* 5.5*   BILITOT 1.4* 1.8* 2.7* 2.9*   INR 1.3* 1.3*  --   --           Scheduled Meds:   (Magic mouthwash) 1:1:1 Benadryl 12.5mg/5ml liq, aluminum & magnesium hydroxide-simehticone (Maalox), lidocaine viscous 2%  5 mL Swish & Spit Q4H    acyclovir  10 mg/kg Intravenous Q8H    albuterol-ipratropium  3 mL Nebulization Q4H WAKE    fluconazole (DIFLUCAN) IVPB  400 mg Intravenous Q24H    lidocaine  1 patch Transdermal Q24H    lidocaine HCL 20 mg/ml (2%)  20 mL Other Once    pantoprazole  40 mg Oral Daily    piperacillin-tazobactam (ZOSYN) IVPB  4.5 g Intravenous Q8H     vancomycin (VANCOCIN) IVPB  1,000 mg Intravenous Q24H     Continuous Infusions:  As Needed:  sodium chloride, sodium chloride, acetaminophen, albuterol-ipratropium, hydrOXYzine HCl, loperamide, morphine, ondansetron, promethazine, ramelteon, sodium chloride 0.9%, sodium chloride 0.9%    Active Hospital Problems    Diagnosis  POA    *Sepsis due to urinary tract infection [A41.9, N39.0]  Yes    Fever in adult [R50.9]  Unknown    Palliative care encounter [Z51.5]  Not Applicable    Mouth ulcers [K12.1]  No    PFO (patent foramen ovale) [Q21.1]  Not Applicable    Hypomagnesemia [E83.42]  No    Hypokalemia [E87.6]  Yes    Pneumonia involving left lung [J18.9]  Yes    SOB (shortness of breath) [R06.02]  Yes    Weakness [R53.1]  Yes    History of CVA (cerebrovascular accident) [Z86.73]  Not Applicable    Hyponatremia [E87.1]  Yes    Pancytopenia [D61.818]  Yes    Hyperlipidemia [E78.5]  Yes     Chronic      Resolved Hospital Problems   No resolved problems to display.         Assessment and Plan    History of CVA (cerebrovascular accident)     Patient states that she had a stroke 20 years back. No residual neurological manifestion.  ECHO this admission consistent with PFO and shunt physiology.   - 02/05/2019: Critical Care contacted Vascular Neurology, Dr. Horn, to ask about the role of anti-platelets vs anticoagulation because the patient has PFO with history of stroke. No history of A fib. He recommends to start ASA 81 mg PO qD      Pneumonia involving left lung     On 02/03/2019: Patient  febrile this AM and UA c/w with UTI start Ceftriaxone,  At afternoon repeat fever to >102, blood cultures obtained and on abx for UTI, started on sepsis bolus 30cc/kg and monitoring of lactic acid levels. Antibiotics were changed to Piperacillin/Tazobactam and Vancomycin.      02/04/2019: Patient have complaints of left sided lateral chest wall pleuritic pain. Throughout the night patient remained febrile, O2 requirement  increasing this Am after 4am patient given 2 500ml saline boluses. This am patient is tachypneic, accessory muscle use, febrile, MAP >65, but pressures 90s/50s. CXR with significant new left sided lower& upper airspace disease, absent breath sounds in left mid to lower fields, dullness to percussion, right basilar absent breath sounds. On Exam patient also with distended JVP to angle of jaw. Critical Care consulted & have accepted patient. Azithromycin started     02/05/2019: During night patient received 1 L NS bolus for marginal BP. Patient reports her symptoms have improved today. BP now is stable. Afebrile last spike of fever: 39.1 (02/03/3019). Np leucocytosis. CXR today: Worsening severe pulmonary edema.  CT Chest consistent with pulmonary edema vs ARDS; however, patient requiring minimal oxygen (3L currently) and had received copious IVF due to sepsis. PNA possible but less likely given how rapid radiographic changes developed. Will start gentle diuresis. Finished course of CAP therapy with rocephin.     2/13/2019: Rpt CXR showed decreased bilateral infiltrates. However, patient with increased work of breathing, elevated PCT, elevated WBC w/ L shift, fever, mild tachycardia, and hypotension. Initiated broad spectrum antibiotics pending further workup (Bcx, LA, UA). Plan to de-escalate/discontinue pending cultures, clinical response.    2/15/2019: Increased work of breathing this morning, rpt CXR pending. Continuing Vanc/Zosyn due to concern for sepsis. ID consulted for assistance in evaluating oral lesions and antibiotic mgmt.     2/16/2019: Pulmonology consulted for consideration of Bronchoscopy. Patient has been on Zosyn/vancomycin without clinical improvement. She cannot be weaned of 2L O2 and repeat imaging shows persistent consolidative changes bilaterally, L>R. Concern for fungal/mold infection given immunocompromised status. ID and pulm recs appreciated.       Cardiac/Vascular   Hyperlipidemia     -  "Resume home medication: Statin.      Renal/   Hypokalemia     Monitor potassium level and replace as needed      Hypomagnesemia     Monitor Mg level and replace as needed      Hyponatremia     Likely hypervolemic hyponatremia, given pulmonary edema and fluid resuscitation for sepsis. Complicated by hypoalbuminemia.   Initially worsening, despite diuresis. Serum osmol (low) and urine lytes ordered (pending).  Complex case as ideally, fluid restriction and diuresis would be appropriate. However, currently, patient is hypotensive, with concern for sepsis. Additional fluid boluses likely to exacerbate pulmonary edema and hyponatremia. Trial of salt tabs , IV albumin. To receive 1U pRBC.   S/p 1U pRBC 2/14. Trial of albumin x3 doses today.   Improved; continue to monitor.   ID   * Sepsis due to urinary tract infection     - Urine cultures grew E. coli, susceptible to ceftriaxone, completed course  - Does have history of frequent UTIs, previously followed with [facility] (most recently seen 2015), normal cysto in 2014  -Rpt UA pending      Transaminitis  Etiology unclear. Will check RUQ US (benign), hep panel (-ve) and trend. Trend synthetic function with am labs.   LFTs trending down 2/13.     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  Prior to [facility] [place] had her blood drawn and was found to have a Hgb of 6.6. At that time her PCP instructed her to go to the emergency department for a blood transfusion.  She is s/p 1unit Platelet transfusion - developed transfusion reaction during this - received IM epi, Benadryl/ 125 methylpred for concerns of throat swelling per Dr. Gonzalez.    Heme/oncology consulted (02/03/2019) their recs and plan: " Anemia, thrombocytopenia. Ddx includes primary BM disorder such as MDS, vs secondary BM suppression from infection (recent Chikungunya infection, known to cause thrombocytopenia). Reviewed medication list, no known " culprits. Possible also slow GIB from adenoma.  -No evidence of iron deficiency, B12/folate nml  -No evidence of hemolysis  -No evidence of splenomegaly, no evidence of cirrhosis  -No evidence of coagulation      S/p bone marrow biopsy on 2/12. Consistent with high grade myeloid disorder, cytogenics pending. Hematology consulted regarding further management and are recommending OP f/u.   -Hold NSAIDs  -Transfuse Hb<7 or plt<10 unless active bleeding. Required 1u pRBC 2/14.  -Consider repeat colonoscopy given recommended to be done 5 years from adenoma visualized in 2014   Other   SOB (shortness of breath)     - Likely due to volume overload and pulmonary edema  - currently dependent on 2L nc  - continue to wean as tolerated   - unable to wean off 1-2L. Will trial albumin, lasix if BP tolerates. Rpt CXR showing persistent consolidative changes bilaterally. She has not clinically improved despite broad spectrum antibiotics. Pulmonology consulted, ID consulted, appreciate recs.       Weakness     - Generalized, likely multifactorial (age, acute illness, malignancy, infection, deconditioning)  - PT/OT consulted        High Risk Conditions  Sepsis, pancytopenia, Pneumonia     Diet: regular thin diet  GI PPx:   DVT PPx:    MING/SCD, avoid heparin products due to thrombocytopenia     Goals of Care: Full code  Discharge plan: pending hematology recs, palliative care recs    Time (minutes) spent in care of the patient (Greater than 1/2 spent in direct face-to-face contact) 85 minutes      Marcelle Jones MD  Staff Hospitalist  Department of Hospital Medicine  [facility]   [id number]

## 2019-02-17 NOTE — NURSING
Spoke with Dr Jones after NG tube placement was not successful from meeting resistance after three nurse attempts. MD to see pt at bedside.

## 2019-02-17 NOTE — CONSULTS
"[facility] Consult Note    Date of Consult: 02/17/2019    Reason for Consult:     Atypical Pneumonia    Subjective:      History of Present Illness:  Vicki Peña is a 83 y.o. female with hx CVA, HLD, MDS who presented to ED on Feb 2nd c/o myalgias and generalized fatigue since the new year.  Since that time she has undergone bone marrow biopsy for pancytopenia, revealing MDS.  She has remained hospitalized without improvement and has continued to fever despite broad spectrum antibiotics. All cultures and work up have been unrevealing.  CT of her chest has bilateral infiltrates with small bilateral pleural effusions.   Of note, pt travels back and forth to [place] for months at a time.  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  In [place], she has contact with chickens but has sonny or atypical pets.  Several family members have both cats and dogs.      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 Authorizing Provider   albuterol 90 mcg/actuation inhaler Inhale 1-2 puffs into the lungs every 6 (six) hours as needed for Wheezing. 2/15/17  Yes Yaron Waite MD   ascorbic acid (VITAMIN C) 500 MG tablet Take 1 tablet (500 mg total) by mouth 2 (two) times daily. 10/21/15  Yes Mahnaz Aviles NP   cranberry 400 mg Cap Take 1 capsule by mouth once daily.    Yes Historical " Provider, MD   GLUCOSAMINE SULFATE (GLUCOSAMINE ORAL) Take 2 tablets by mouth once daily.   Yes Historical Provider, MD   ibuprofen (ADVIL,MOTRIN) 600 MG tablet Take 1 tablet (600 mg total) by mouth every 6 (six) hours as needed for Pain. 18  Yes Milady Mandel PA-C   multivitamin capsule Take 1 capsule by mouth once daily.     Yes Historical Provider, MD   naproxen (NAPROSYN) 500 MG tablet Take 1 tablet (500 mg total) by mouth 2 (two) times daily.  Patient taking differently: Take 500 mg by mouth 2 (two) times daily as needed.  9/23/15  Yes Yaron Waite MD   simvastatin (ZOCOR) 40 MG tablet Take 1 tablet (40 mg total) by mouth every evening. 18 Yes Yaron Waite MD   ondansetron (ZOFRAN-ODT) 8 MG TbDL Take 1 tablet (8 mg total) by mouth every 12 (twelve) hours as needed. 19   Yaron Waite MD   ranitidine (ZANTAC) 150 MG tablet Take 1 tablet (150 mg total) by mouth 2 (two) times daily. PRN heartburn 19   Yaron Waite MD          Objective:   Last 24 Hour Vital Signs:  BP  Min: 115/54  Max: 133/60  Temp  Av.8 °F (37.7 °C)  Min: 98.1 °F (36.7 °C)  Max: 102.9 °F (39.4 °C)  Pulse  Av.5  Min: 88  Max: 103  Resp  Av.1  Min: 16  Max: 20  SpO2  Av.2 %  Min: 93 %  Max: 98 %  Body mass index is 28.68 kg/m².  I/O last 3 completed shifts:  In: 1820 [P.O.:720; IV Piggyback:1100]  Out: 1450 [Urine:1450]    Physical Examination:  General: Alert and awake.  Ill-appearing  HENT:  NCAT; anicteric sclera; oral ulcers present  Cardio:  Regular and tachycardic; no murmurs  Resp:  Unlabored.  Scattered rhonchi with bibasilar crackles  Abdom: Soft, with mild diffuse TTP.  Normoactive bowel sounds  Extrem: Warm and dry.  No edema  Skin:  No lesions or rashes  Neuro:  AAOx3; cooperative and pleasant.  Generalizes weakness present with no focal deficits      Laboratory:  Most Recent Data:  Lab Results   Component Value Date    WBC 8.52 2019    WBC 8.52 2019    HGB 6.5 (L) 2019    HGB 6.5  (L) 02/17/2019    HCT 19.0 (LL) 02/17/2019    HCT 19.0 (LL) 02/17/2019    MCV 79 (L) 02/17/2019    MCV 79 (L) 02/17/2019    PLT 11 (LL) 02/17/2019    PLT 11 (LL) 02/17/2019     Lab Results   Component Value Date    CREATININE 1.2 02/17/2019    BUN 44 (H) 02/17/2019     (L) 02/17/2019    K 3.1 (L) 02/17/2019    CL 97 02/17/2019    CO2 21 (L) 02/17/2019     Lab Results   Component Value Date    ALT 46 (H) 02/17/2019    AST 37 02/17/2019     (H) 02/02/2019    ALKPHOS 364 (H) 02/17/2019    BILITOT 2.8 (H) 02/17/2019        Assessment:     Vicki Peña is a 83 y.o. female with hx CVA and MDS now with protracted hospital stay continuing to decline clinically and fever despite broad spectrum antibiotics.      Plan:     - given hx exposure, obtain AFB sputum to r/o mycobacterial infections, though would be atypical presentation  - needs bronchoscopy, however, markedly thrombocytopenic.  Consult hematology to discuss best option to transfuse PLT  - reasonable to explore immunoglobulin deficiency which may have precipitated anaphylaxis to PLT transfusion  - currently on empiric Fluconazole.  Fungitell, Histo, Blasto pending  - also reasonable to begin empiric Doxycycline for atypical and zoonotic infections      Thank you for the consult.  Please feel free to contact us with any questions or concerns regarding the care of this patient.     Rocky Mena MD  [facility], -[facility]

## 2019-02-18 PROBLEM — E83.42 HYPOMAGNESEMIA: Status: RESOLVED | Noted: 2019-02-05 | Resolved: 2019-02-18

## 2019-02-18 PROBLEM — D46.Z MDS (MYELODYSPLASTIC SYNDROME), HIGH GRADE: Status: ACTIVE | Noted: 2019-02-18

## 2019-02-18 PROBLEM — A41.9 SEPSIS DUE TO URINARY TRACT INFECTION: Status: RESOLVED | Noted: 2019-02-03 | Resolved: 2019-02-18

## 2019-02-18 PROBLEM — Z51.5 COMFORT MEASURES ONLY STATUS: Status: ACTIVE | Noted: 2019-02-18

## 2019-02-18 PROBLEM — N39.0 SEPSIS DUE TO URINARY TRACT INFECTION: Status: RESOLVED | Noted: 2019-02-03 | Resolved: 2019-02-18

## 2019-02-18 PROBLEM — D69.6 THROMBOCYTOPENIA: Status: ACTIVE | Noted: 2019-02-18

## 2019-02-18 PROBLEM — E87.1 HYPONATREMIA: Status: RESOLVED | Noted: 2019-02-03 | Resolved: 2019-02-18

## 2019-02-18 PROBLEM — D64.9 SEVERE ANEMIA: Status: ACTIVE | Noted: 2019-02-18

## 2019-02-18 PROBLEM — E87.6 HYPOKALEMIA: Status: RESOLVED | Noted: 2019-02-05 | Resolved: 2019-02-18

## 2019-02-18 PROBLEM — Z75.8 DISCHARGE PLANNING ISSUES: Status: ACTIVE | Noted: 2019-02-18

## 2019-02-18 LAB
ACANTHOCYTES BLD QL SMEAR: PRESENT
ALBUMIN SERPL BCP-MCNC: 1.6 G/DL
ALP SERPL-CCNC: 303 U/L
ALT SERPL W/O P-5'-P-CCNC: 28 U/L
ANION GAP SERPL CALC-SCNC: 9 MMOL/L
ANISOCYTOSIS BLD QL SMEAR: SLIGHT
AST SERPL-CCNC: 18 U/L
BACTERIA BLD CULT: NORMAL
BACTERIA BLD CULT: NORMAL
BASOPHILS NFR BLD: 0 %
BILIRUB SERPL-MCNC: 3.5 MG/DL
BLASTS NFR BLD MANUAL: 9 %
BLD PROD TYP BPU: NORMAL
BLOOD UNIT EXPIRATION DATE: NORMAL
BLOOD UNIT TYPE CODE: 5100
BLOOD UNIT TYPE: NORMAL
BUN SERPL-MCNC: 43 MG/DL
BURR CELLS BLD QL SMEAR: ABNORMAL
CALCIUM SERPL-MCNC: 8.8 MG/DL
CHLORIDE SERPL-SCNC: 103 MMOL/L
CO2 SERPL-SCNC: 20 MMOL/L
CODING SYSTEM: NORMAL
CREAT SERPL-MCNC: 1.2 MG/DL
DACRYOCYTES BLD QL SMEAR: ABNORMAL
DIFFERENTIAL METHOD: ABNORMAL
DISPENSE STATUS: NORMAL
EOSINOPHIL NFR BLD: 0 %
EPO SERPL-ACNC: 111.4 MIU/ML
ERYTHROCYTE [DISTWIDTH] IN BLOOD BY AUTOMATED COUNT: 17.4 %
EST. GFR  (AFRICAN AMERICAN): 49 ML/MIN/1.73 M^2
EST. GFR  (NON AFRICAN AMERICAN): 42.5 ML/MIN/1.73 M^2
GLUCOSE SERPL-MCNC: 137 MG/DL
HCT VFR BLD AUTO: 24.7 %
HGB BLD-MCNC: 8.4 G/DL
HYPOCHROMIA BLD QL SMEAR: ABNORMAL
IGA SERPL-MCNC: 204 MG/DL
IGG SERPL-MCNC: 956 MG/DL
IGM SERPL-MCNC: 42 MG/DL
IMM GRANULOCYTES # BLD AUTO: ABNORMAL K/UL
IMM GRANULOCYTES NFR BLD AUTO: ABNORMAL %
LYMPHOCYTES NFR BLD: 26 %
MAGNESIUM SERPL-MCNC: 1.9 MG/DL
MCH RBC QN AUTO: 28 PG
MCHC RBC AUTO-ENTMCNC: 34 G/DL
MCV RBC AUTO: 82 FL
METAMYELOCYTES NFR BLD MANUAL: 4 %
MONOCYTES NFR BLD: 8 %
MYELOCYTES NFR BLD MANUAL: 1 %
NEUTROPHILS NFR BLD: 50 %
NEUTS BAND NFR BLD MANUAL: 2 %
NRBC BLD-RTO: 1 /100 WBC
OVALOCYTES BLD QL SMEAR: ABNORMAL
PHOSPHATE SERPL-MCNC: 4 MG/DL
PLATELET # BLD AUTO: 14 K/UL
PLATELET BLD QL SMEAR: ABNORMAL
PMV BLD AUTO: ABNORMAL FL
POIKILOCYTOSIS BLD QL SMEAR: SLIGHT
POLYCHROMASIA BLD QL SMEAR: ABNORMAL
POTASSIUM SERPL-SCNC: 3.3 MMOL/L
PROT SERPL-MCNC: 5.7 G/DL
RBC # BLD AUTO: 3 M/UL
SCHISTOCYTES BLD QL SMEAR: ABNORMAL
SCHISTOCYTES BLD QL SMEAR: PRESENT
SODIUM SERPL-SCNC: 132 MMOL/L
TRANS ERYTHROCYTES VOL PATIENT: NORMAL ML
VANCOMYCIN TROUGH SERPL-MCNC: 18.7 UG/ML
WBC # BLD AUTO: 7.61 K/UL

## 2019-02-18 PROCEDURE — 63600175 PHARM REV CODE 636 W HCPCS: Performed by: INTERNAL MEDICINE

## 2019-02-18 PROCEDURE — 82784 ASSAY IGA/IGD/IGG/IGM EACH: CPT | Mod: 59

## 2019-02-18 PROCEDURE — 99233 PR SUBSEQUENT HOSPITAL CARE,LEVL III: ICD-10-PCS | Mod: GC,,, | Performed by: INTERNAL MEDICINE

## 2019-02-18 PROCEDURE — 63600175 PHARM REV CODE 636 W HCPCS: Performed by: PHYSICIAN ASSISTANT

## 2019-02-18 PROCEDURE — 80202 ASSAY OF VANCOMYCIN: CPT

## 2019-02-18 PROCEDURE — 20000000 HC ICU ROOM

## 2019-02-18 PROCEDURE — 99233 PR SUBSEQUENT HOSPITAL CARE,LEVL III: ICD-10-PCS | Mod: ,,, | Performed by: HOSPITALIST

## 2019-02-18 PROCEDURE — 84100 ASSAY OF PHOSPHORUS: CPT

## 2019-02-18 PROCEDURE — 99233 SBSQ HOSP IP/OBS HIGH 50: CPT | Mod: GC,,, | Performed by: INTERNAL MEDICINE

## 2019-02-18 PROCEDURE — 36415 COLL VENOUS BLD VENIPUNCTURE: CPT

## 2019-02-18 PROCEDURE — 94761 N-INVAS EAR/PLS OXIMETRY MLT: CPT

## 2019-02-18 PROCEDURE — 80053 COMPREHEN METABOLIC PANEL: CPT

## 2019-02-18 PROCEDURE — 83735 ASSAY OF MAGNESIUM: CPT

## 2019-02-18 PROCEDURE — 85007 BL SMEAR W/DIFF WBC COUNT: CPT

## 2019-02-18 PROCEDURE — 25000003 PHARM REV CODE 250: Performed by: HOSPITALIST

## 2019-02-18 PROCEDURE — 25000003 PHARM REV CODE 250: Performed by: INTERNAL MEDICINE

## 2019-02-18 PROCEDURE — 25000242 PHARM REV CODE 250 ALT 637 W/ HCPCS: Performed by: STUDENT IN AN ORGANIZED HEALTH CARE EDUCATION/TRAINING PROGRAM

## 2019-02-18 PROCEDURE — P9021 RED BLOOD CELLS UNIT: HCPCS

## 2019-02-18 PROCEDURE — 94640 AIRWAY INHALATION TREATMENT: CPT

## 2019-02-18 PROCEDURE — 63600175 PHARM REV CODE 636 W HCPCS: Performed by: STUDENT IN AN ORGANIZED HEALTH CARE EDUCATION/TRAINING PROGRAM

## 2019-02-18 PROCEDURE — 63600175 PHARM REV CODE 636 W HCPCS: Performed by: HOSPITALIST

## 2019-02-18 PROCEDURE — 99233 SBSQ HOSP IP/OBS HIGH 50: CPT | Mod: ,,, | Performed by: HOSPITALIST

## 2019-02-18 PROCEDURE — 85027 COMPLETE CBC AUTOMATED: CPT

## 2019-02-18 PROCEDURE — 25000003 PHARM REV CODE 250: Performed by: PHYSICIAN ASSISTANT

## 2019-02-18 PROCEDURE — 27000221 HC OXYGEN, UP TO 24 HOURS

## 2019-02-18 RX ORDER — MORPHINE SULFATE 2 MG/ML
2 INJECTION, SOLUTION INTRAMUSCULAR; INTRAVENOUS EVERY 4 HOURS PRN
Status: DISCONTINUED | OUTPATIENT
Start: 2019-02-18 | End: 2019-02-19 | Stop reason: HOSPADM

## 2019-02-18 RX ORDER — LORAZEPAM 2 MG/ML
0.5 INJECTION INTRAMUSCULAR
Status: DISCONTINUED | OUTPATIENT
Start: 2019-02-18 | End: 2019-02-19 | Stop reason: HOSPADM

## 2019-02-18 RX ORDER — MORPHINE SULFATE 2 MG/ML
1 INJECTION, SOLUTION INTRAMUSCULAR; INTRAVENOUS
Status: DISCONTINUED | OUTPATIENT
Start: 2019-02-18 | End: 2019-02-19 | Stop reason: HOSPADM

## 2019-02-18 RX ORDER — LORAZEPAM 0.5 MG/1
1 TABLET ORAL
Status: DISCONTINUED | OUTPATIENT
Start: 2019-02-18 | End: 2019-02-19 | Stop reason: HOSPADM

## 2019-02-18 RX ORDER — FLUCONAZOLE 2 MG/ML
200 INJECTION, SOLUTION INTRAVENOUS
Status: DISCONTINUED | OUTPATIENT
Start: 2019-02-19 | End: 2019-02-18

## 2019-02-18 RX ORDER — POTASSIUM CHLORIDE 7.45 MG/ML
10 INJECTION INTRAVENOUS
Status: DISCONTINUED | OUTPATIENT
Start: 2019-02-18 | End: 2019-02-18

## 2019-02-18 RX ORDER — ENOXAPARIN SODIUM 100 MG/ML
40 INJECTION SUBCUTANEOUS EVERY 24 HOURS
Status: DISCONTINUED | OUTPATIENT
Start: 2019-02-18 | End: 2019-02-18

## 2019-02-18 RX ADMIN — ACYCLOVIR SODIUM 670 MG: 1000 INJECTION, SOLUTION INTRAVENOUS at 11:02

## 2019-02-18 RX ADMIN — LIDOCAINE 1 PATCH: 50 PATCH TOPICAL at 12:02

## 2019-02-18 RX ADMIN — VANCOMYCIN HYDROCHLORIDE 1000 MG: 1 INJECTION, POWDER, LYOPHILIZED, FOR SOLUTION INTRAVENOUS at 05:02

## 2019-02-18 RX ADMIN — DOXYCYCLINE 100 MG: 100 INJECTION, POWDER, LYOPHILIZED, FOR SOLUTION INTRAVENOUS at 09:02

## 2019-02-18 RX ADMIN — Medication 1 MG: at 08:02

## 2019-02-18 RX ADMIN — IPRATROPIUM BROMIDE AND ALBUTEROL SULFATE 3 ML: .5; 3 SOLUTION RESPIRATORY (INHALATION) at 08:02

## 2019-02-18 RX ADMIN — POTASSIUM CHLORIDE 10 MEQ: 10 INJECTION, SOLUTION INTRAVENOUS at 04:02

## 2019-02-18 RX ADMIN — POTASSIUM CHLORIDE 10 MEQ: 10 INJECTION, SOLUTION INTRAVENOUS at 02:02

## 2019-02-18 RX ADMIN — DOXYCYCLINE 100 MG: 100 INJECTION, POWDER, LYOPHILIZED, FOR SOLUTION INTRAVENOUS at 05:02

## 2019-02-18 RX ADMIN — PIPERACILLIN AND TAZOBACTAM 4.5 G: 4; .5 INJECTION, POWDER, LYOPHILIZED, FOR SOLUTION INTRAVENOUS; PARENTERAL at 06:02

## 2019-02-18 RX ADMIN — PIPERACILLIN AND TAZOBACTAM 4.5 G: 4; .5 INJECTION, POWDER, LYOPHILIZED, FOR SOLUTION INTRAVENOUS; PARENTERAL at 03:02

## 2019-02-18 RX ADMIN — POTASSIUM CHLORIDE 10 MEQ: 10 INJECTION, SOLUTION INTRAVENOUS at 12:02

## 2019-02-18 RX ADMIN — DIPHENHYDRAMINE HYDROCHLORIDE 25 MG: 50 INJECTION INTRAMUSCULAR; INTRAVENOUS at 02:02

## 2019-02-18 RX ADMIN — IPRATROPIUM BROMIDE AND ALBUTEROL SULFATE 3 ML: .5; 3 SOLUTION RESPIRATORY (INHALATION) at 07:02

## 2019-02-18 RX ADMIN — IPRATROPIUM BROMIDE AND ALBUTEROL SULFATE 3 ML: .5; 3 SOLUTION RESPIRATORY (INHALATION) at 12:02

## 2019-02-18 RX ADMIN — IPRATROPIUM BROMIDE AND ALBUTEROL SULFATE 3 ML: .5; 3 SOLUTION RESPIRATORY (INHALATION) at 04:02

## 2019-02-18 RX ADMIN — ACETAMINOPHEN 650 MG: 650 SUPPOSITORY RECTAL at 12:02

## 2019-02-18 NOTE — H&P
[facility]  Critical Care Medicine  History & Physical    Patient Name: Vicki Peña  MRN: [id number]  Admission Date: 2/2/2019  Hospital Length of Stay: 16 days  Code Status: Full Code  Attending Physician: Anne Lyons MD   Primary Care Provider: Yaron Waite MD   Principal Problem: Sepsis due to urinary tract infection    Subjective:     HPI:  Vicki Peña is a 83 y.o. female with hx CVA, HLD and newly diagnosed MDS who presented to ED on Feb 2nd c/o myalgias and generalized fatigue since the new year.  Since that time she has undergone bone marrow biopsy for pancytopenia, revealing above diagnosis of MDS with cytogenetics result and NGS result pending.  She has remained hospitalized without improvement and has continued to fever despite broad spectrum antimicrobials. All cultures and work up have been unrevealing.  CT of her chest has bilateral infiltrates with small bilateral pleural effusions. Due to persistent fevers, worsening overall condition critical care was re-consulted.         Hospital/ICU Course:  Presented to PCP on 2/2 with generalized body aches, nausea and vomiting of two week duration with severe bone pain. Patient with poor appetite. She has had a lot of headaches and the back of her neck hurts.  She has had a lot of chills.  No respiratory symptoms. Patient's last travel out of country to [facility] was Dec 2018. Admitted 2/2 due to anemia. 2/4 given 30cc/kg due to persistent fever, found to have UA - complaining of pleuritic chest pain - with CXR displaying left sided lower and upper airspace disease. 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 Stepped up to ICU 2/4 and stepped down 2/6. 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 Underwent and completed course of  CAP therapy. 2/13 with repeat CXR showing decreased infiltrates, patient with tachypnea, increased WOB, with leukocytosis and left shift, hypotension. Added broad spectrum abx. Patient continued to deteriorate clinically, remained on broad spectrum antimicrobials with fluconazole, zosyn, acyclovir, vancomycin with multiple serologies for fungal organisms and molds. Patient has undergone extensive testing with laboratory and microbiological workup. All cultures outside of a culture from mouth (which grew oral fernando) have been negative. Respiratory panels have been unrevealing. Parvo b19 IgG positive, IgM negative, prealbumin 7 on admit, HIV 1/2 negative ag/ab, acute hepatitis negative, EBV IgG positive, Fungitell negative, HSV1/HSV2 negative, AML negative. Histo urine pending, blastomyces antigen pending, aspergillus negative, IgA levels WNL. Transferred back to ICU due to lethargy, tachypnea and continued fevers. Goals of care addressed with 3 daughters today at bedside.      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    UTI (urinary tract infection)        Past Surgical History:   Procedure Laterality Date    CATARACT EXTRACTION W/  INTRAOCULAR LENS IMPLANT Left 7/21/14    CATARACT EXTRACTION W/  INTRAOCULAR LENS IMPLANT Right 8/11/14    CYSTOSCOPY      INSERTION-INTRAOCULAR LENS (IOL) Right 8/11/2014    Performed by Rich Camejo MD at [facility] [place]    INSERTION-INTRAOCULAR LENS (IOL) Left 7/21/2014    Performed by Rich Camejo MD at [facility] [place]    PHACOEMULSIFICATION-ASPIRATION-CATARACT Right 8/11/2014    Performed by Rich Camejo MD at [facility] [place]    PHACOEMULSIFICATION-ASPIRATION-CATARACT Left 7/21/2014    Performed by Rich Camejo MD at [facility] [place]    screening N/A 9/5/2014    Performed by Mt Drake MD at [facility]  ENDO (4TH FLR)       Review of patient's allergies indicates:   Allergen Reactions    Tramadol Rash       Family History     Problem Relation (Age of Onset)    Cancer Brother, Brother, Sister        Tobacco Use    Smoking status: Never Smoker    Smokeless tobacco: Never Used   Substance and Sexual Activity    Alcohol use: No     Alcohol/week: 0.0 oz     Comment: moderate    Drug use: No    Sexual activity: Yes     Partners: Male      Review of Systems   Unable to perform ROS: Mental status change     Objective:     Vital Signs (Most Recent):  Temp: 98.1 °F (36.7 °C) (02/18/19 1340)  Pulse: 94 (02/18/19 1535)  Resp: (!) 37 (02/18/19 1535)  BP: (!) 152/70 (02/18/19 1535)  SpO2: 98 % (02/18/19 1535) Vital Signs (24h Range):  Temp:  [98.1 °F (36.7 °C)-100.6 °F (38.1 °C)] 98.1 °F (36.7 °C)  Pulse:  [] 94  Resp:  [16-43] 37  SpO2:  [92 %-99 %] 98 %  BP: (116-152)/(56-78) 152/70   Weight: 66.6 kg (146 lb 13.2 oz)  Body mass index is 28.68 kg/m².      Intake/Output Summary (Last 24 hours) at 2/18/2019 1600  Last data filed at 2/18/2019 0912  Gross per 24 hour   Intake 20 ml   Output 750 ml   Net -730 ml       Physical Exam   Constitutional: She has a sickly appearance. She appears ill. She appears distressed. Nasal cannula in place.   HENT:   Head: Atraumatic.   Oral ulcers   Cardiovascular: Regular rhythm, S1 normal, S2 normal and intact distal pulses.   Pulses:       Radial pulses are 2+ on the right side, and 2+ on the left side.   Pulmonary/Chest: She has rhonchi in the right lower field and the left upper field. She has rales.   Abdominal: Soft. There is tenderness.   Musculoskeletal: She exhibits no edema.   Skin: Skin is warm and dry.   Nursing note and vitals reviewed.      Vents:     Lines/Drains/Airways     Drain            Female External Urinary Catheter 02/10/19 2000 7 days          Peripheral Intravenous Line                 Midline Catheter Insertion/Assessment  - Single Lumen 02/14/19 1705 Left  basilic vein (medial side of arm) 18g x 8cm 3 days         Peripheral IV - Single Lumen 02/17/19 1617 Anterior;Right Hand less than 1 day              Significant Labs:    CBC/Anemia Profile:  Recent Labs   Lab 02/17/19  1013 02/18/19  0728   WBC 8.52  8.52 7.61   HGB 6.5*  6.5* 8.4*   HCT 19.0*  19.0* 24.7*   PLT 11*  11* 14*   MCV 79*  79* 82   RDW 18.8*  18.8* 17.4*        Chemistries:  Recent Labs   Lab 02/17/19  0545 02/18/19  0728   * 132*   K 3.1* 3.3*   CL 97 103   CO2 21* 20*   BUN 44* 43*   CREATININE 1.2 1.2   CALCIUM 8.6* 8.8   ALBUMIN 1.7* 1.6*   PROT 5.3* 5.7*   BILITOT 2.8* 3.5*   ALKPHOS 364* 303*   ALT 46* 28   AST 37 18   MG 2.0 1.9   PHOS 3.3 4.0       All pertinent labs within the past 24 hours have been reviewed.    Significant Imaging: I have reviewed all pertinent imaging results/findings within the past 24 hours.    Assessment/Plan:     Neuro   History of CVA (cerebrovascular accident)    Patient with CVA ~ 20 years ago without residual neurological manifestion. TTE this admission consistent with PFO and shunt physiology, preserved EF, no diastolic dysfunction.              ENT   Mouth ulcers    - magic mouth wash ordered  - a significant contributor to patients overall condition and deterioration      Pulmonary   Pneumonia involving left lung    - patient initially treated for CAP, due to continued fevers and unknown cause patient has been broad spectrum antimicrobials for 2/16 until present  - zosyn commenced on 2/13  - family currently does not desire any intensive therapy - deferring bronch at this point  -Parvo b19 IgG positive, IgM negative, prealbumin 7 on admit, HIV 1/2 negative ag/ab, acute hepatitis negative, EBV IgG positive, Fungitell negative, HSV1/HSV2 negative, AML negative. Histo urine pending, blastomyces antigen pending, aspergillus negative, IgA levels WNL.   - follow TB and legionella testing              Cardiac/Vascular   Hyperlipidemia   -  No current  indication due to patients overall condition   Hematology   Thrombocytopenia    - if patient needs platelet transfusion please premedicate with tylenol and benadryl and have methylprednisolone prepared but not given   - transfuse for platelets <10      Oncology   MDS (myelodysplastic syndrome), high grade    Patient with known cytopenias dating back to 2005, found to have t cell clonal rearrangement in 2012 and now with high risk MDS   - per IPSS-R patient with 7.5 high risk score    - due to current condition and overall debility, unlikely patient would tolerate chemotherapy   - hem/onc following for cytogenetics    - if patient needs platelet transfusion please premedicate with tylenol and benadryl and have methylprednisolone prepared but not given      Severe anemia    - MDS found on BMB performed on 2/12  - 7.5 points, IPSS-R Score, Very high risk, 0.8 years, Median survival, Median time to 25% AML evolution: 0.73 years     Pancytopenia    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 Prior to [facility] arrival she had her blood drawn and was found to have a Hgb of 6.6. At that time her PCP instructed her to go to the emergency department for a blood transfusion. She is s/p 1unit Platelet transfusion - developed transfusion reaction during this - received IM epi, Benadryl/ 125 methylpred for concerns of throat swelling per Dr. Gonzalez. She also had t cell clonal rearrangment diagnosed in 2012. Underwent bone marrow biopsy on 2/12 with diagnosis of MDS   - she has an IPSS -R score of 7.5, high risk MDS. Cytogenics pending.    - Transfuse Hb <7 and platelets <10.              Other   Discharge planning issues    Patient's case discussed at bedside with three of her daughters, including her POA who are waiting for their brother to arrive to make final decisions regarding code status  - at the moment patient is to remain full code  - they are leaning towards transitioning patient to home  with home hospice      Fever in adult    83 year old with fatigue, body aches, recurrent and relapsing fever despite broad spectrum antimicrobials. All lab testing and microbiological evaluation have been unrevealing. Patient continues to have febrile episodes despite broad spectrum antimicrobials.     ID following and recommending bronchoscopy - patient's family is currently against invasive measure such as bronchoscopy and are continuing to debate initiating comfort measures.        Weakness    - Generalized, likely multifactorial (age, acute illness, deconditioning)  - PT/OT consulted         Critical Care Daily Checklist:    A: Awake: RASS Goal/Actual Goal: RASS Goal: 0-->alert and calm  Actual: Ambrose Agitation Sedation Scale (RASS): Alert and calm   B: Spontaneous Breathing Trial Performed?     C: SAT & SBT Coordinated?  NA                      D: Delirium: CAM-ICU Overall CAM-ICU: Negative   E: Early Mobility Performed? NA   F: Feeding Goal: Goals: 1. Meet >75% EEN/EPN 2. Prevent >2% dry weight loss over one week  Status: Nutrition Goal Status: goal not met   Current Diet Order   Procedures    Diet Adult Regular (IDDSI Level 7) Fluid - 1000mL; Thin     Order Specific Question:   Fluid restriction:     Answer:   Fluid - 1000mL     Order Specific Question:   Fluid consistency:     Answer:   Thin      AS: Analgesia/Sedation NA   T: Thromboembolic Prophylaxis lovenox   H: HOB > 300 YES   U: Stress Ulcer Prophylaxis (if needed) NA   G: Glucose Control    B: Bowel Function Stool Occurrence: 2   I: Indwelling Catheter (Lines & Ojeda) Necessity PIVs   D: De-escalation of Antimicrobials/Pharmacotherapies Pending clinical course    Plan for the day/ETD Family meeting regarding goals of care and code status    Code Status:  Family/Goals of Care: Full Code  Reviewed on multiple occasions with family, will continue to revisit        Critical secondary to Patient has a condition that poses threat to life and bodily  function: myelodysplasia, fever of unknown origin      Critical care was time spent personally by me on the following activities: development of treatment plan with patient or surrogate and bedside caregivers, discussions with consultants, evaluation of patient's response to treatment, examination of patient, ordering and performing treatments and interventions, ordering and review of laboratory studies, ordering and review of radiographic studies, pulse oximetry, re-evaluation of patient's condition. This critical care time did not overlap with that of any other provider or involve time for any procedures.     Akhil Gandara MD  Critical Care Medicine  [facility]

## 2019-02-18 NOTE — PROGRESS NOTES
Hospital Medicine  Progress note    Team: [facility] Marcelle Jones MD  Admit Date: 2/2/2019  JACK   Length of Stay:  LOS: 16 days   Code status: Full Code    Principal Problem:  Sepsis due to urinary tract infection    Overview:    Interval hx: Patient seen and examined at bedside. Mentation worse today. Appears very ill. Opening eyes spontaneously but unable to follow commands. Very weak.     I had a long discussion with the family today about my concerns. She is not getting better despite aggressive medical therapy. Options to pursue invasive procedures (NGT, bronchoscopy) and the risks/benefits were again discussed. They would like to discuss this with their family but are in consensus that they do not want aggressive measures (ACLS) and would not like their mother to suffer. They would like to continue current management and reassess. They are interested in hospice care. They are unhappy with room that she was moved to (-ve pressure room) and nursing ratio and request ICU transfer. As patient's clinical status has deteriorated (and expected to continue to deteriorate), I am in agreement that she would benefit from higher level of care. I consulted ICU who have accepted the patient. I have communicated with Dr. Gandara the information conveyed to me through the blood bank regarding platelet transfusion, should they choose to proceed with bronchoscopy and NGT placement.      ROS   Constitutional: Positive for activity change, appetite change and fatigue. Positive for chills and fever.   HENT: Negative for congestion, rhinorrhea and sore throat.  +ve multiple painful, nonexudative lesions of upper/lower gum line and hard palate.  Respiratory: Positive for nonproductive cough. Positive for shortness of breath, -ve wheezing and stridor.    Cardiovascular: Negative for chest pain, palpitations and leg swelling.   Gastrointestinal: Positive for nausea. Negative for abdominal pain, blood in stool, constipation,  diarrhea and vomiting.   Genitourinary: Negative for decreased urine volume, difficulty urinating, dysuria, flank pain, frequency and urgency.   Musculoskeletal: Negative for arthralgias and +ve myalgias.   Neurological: Negative for syncope, weakness, light-headedness and headaches.   Psychiatric/Behavioral: Negative for agitation and confusion.         PEx  Temp:  [98.1 °F (36.7 °C)-100.6 °F (38.1 °C)]   Pulse:  []   Resp:  [16-43]   BP: (116-152)/(56-78)   SpO2:  [92 %-99 %]     Intake/Output Summary (Last 24 hours) at 2/18/2019 1542  Last data filed at 2/18/2019 0912  Gross per 24 hour   Intake 620 ml   Output 750 ml   Net -130 ml       Constitutional: She is lethargic. She is oriented to self and place. She is toxic appearing.  HENT:   Head: Normocephalic.   conjunctival pallor   Eyes: Pupils are equal, round, and reactive to light.   Throat: She has several ulcerations on her gum line and hard palate. New ulceration and discoloration of tongue. No bleeding, no exudate. Adentulous.  Neck: Normal range of motion. Supple.   Cardiovascular:Tachycardic, regular rhythm and normal heart sounds.   No murmur heard.  Pulmonary/Chest: Effort labored. No stridor. Mild respiratory distress. No wheezes. She has rales, L>R.  Abdominal: Soft. Bowel sounds are normal. She exhibits no distension. There is mild tenderness. There is no guarding.   Musculoskeletal: Normal range of motion. Improved edema of LE, no deformity.   Neurological: She is lethargic. No lateralizing deficits. Not able to follow commands.  Skin: Skin is warm and dry. Capillary refill takes less than 2 seconds. She is not diaphoretic.    Nursing note and vitals reviewed.        Recent Labs   Lab 02/16/19  0831 02/17/19  1013 02/18/19  0728   WBC 8.91 8.52  8.52 7.61   HGB 7.4* 6.5*  6.5* 8.4*   HCT 21.6* 19.0*  19.0* 24.7*   PLT 11* 11*  11* 14*     Recent Labs   Lab 02/16/19  0527 02/17/19  0545 02/18/19  0728   * 128* 132*   K 3.2* 3.1* 3.3*    CL 97 97 103   CO2 22* 21* 20*   BUN 35* 44* 43*   CREATININE 1.0 1.2 1.2    104 137*   CALCIUM 8.8 8.6* 8.8   MG 1.9 2.0 1.9   PHOS 3.3 3.3 4.0     Recent Labs   Lab 02/13/19  0500 02/14/19  0406  02/16/19  0527 02/17/19  0545 02/18/19  0728   ALKPHOS 679* 486*   < > 370* 364* 303*   ALT 75* 58*   < > 36 46* 28   AST 75* 51*   < > 25 37 18   ALBUMIN 1.7* 1.4*   < > 1.8* 1.7* 1.6*   PROT 5.6* 5.1*   < > 5.5* 5.3* 5.7*   BILITOT 1.4* 1.8*   < > 2.9* 2.8* 3.5*   INR 1.3* 1.3*  --   --   --   --     < > = values in this interval not displayed.          Scheduled Meds:   (Magic mouthwash) 1:1:1 Benadryl 12.5mg/5ml liq, aluminum & magnesium hydroxide-simehticone (Maalox), lidocaine viscous 2%  5 mL Swish & Spit Q4H    acyclovir  10 mg/kg Intravenous Q8H    albuterol-ipratropium  3 mL Nebulization Q4H WAKE    doxycycline (VIBRAMYCIN) IVPB  100 mg Intravenous Q12H    fluconazole (DIFLUCAN) IVPB  400 mg Intravenous Q24H    lidocaine  1 patch Transdermal Q24H    lidocaine HCL 20 mg/ml (2%)  20 mL Other Once    pantoprazole  40 mg Oral Daily    piperacillin-tazobactam (ZOSYN) IVPB  4.5 g Intravenous Q8H    potassium chloride  10 mEq Intravenous Q1H    vancomycin (VANCOCIN) IVPB  1,000 mg Intravenous Q24H     Continuous Infusions:  As Needed:  sodium chloride, sodium chloride, sodium chloride, acetaminophen, albuterol-ipratropium, hydrOXYzine HCl, loperamide, morphine, ondansetron, promethazine, ramelteon, sodium chloride 0.9%, sodium chloride 0.9%    Active Hospital Problems    Diagnosis  POA    *Sepsis due to urinary tract infection [A41.9, N39.0]  Yes    Severe anemia [D64.9]  Yes    Fever in adult [R50.9]  Unknown    Palliative care encounter [Z51.5]  Not Applicable    Mouth ulcers [K12.1]  No    PFO (patent foramen ovale) [Q21.1]  Not Applicable    Hypomagnesemia [E83.42]  No    Hypokalemia [E87.6]  Yes    Pneumonia involving left lung [J18.9]  Yes    SOB (shortness of breath) [R06.02]  Yes     Weakness [R53.1]  Yes    History of CVA (cerebrovascular accident) [Z86.73]  Not Applicable    Hyponatremia [E87.1]  Yes    Pancytopenia [D61.818]  Yes    Hyperlipidemia [E78.5]  Yes     Chronic      Resolved Hospital Problems   No resolved problems to display.         Assessment and Plan    Patient to be transferred to ICU for higher level of care. Appreciate assistance. Please call with questions.     Greater than 60 minutes were spent in face-to-face contact, counseling with patient and family members.     Marcelle Jones MD  Staff Hospitalist  Department of Hospital Medicine  [facility]   [phone]

## 2019-02-18 NOTE — PROGRESS NOTES
[facility]  Pulmonology  Progress Note    Patient Name: Vicki Peña  MRN: [id number]  Admission Date: 2/2/2019  Hospital Length of Stay: 16 days  Code Status: Full Code  Attending Provider: Marcelle Jones*  Primary Care Provider: Yaron Waite MD   Principal Problem: Sepsis due to urinary tract infection    Subjective:     Interval History: NAEO, daughter states more delirious today. Pt is alert and is in no acute distress but appears lethargic, does not answer questions appropriately.     Objective:     Vital Signs (Most Recent):  Temp: (!) 100.6 °F (38.1 °C) (02/18/19 0900)  Pulse: 106 (02/18/19 0900)  Resp: (!) 22 (02/18/19 0900)  BP: (!) 123/58 (02/18/19 0900)  SpO2: (!) 92 % (02/18/19 0900) Vital Signs (24h Range):  Temp:  [98.5 °F (36.9 °C)-102.9 °F (39.4 °C)] 100.6 °F (38.1 °C)  Pulse:  [] 106  Resp:  [16-24] 22  SpO2:  [92 %-98 %] 92 %  BP: (116-133)/(56-62) 123/58     Weight: 66.6 kg (146 lb 13.2 oz)  Body mass index is 28.68 kg/m².      Intake/Output Summary (Last 24 hours) at 2/18/2019 0935  Last data filed at 2/18/2019 0616  Gross per 24 hour   Intake 660 ml   Output 750 ml   Net -90 ml       Physical Exam   Constitutional: She is oriented to person, place, and time. She appears lethargic. She appears cachectic. She appears ill. No distress. Nasal cannula in place.   HENT:   Head: Normocephalic and atraumatic.   Eyes: Conjunctivae are normal. No scleral icterus.   Neck: Neck supple. No JVD present. No tracheal deviation present.   Cardiovascular: Normal rate, regular rhythm, normal heart sounds and intact distal pulses. Exam reveals no gallop and no friction rub.   Pulmonary/Chest: Effort normal. No stridor. No respiratory distress. She has decreased breath sounds in the right middle field, the right lower field and the left lower field. She has no wheezes. She has no rales. She exhibits no tenderness.   Abdominal: Soft. Bowel sounds are normal. She exhibits no distension  and no mass. There is no tenderness. There is no rebound and no guarding.   Musculoskeletal: She exhibits no edema or tenderness.   Neurological: She is oriented to person, place, and time. She appears lethargic. No cranial nerve deficit or sensory deficit. Coordination normal.   Skin: Skin is warm and dry. She is not diaphoretic.   Psychiatric: She has a normal mood and affect. Her behavior is normal.       Vents:       Lines/Drains/Airways     Drain            Female External Urinary Catheter 02/10/19 2000 7 days          Peripheral Intravenous Line                 Midline Catheter Insertion/Assessment  - Single Lumen 02/14/19 1705 Left basilic vein (medial side of arm) 18g x 8cm 3 days         Peripheral IV - Single Lumen 02/17/19 1617 Anterior;Right Hand less than 1 day                Significant Labs:    CBC/Anemia Profile:  Recent Labs   Lab 02/17/19  1013 02/18/19  0728   WBC 8.52  8.52 7.61   HGB 6.5*  6.5* 8.4*   HCT 19.0*  19.0* 24.7*   PLT 11*  11*  --    MCV 79*  79* 82   RDW 18.8*  18.8* 17.4*        Chemistries:  Recent Labs   Lab 02/17/19  0545 02/18/19  0728   * 132*   K 3.1* 3.3*   CL 97 103   CO2 21* 20*   BUN 44* 43*   CREATININE 1.2 1.2   CALCIUM 8.6* 8.8   ALBUMIN 1.7* 1.6*   PROT 5.3* 5.7*   BILITOT 2.8* 3.5*   ALKPHOS 364* 303*   ALT 46* 28   AST 37 18   MG 2.0 1.9   PHOS 3.3 4.0       Recent Lab Results       02/18/19  0728   02/18/19  0053   02/17/19  1013        Immature Granulocytes     CANCELED  Comment:  Result canceled by the ancillary.          CANCELED  Comment:  Result canceled by the ancillary.     Immature Grans (Abs)     CANCELED  Comment:  Mild elevation in immature granulocytes is non specific and   can be seen in a variety of conditions including stress response,   acute inflammation, trauma and pregnancy. Correlation with other   laboratory and clinical findings is essential.    Result canceled by the ancillary.            CANCELED  Comment:  Mild elevation in  immature granulocytes is non specific and   can be seen in a variety of conditions including stress response,   acute inflammation, trauma and pregnancy. Correlation with other   laboratory and clinical findings is essential.    Result canceled by the ancillary.       Albumin 1.6         Alkaline Phosphatase 303         ALT 28         Anion Gap 9         Aniso     Slight          Slight     AST 18         BANDS     8.0          8.0     Baso #     CANCELED  Comment:  Result canceled by the ancillary.          CANCELED  Comment:  Result canceled by the ancillary.     Basophil%     5.0          5.0     Total Bilirubin 3.5  Comment:  For infants and newborns, interpretation of results should be based  on gestational age, weight and in agreement with clinical  observations.  Premature Infant recommended reference ranges:  Up to 24 hours.............<8.0 mg/dL  Up to 48 hours............<12.0 mg/dL  3-5 days..................<15.0 mg/dL  6-29 days.................<15.0 mg/dL           Blasts     9.0          9.0     BUN, Bld 43         Calcium 8.8         Chloride 103         CO2 20         Creatinine 1.2         Differential Method     Manual          Manual     eGFR if  49.0         eGFR if non  42.5  Comment:  Calculation used to obtain the estimated glomerular filtration  rate (eGFR) is the CKD-EPI equation.            Eos #     CANCELED  Comment:  Result canceled by the ancillary.          CANCELED  Comment:  Result canceled by the ancillary.     Eosinophil%     3.0          3.0     Glucose 137         Gran%     42.0          42.0     Hematocrit 24.7   19.0  Comment:  HCT critical result(s) called and verbal readback obtained from Shelia Spears RN, 02/17/2019 10:46            19.0  Comment:  HCT critical result(s) called and verbal readback obtained from Shelia Spears RN, 02/17/2019 10:46       Hemoglobin 8.4   6.5          6.5     Hypo     Occasional          Occasional     IgA  204  Comment:  IgA Cord Blood Reference Range: <5 mg/dL.         IgG - Serum 956  Comment:  IgG Cord Blood Reference Range: 650-1600 mg/dL.         IgM 42  Comment:  IgM Cord Blood Reference Range: <25 mg/dL.         Lymph #     CANCELED  Comment:  Result canceled by the ancillary.          CANCELED  Comment:  Result canceled by the ancillary.     Lymph%     9.0          9.0     Magnesium 1.9         MCH 28.0   27.1          27.1     MCHC 34.0   34.2          34.2     MCV 82   79          79     Metamyelocytes     1.0          1.0     Mono #     CANCELED  Comment:  Result canceled by the ancillary.          CANCELED  Comment:  Result canceled by the ancillary.     Mono%     16.0          16.0     MPV SEE COMMENT  Comment:  Result not available.   SEE COMMENT  Comment:  Result not available.          SEE COMMENT  Comment:  Result not available.     Myelocytes     4.0          4.0     nRBC     1          1     Phosphorus 4.0         Platelet Estimate     Decreased          Decreased     Platelets     11  Comment:  PLT   critical result(s) called and verbal readback obtained from   JUANITA CHAVEZ RN, 02/17/2019 11:25            11  Comment:  PLT   critical result(s) called and verbal readback obtained from   JUANITA CHAVEZ RN, 02/17/2019 11:25       Poik     Slight          Slight     Poly     Occasional          Occasional     Potassium 3.3         Promyelocytes     3.0          3.0     Total Protein 5.7         RBC 3.00   2.40          2.40     RDW 17.4   18.8          18.8     Smudge Cells     Present  Comment:  Smudge cells present;Substantial numbers may affect the   accuracy of the differential.            Present  Comment:  Smudge cells present;Substantial numbers may affect the   accuracy of the differential.       Sodium 132         Vancomycin-Trough   18.7       WBC 7.61   8.52          8.52         All pertinent labs within the past 24 hours have been reviewed.    Significant Imaging:  I have reviewed all  pertinent imaging results/findings within the past 24 hours.    Assessment/Plan:     Pneumonia involving left lung    Vicki Peña is a 83 y.o. female with hx CVA and MDS now with protracted hospital stay continuing to decline clinically with fever and b/l lung infiltrates despite broad spectrum antibiotics    A/P  - Clinically and radiologically most consistent with PNA  - Continue empiric broad spectrum antimicrobials pending Cx results  - Cryptococcus, histo, blasto, aspergillus, legionella serologies pending per ID  - T-Spot, AFB sputum cx in consideration of TB risk factors  - Pt could potentially benefit diagnostically from bronch, but family is leaning toward more conservative management at this point. Pt family has requested transfer back to ICU where they feel it is more comfortable for the pt. If clinical condition or family wishes should change: to consider a bronch thrombocytopenia must first be addressed due to high bleed risk. Would recommend consideration of hematology input due to previous documented anaphylactoid reaction to platelet transfusion earlier in hospital stay c/b UGO Mccormick MD  Pulmonology  [facility]

## 2019-02-18 NOTE — CONSULTS
Patient seen and case reviewed with Dr. Hemphill [facility] fellow. Patient admitted to Critical Care medicine service. Full H&P to follow. Transfer order placed.     Akhil Gandara M.D. PGY-2  [facility]  10:40 AM  2/18/2019  Pager [phone]

## 2019-02-18 NOTE — ASSESSMENT & PLAN NOTE
- MDS found on BMB performed on 2/12  - 7.5 points, IPSS-R Score, Very high risk, 0.8 years, Median survival, Median time to 25% AML evolution: 0.73 years

## 2019-02-18 NOTE — PROGRESS NOTES
[facility]  Adult Nutrition  Consult Note    SUMMARY       Recommendations    Recommendation/Intervention:   1. If TF desired, recommend Isosource 1.5 at 35 ml/hr to provide 1260 kcal, 57 gm pro, and 642 ml free water. Additional free water per MD.   2. Continue current regular diet with Boost Plus and encourage PO intake as tolerated. Recommend mechanical soft texture if pt with continued difficulty chewing due to mouth sores.   3. RD following.    Goals: 1. Meet >75% EEN/EPN 2. Prevent >2% dry weight loss over one week  Nutrition Goal Status: goal not met  Communication of RD Recs: other (comment)(POC)    Reason for Assessment    Reason For Assessment: RD follow-up, consult  Diagnosis: other (see comments)(severe anemia)  Relevant Medical History: CVA, osteoporosis, HLD    General Information Comments: Pt now on airborne precautions for possible TB. Continues with meal intake 0-25% on regular diet with Boost ordered. Consulted for TF recs. Noted large wt discrepancy since admit (admitted at 115lb, now 142lb). NFPE performed 2/4, continues with muscle/fat loss.    Nutrition Discharge Planning: Unable to determine    Nutrition Risk Screen    Nutrition Risk Screen: no indicators present    Nutrition/Diet History    Patient Reported Diet/Restrictions/Preferences: general  Spiritual, Cultural Beliefs, Nondenominational Practices, Values that Affect Care: no  Factors Affecting Nutritional Intake: decreased appetite, sore mouth, chewing difficulties/inability to chew food    Anthropometrics    Temp: (!) 100.6 °F (38.1 °C)  Height Method: Stated  Height: 5' (152.4 cm)  Height (inches): 60 in  Weight Method: Bed Scale  Weight: 66.6 kg (146 lb 13.2 oz)  Weight (lb): 146.83 lb  Ideal Body Weight (IBW), Female: 100 lb  % Ideal Body Weight, Female (lb): 115 lb  BMI (Calculated): 22.5  BMI Grade: 18.5-24.9 - normal  Usual Body Weight (UBW), k.43 kg  % Usual Body Weight: 96.47  % Weight Change From Usual Weight:  -3.73 %       Lab/Procedures/Meds    Pertinent Labs Reviewed: reviewed  Pertinent Labs Comments: Na 132, K 3.3, BUN 43, GFR 42.5, Glu 137, Alk Phos 303, Alb 1.6, T.Bili 3.5  Pertinent Medications Reviewed: reviewed  Pertinent Medications Comments: Magic Mouthwash, pantoprazole    Estimated/Assessed Needs    Weight Used For Calorie Calculations: 52.4 kg (115 lb 8.3 oz)  Energy Calorie Requirements (kcal): 1310-1575kcal/day(25-30kcal/kg)  Energy Need Method: Kcal/kg  Protein Requirements: 53-63g/day(1.0-1.2g/kg)  Weight Used For Protein Calculations: 52.4 kg (115 lb 8.3 oz)  Fluid Requirements (mL): 1mL/kcal or per MD     RDA Method (mL): 1310       Nutrition Prescription Ordered    Current Diet Order: Regular  Nutrition Order Comments: 1000 ml FR  Oral Nutrition Supplement: Boost Plus    Evaluation of Received Nutrient/Fluid Intake    Oral Fluid (mL): 60  I/O: -1.2L  Energy Calories Required: not meeting needs  Protein Required: not meeting needs  Comments: LBM 2/15  % Intake of Estimated Energy Needs: 0 - 25 %  % Meal Intake: 0 - 25 %    Nutrition Risk    Level of Risk/Frequency of Follow-up: low     Assessment and Plan    Nutrition Problem  Inadequate oral intake     Related to (etiology):   Poor appetite     Signs and Symptoms (as evidenced by):   PO intake 0-25% PTA for >2 weeks; PO intake 0-25% while admitted; mild weight loss     Recommendations:  Commercial beverage     Nutrition Diagnosis Status:   Continues    Monitor and Evaluation    Food and Nutrient Intake: energy intake, food and beverage intake  Food and Nutrient Adminstration: diet order  Knowledge/Beliefs/Attitudes: food and nutrition knowledge/skill, beliefs and attitudes  Physical Activity and Function: nutrition-related ADLs and IADLs  Anthropometric Measurements: weight, weight change, body mass index  Biochemical Data, Medical Tests and Procedures: lipid profile, inflammatory profile, glucose/endocrine profile, gastrointestinal profile,  electrolyte and renal panel  Nutrition-Focused Physical Findings: overall appearance, extremities, muscles and bones, head and eyes, skin     Malnutrition Assessment     Orbital Region (Subcutaneous Fat Loss): moderate depletion  Upper Arm Region (Subcutaneous Fat Loss): moderate depletion   Congregation Region (Muscle Loss): moderate depletion  Clavicle and Acromion Bone Region (Muscle Loss): mild depletion  Patellar Region (Muscle Loss): moderate depletion  Posterior Calf Region (Muscle Loss): moderate depletion     Nutrition Follow-Up    RD Follow-up?: Yes

## 2019-02-18 NOTE — ASSESSMENT & PLAN NOTE
- if patient needs platelet transfusion please premedicate with tylenol and benadryl and have methylprednisolone prepared but not given   - transfuse for platelets <10

## 2019-02-18 NOTE — ASSESSMENT & PLAN NOTE
Vicki Peña is a 83 y.o. female with hx CVA and MDS now with protracted hospital stay continuing to decline clinically with fever and b/l lung infiltrates despite broad spectrum antibiotics    A/P  - Clinically and radiologically most consistent with PNA  - Continue empiric broad spectrum antimicrobials pending Cx results  - Cryptococcus, histo, blasto, aspergillus, legionella serologies pending per ID  - T-Spot, AFB sputum cx w/ in consideration of TB risk factors  - Pt could potentially benefit diagnostically from bronch, but family is leaning toward more conservative management at this point. Pt family has requested transfer back to ICU where they feel it is more comfortable for the pt. If clinical condition or family wishes should change: to consider a bronch thrombocytopenia must first be addressed due to high bleed risk. Would recommend consideration of hematology input due to previous documented anaphylactoid reaction to platelet transfusion earlier in hospital stay c/b MDS

## 2019-02-18 NOTE — NURSING
I spoke with on-call MD for pulmonary / Critical care.  Patient is being worked up for questionable TB - MD reports patient will require negative pressure room - Notified nursing supervisor - awaiting available negative pressure room.

## 2019-02-18 NOTE — ASSESSMENT & PLAN NOTE
- magic mouth wash ordered  - a significant contributor to patients overall condition and deterioration

## 2019-02-18 NOTE — PROGRESS NOTES
Hospital Medicine  Progress note    Team: [facility] HOSP MED R Marcelle Jones MD  Admit Date: 2/2/2019  JACK   Length of Stay:  LOS: 16 days   Code status: Full Code    Principal Problem:  Sepsis due to urinary tract infection    Overview:    Interval hx: Patient seen and examined at bedside several times throughout the day 2/17/2019. Daughters and son at bedside feel as if some improvement has been made, but patient continues to appear very ill despite aggressive medical management and does not appear to be clinically improving. Pulmonology consulted and recommending bronchoscopy.    ROS   Constitutional: Positive for activity change, appetite change and fatigue. Positive for chills and fever.   HENT: Negative for congestion, rhinorrhea and sore throat.  +ve multiple painful, nonexudative lesions of upper/lower gum line and hard palate.  Respiratory: Positive for nonproductive cough. Positive for shortness of breath, -ve wheezing and stridor.    Cardiovascular: Negative for chest pain, palpitations and leg swelling.   Gastrointestinal: Positive for nausea. Negative for abdominal pain, blood in stool, constipation, diarrhea and vomiting.   Genitourinary: Negative for decreased urine volume, difficulty urinating, dysuria, flank pain, frequency and urgency.   Musculoskeletal: Negative for arthralgias and +ve myalgias.   Neurological: Negative for syncope, weakness, light-headedness and headaches.   Psychiatric/Behavioral: Negative for agitation and confusion.         PEx  Temp:  [98.1 °F (36.7 °C)-100.6 °F (38.1 °C)]   Pulse:  []   Resp:  [16-42]   BP: (116-135)/(56-78)   SpO2:  [92 %-98 %]     Intake/Output Summary (Last 24 hours) at 2/18/2019 1524  Last data filed at 2/18/2019 0912  Gross per 24 hour   Intake 620 ml   Output 750 ml   Net -130 ml       Constitutional: She is lethargic. She is oriented to self and place. She is toxic appearing.  HENT:   Head: Normocephalic.   conjunctival pallor   Eyes: Pupils  are equal, round, and reactive to light.   Throat: She has several ulcerations on her gum line and hard palate. New ulceration and discoloration of tongue. No bleeding, no exudate. Adentulous.  Neck: Normal range of motion. Supple.   Cardiovascular:Tachycardic, regular rhythm and normal heart sounds.   No murmur heard.  Pulmonary/Chest: Effort labored. No stridor. Mild respiratory distress. No wheezes. She has rales, L>R.  Abdominal: Soft. Bowel sounds are normal. She exhibits no distension. There is mild tenderness. There is no guarding.   Musculoskeletal: Normal range of motion. Improved edema of LE, no deformity.   Neurological: She is lethargic. No lateralizing deficits. Not able to follow commands.  Skin: Skin is warm and dry. Capillary refill takes less than 2 seconds. She is not diaphoretic.    Nursing note and vitals reviewed.        Recent Labs   Lab 02/16/19  0831 02/17/19  1013 02/18/19  0728   WBC 8.91 8.52  8.52 7.61   HGB 7.4* 6.5*  6.5* 8.4*   HCT 21.6* 19.0*  19.0* 24.7*   PLT 11* 11*  11* 14*     Recent Labs   Lab 02/16/19  0527 02/17/19  0545 02/18/19  0728   * 128* 132*   K 3.2* 3.1* 3.3*   CL 97 97 103   CO2 22* 21* 20*   BUN 35* 44* 43*   CREATININE 1.0 1.2 1.2    104 137*   CALCIUM 8.8 8.6* 8.8   MG 1.9 2.0 1.9   PHOS 3.3 3.3 4.0     Recent Labs   Lab 02/13/19  0500 02/14/19  0406  02/16/19  0527 02/17/19  0545 02/18/19  0728   ALKPHOS 679* 486*   < > 370* 364* 303*   ALT 75* 58*   < > 36 46* 28   AST 75* 51*   < > 25 37 18   ALBUMIN 1.7* 1.4*   < > 1.8* 1.7* 1.6*   PROT 5.6* 5.1*   < > 5.5* 5.3* 5.7*   BILITOT 1.4* 1.8*   < > 2.9* 2.8* 3.5*   INR 1.3* 1.3*  --   --   --   --     < > = values in this interval not displayed.          Scheduled Meds:   (Magic mouthwash) 1:1:1 Benadryl 12.5mg/5ml liq, aluminum & magnesium hydroxide-simehticone (Maalox), lidocaine viscous 2%  5 mL Swish & Spit Q4H    acyclovir  10 mg/kg Intravenous Q8H    albuterol-ipratropium  3 mL Nebulization  Q4H WAKE    doxycycline (VIBRAMYCIN) IVPB  100 mg Intravenous Q12H    fluconazole (DIFLUCAN) IVPB  400 mg Intravenous Q24H    lidocaine  1 patch Transdermal Q24H    lidocaine HCL 20 mg/ml (2%)  20 mL Other Once    pantoprazole  40 mg Oral Daily    piperacillin-tazobactam (ZOSYN) IVPB  4.5 g Intravenous Q8H    potassium chloride  10 mEq Intravenous Q1H    vancomycin (VANCOCIN) IVPB  1,000 mg Intravenous Q24H     Continuous Infusions:  As Needed:  sodium chloride, sodium chloride, sodium chloride, acetaminophen, albuterol-ipratropium, hydrOXYzine HCl, loperamide, morphine, ondansetron, promethazine, ramelteon, sodium chloride 0.9%, sodium chloride 0.9%    Active Hospital Problems    Diagnosis  POA    *Sepsis due to urinary tract infection [A41.9, N39.0]  Yes    Severe anemia [D64.9]  Yes    Fever in adult [R50.9]  Unknown    Palliative care encounter [Z51.5]  Not Applicable    Mouth ulcers [K12.1]  No    PFO (patent foramen ovale) [Q21.1]  Not Applicable    Hypomagnesemia [E83.42]  No    Hypokalemia [E87.6]  Yes    Pneumonia involving left lung [J18.9]  Yes    SOB (shortness of breath) [R06.02]  Yes    Weakness [R53.1]  Yes    History of CVA (cerebrovascular accident) [Z86.73]  Not Applicable    Hyponatremia [E87.1]  Yes    Pancytopenia [D61.818]  Yes    Hyperlipidemia [E78.5]  Yes     Chronic      Resolved Hospital Problems   No resolved problems to display.         Assessment and Plan    History of CVA (cerebrovascular accident)     Patient states that she had a stroke 20 years back. No residual neurological manifestion.  ECHO this admission consistent with PFO and shunt physiology.   - 02/05/2019: Critical Care contacted Vascular Neurology, Dr. Horn, to ask about the role of anti-platelets vs anticoagulation because the patient has PFO with history of stroke. No history of A fib. He recommends to start ASA 81 mg PO qD      Pneumonia involving left lung     On 02/03/2019: Patient  febrile  this AM and UA c/w with UTI start Ceftriaxone,  At afternoon repeat fever to >102, blood cultures obtained and on abx for UTI, started on sepsis bolus 30cc/kg and monitoring of lactic acid levels. Antibiotics were changed to Piperacillin/Tazobactam and Vancomycin.      02/04/2019: Patient have complaints of left sided lateral chest wall pleuritic pain. Throughout the night patient remained febrile, O2 requirement increasing this Am after 4am patient given 2 500ml saline boluses. This am patient is tachypneic, accessory muscle use, febrile, MAP >65, but pressures 90s/50s. CXR with significant new left sided lower& upper airspace disease, absent breath sounds in left mid to lower fields, dullness to percussion, right basilar absent breath sounds. On Exam patient also with distended JVP to angle of jaw. Critical Care consulted & have accepted patient. Azithromycin started     02/05/2019: During night patient received 1 L NS bolus for marginal BP. Patient reports her symptoms have improved today. BP now is stable. Afebrile last spike of fever: 39.1 (02/03/3019). Np leucocytosis. CXR today: Worsening severe pulmonary edema.  CT Chest consistent with pulmonary edema vs ARDS; however, patient requiring minimal oxygen (3L currently) and had received copious IVF due to sepsis. PNA possible but less likely given how rapid radiographic changes developed. Will start gentle diuresis. Finished course of CAP therapy with rocephin.     2/13/2019: Rpt CXR showed decreased bilateral infiltrates. However, patient with increased work of breathing, elevated PCT, elevated WBC w/ L shift, fever, mild tachycardia, and hypotension. Initiated broad spectrum antibiotics pending further workup (Bcx, LA, UA). Plan to de-escalate/discontinue pending cultures, clinical response.    2/15/2019: Increased work of breathing this morning, rpt CXR pending. Continuing Vanc/Zosyn due to concern for sepsis. ID consulted for assistance in evaluating oral  lesions and antibiotic mgmt.     2/16/2019: Pulmonology consulted for consideration of Bronchoscopy. Patient has been on Zosyn/vancomycin without clinical improvement. She cannot be weaned of 2L O2 and repeat imaging shows persistent consolidative changes bilaterally, L>R. Concern for fungal/mold infection given immunocompromised status. ID and pulm recs appreciated.     Doxycycline added 2/17 for atypical coverage. TB precautions in place given previous exposure and immunocompromised picture. Cryptococcus, histo, blasto, aspergillus, legionella serologies pending per ID. T-Spot, AFB sputum cx in consideration of TB risk factors. Timing of bronchoscopy to be discussed with Pulm, as patient will require platelet transfusion prior to procedure. Discussed with blood bank who recommend premedicating with benadryl and tylenol, given previous anaphylactic reaction. IgA levels wnl.      Cardiac/Vascular   Hyperlipidemia     - Resume home medication: Statin.      Renal/   Hypokalemia     Monitor potassium level and replace as needed. IV 10 meq q1h x5 ordered due to new dysphagia but complicated by limited IV access and multiple infusions.       Hypomagnesemia     Monitor Mg level and replace as needed      Hyponatremia     Improved with fluid restriction, albumin and diuresis.      ID   * Sepsis due to urinary tract infection     - Urine cultures grew E. coli, susceptible to ceftriaxone, completed course  - Does have history of frequent UTIs, previously followed with [facility] (most recently seen 2015), normal cysto in 2014      Transaminitis  Etiology unclear. Will check RUQ US (benign), hep panel (-ve) and trend. Trend synthetic function with am labs.   LFTs trending down 2/13.     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  Prior to [facility] arrival she had her blood drawn and was found to have a Hgb of 6.6. At that time her PCP instructed her to go to the emergency  "department for a blood transfusion.  She is s/p 1unit Platelet transfusion - developed transfusion reaction during this - received IM epi, Benadryl/ 125 methylpred for concerns of throat swelling per Dr. Gonzalez.    Heme/oncology consulted (02/03/2019) their recs and plan: " Anemia, thrombocytopenia. Ddx includes primary BM disorder such as MDS, vs secondary BM suppression from infection (recent Chikungunya infection, known to cause thrombocytopenia). Reviewed medication list, no known culprits. Possible also slow GIB from adenoma.  -No evidence of iron deficiency, B12/folate nml  -No evidence of hemolysis  -No evidence of splenomegaly, no evidence of cirrhosis  -No evidence of coagulation      S/p bone marrow biopsy on 2/12. Consistent with high grade myeloid disorder, cytogenics pending. Hematology consulted regarding further management and are recommending OP f/u.   -Hold NSAIDs  -Transfuse Hb<7 or plt<10 unless active bleeding. Required 1u pRBC 2/14.  -Have spoken with blood bank who recommend premedicating patient with tylenol and benadryl (and having IV methylprednisolone on hand) prior to PLT transfusion.    Other   SOB (shortness of breath)     - See above.      Weakness     - Generalized, likely multifactorial (age, acute illness, malignancy, infection, deconditioning)  - PT/OT consulted        High Risk Conditions  Sepsis, pancytopenia, Pneumonia     Diet: regular thin diet  GI PPx:   DVT PPx:    MING/SCD, avoid heparin products due to thrombocytopenia     Goals of Care: Full code  Discharge plan: pending hematology recs, palliative care recs    Time (minutes) spent in care of the patient (Greater than 1/2 spent in direct face-to-face contact) 85 minutes      Marcelle Jones MD  Staff Hospitalist  Department of Hospital Medicine  [facility]   [id number]  "

## 2019-02-18 NOTE — NURSING
Dr Jones called regarding temp and increased heart rate and respiration  varying 22 to 24 shallow , lethargic, communicates with daughters in their language

## 2019-02-18 NOTE — CARE UPDATE
..  RN Proactive Rounding Note  Time of Visit: 1030    Admit Date: 2019  LOS: 16  Code Status: Full Code   Date of Visit: 2019  : 1935  Age: 83 y.o.  Sex: female  Race: White  Bed: 1108/1108 A:   MRN: [id number]  Was the patient discharged from an ICU this admission? yes   Was the patient discharged from a PACU within last 24 hours?  no  Did the patient receive conscious sedation/general anesthesia in last 24 hours?  no  Was the patient in the ED within the past 24 hours?  no  Was the patient started on NIPPV within the past 24 hours?  no  Attending Physician: Marcelle Jones*  Primary Service: [facility] HOSP MED R      ASSESSMENT:     Abnormal Vital Signs: Increased RR rate, O2 sats 96% on 2L NC, Febrile 100.6  Clinical Issues: Respiratory     INTERVENTIONS/ RECOMMENDATIONS:     Called to the Bs by the RN. Dr Romano with [facility] at the Bs on RRT arrival. Pt will be transferred to Critcal care. VSS at this time. Pt on 2LNC, Sats 96%. /62, HR 94. Pt febrile at 100.6. lisa Hidalgo RN aware of transfer. House supervisor notified. Pt to move to SICU [id number].    Discussed plan of care with Rocky GALVEZ    PHYSICIAN ESCALATION:     Yes/No  yes    Orders received and case discussed with Dr. Gandara    Disposition: Tx in ICU bed [id number].    FOLLOW-UP/CONTINGENCY:     Call back the Rapid Response Nurse at x [phone] for additional questions or concerns.

## 2019-02-18 NOTE — ASSESSMENT & PLAN NOTE
83 year old with fatigue, body aches, recurrent and relapsing fever despite broad spectrum antimicrobials. All lab testing and microbiological evaluation have been unrevealing. Patient continues to have febrile episodes despite broad spectrum antimicrobials.     ID following and recommending bronchoscopy - patient's family is currently against invasive measure such as bronchoscopy and are continuing to debate initiating comfort measures.

## 2019-02-18 NOTE — ASSESSMENT & PLAN NOTE
Patient's case discussed at bedside with three of her daughters, including her POA who are waiting for their brother to arrive to make final decisions regarding code status  - at the moment patient is to remain full code  - they are leaning towards transitioning patient to home with home hospice

## 2019-02-18 NOTE — ASSESSMENT & PLAN NOTE
In this patient with hematologic malignancy, her mouth ulcers could represent atypical presentation of thrush vs HSV infection.  Alternatively there are atypical presentations of histoplasmosis that involve mouth ulcers.  Recommendations are;    1.  Start empiric acyclovir IV  2.  Start empiric fluconazole IV  3.  Send serology for histoplasmosis. Results are still pending

## 2019-02-18 NOTE — SUBJECTIVE & OBJECTIVE
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    UTI (urinary tract infection)        Past Surgical History:   Procedure Laterality Date    CATARACT EXTRACTION W/  INTRAOCULAR LENS IMPLANT Left 7/21/14    CATARACT EXTRACTION W/  INTRAOCULAR LENS IMPLANT Right 8/11/14    CYSTOSCOPY      INSERTION-INTRAOCULAR LENS (IOL) Right 8/11/2014    Performed by Rich Camejo MD at [facility] [place], operated by [facility] OR    INSERTION-INTRAOCULAR LENS (IOL) Left 7/21/2014    Performed by Rich Camejo MD at [facility] [place], operated by [facility] OR    PHACOEMULSIFICATION-ASPIRATION-CATARACT Right 8/11/2014    Performed by Rich Camejo MD at [facility] [place], operated by [facility] OR    PHACOEMULSIFICATION-ASPIRATION-CATARACT Left 7/21/2014    Performed by Rich Camejo MD at [facility] [place], operated by [facility] OR    screening N/A 9/5/2014    Performed by Mt Drake MD at [facility] (4TH FLR)       Review of patient's allergies indicates:   Allergen Reactions    Tramadol Rash       Family History     Problem Relation (Age of Onset)    Cancer Brother, Brother, Sister        Tobacco Use    Smoking status: Never Smoker    Smokeless tobacco: Never Used   Substance and Sexual Activity    Alcohol use: No     Alcohol/week: 0.0 oz     Comment: moderate    Drug use: No    Sexual activity: Yes     Partners: Male      Review of Systems   Unable to perform ROS: Mental status change     Objective:     Vital Signs (Most Recent):  Temp: 98.1 °F (36.7 °C) (02/18/19 1340)  Pulse: 94 (02/18/19 1535)  Resp: (!) 37 (02/18/19 1535)  BP: (!) 152/70 (02/18/19 1535)  SpO2: 98 % (02/18/19 1535) Vital Signs (24h Range):  Temp:  [98.1 °F (36.7 °C)-100.6 °F (38.1 °C)] 98.1 °F (36.7 °C)  Pulse:  [] 94  Resp:  [16-43] 37  SpO2:  [92 %-99 %] 98 %  BP: (116-152)/(56-78) 152/70   Weight: 66.6 kg (146 lb 13.2 oz)  Body mass index is 28.68 kg/m².      Intake/Output  Summary (Last 24 hours) at 2/18/2019 1600  Last data filed at 2/18/2019 0912  Gross per 24 hour   Intake 20 ml   Output 750 ml   Net -730 ml       Physical Exam   Constitutional: She has a sickly appearance. She appears ill. She appears distressed. Nasal cannula in place.   HENT:   Head: Atraumatic.   Oral ulcers   Cardiovascular: Regular rhythm, S1 normal, S2 normal and intact distal pulses.   Pulses:       Radial pulses are 2+ on the right side, and 2+ on the left side.   Pulmonary/Chest: She has rhonchi in the right lower field and the left upper field. She has rales.   Abdominal: Soft. There is tenderness.   Musculoskeletal: She exhibits no edema.   Skin: Skin is warm and dry.   Nursing note and vitals reviewed.      Vents:     Lines/Drains/Airways     Drain            Female External Urinary Catheter 02/10/19 2000 7 days          Peripheral Intravenous Line                 Midline Catheter Insertion/Assessment  - Single Lumen 02/14/19 1705 Left basilic vein (medial side of arm) 18g x 8cm 3 days         Peripheral IV - Single Lumen 02/17/19 1617 Anterior;Right Hand less than 1 day              Significant Labs:    CBC/Anemia Profile:  Recent Labs   Lab 02/17/19  1013 02/18/19  0728   WBC 8.52  8.52 7.61   HGB 6.5*  6.5* 8.4*   HCT 19.0*  19.0* 24.7*   PLT 11*  11* 14*   MCV 79*  79* 82   RDW 18.8*  18.8* 17.4*        Chemistries:  Recent Labs   Lab 02/17/19  0545 02/18/19  0728   * 132*   K 3.1* 3.3*   CL 97 103   CO2 21* 20*   BUN 44* 43*   CREATININE 1.2 1.2   CALCIUM 8.6* 8.8   ALBUMIN 1.7* 1.6*   PROT 5.3* 5.7*   BILITOT 2.8* 3.5*   ALKPHOS 364* 303*   ALT 46* 28   AST 37 18   MG 2.0 1.9   PHOS 3.3 4.0       All pertinent labs within the past 24 hours have been reviewed.    Significant Imaging: I have reviewed all pertinent imaging results/findings within the past 24 hours.

## 2019-02-18 NOTE — ASSESSMENT & PLAN NOTE
Patient with CVA ~ 20 years ago without residual neurological manifestion. TTE this admission consistent with PFO and shunt physiology, preserved EF, no diastolic dysfunction.

## 2019-02-18 NOTE — PT/OT/SLP PROGRESS
"     Physical Therapy  Pt Not Seen    Patient Name:  Vicki Peña   MRN:  [id number]    Patient not seen today secondary to  Other (Comment)(Pt on hold per RN (Rocky) who reports "her vital signs don't look too good"  10:13 am.  Later in the day, pt was transferred to CVICU). PT to discontinue orders and write D/C summary    Martine Howard, PTA  2/18/2019      "

## 2019-02-18 NOTE — SUBJECTIVE & OBJECTIVE
Interval History: Patient's temperature has been between  F. On physical examination patient looked extremely lethargic. When I spoke to the family members they stated that she has been doing much worse and would like to consider comfort care. They are awaiting the decision of one of her sons. 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 Hematology- oncology stated that if she is to be transfused platelets they would have to make sure that it is leukoreduced and irradiated.   Pertinent Labs  1) Cryptococcal Ag is negative   2) AFB Cx and smear, blastomyces, histoplasma, fungitell, and aspergillosis is pending.         Review of Systems   Constitutional: Positive for chills and fever. Negative for diaphoresis and fatigue.   HENT: Negative for congestion, mouth sores, rhinorrhea and sore throat.    Respiratory: Negative for cough and shortness of breath.    Gastrointestinal: Negative for abdominal distention, abdominal pain, diarrhea, nausea and vomiting.   Genitourinary: Negative for dysuria and urgency.   Musculoskeletal: Negative for joint swelling and myalgias.   Allergic/Immunologic: Negative for environmental allergies, food allergies and immunocompromised state.   Neurological: Negative for dizziness and numbness.   Hematological: Negative for adenopathy.   Psychiatric/Behavioral: Positive for confusion. Negative for hallucinations and sleep disturbance.     Objective:     Vital Signs (Most Recent):  Temp: 98.1 °F (36.7 °C) (02/18/19 1340)  Pulse: 92 (02/18/19 1629)  Resp: (!) 28 (02/18/19 1629)  BP: (!) 152/70 (02/18/19 1535)  SpO2: 100 % (02/18/19 1629) Vital Signs (24h Range):  Temp:  [98.1 °F (36.7 °C)-100.6 °F (38.1 °C)] 98.1 °F (36.7 °C)  Pulse:  [] 92  Resp:  [16-43] 28  SpO2:  [92 %-100 %] 100 %  BP: (116-152)/(56-78) 152/70     Weight: 66.6 kg (146 lb 13.2 oz)  Body mass index is 28.68 kg/m².    Estimated Creatinine Clearance: 31.3 mL/min (based on SCr of 1.2  mg/dL).    Physical Exam   Constitutional: She is oriented to person, place, and time. She appears well-developed and well-nourished.   HENT:   Head: Normocephalic and atraumatic.   Has multiple oral ulcers, unchanged in appearance   Eyes: EOM are normal. Pupils are equal, round, and reactive to light.   Neck: Normal range of motion.   Cardiovascular: Normal rate, regular rhythm and normal heart sounds.   Pulmonary/Chest: Effort normal. She has rales.   Rales in the left lower and upper fields.    Abdominal: Soft. Bowel sounds are normal.   Musculoskeletal: Normal range of motion. She exhibits no edema.   Neurological: She is alert and oriented to person, place, and time.   Skin: Skin is dry. No rash noted. There is pallor.       Significant Labs:   Blood Culture:   Recent Labs   Lab 02/03/19  0925 02/11/19  1359 02/13/19  0010 02/16/19  0831   LABBLOO No growth after 5 days.  No growth after 5 days. No growth after 5 days. No growth after 5 days.  No growth after 5 days. No Growth to date  No Growth to date  No Growth to date  No Growth to date  No Growth to date  No Growth to date     BMP:   Recent Labs   Lab 02/18/19  0728   *   *   K 3.3*      CO2 20*   BUN 43*   CREATININE 1.2   CALCIUM 8.8   MG 1.9     CBC:   Recent Labs   Lab 02/17/19  1013 02/18/19  0728   WBC 8.52  8.52 7.61   HGB 6.5*  6.5* 8.4*   HCT 19.0*  19.0* 24.7*   PLT 11*  11* 14*     CMP:   Recent Labs   Lab 02/17/19  0545 02/18/19  0728   * 132*   K 3.1* 3.3*   CL 97 103   CO2 21* 20*    137*   BUN 44* 43*   CREATININE 1.2 1.2   CALCIUM 8.6* 8.8   PROT 5.3* 5.7*   ALBUMIN 1.7* 1.6*   BILITOT 2.8* 3.5*   ALKPHOS 364* 303*   AST 37 18   ALT 46* 28   ANIONGAP 10 9   EGFRNONAA 42.5* 42.5*     Quantiferon: No results for input(s): NIL, TBAG, TBAGNIL, MITOGENNIL, TBGOLD in the last 48 hours.    Significant Imaging: I have reviewed all pertinent imaging results/findings within the past 24 hours.

## 2019-02-18 NOTE — NURSING
Spoke with rapid response nurse , she is viewing vitals sign .Updates given to daughters in the room

## 2019-02-18 NOTE — PROGRESS NOTES
[facility]  Infectious Disease  Progress Note    Patient Name: Vicki Peña  MRN: [id number]  Admission Date: 2/2/2019  Length of Stay: 16 days  Attending Physician: Anne Lyons MD  Primary Care Provider: Yaron Waite MD    Isolation Status: Airborne  Assessment/Plan:      Fever in adult    84 y/o female admitted with fatigue, whole body aches and pancytopenia with a new diagnosis of an acute myeloid neoplasm. ID consulted for her persistent fevers despite being on antibiotics through most of her hospitalization. Work up has included blood cultures that have been negative. Initial urine cultures were positive but patient hasn't reported any symptoms since. CT scan of her chest showed a consolidated area in her chest.  She has also been noted to have painful mouth ulcers for which she is being treated for acyclovir.    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  -Patient had negative quantiferon gold in the past (after she came to this country). Based on CXR from 2/2/19, it is unlikely that Tuberculosis would have caused such a  rapidly progressively lung pathology when compared to the CXR on 2/16/19.   -Cryptococcal Ag is negative        Recommendations:   1) Continue current antimicrobial regimen   2) Proceed with bronchoscopy once patient has been transfused with platelets. It woudl help us narrow the differential. Please follow up with Heme-onc based on their recommendations to prevent transfusion reactions. Most likely platelets will need to be leuko-reduced and irradiated.  3) I have spoken to the family today and they would like to head in the direction of comfort care. However, they are still awaiting the  decision of one brother.        Mouth ulcers    In this patient with hematologic malignancy, her mouth ulcers could represent atypical presentation of thrush vs HSV infection.  Alternatively there are atypical presentations of histoplasmosis that involve mouth ulcers.  Recommendations are;    1.  Start empiric acyclovir IV  2.  Start empiric fluconazole IV  3.  Send serology for histoplasmosis. Results are still pending                 Anticipated Disposition: TBD    Thank you for your consult. I will follow-up with patient. Please contact us if you have any additional questions.    Liang Melgoza MD  Infectious Disease  [facility]    Subjective:     Principal Problem:Sepsis due to urinary tract infection    HPI: 83 year old female with PMH of CVA, osteoporosis, and HLD who presented to the ED (02/02/2019) with complaints of generalized weakness, whole body aches and fatigue for 6 weeks.  She was evaluated by her PCP for this and was found to be anemic and thrombocytopenic and subsequently referred to the ER.  She has been hospitalized since 2/2/19.  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  She has essentially been on some form of antibiotics since her admission.  She was initially febrile and then had several days were she didn't have a fever.  She has since developed fevers again.  Her work up has included blood cultures that have been negative.  A chest CT shows an area of consolidation that is concerning.  She has ulcers in her mouth.  CT of maxillofacial area was unremarkable.  We are re-consulted to provide input into her ongoing fevers.    Interval History: Patient's temperature has been between  F. On physical examination patient looked extremely lethargic. When I spoke to the family members they stated that she has been doing much worse and would like to consider comfort care. They are awaiting the decision of one of her  sons. 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 Hematology- oncology stated that if she is to be transfused platelets they would have to make sure that it is leukoreduced and irradiated.   Pertinent Labs  1) Cryptococcal Ag is negative   2) AFB Cx and smear, blastomyces, histoplasma, fungitell, and aspergillosis is pending.         Review of Systems   Constitutional: Positive for chills and fever. Negative for diaphoresis and fatigue.   HENT: Negative for congestion, mouth sores, rhinorrhea and sore throat.    Respiratory: Negative for cough and shortness of breath.    Gastrointestinal: Negative for abdominal distention, abdominal pain, diarrhea, nausea and vomiting.   Genitourinary: Negative for dysuria and urgency.   Musculoskeletal: Negative for joint swelling and myalgias.   Allergic/Immunologic: Negative for environmental allergies, food allergies and immunocompromised state.   Neurological: Negative for dizziness and numbness.   Hematological: Negative for adenopathy.   Psychiatric/Behavioral: Positive for confusion. Negative for hallucinations and sleep disturbance.     Objective:     Vital Signs (Most Recent):  Temp: 98.1 °F (36.7 °C) (02/18/19 1340)  Pulse: 92 (02/18/19 1629)  Resp: (!) 28 (02/18/19 1629)  BP: (!) 152/70 (02/18/19 1535)  SpO2: 100 % (02/18/19 1629) Vital Signs (24h Range):  Temp:  [98.1 °F (36.7 °C)-100.6 °F (38.1 °C)] 98.1 °F (36.7 °C)  Pulse:  [] 92  Resp:  [16-43] 28  SpO2:  [92 %-100 %] 100 %  BP: (116-152)/(56-78) 152/70     Weight: 66.6 kg (146 lb 13.2 oz)  Body mass index is 28.68 kg/m².    Estimated Creatinine Clearance: 31.3 mL/min (based on SCr of 1.2 mg/dL).    Physical Exam   Constitutional: She is oriented to person, place, and time. She appears well-developed and well-nourished.   HENT:   Head: Normocephalic and atraumatic.   Has multiple oral ulcers, unchanged in appearance   Eyes: EOM are normal. Pupils are equal, round, and reactive to  light.   Neck: Normal range of motion.   Cardiovascular: Normal rate, regular rhythm and normal heart sounds.   Pulmonary/Chest: Effort normal. She has rales.   Rales in the left lower and upper fields.    Abdominal: Soft. Bowel sounds are normal.   Musculoskeletal: Normal range of motion. She exhibits no edema.   Neurological: She is alert and oriented to person, place, and time.   Skin: Skin is dry. No rash noted. There is pallor.       Significant Labs:   Blood Culture:   Recent Labs   Lab 02/03/19  0925 02/11/19  1359 02/13/19  0010 02/16/19  0831   LABBLOO No growth after 5 days.  No growth after 5 days. No growth after 5 days. No growth after 5 days.  No growth after 5 days. No Growth to date  No Growth to date  No Growth to date  No Growth to date  No Growth to date  No Growth to date     BMP:   Recent Labs   Lab 02/18/19  0728   *   *   K 3.3*      CO2 20*   BUN 43*   CREATININE 1.2   CALCIUM 8.8   MG 1.9     CBC:   Recent Labs   Lab 02/17/19  1013 02/18/19  0728   WBC 8.52  8.52 7.61   HGB 6.5*  6.5* 8.4*   HCT 19.0*  19.0* 24.7*   PLT 11*  11* 14*     CMP:   Recent Labs   Lab 02/17/19  0545 02/18/19  0728   * 132*   K 3.1* 3.3*   CL 97 103   CO2 21* 20*    137*   BUN 44* 43*   CREATININE 1.2 1.2   CALCIUM 8.6* 8.8   PROT 5.3* 5.7*   ALBUMIN 1.7* 1.6*   BILITOT 2.8* 3.5*   ALKPHOS 364* 303*   AST 37 18   ALT 46* 28   ANIONGAP 10 9   EGFRNONAA 42.5* 42.5*     Quantiferon: No results for input(s): NIL, TBAG, TBAGNIL, MITOGENNIL, TBGOLD in the last 48 hours.    Significant Imaging: I have reviewed all pertinent imaging results/findings within the past 24 hours.

## 2019-02-18 NOTE — NURSING
MD Med R notified  - 1U PRBC not given - presently pt has 100.1 temp - requested reorder premeds - pt unable to swallow - was unable to place NGT today - MD aware - plan to transfer patient to [facility] room 1108 until TB testing cleared - cont. Potassium riders as ordered - report called by staff nurse to 11th floor - awaiting room cleaning for transfer -

## 2019-02-18 NOTE — ASSESSMENT & PLAN NOTE
82 y/o female admitted with fatigue, whole body aches and pancytopenia with a new diagnosis of an acute myeloid neoplasm. ID consulted for her persistent fevers despite being on antibiotics through most of her hospitalization. Work up has included blood cultures that have been negative. Initial urine cultures were positive but patient hasn't reported any symptoms since. CT scan of her chest showed a consolidated area in her chest.  She has also been noted to have painful mouth ulcers for which she is being treated for acyclovir.    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 It woudl help us narrow the differential. Please follow up with Heme-onc based on their recommendations to prevent transfusion reactions. Most likely platelets will need to be leuko-reduced and irradiated.  3) I have spoken to the family today and they would like to head in the direction of comfort care. However, they are still awaiting the decision of one brother.

## 2019-02-18 NOTE — SUBJECTIVE & OBJECTIVE
Interval History: ELSAO, daughter states more delirious today. Pt is alert and is in no acute distress but appears lethargic, does not answer questions appropriately.     Objective:     Vital Signs (Most Recent):  Temp: (!) 100.6 °F (38.1 °C) (02/18/19 0900)  Pulse: 106 (02/18/19 0900)  Resp: (!) 22 (02/18/19 0900)  BP: (!) 123/58 (02/18/19 0900)  SpO2: (!) 92 % (02/18/19 0900) Vital Signs (24h Range):  Temp:  [98.5 °F (36.9 °C)-102.9 °F (39.4 °C)] 100.6 °F (38.1 °C)  Pulse:  [] 106  Resp:  [16-24] 22  SpO2:  [92 %-98 %] 92 %  BP: (116-133)/(56-62) 123/58     Weight: 66.6 kg (146 lb 13.2 oz)  Body mass index is 28.68 kg/m².      Intake/Output Summary (Last 24 hours) at 2/18/2019 0935  Last data filed at 2/18/2019 0616  Gross per 24 hour   Intake 660 ml   Output 750 ml   Net -90 ml       Physical Exam   Constitutional: She is oriented to person, place, and time. She appears lethargic. She appears cachectic. She appears ill. No distress. Nasal cannula in place.   HENT:   Head: Normocephalic and atraumatic.   Eyes: Conjunctivae are normal. No scleral icterus.   Neck: Neck supple. No JVD present. No tracheal deviation present.   Cardiovascular: Normal rate, regular rhythm, normal heart sounds and intact distal pulses. Exam reveals no gallop and no friction rub.   Pulmonary/Chest: Effort normal. No stridor. No respiratory distress. She has decreased breath sounds in the right middle field, the right lower field and the left lower field. She has no wheezes. She has no rales. She exhibits no tenderness.   Abdominal: Soft. Bowel sounds are normal. She exhibits no distension and no mass. There is no tenderness. There is no rebound and no guarding.   Musculoskeletal: She exhibits no edema or tenderness.   Neurological: She is oriented to person, place, and time. She appears lethargic. No cranial nerve deficit or sensory deficit. Coordination normal.   Skin: Skin is warm and dry. She is not diaphoretic.   Psychiatric: She  has a normal mood and affect. Her behavior is normal.       Vents:       Lines/Drains/Airways     Drain            Female External Urinary Catheter 02/10/19 2000 7 days          Peripheral Intravenous Line                 Midline Catheter Insertion/Assessment  - Single Lumen 02/14/19 1705 Left basilic vein (medial side of arm) 18g x 8cm 3 days         Peripheral IV - Single Lumen 02/17/19 1617 Anterior;Right Hand less than 1 day                Significant Labs:    CBC/Anemia Profile:  Recent Labs   Lab 02/17/19  1013 02/18/19  0728   WBC 8.52  8.52 7.61   HGB 6.5*  6.5* 8.4*   HCT 19.0*  19.0* 24.7*   PLT 11*  11*  --    MCV 79*  79* 82   RDW 18.8*  18.8* 17.4*        Chemistries:  Recent Labs   Lab 02/17/19  0545 02/18/19  0728   * 132*   K 3.1* 3.3*   CL 97 103   CO2 21* 20*   BUN 44* 43*   CREATININE 1.2 1.2   CALCIUM 8.6* 8.8   ALBUMIN 1.7* 1.6*   PROT 5.3* 5.7*   BILITOT 2.8* 3.5*   ALKPHOS 364* 303*   ALT 46* 28   AST 37 18   MG 2.0 1.9   PHOS 3.3 4.0       Recent Lab Results       02/18/19  0728   02/18/19  0053   02/17/19  1013        Immature Granulocytes     CANCELED  Comment:  Result canceled by the ancillary.          CANCELED  Comment:  Result canceled by the ancillary.     Immature Grans (Abs)     CANCELED  Comment:  Mild elevation in immature granulocytes is non specific and   can be seen in a variety of conditions including stress response,   acute inflammation, trauma and pregnancy. Correlation with other   laboratory and clinical findings is essential.    Result canceled by the ancillary.            CANCELED  Comment:  Mild elevation in immature granulocytes is non specific and   can be seen in a variety of conditions including stress response,   acute inflammation, trauma and pregnancy. Correlation with other   laboratory and clinical findings is essential.    Result canceled by the ancillary.       Albumin 1.6         Alkaline Phosphatase 303         ALT 28         Anion Gap 9          Aniso     Slight          Slight     AST 18         BANDS     8.0          8.0     Baso #     CANCELED  Comment:  Result canceled by the ancillary.          CANCELED  Comment:  Result canceled by the ancillary.     Basophil%     5.0          5.0     Total Bilirubin 3.5  Comment:  For infants and newborns, interpretation of results should be based  on gestational age, weight and in agreement with clinical  observations.  Premature Infant recommended reference ranges:  Up to 24 hours.............<8.0 mg/dL  Up to 48 hours............<12.0 mg/dL  3-5 days..................<15.0 mg/dL  6-29 days.................<15.0 mg/dL           Blasts     9.0          9.0     BUN, Bld 43         Calcium 8.8         Chloride 103         CO2 20         Creatinine 1.2         Differential Method     Manual          Manual     eGFR if  49.0         eGFR if non  42.5  Comment:  Calculation used to obtain the estimated glomerular filtration  rate (eGFR) is the CKD-EPI equation.            Eos #     CANCELED  Comment:  Result canceled by the ancillary.          CANCELED  Comment:  Result canceled by the ancillary.     Eosinophil%     3.0          3.0     Glucose 137         Gran%     42.0          42.0     Hematocrit 24.7   19.0  Comment:  HCT critical result(s) called and verbal readback obtained from Shelia Spears RN, 02/17/2019 10:46            19.0  Comment:  HCT critical result(s) called and verbal readback obtained from Shelia Spears RN, 02/17/2019 10:46       Hemoglobin 8.4   6.5          6.5     Hypo     Occasional          Occasional     IgA 204  Comment:  IgA Cord Blood Reference Range: <5 mg/dL.         IgG - Serum 956  Comment:  IgG Cord Blood Reference Range: 650-1600 mg/dL.         IgM 42  Comment:  IgM Cord Blood Reference Range: <25 mg/dL.         Lymph #     CANCELED  Comment:  Result canceled by the ancillary.          CANCELED  Comment:  Result canceled by the ancillary.     Lymph%      9.0          9.0     Magnesium 1.9         MCH 28.0   27.1          27.1     MCHC 34.0   34.2          34.2     MCV 82   79          79     Metamyelocytes     1.0          1.0     Mono #     CANCELED  Comment:  Result canceled by the ancillary.          CANCELED  Comment:  Result canceled by the ancillary.     Mono%     16.0          16.0     MPV SEE COMMENT  Comment:  Result not available.   SEE COMMENT  Comment:  Result not available.          SEE COMMENT  Comment:  Result not available.     Myelocytes     4.0          4.0     nRBC     1          1     Phosphorus 4.0         Platelet Estimate     Decreased          Decreased     Platelets     11  Comment:  PLT   critical result(s) called and verbal readback obtained from   JUANITA CHAVEZ RN, 02/17/2019 11:25            11  Comment:  PLT   critical result(s) called and verbal readback obtained from   JUANITA CHAVEZ RN, 02/17/2019 11:25       Poik     Slight          Slight     Poly     Occasional          Occasional     Potassium 3.3         Promyelocytes     3.0          3.0     Total Protein 5.7         RBC 3.00   2.40          2.40     RDW 17.4   18.8          18.8     Smudge Cells     Present  Comment:  Smudge cells present;Substantial numbers may affect the   accuracy of the differential.            Present  Comment:  Smudge cells present;Substantial numbers may affect the   accuracy of the differential.       Sodium 132         Vancomycin-Trough   18.7       WBC 7.61   8.52          8.52         All pertinent labs within the past 24 hours have been reviewed.    Significant Imaging:  I have reviewed all pertinent imaging results/findings within the past 24 hours.

## 2019-02-18 NOTE — NURSING
Critical care physician at bedside , family upset and the rapid decline iatient,s health.Wants to transfer from isolation room ,wants  place like   8th floor

## 2019-02-18 NOTE — ASSESSMENT & PLAN NOTE
- patient initially treated for CAP, due to continued fevers and unknown cause patient has been broad spectrum antimicrobials for 2/16 until present  - zosyn commenced on 2/13  - family currently does not desire any intensive therapy - deferring bronch at this point

## 2019-02-18 NOTE — NURSING
Pt has signed blood consent in chart - will be transferred with patient to 11th floor negative pressure room.

## 2019-02-19 VITALS
OXYGEN SATURATION: 100 % | DIASTOLIC BLOOD PRESSURE: 67 MMHG | HEIGHT: 60 IN | WEIGHT: 146.81 LBS | RESPIRATION RATE: 32 BRPM | SYSTOLIC BLOOD PRESSURE: 164 MMHG | TEMPERATURE: 100 F | HEART RATE: 94 BPM | BODY MASS INDEX: 28.82 KG/M2

## 2019-02-19 PROBLEM — Z86.73 HISTORY OF CVA (CEREBROVASCULAR ACCIDENT): Status: RESOLVED | Noted: 2019-02-03 | Resolved: 2019-02-19

## 2019-02-19 PROBLEM — D46.Z MDS (MYELODYSPLASTIC SYNDROME), HIGH GRADE: Status: RESOLVED | Noted: 2019-02-18 | Resolved: 2019-02-19

## 2019-02-19 LAB — GALACTOMANNAN AG SERPL IA-ACNC: <0.5 INDEX

## 2019-02-19 PROCEDURE — 99233 SBSQ HOSP IP/OBS HIGH 50: CPT | Mod: GC,,, | Performed by: INTERNAL MEDICINE

## 2019-02-19 PROCEDURE — 88271 CYTOGENETICS DNA PROBE: CPT | Mod: 59

## 2019-02-19 PROCEDURE — 99233 PR SUBSEQUENT HOSPITAL CARE,LEVL III: ICD-10-PCS | Mod: GC,,, | Performed by: INTERNAL MEDICINE

## 2019-02-19 PROCEDURE — 94640 AIRWAY INHALATION TREATMENT: CPT

## 2019-02-19 PROCEDURE — 25000003 PHARM REV CODE 250: Performed by: HOSPITALIST

## 2019-02-19 PROCEDURE — 25000242 PHARM REV CODE 250 ALT 637 W/ HCPCS: Performed by: STUDENT IN AN ORGANIZED HEALTH CARE EDUCATION/TRAINING PROGRAM

## 2019-02-19 PROCEDURE — 88275 CYTOGENETICS 100-300: CPT | Mod: 59

## 2019-02-19 PROCEDURE — 27000221 HC OXYGEN, UP TO 24 HOURS

## 2019-02-19 PROCEDURE — 94761 N-INVAS EAR/PLS OXIMETRY MLT: CPT

## 2019-02-19 RX ORDER — MORPHINE SULFATE ORAL SOLUTION 10 MG/5ML
2.5 SOLUTION ORAL
Status: DISCONTINUED | OUTPATIENT
Start: 2019-02-19 | End: 2019-02-19

## 2019-02-19 RX ORDER — SCOLOPAMINE TRANSDERMAL SYSTEM 1 MG/1
1 PATCH, EXTENDED RELEASE TRANSDERMAL
Status: DISCONTINUED | OUTPATIENT
Start: 2019-02-19 | End: 2019-02-19 | Stop reason: HOSPADM

## 2019-02-19 RX ORDER — MORPHINE SULFATE ORAL SOLUTION 10 MG/5ML
2.5 SOLUTION ORAL
Refills: 0
Start: 2019-02-19

## 2019-02-19 RX ORDER — SCOLOPAMINE TRANSDERMAL SYSTEM 1 MG/1
1 PATCH, EXTENDED RELEASE TRANSDERMAL
Start: 2019-02-22

## 2019-02-19 RX ORDER — LORAZEPAM 1 MG/1
1 TABLET ORAL
Start: 2019-02-19 | End: 2019-03-21

## 2019-02-19 RX ADMIN — IPRATROPIUM BROMIDE AND ALBUTEROL SULFATE 3 ML: .5; 3 SOLUTION RESPIRATORY (INHALATION) at 08:02

## 2019-02-19 RX ADMIN — IPRATROPIUM BROMIDE AND ALBUTEROL SULFATE 3 ML: .5; 3 SOLUTION RESPIRATORY (INHALATION) at 03:02

## 2019-02-19 RX ADMIN — IPRATROPIUM BROMIDE AND ALBUTEROL SULFATE 3 ML: .5; 3 SOLUTION RESPIRATORY (INHALATION) at 12:02

## 2019-02-19 RX ADMIN — SCOPALAMINE 1 PATCH: 1 PATCH, EXTENDED RELEASE TRANSDERMAL at 10:02

## 2019-02-19 NOTE — PLAN OF CARE
Transportation has been arranged through the [facility] for stretcher transport.  Ext. [phone] for any questions regarding time of arrival.     02/19/19 1245   Post-Acute Status   Post-Acute Authorization Home Health/Hospice   Post-Acute Placement Status Referrals Sent

## 2019-02-19 NOTE — NURSING
Patient admitted to SICU with airborne precautions. Potassium replacements and anitbiotics administered. Vital signs stable and 02 sat 95% on 2L NC. Family at bedside and educated on ICU policies and procedures. Dr. Lyons at bedside earlier updating family and answering questions. Will continue to monitor.

## 2019-02-19 NOTE — PLAN OF CARE
Transportation arranged through [facility] for stretcher transport to home address.  Family has been notified and agreeable to dc.  The extension # is [phone] for [facility].    Pt expected to dc today.    Yaron, dispatch [id number], has been notified of requested  time (7:00pm).    Darrion Snow, [facility]  Critical Care Medicine  Ext. [phone]

## 2019-02-19 NOTE — PLAN OF CARE
Plan for home with hospice.  CM noted orders placed this morning after requested.  Referral initatied.  Awaiting equipment delivery and admission paperwork to be completed prior to discharge/transport home.     02/19/19 1237   Right Care Assessment   Can the patient answer the patient profile reliably? Yes, cognitively intact   How often would a person be available to care for the patient? Whenever needed   Describe the patient's ability to walk at the present time. Major restrictions/daily assistance from another person   How does the patient rate their overall health at the present time? Fair   Number of comorbid conditions (as recorded on the chart) Five or more   During the past month, has the patient often been bothered by feeling down, depressed or hopeless? Yes   During the past month, has the patient often been bothered by little interest or pleasure in doing things? Yes   China Johnson RN, BSN, [facility]  Case Management  [facility]  Ext. [phone]

## 2019-02-19 NOTE — NURSING
AVS given to kimberly Ram of patient. Verbalization of understanding from granddaughter and family at bedside. Left brachial midline removed.

## 2019-02-19 NOTE — ASSESSMENT & PLAN NOTE
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

## 2019-02-19 NOTE — NURSING
Transferred to [id number] , patient lethargic ,follows simple commands  Attached  Monitor 126/78-HR 89 Resp 20 shallow ,patient had prod sputum yellowish with blood tinged unable to send to lab due contamination. Dr Jones aware of patient condition lethargic , will speak with daughters , explained transfer to ICU

## 2019-02-19 NOTE — PLAN OF CARE
Referral send to [facility]. Contact # [phone].    Representative will meet w/ Bettina, moose, PODIANNE, to complete paperwork and make arrangements for DME to be delivered to the home.    Darrion Snow, [facility]  Critical Care Medicine  Ext. [phone]

## 2019-02-19 NOTE — PLAN OF CARE
Received call from Patricio liaison for [facility] (cell# [phone]) notifying Sw that he is on his way to evaluate pt for home hospice and to complete paperwork w/family.    Expected dc is today.    Darrion Snow LMSW  Critical Care Medicine  Ext. [phone]

## 2019-02-19 NOTE — DISCHARGE SUMMARY
[facility]  Critical Care Medicine  Discharge Summary      Patient Name: Vicki Peña  MRN: [id number]  Admission Date: 2/2/2019  Hospital Length of Stay: 17 days  Discharge Date and Time:  02/19/2019 2:21 PM  Attending Physician: Anne Lyons MD   Discharging Provider: Akhil Gandara MD  Primary Care Provider: Yaron Waite MD  Reason for Admission: Abnormal lab values (anemia, thrombocytopenia)    HPI:   Vicki Peña is a 83 y.o. female with hx CVA, HLD and newly diagnosed MDS who presented to ED on Feb 2nd c/o myalgias and generalized fatigue since the new year.  Since that time she has undergone bone marrow biopsy for pancytopenia, revealing above diagnosis of MDS with cytogenetics result and NGS result pending.  She has remained hospitalized without improvement and has continued to fever despite broad spectrum antimicrobials. All cultures and work up have been unrevealing.  CT of her chest has bilateral infiltrates with small bilateral pleural effusions. Due to persistent fevers, worsening overall condition critical care was re-consulted.         * No surgery found *    Indwelling Lines/Drains at Time of Discharge:   Lines/Drains/Airways     Drain            Female External Urinary Catheter 02/10/19 2000 8 days              Hospital Course:   Presented to PCP on 2/2 with generalized body aches, nausea and vomiting of two week duration with severe bone pain. Patient with poor appetite. She has had a lot of headaches and the back of her neck hurts.  She has had a lot of chills.  No respiratory symptoms. Patient's last travel out of country to [facility] was Dec 2018. Admitted 2/2 due to anemia. 2/4 given 30cc/kg due to persistent fever, found to have UA - complaining of pleuritic chest pain - with CXR displaying left sided lower and upper airspace disease. TTE performed 2/4 with normal EF, markedly aneurysmal interatrial septum with clear evidence of right to left intracardiac shunting consistent  with a PF. Stepped up to ICU 2/4 and stepped down 2/6. 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 Underwent and completed course of CAP therapy. 2/13 with repeat CXR showing decreased infiltrates, patient with tachypnea, increased WOB, with leukocytosis and left shift, hypotension. Added broad spectrum abx. Patient continued to deteriorate clinically, remained on broad spectrum antimicrobials with fluconazole, zosyn, acyclovir, vancomycin with multiple serologies for fungal organisms and molds. Patient has undergone extensive testing with laboratory and microbiological workup. All cultures outside of a culture from mouth (which grew oral fernando) have been negative. Respiratory panels have been unrevealing. Parvo b19 IgG positive, IgM negative, prealbumin 7 on admit, HIV 1/2 negative ag/ab, acute hepatitis negative, EBV IgG positive, Fungitell negative, HSV1/HSV2 negative, AML negative. Histo urine pending, blastomyces antigen pending, aspergillus negative, IgA levels WNL. Transferred back to ICU due to lethargy, tachypnea and continued fevers. Goals of care addressed with all children today and have transitioned patient to comfort care measures only with discontinuation of antimicrobials. Home hospice arranged.     Consults (From admission, onward)        Status Ordering Provider     Inpatient consult to Critical Care Medicine  Once     Provider:  (Not yet assigned)    LANE Diaz     Inpatient consult to Critical Care Medicine  Once     Provider:  (Not yet assigned)    Completed JANELLE STEPHENS     Inpatient consult to Critical Care Medicine  Once     Provider:  (Not yet assigned)    Completed JANELLE STEPHENS     Inpatient consult to Hematology/Oncology  Once     Provider:  (Not yet assigned)    Completed JUDY RATLIFF     Inpatient consult to Infectious Diseases  Once      Provider:  (Not yet assigned)    Completed LANE SHAH     Inpatient consult to Infectious Diseases  Once     Provider:  (Not yet assigned)    Completed JANELLE STEPHENS     Inpatient consult to Midline team  Once     Provider:  (Not yet assigned)    Completed JANELLE STEPHENS     Inpatient consult to Midline team  Once     Provider:  (Not yet assigned)    Completed ALBIN MCCOLLUM     Inpatient consult to Palliative Care  Once     Provider:  (Not yet assigned)    Completed JANELLE STEPHENS     Inpatient consult to Pulmonology  Once     Provider:  (Not yet assigned)    Completed JANELLE STEPHENS     Inpatient consult to Registered Dietitian/Nutritionist  Once     Provider:  (Not yet assigned)    Completed JUDY RATLIFF     Inpatient consult to Registered Dietitian/Nutritionist  Once     Provider:  (Not yet assigned)    Completed AMOR SOTELO     Inpatient consult to Registered Dietitian/Nutritionist  Once     Provider:  (Not yet assigned)    Completed JANELLE STEPHENS     Inpatient consult to [facility]  Once     Provider:  (Not yet assigned)    Acknowledged JANELLE STEPHENS        Significant Labs:  CMP:   Recent Labs   Lab 02/18/19  0728   *   K 3.3*      CO2 20*   *   BUN 43*   CREATININE 1.2   CALCIUM 8.8   PROT 5.7*   ALBUMIN 1.6*   BILITOT 3.5*   ALKPHOS 303*   AST 18   ALT 28   ANIONGAP 9   EGFRNONAA 42.5*       Significant Imaging:  I have reviewed all pertinent imaging results/findings within the past 24 hours.    Pending Diagnostic Studies:     Procedure Component Value Units Date/Time    Aspergillus antigen [[id number]] Collected:  02/16/19 1547    Order Status:  Sent Lab Status:  In process Updated:  02/16/19 1619    Specimen:  Blood     Fungitell Assay For (1.3)-B-D-Glucans [[id number]] Collected:  02/16/19 1547    Order Status:  Sent Lab Status:  In process Updated:  02/16/19 1619    Specimen:  Blood     Histoplasma  antigen, urine [[id number]] Collected:  02/16/19 1905    Order Status:  Sent Lab Status:  In process Updated:  02/16/19 1935    Specimen:  Urine, Clean Catch         Final Active Diagnoses:    Diagnosis Date Noted POA    PRINCIPAL PROBLEM:  MDS (myelodysplastic syndrome), high grade [D46.Z] 02/18/2019 Yes    Severe anemia [D64.9] 02/18/2019 Yes    Thrombocytopenia [D69.6] 02/18/2019 Yes    Discharge planning issues [Z02.9] 02/18/2019 Not Applicable    Comfort measures only status [Z51.5] 02/18/2019 Not Applicable    Fever in adult [R50.9] 02/16/2019 Yes    Palliative care encounter [Z51.5] 02/15/2019 Not Applicable    Mouth ulcers [K12.1] 02/10/2019 No    PFO (patent foramen ovale) [Q21.1] 02/09/2019 Not Applicable    Pneumonia involving left lung [J18.9] 02/04/2019 Yes    Weakness [R53.1] 02/03/2019 Yes    History of CVA (cerebrovascular accident) [Z86.73] 02/03/2019 Not Applicable    Pancytopenia [D61.818]  Yes      Problems Resolved During this Admission:    Diagnosis Date Noted Date Resolved POA    Hypomagnesemia [E83.42] 02/05/2019 02/18/2019 No    Hypokalemia [E87.6] 02/05/2019 02/18/2019 Yes    SOB (shortness of breath) [R06.02]  02/18/2019 Yes    Sepsis due to urinary tract infection [A41.9, N39.0] 02/03/2019 02/18/2019 Yes    Hyponatremia [E87.1] 02/03/2019 02/18/2019 Yes    Hyperlipidemia [E78.5] 08/20/2014 02/19/2019 Yes     Chronic     Neuro   History of CVA (cerebrovascular accident)    - Patient with CVA ~ 20 years ago without residual neurological manifestion. TTE this admission consistent with PFO and shunt physiology, preserved EF, no diastolic dysfunction.              ENT   Mouth ulcers    - magic mouth wash ordered  - a significant contributor to patients overall condition and deterioration      Pulmonary   Pneumonia involving left lung    - patient initially treated for CAP, due to continued fevers and unknown cause patient has been broad spectrum antimicrobials for 2/16 until  present  - family currently does not desire any intensive therapy             Hematology   Thrombocytopenia    - if patient needs platelet transfusion please premedicate with tylenol and benadryl and have methylprednisolone prepared but not given   - transfuse for platelets <10      Oncology   * MDS (myelodysplastic syndrome), high grade    Patient with known cytopenias dating back to 2005, found to have t cell clonal rearrangement in 2012 and now with high risk MDS   - per IPSS-R patient with 7.5 high risk score    - due to current condition and overall debility, unlikely patient would tolerate chemotherapy   - hem/onc following for cytogenetics    - if patient needs platelet transfusion please premedicate with tylenol and benadryl and have methylprednisolone prepared but not given      Severe anemia    - MDS found on BMB performed on 2/12  - 7.5 points, IPSS-R Score, Very high risk, 0.8 years, Median survival, Median time to 25% AML evolution: 0.73 years     Pancytopenia    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 [place] [facility] [place] she had her blood drawn and was found to have a Hgb of 6.6. At that time her PCP instructed her to go to the emergency department for a blood transfusion. She is s/p 1unit Platelet transfusion - developed transfusion reaction during this - received IM epi, Benadryl/ 125 methylpred for concerns of throat swelling per Dr. Gonzalez. She also had t cell clonal rearrangment diagnosed in 2012. Underwent bone marrow biopsy on 2/12 with diagnosis of MDS   - she has an IPSS -R score of 7.5, high risk MDS. Cytogenics pending.    - Transfuse Hb <7 and platelets <10.              Other   Discharge planning issues    - pt is DNR/DNI  - home with home hospice, working with case mgmt to arrange     Fever in adult    83 year old with fatigue, body aches, recurrent and relapsing fever despite broad spectrum antimicrobials. All lab testing and microbiological  evaluation have been unrevealing. Patient continues to have febrile episodes despite broad spectrum antimicrobials.     -  initiating comfort measures  - all antimicrobials ceased per family request       Weakness    - Generalized, likely multifactorial (age, acute illness, deconditioning)  - home with home hospice        Discharged Condition: poor    Disposition: home with home hospice ([facility])      Patient Instructions:   No discharge procedures on file.  Medications:  Reconciled Home Medications:      Medication List      START taking these medications    (MAGIC MOUTHWASH) 1:1:1 BENADRYL 12.5MG/5ML LIQ, ALUMINUM & MAGNESIUM  Swish and spit 5 mLs every 4 (four) hours. for mouth sores     LORazepam 1 MG tablet  Commonly known as:  ATIVAN  Take 1 tablet (1 mg total) by mouth every 2 (two) hours as needed for Anxiety (RASS score greater than 0).     morphine 10 mg/5 mL solution  Take 1.3 mLs (2.6 mg total) by mouth every 2 (two) hours as needed for Pain (Pain >2/10 and dyspnea/air hunger).     scopolamine 1.3-1.5 mg (1 mg over 3 days)  Commonly known as:  TRANSDERM-SCOP  Place 1 patch onto the skin Every 3 (three) days.  Start taking on:  2/22/2019        CONTINUE taking these medications    ondansetron 8 MG Tbdl  Commonly known as:  ZOFRAN-ODT  Take 1 tablet (8 mg total) by mouth every 12 (twelve) hours as needed.        STOP taking these medications    albuterol 90 mcg/actuation inhaler  Commonly known as:  PROVENTIL/VENTOLIN HFA     ascorbic acid (vitamin C) 500 MG tablet  Commonly known as:  VITAMIN C     cranberry 400 mg Cap     GLUCOSAMINE ORAL     ibuprofen 600 MG tablet  Commonly known as:  ADVIL,MOTRIN     multivitamin capsule     naproxen 500 MG tablet  Commonly known as:  NAPROSYN     ranitidine 150 MG tablet  Commonly known as:  ZANTAC     simvastatin 40 MG tablet  Commonly known as:  IRVIN Gandara MD  Critical Care Medicine  [facility]

## 2019-02-19 NOTE — ASSESSMENT & PLAN NOTE
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 Prior to [facility] arrival she had her blood drawn and was found to have a Hgb of 6.6. At that time her PCP instructed her to go to the emergency department for a blood transfusion. She is s/p 1unit Platelet transfusion - developed transfusion reaction during this - received IM epi, Benadryl/ 125 methylpred for concerns of throat swelling per Dr. Gonzalez. She also had t cell clonal rearrangment diagnosed in 2012. Underwent bone marrow biopsy on 2/12 with diagnosis of MDS   - she has an IPSS -R score of 7.5, high risk MDS. Cytogenics pending.    - Transfuse Hb <7 and platelets <10.

## 2019-02-19 NOTE — PLAN OF CARE
CM returned call to Dr. Gandara with [facility] physician team.  Patient and family amenable to home hospice, per Dr. Gandara.  CM requested home hospice orders.      JAMES communicated disposition plan to MADI Snow LMSW who will initiate referrals with patient/family.      China Johnson, RN, BSN, CCM  Case Management  [facility]  Ext. [phone]

## 2019-02-19 NOTE — PLAN OF CARE
Problem: Adult Inpatient Plan of Care  Goal: Plan of Care Review  Outcome: Ongoing (interventions implemented as appropriate)  POC reviewed with patient and family who verbalized understanding. Pt rested well throughout the night. Vital signs stable. Pt worked well with therapy today. Pain being controlled with PRN pain medication, Morphine given once. Plan to go home on hospice today. Pt remained free from falls, No distress noted, will continue to monitor.

## 2019-02-19 NOTE — ASSESSMENT & PLAN NOTE
83 year old with fatigue, body aches, recurrent and relapsing fever despite broad spectrum antimicrobials. All lab testing and microbiological evaluation have been unrevealing. Patient continues to have febrile episodes despite broad spectrum antimicrobials.     -  initiating comfort measures  - all antimicrobials ceased per family request

## 2019-02-19 NOTE — ASSESSMENT & PLAN NOTE
- patient initially treated for CAP, due to continued fevers and unknown cause patient has been broad spectrum antimicrobials for 2/16 until present  - family currently does not desire any intensive therapy

## 2019-02-19 NOTE — SUBJECTIVE & OBJECTIVE
Interval history:  Transitioned to comfort care, antimicrobials stopped, lab draws ceased. Required single dose of morphine IV overnight      Review of Systems   Unable to perform ROS: Mental status change   Patient denies pain. Difficulty swallowing due to weakness and oral ulcers  Objective:     Vital Signs (Most Recent):  Temp: 98.6 °F (37 °C) (02/18/19 1900)  Pulse: 85 (02/19/19 0645)  Resp: (!) 25 (02/19/19 0645)  BP: (!) 120/58 (02/19/19 0600)  SpO2: 98 % (02/19/19 0645) Vital Signs (24h Range):  Temp:  [98.1 °F (36.7 °C)-100.6 °F (38.1 °C)] 98.6 °F (37 °C)  Pulse:  [] 85  Resp:  [17-49] 25  SpO2:  [92 %-100 %] 98 %  BP: (120-155)/(58-78) 120/58   Weight: 66.6 kg (146 lb 13.2 oz)  Body mass index is 28.68 kg/m².      Intake/Output Summary (Last 24 hours) at 2/19/2019 0806  Last data filed at 2/18/2019 2200  Gross per 24 hour   Intake 0 ml   Output 600 ml   Net -600 ml       Physical Exam   Constitutional: She has a sickly appearance. She appears ill. She appears distressed. Nasal cannula in place.   Able to follow commands and movement in all 4 extremities   HENT:   Head: Atraumatic.   Oral ulcers   Cardiovascular: Regular rhythm, S1 normal, S2 normal and intact distal pulses.   Pulses:       Radial pulses are 2+ on the right side, and 2+ on the left side.   Pulmonary/Chest: She has rhonchi in the right lower field and the left upper field. She has rales.   Abdominal: Soft. There is tenderness.   Musculoskeletal: She exhibits no edema.   Skin: Skin is warm and dry.   Nursing note and vitals reviewed.      Vents:     Lines/Drains/Airways     Drain            Female External Urinary Catheter 02/10/19 2000 8 days          Peripheral Intravenous Line                 Midline Catheter Insertion/Assessment  - Single Lumen 02/14/19 1705 Left basilic vein (medial side of arm) 18g x 8cm 4 days         Peripheral IV - Single Lumen 02/18/19 1629 Anterior;Left Forearm less than 1 day              Significant  Labs:    CBC/Anemia Profile:  Recent Labs   Lab 02/17/19  1013 02/18/19  0728   WBC 8.52  8.52 7.61   HGB 6.5*  6.5* 8.4*   HCT 19.0*  19.0* 24.7*   PLT 11*  11* 14*   MCV 79*  79* 82   RDW 18.8*  18.8* 17.4*        Chemistries:  Recent Labs   Lab 02/18/19  0728   *   K 3.3*      CO2 20*   BUN 43*   CREATININE 1.2   CALCIUM 8.8   ALBUMIN 1.6*   PROT 5.7*   BILITOT 3.5*   ALKPHOS 303*   ALT 28   AST 18   MG 1.9   PHOS 4.0       All pertinent labs within the past 24 hours have been reviewed.    Significant Imaging: I have reviewed all pertinent imaging results/findings within the past 24 hours.

## 2019-02-19 NOTE — PROGRESS NOTES
[facility]  Critical Care Medicine  Progress Note    Patient Name: Vicki Peña  MRN: [id number]  Admission Date: 2/2/2019  Hospital Length of Stay: 17 days  Code Status: DNR  Attending Provider: Anne Lyons MD  Primary Care Provider: Yaron Waite MD   Principal Problem: MDS (myelodysplastic syndrome), high grade    Subjective:     HPI:  Vicki Peña is a 83 y.o. female with hx CVA, HLD and newly diagnosed MDS who presented to ED on Feb 2nd c/o myalgias and generalized fatigue since the new year.  Since that time she has undergone bone marrow biopsy for pancytopenia, revealing above diagnosis of MDS with cytogenetics result and NGS result pending.  She has remained hospitalized without improvement and has continued to fever despite broad spectrum antimicrobials. All cultures and work up have been unrevealing.  CT of her chest has bilateral infiltrates with small bilateral pleural effusions. Due to persistent fevers, worsening overall condition critical care was re-consulted.         Hospital/ICU Course:  Presented to PCP on 2/2 with generalized body aches, nausea and vomiting of two week duration with severe bone pain. Patient with poor appetite. She has had a lot of headaches and the back of her neck hurts.  She has had a lot of chills.  No respiratory symptoms. Patient's last travel out of country to [facility] was Dec 2018. Admitted 2/2 due to anemia. 2/4 given 30cc/kg due to persistent fever, found to have UA - complaining of pleuritic chest pain - with CXR displaying left sided lower and upper airspace disease. 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 Stepped up to ICU 2/4 and stepped down 2/6. 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 Underwent and completed course of CAP  therapy. 2/13 with repeat CXR showing decreased infiltrates, patient with tachypnea, increased WOB, with leukocytosis and left shift, hypotension. Added broad spectrum abx. Patient continued to deteriorate clinically, remained on broad spectrum antimicrobials with fluconazole, zosyn, acyclovir, vancomycin with multiple serologies for fungal organisms and molds. Patient has undergone extensive testing with laboratory and microbiological workup. All cultures outside of a culture from mouth (which grew oral fernando) have been negative. Respiratory panels have been unrevealing. Parvo b19 IgG positive, IgM negative, prealbumin 7 on admit, HIV 1/2 negative ag/ab, acute hepatitis negative, EBV IgG positive, Fungitell negative, HSV1/HSV2 negative, AML negative. Histo urine pending, blastomyces antigen pending, aspergillus negative, IgA levels WNL. Transferred back to ICU due to lethargy, tachypnea and continued fevers. Goals of care addressed with all children today and have transitioned patient to comfort care measures only with discontinuation of antimicrobials. Arranging home hospice.       Interval history:  Transitioned to comfort care, antimicrobials stopped, lab draws ceased. Required single dose of morphine IV overnight      Review of Systems   Unable to perform ROS: Mental status change   Patient denies pain. Difficulty swallowing due to weakness and oral ulcers  Objective:     Vital Signs (Most Recent):  Temp: 98.6 °F (37 °C) (02/18/19 1900)  Pulse: 85 (02/19/19 0645)  Resp: (!) 25 (02/19/19 0645)  BP: (!) 120/58 (02/19/19 0600)  SpO2: 98 % (02/19/19 0645) Vital Signs (24h Range):  Temp:  [98.1 °F (36.7 °C)-100.6 °F (38.1 °C)] 98.6 °F (37 °C)  Pulse:  [] 85  Resp:  [17-49] 25  SpO2:  [92 %-100 %] 98 %  BP: (120-155)/(58-78) 120/58   Weight: 66.6 kg (146 lb 13.2 oz)  Body mass index is 28.68 kg/m².      Intake/Output Summary (Last 24 hours) at 2/19/2019 0806  Last data filed at 2/18/2019 2200  Gross per 24 hour    Intake 0 ml   Output 600 ml   Net -600 ml       Physical Exam   Constitutional: She has a sickly appearance. She appears ill. She appears distressed. Nasal cannula in place.   Able to follow commands and movement in all 4 extremities   HENT:   Head: Atraumatic.   Oral ulcers   Cardiovascular: Regular rhythm, S1 normal, S2 normal and intact distal pulses.   Pulses:       Radial pulses are 2+ on the right side, and 2+ on the left side.   Pulmonary/Chest: She has rhonchi in the right lower field and the left upper field. She has rales.   Abdominal: Soft. There is tenderness.   Musculoskeletal: She exhibits no edema.   Skin: Skin is warm and dry.   Nursing note and vitals reviewed.      Vents:     Lines/Drains/Airways     Drain            Female External Urinary Catheter 02/10/19 2000 8 days          Peripheral Intravenous Line                 Midline Catheter Insertion/Assessment  - Single Lumen 02/14/19 1705 Left basilic vein (medial side of arm) 18g x 8cm 4 days         Peripheral IV - Single Lumen 02/18/19 1629 Anterior;Left Forearm less than 1 day              Significant Labs:    CBC/Anemia Profile:  Recent Labs   Lab 02/17/19  1013 02/18/19  0728   WBC 8.52  8.52 7.61   HGB 6.5*  6.5* 8.4*   HCT 19.0*  19.0* 24.7*   PLT 11*  11* 14*   MCV 79*  79* 82   RDW 18.8*  18.8* 17.4*        Chemistries:  Recent Labs   Lab 02/18/19  0728   *   K 3.3*      CO2 20*   BUN 43*   CREATININE 1.2   CALCIUM 8.8   ALBUMIN 1.6*   PROT 5.7*   BILITOT 3.5*   ALKPHOS 303*   ALT 28   AST 18   MG 1.9   PHOS 4.0       All pertinent labs within the past 24 hours have been reviewed.    Significant Imaging: I have reviewed all pertinent imaging results/findings within the past 24 hours.      ABG  Recent Labs   Lab 02/16/19  1659   PH 7.506*   PO2 88   PCO2 30.0*   HCO3 23.7*   BE 1     Assessment/Plan:     Neuro   History of CVA (cerebrovascular accident)    Patient with CVA ~ 20 years ago without residual neurological  manifestion. TTE this admission consistent with PFO and shunt physiology, preserved EF, no diastolic dysfunction.              ENT   Mouth ulcers    - magic mouth wash ordered  - a significant contributor to patients overall condition and deterioration      Pulmonary   Pneumonia involving left lung    - patient initially treated for CAP, due to continued fevers and unknown cause patient has been broad spectrum antimicrobials for 2/16 until present  - family currently does not desire any intensive therapy             Hematology   Thrombocytopenia    - if patient needs platelet transfusion please premedicate with tylenol and benadryl and have methylprednisolone prepared but not given   - transfuse for platelets <10      Oncology   * MDS (myelodysplastic syndrome), high grade    Patient with known cytopenias dating back to 2005, found to have t cell clonal rearrangement in 2012 and now with high risk MDS   - per IPSS-R patient with 7.5 high risk score    - due to current condition and overall debility, unlikely patient would tolerate chemotherapy   - hem/onc following for cytogenetics    - if patient needs platelet transfusion please premedicate with tylenol and benadryl and have methylprednisolone prepared but not given      Severe anemia    - MDS found on BMB performed on 2/12  - 7.5 points, IPSS-R Score, Very high risk, 0.8 years, Median survival, Median time to 25% AML evolution: 0.73 years     Pancytopenia    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 [place] [facility] [place] her blood drawn and was found to have a Hgb of 6.6. At that time her PCP instructed her to go to the emergency department for a blood transfusion. She is s/p 1unit Platelet transfusion - developed transfusion reaction during this - received IM epi, Benadryl/ 125 methylpred for concerns of throat swelling per Dr. Gonzalez. She also had t cell clonal rearrangment diagnosed in 2012. Underwent bone marrow  biopsy on 2/12 with diagnosis of MDS   - she has an IPSS -R score of 7.5, high risk MDS. Cytogenics pending.    - Transfuse Hb <7 and platelets <10.              Other   Comfort measures only status    - plan for home with home hospice today     Discharge planning issues    - pt is DNR/DNI  - home with home hospice, working with case mgmt to arrange     Fever in adult    83 year old with fatigue, body aches, recurrent and relapsing fever despite broad spectrum antimicrobials. All lab testing and microbiological evaluation have been unrevealing. Patient continues to have febrile episodes despite broad spectrum antimicrobials.     -  initiating comfort measures  - all antimicrobials ceased per family request       Weakness    - Generalized, likely multifactorial (age, acute illness, deconditioning)  - home with home hospice         Critical Care Daily Checklist:    A: Awake: RASS Goal/Actual Goal: RASS Goal: 0-->alert and calm  Actual: Ambrose Agitation Sedation Scale (RASS): Alert and calm   B: Spontaneous Breathing Trial Performed?     C: SAT & SBT Coordinated?  NA                      D: Delirium: CAM-ICU Overall CAM-ICU: Negative   E: Early Mobility Performed? NA   F: Feeding Goal: Goals: 1. Meet >75% EEN/EPN 2. Prevent >2% dry weight loss over one week  Status: Nutrition Goal Status: goal not met   Current Diet Order   Procedures    Diet Adult Regular (IDDSI Level 7) Fluid - 1000mL; Thin     Order Specific Question:   Fluid restriction:     Answer:   Fluid - 1000mL     Order Specific Question:   Fluid consistency:     Answer:   Thin      AS: Analgesia/Sedation Morphine, lorazepam PRN   T: Thromboembolic Prophylaxis SCD - plt count <50   H: HOB > 300 YES   U: Stress Ulcer Prophylaxis (if needed) NA   G: Glucose Control NA   B: Bowel Function Stool Occurrence: 2   I: Indwelling Catheter (Lines & Ojeda) Necessity Purwick and PIV in place   D: De-escalation of Antimicrobials/Pharmacotherapies All antimicrobials off     Plan for the day/ETD Home with home hospice    Code Status:  Family/Goals of Care: DNR  Family updated, plan reviewed       Critical secondary to Patient has a condition that poses threat to life and bodily function: fever of unknown origin, myelodysplastic syndrome (high grade)      Critical care was time spent personally by me on the following activities: development of treatment plan with patient or surrogate and bedside caregivers, discussions with consultants, evaluation of patient's response to treatment, examination of patient, ordering and performing treatments and interventions, ordering and review of laboratory studies, ordering and review of radiographic studies, pulse oximetry, re-evaluation of patient's condition. This critical care time did not overlap with that of any other provider or involve time for any procedures.     Akhil Gandara MD  Critical Care Medicine  [facility]

## 2019-02-19 NOTE — PLAN OF CARE
[facility]  Department of Hospital Medicine  [address] [place] [address]  [phone] after hours  [phone] fax    HOSPICE  ORDERS    02/19/2019    Admit to Hospice:  Home Service    Diagnoses:   Active Hospital Problems    Diagnosis  POA    *MDS (myelodysplastic syndrome), high grade [D46.Z]  Yes    Pneumonia involving left lung [J18.9]  Yes     Priority: 4     Severe anemia [D64.9]  Yes    Thrombocytopenia [D69.6]  Yes    Discharge planning issues [Z02.9]  Not Applicable    Comfort measures only status [Z51.5]  Not Applicable    Fever in adult [R50.9]  Yes    Palliative care encounter [Z51.5]  Not Applicable    Mouth ulcers [K12.1]  No    PFO (patent foramen ovale) [Q21.1]  Not Applicable    Weakness [R53.1]  Yes    History of CVA (cerebrovascular accident) [Z86.73]  Not Applicable    Pancytopenia [D61.818]  Yes      Resolved Hospital Problems    Diagnosis Date Resolved POA    Sepsis due to urinary tract infection [A41.9, N39.0] 02/18/2019 Yes     Priority: 2     Hypomagnesemia [E83.42] 02/18/2019 No    Hypokalemia [E87.6] 02/18/2019 Yes    SOB (shortness of breath) [R06.02] 02/18/2019 Yes    Hyponatremia [E87.1] 02/18/2019 Yes    Hyperlipidemia [E78.5] 02/19/2019 Yes     Chronic       Hospice Qualifying Diagnoses:        Patient has a life expectancy < 6 months due to:  Primary Hospice Diagnosis:  MDS (myelodysplastic syndrome), high grade    1) Comorbid Conditions Contributing to Decline: Recurrent infection     Vital Signs: Routine per Hospice Protocol.    Code Status: DNR    Allergies:   Review of patient's allergies indicates:   Allergen Reactions    Tramadol Rash       Diet: As tolerated    Activities: As tolerated    Nursing: Per Hospice Routine.      Routine Skin for Bedridden Patients: Apply moisture barrier cream to all skin folds and wet areas in perineal area daily and after baths and all bowel movements.      Oxygen: As  tolerated    Medications:     Soni Peña   Home Medication Instructions PATRICIA:[id number]    Printed on:02/19/19 1185   Medication Information                      (Magic mouthwash) 1:1:1 Benadryl 12.5mg/5ml liq, aluminum & magnesium hydroxide-simehticone (Maalox), lidocaine viscous 2%  Swish and spit 5 mLs every 4 (four) hours. for mouth sores             LORazepam (ATIVAN) 1 MG tablet  Take 1 tablet (1 mg total) by mouth every 2 (two) hours as needed for Anxiety (RASS score greater than 0).             morphine 10 mg/5 mL solution  Take 1.3 mLs (2.6 mg total) by mouth every 2 (two) hours as needed for Pain (Pain >2/10 and dyspnea/air hunger).             ondansetron (ZOFRAN-ODT) 8 MG TbDL  Take 1 tablet (8 mg total) by mouth every 12 (twelve) hours as needed.             scopolamine (TRANSDERM-SCOP) 1.3-1.5 mg (1 mg over 3 days)  Place 1 patch onto the skin Every 3 (three) days.                   _________________________________  Akhil Gandara MD  02/19/2019

## 2019-02-19 NOTE — ASSESSMENT & PLAN NOTE
- Patient with CVA ~ 20 years ago without residual neurological manifestion. TTE this admission consistent with PFO and shunt physiology, preserved EF, no diastolic dysfunction.

## 2019-02-19 NOTE — ASSESSMENT & PLAN NOTE
- Generalized, likely multifactorial (age, acute illness, deconditioning)  - home with home hospice

## 2019-02-19 NOTE — ASSESSMENT & PLAN NOTE
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 Prior to [facility] arrival she had her blood drawn and was found to have a Hgb of 6.6. At that time her PCP instructed her to go to the emergency department for a blood transfusion. She is s/p 1unit Platelet transfusion - developed transfusion reaction during this - received IM epi, Benadryl/ 125 methylpred for concerns of throat swelling per Dr. Gonzalez. She also had t cell clonal rearrangment diagnosed in 2012. Underwent bone marrow biopsy on 2/12 with diagnosis of MDS   - she has an IPSS -R score of 7.5, high risk MDS. Cytogenics pending.    - Transfuse Hb <7 and platelets <10.

## 2019-02-19 NOTE — PLAN OF CARE
Problem: Spiritual Distress Risk or Actual  Goal: Spiritual Wellbeing    Intervention: Promote Spiritual Wellbeing  Provided f/u visit. Pt and family present. Introduced and offered pastoral support. Family expressed acceptance of pt's prognosis. No spiritual needs expressed at this point. Family made aware of 's presence as needed.

## 2019-02-20 LAB
1,3 BETA GLUCAN SPEC-MCNC: <31 PG/ML
AML FISH ADDITIONAL INFORMATION (BM): NORMAL
AML FISH DISCLAIMER (BM): NORMAL
AML FISH REASON FOR REFERRAL (BM): NORMAL
AML FISH RELEASED BY (BM): NORMAL
AML FISH RESULT (BM): NORMAL
AML FISH RESULT SUMMARY (BM): NORMAL
AML FISH RESULT TABLE (BM): NORMAL
CLINICAL CYTOGENETICIST REVIEW: NORMAL
DNA/RNA EXTRACT AND HOLD RESULT: NORMAL
DNA/RNA EXTRACTION: NORMAL
EXHR SPECIMEN TYPE: NORMAL
FAMLB SPECIMEN: NORMAL
HISTOPLASMA AG VALUE: 0 NG/ML
HISTOPLASMA ANTIGEN URINE: NEGATIVE
REF LAB TEST METHOD: NORMAL
SPECIMEN SOURCE: NORMAL

## 2019-02-20 NOTE — PHYSICIAN QUERY
PT Name: Vicki Peña  MR #: [id number]    Physician Query Form - Nutrition Clarification     CDS/: Meg Wilder RN              Contact information:  [phone]    This form is a permanent document in the medical record.     Query Date: February 20, 2019    By submitting this query, we are merely seeking further clarification of documentation.. Please utilize your independent clinical judgment when addressing the question(s) below.    The Medical record contains the following:   Indicators  Supporting Clinical Findings Location in Medical Record   x % of Estimated Energy Intake over a time frame from p.o., TF, or TPN Energy Calories Required: not meeting needs   Protein Required: not meeting needs   Comments: LBM 2/15   % Intake of Estimated Energy Needs: 0 - 25 %   % Meal Intake: 0 - 25 %      Nutrition Problem   Inadequate oral intake     Related to (etiology):   Poor appetite     Signs and Symptoms (as evidenced by):   PO intake 0-25% PTA for >2 weeks; PO intake 0-25% while admitted; mild weight loss    2/18 Dietitian consult   x Weight Status over a time frame % Weight Change From Usual Weight: -3.73 %    2/18 Dietitian consult   x Subcutaneous Fat and/or Muscle Loss Orbital Region (Subcutaneous Fat Loss): moderate depletion   Upper Arm Region (Subcutaneous Fat Loss): moderate depletion   Hoahaoism Region (Muscle Loss): moderate depletion   Clavicle and Acromion Bone Region (Muscle Loss): mild depletion   Patellar Region (Muscle Loss): moderate depletion   Posterior Calf Region (Muscle Loss): moderate depletion    2/18 Dietitian consult    Fluid Accumulation or Edema      Reduced  Strength     x Wt / BMI / Usual Body Weight BMI= 22.5  Ht= 5'  Wt= 66.6kg 2/18 Dietitian consult    Delayed Wound Healing / Failure to Thrive     x Acute or Chronic Illness MDS  Severe anemia  Thrombocytopenia  Sepsis due to infection   2/19 DC summary    Medication     x Treatment Continues with meal intake 0-25% on  regular diet with Boost ordered. Consulted for TF recs   2/18 Dietitian consult   x Other Factors Affecting Nutritional Intake: decreased appetite, sore mouth, chewing difficulties/inability to chew food     Recommendation/Intervention:   1. If TF desired, recommend Isosource 1.5 at 35 ml/hr to provide 1260 kcal, 57 gm pro, and 642 ml free water. Additional free water per MD.   2. Continue current regular diet with Boost Plus and encourage PO intake as tolerated. Recommend mechanical soft texture if pt with continued difficulty chewing due to mouth sores.   3. RD following.    2/18 Dietitian consult     AND / ASPEN Clinical Characteristics (October 2011)  A minimum of two characteristics is recommended for diagnosing either moderate or severe malnutrition   Mild Malnutrition Moderate Malnutrition Severe Malnutrition   Energy Intake from p.o., TF or TPN. < 75% intake of estimated energy needs for less than 7 days < 75% intake of estimated energy needs for greater than 7 days < 50% intake of estimated energy needs for > 5 days   Weight Loss 1-2% in 1 month  5% in 3 months  7.5% in 6 months  10% in 1 year 1-2 % in 1 week  5% in 1 month  7.5% in 3 months  10% in 6 months  20% in 1 year > 2% in 1 week  > 5% in 1 month  > 7.5% in 3 months  > 10% in 6 months  > 20% in 1 year   Physical Findings     None *Mild subcutaneous fat and/or muscle loss  *Mild fluid accumulation  *Stage II decubitus  *Surgical wound or non-healing wound *Mod/severe subcutaneous fat and/or muscle loss  *Mod/severe fluid accumulation  *Stage III or IV decubitus  *Non-healing surgical wound     Provider, please specify diagnosis or diagnoses associated with above clinical findings.    [  ] Mild Protein-Calorie Malnutrition   [ x ] Moderate Protein-Calorie Malnutrition   [  ] Other Nutritional Diagnosis (please specify):    [  ] Other:    [  ] Clinically Undetermined       Please document in your progress notes daily for the duration of treatment until  resolved and include in your discharge summary.

## 2019-02-20 NOTE — NURSING
Dx: Pneumonia    Shift Events: Discharged home on hospice     Neuro: Arouses to voice, Moves all extremities and Follows commands    Vital Signs: BP (!) 164/67 (BP Location: Right arm, Patient Position: Lying)   Pulse 94   Temp 99.8 °F (37.7 °C) (Axillary)   Resp (!) 32   Ht 5' (1.524 m)   Wt 66.6 kg (146 lb 13.2 oz)   SpO2 100%   Breastfeeding? No   BMI 28.68 kg/m²     Diet: NPO    Output: Purewick    Skin: Bruises located around previous IV insertion sites noted and documented.

## 2019-02-20 NOTE — PLAN OF CARE
Follow-up With  Details  Why  Contact Info   [facility]    Hospice  [address] [address]  [phone]        02/20/19 0959   Final Note   Assessment Type Final Discharge Note   Anticipated Discharge Disposition HospiceHome   What phone number can be called within the next 1-3 days to see how you are doing after discharge? [phone]   Hospital Follow Up  Appt(s) scheduled? No   Discharge plans and expectations educations in teach back method with documentation complete? No   Right Care Referral Info   Post Acute Recommendation Home-care   Referral Type Hospice   Facility Name [facility]   China Johnson, RN, BSN, CCM  Case Management  [facility]  Ext. [phone]

## 2019-02-21 LAB
BACTERIA BLD CULT: NORMAL
BACTERIA BLD CULT: NORMAL
BLASTOMYCES AG INTERP: NEGATIVE
BLASTOMYCES AG RESULT: NORMAL NG/ML
BLASTOMYCES AG SPECIMEN: NORMAL
CHROM BANDING METHOD: NORMAL
CHROMOSOME ANALYSIS BM ADDITIONAL INFORMATION: NORMAL
CHROMOSOME ANALYSIS BM RELEASED BY: NORMAL
CHROMOSOME ANALYSIS BM RESULT SUMMARY: NORMAL
CLINICAL CYTOGENETICIST REVIEW: NORMAL
KARYOTYP MAR: NORMAL
MAYO MISCELLANEOUS RESULT (REF): NORMAL
REASON FOR REFERRAL (NARRATIVE): NORMAL
REF LAB TEST METHOD: NORMAL
SPECIMEN SOURCE: NORMAL
SPECIMEN: NORMAL

## 2019-02-22 NOTE — ASSESSMENT & PLAN NOTE
84 yo female with fever and sepsis who is being admitted to the ICU.     - Possible sources of infection are urine and lung  - Urine culture growing E.Coli   - CXR with possible PNA  - Discontinue Vanc  - Deescalate Zosyn to Ceftriaxone   - Can continue planned 3 doses of Azithromycin as already started   - C.Diff negative  - Will continue to follow pending cultures    all other ROS negative except as per HPI

## 2019-03-07 LAB
ANNOTATION COMMENT IMP: NORMAL
NGS CLINCIAL TRIALS: NORMAL
NGS INDICATION OF TEST: NORMAL
NGS INTERPRETATION: NORMAL
NGS ONCOHEME PANEL GENE LIST: NORMAL
NGS PATHOGENIC MUTATIONS DETECTED: NORMAL
NGS REVIEWED BY:: NORMAL
NGS VARIANTS OF UNKNOWN SIGNIFICANCE: NORMAL
NGSHM RESULT, BONE MARROW: NORMAL
REF LAB TEST METHOD: NORMAL
SPECIMEN SOURCE: NORMAL
TEST PERFORMANCE INFO SPEC: NORMAL

## 2019-03-24 DIAGNOSIS — E78.5 HYPERLIPIDEMIA, UNSPECIFIED HYPERLIPIDEMIA TYPE: Chronic | ICD-10-CM

## 2019-03-24 RX ORDER — SIMVASTATIN 40 MG/1
40 TABLET, FILM COATED ORAL NIGHTLY
Qty: 90 TABLET | Refills: 3 | Status: SHIPPED | OUTPATIENT
Start: 2019-03-24 | End: 2019-06-22

## 2021-12-03 NOTE — PROGRESS NOTES
Subjective:       Patient ID: Vicki Peña is a 82 y.o. female.    Chief Complaint: Follow-up    HPI    82-year-old female with keratoconjunctivitis here for follow-up.  Patient was to return to clinic after going to [place] to reevaluate blood pressure.  She enjoyed her trip to [place].    HLD - Patient is currently on zocor 40 mg.  Her last lipid panel was   Cholesterol   Date Value Ref Range Status   02/16/2017 162 120 - 199 mg/dL Final     Comment:     The National Cholesterol Education Program (NCEP) has set the  following guidelines (reference ranges) for Cholesterol:  Optimal.....................<200 mg/dL  Borderline High.............200-239 mg/dL  High........................> or = 240 mg/dL       Triglycerides   Date Value Ref Range Status   02/16/2017 62 30 - 150 mg/dL Final     Comment:     The National Cholesterol Education Program (NCEP) has set the  following guidelines (reference values) for triglycerides:  Normal......................<150 mg/dL  Borderline High.............150-199 mg/dL  High........................200-499 mg/dL       HDL   Date Value Ref Range Status   02/16/2017 78 (H) 40 - 75 mg/dL Final     Comment:     The National Cholesterol Education Program (NCEP) has set the  following guidelines (reference values) for HDL Cholesterol:  Low...............<40 mg/dL  Optimal...........>60 mg/dL       LDL Cholesterol   Date Value Ref Range Status   02/16/2017 71.6 63.0 - 159.0 mg/dL Final     Comment:     The National Cholesterol Education Program (NCEP) has set the  following guidelines (reference values) for LDL Cholesterol:  Optimal.......................<130 mg/dL  Borderline High...............130-159 mg/dL  High..........................160-189 mg/dL  Very High.....................>190 mg/dL     .  Side effects of the medication: none.    HTN -  Patient's co morbidities include: HLD. Patient is currently on no medication. She does check her BP at home, and it runs 130-140/70s. Side  effects of medications note: none. Denies headaches, blurred vision, chest pain, shortness of breath, nausea.    Osteoporosis - on actonel.  Needs a refill.    Her arthritis is acting up. She had steroid injections in both knees last week.  She feels better with steroid injections.    Review of Systems    Objective:      Physical Exam   Constitutional: She is oriented to person, place, and time. She appears well-developed and well-nourished.   HENT:   Head: Normocephalic and atraumatic.   Mouth/Throat: No oropharyngeal exudate.   Eyes: EOM are normal. Pupils are equal, round, and reactive to light. Right eye exhibits no discharge. Left eye exhibits no discharge. No scleral icterus.   Neck: Normal range of motion. Neck supple. No tracheal deviation present. No thyromegaly present.   Cardiovascular: Normal rate, regular rhythm and normal heart sounds.  Exam reveals no gallop and no friction rub.    No murmur heard.  Pulmonary/Chest: Effort normal and breath sounds normal. No respiratory distress. She has no wheezes. She has no rales. She exhibits no tenderness.   Abdominal: Soft. Bowel sounds are normal. She exhibits no distension and no mass. There is no tenderness. There is no rebound and no guarding.   Musculoskeletal: Normal range of motion. She exhibits no edema or tenderness.   Neurological: She is alert and oriented to person, place, and time.   Skin: Skin is warm and dry. No rash noted. No erythema. No pallor.   Psychiatric: She has a normal mood and affect. Her behavior is normal.   Vitals reviewed.      Assessment:       1. Essential hypertension    2. Hyperlipidemia, unspecified hyperlipidemia type    3. Osteoporosis, unspecified osteoporosis type, unspecified pathological fracture presence    4. Keratoconjunctivitis sicca of both eyes        Plan:       1.  Start losartan 50 mrem daily.  Return to clinic in one month to reassess.  2.  Continue Zocor 40 mrem daily.  3.  Continue Actonel 150 mg monthly.  4.   Monitor.    Check CBC, CMP, TSH, lipids.        [place]onne

## 2022-03-16 NOTE — PLAN OF CARE
DRE spoke w/moose Dunbar, POA cell# [phone], who requested that home hospice list be given to family @ bedside as they will make a joint decision.      10:58 am - DRE received a call from preston Stewart, @ bedside who stated that the family has selected [facility] for in home care.    Expected dc is 02/19/19.    Darrion Snow LMSW  Critical Care Medicine  Ext. [phone]     Xerosis Aggressive Treatment: I recommended application of Cetaphil or CeraVe numerous times a day and before going to bed to all dry areas. I also prescribed a topical steroid for twice daily use.

## 2023-05-11 NOTE — PLAN OF CARE
02/13/19 1452   Post-Acute Status   Post-Acute Authorization Placement   Post-Acute Placement Status Referrals Sent     Awaiting medical stability and SNF acceptance.    Propranolol Counseling:  I discussed with the patient the risks of propranolol including but not limited to low heart rate, low blood pressure, low blood sugar, restlessness and increased cold sensitivity. They should call the office if they experience any of these side effects.

## 2024-09-02 NOTE — ASSESSMENT & PLAN NOTE
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